# Patient Record
Sex: MALE | Race: WHITE | NOT HISPANIC OR LATINO | Employment: OTHER | URBAN - METROPOLITAN AREA
[De-identification: names, ages, dates, MRNs, and addresses within clinical notes are randomized per-mention and may not be internally consistent; named-entity substitution may affect disease eponyms.]

---

## 2017-01-31 ENCOUNTER — OFFICE VISIT (OUTPATIENT)
Dept: RHEUMATOLOGY | Facility: CLINIC | Age: 67
End: 2017-01-31
Payer: MEDICARE

## 2017-01-31 VITALS
BODY MASS INDEX: 27.25 KG/M2 | WEIGHT: 200.88 LBS | DIASTOLIC BLOOD PRESSURE: 86 MMHG | HEART RATE: 57 BPM | SYSTOLIC BLOOD PRESSURE: 135 MMHG

## 2017-01-31 DIAGNOSIS — M54.2 NECK PAIN, ACUTE: ICD-10-CM

## 2017-01-31 DIAGNOSIS — M19.90 ARTHRITIS PAIN: ICD-10-CM

## 2017-01-31 DIAGNOSIS — M47.15 OSTEOARTHRITIS OF SPINE WITH MYELOPATHY, THORACOLUMBAR REGION: ICD-10-CM

## 2017-01-31 DIAGNOSIS — M06.9 RHEUMATOID ARTHRITIS OF HAND, UNSPECIFIED LATERALITY, UNSPECIFIED RHEUMATOID FACTOR PRESENCE: ICD-10-CM

## 2017-01-31 DIAGNOSIS — K90.9 INTESTINAL MALABSORPTION, UNSPECIFIED TYPE: ICD-10-CM

## 2017-01-31 DIAGNOSIS — E78.9 LIPID DISORDER: ICD-10-CM

## 2017-01-31 DIAGNOSIS — E03.9 HYPOTHYROIDISM, UNSPECIFIED TYPE: Primary | ICD-10-CM

## 2017-01-31 PROCEDURE — 96372 THER/PROPH/DIAG INJ SC/IM: CPT | Mod: PBBFAC,PO

## 2017-01-31 PROCEDURE — 99214 OFFICE O/P EST MOD 30 MIN: CPT | Mod: 25,S$PBB,, | Performed by: INTERNAL MEDICINE

## 2017-01-31 PROCEDURE — 99213 OFFICE O/P EST LOW 20 MIN: CPT | Mod: PBBFAC,PO | Performed by: INTERNAL MEDICINE

## 2017-01-31 PROCEDURE — 99999 PR PBB SHADOW E&M-EST. PATIENT-LVL III: CPT | Mod: PBBFAC,,, | Performed by: INTERNAL MEDICINE

## 2017-01-31 RX ORDER — KETOROLAC TROMETHAMINE 30 MG/ML
60 INJECTION, SOLUTION INTRAMUSCULAR; INTRAVENOUS
Status: COMPLETED | OUTPATIENT
Start: 2017-01-31 | End: 2017-01-31

## 2017-01-31 RX ORDER — CYANOCOBALAMIN 1000 UG/ML
1000 INJECTION, SOLUTION INTRAMUSCULAR; SUBCUTANEOUS ONCE
Status: COMPLETED | OUTPATIENT
Start: 2017-01-31 | End: 2017-01-31

## 2017-01-31 RX ORDER — METHYLPREDNISOLONE ACETATE 80 MG/ML
160 INJECTION, SUSPENSION INTRA-ARTICULAR; INTRALESIONAL; INTRAMUSCULAR; SOFT TISSUE
Status: COMPLETED | OUTPATIENT
Start: 2017-01-31 | End: 2017-01-31

## 2017-01-31 RX ORDER — DICLOFENAC SODIUM 100 MG/1
TABLET, EXTENDED RELEASE ORAL
Qty: 60 TABLET | Refills: 2 | Status: SHIPPED | OUTPATIENT
Start: 2017-01-31 | End: 2017-07-26 | Stop reason: SDUPTHER

## 2017-01-31 RX ORDER — LEVOTHYROXINE SODIUM 25 UG/1
50 TABLET ORAL
Qty: 60 TABLET | Refills: 11 | Status: SHIPPED | OUTPATIENT
Start: 2017-01-31 | End: 2018-02-02 | Stop reason: SDUPTHER

## 2017-01-31 RX ORDER — ESZOPICLONE 1 MG/1
1 TABLET, FILM COATED ORAL NIGHTLY
Qty: 30 TABLET | Refills: 3 | Status: SHIPPED | OUTPATIENT
Start: 2017-01-31 | End: 2017-03-02

## 2017-01-31 RX ADMIN — CYANOCOBALAMIN 1000 MCG: 1000 INJECTION, SOLUTION INTRAMUSCULAR at 05:01

## 2017-01-31 RX ADMIN — METHYLPREDNISOLONE ACETATE 160 MG: 80 INJECTION, SUSPENSION INTRA-ARTICULAR; INTRALESIONAL; INTRAMUSCULAR; SOFT TISSUE at 05:01

## 2017-01-31 RX ADMIN — KETOROLAC TROMETHAMINE 60 MG: 60 INJECTION, SOLUTION INTRAMUSCULAR at 05:01

## 2017-01-31 ASSESSMENT — ROUTINE ASSESSMENT OF PATIENT INDEX DATA (RAPID3)
WHEN YOU AWAKENED IN THE MORNING OVER THE LAST WEEK, PLEASE INDICATE THE AMOUNT OF TIME IT TAKES UNTIL YOU ARE AS LIMBER AS YOU WILL BE FOR THE DAY: ONE HOUR AFTER WAKING
PSYCHOLOGICAL DISTRESS SCORE: 1.1
PATIENT GLOBAL ASSESSMENT SCORE: 4.5
FATIGUE SCORE: 5
AM STIFFNESS SCORE: 1, YES
PAIN SCORE: 6
MDHAQ FUNCTION SCORE: .7
TOTAL RAPID3 SCORE: 4.28

## 2017-01-31 NOTE — MR AVS SNAPSHOT
[facility]  [address] [facility] [address]  [facility] [address]  Phone: [phone]  Fax: [phone]                  Aurelio Perikns   2017 1:30 PM   Office Visit    Description:  Male : 1950   Provider:  Yoav Oliveira MD   Department:  [facility]           Reason for Visit     Follow-up           Diagnoses this Visit        Comments    Hypothyroidism, unspecified type    -  Primary     Rheumatoid arthritis of hand, unspecified laterality, unspecified rheumatoid factor presence         Arthritis pain         Neck pain, acute         Intestinal malabsorption, unspecified type         Osteoarthritis of spine with myelopathy, thoracolumbar region         Lipid disorder                To Do List           Goals (5 Years of Data)     None      Follow-Up and Disposition     Return in about 4 months (around 2017).       These Medications        Disp Refills Start End    diclofenac sodium 100 mg 24 hr tablet 60 tablet 2 2017     TAKE TWO TABLETS BY MOUTH DAILY    Pharmacy: [facility] [id number] [facility] [place] [address] Ph #: [phone]       levothyroxine (SYNTHROID) 25 MCG tablet 60 tablet 11 2017    Take 2 tablets (50 mcg total) by mouth before breakfast. - Oral    Pharmacy: [facility] [address] [facility] [place] [address] Ph #: [phone]         [facility]     [facility] -  Assistance  Registered nurses in the [facility] provide clinical advisement, health education, appointment booking, and other advisory services.  Call for this free service at [phone].             Medications           These medications were administered today        Dose Freq    ketorolac injection 60 mg 60 mg Clinic/HOD 1 time    Sig: Inject 2 mLs (60 mg total) into the muscle one time.    Class: Normal    Route: Intramuscular    methylPREDNISolone acetate injection 160 mg 160 mg Clinic/HOD 1 time     Sig: Inject 2 mLs (160 mg total) into the muscle one time.    Class: Normal    Route: Intramuscular    cyanocobalamin injection 1,000 mcg 1,000 mcg Once    Sig: Inject 1 mL (1,000 mcg total) into the muscle once.    Class: Normal    Route: Intramuscular      CHANGE how you are taking these medications     Start Taking Instead of    levothyroxine (SYNTHROID) 25 MCG tablet levothyroxine (SYNTHROID) 25 MCG tablet    Dosage:  Take 2 tablets (50 mcg total) by mouth before breakfast. Dosage:  Take 1 tablet (25 mcg total) by mouth before breakfast.    Reason for Change:  Reorder            Verify that the below list of medications is an accurate representation of the medications you are currently taking.  If none reported, the list may be blank. If incorrect, please contact your healthcare provider. Carry this list with you in case of emergency.           Current Medications     diclofenac sodium 100 mg 24 hr tablet TAKE TWO TABLETS BY MOUTH DAILY    ENBREL SURECLICK 50 mg/mL (0.98 mL) PnIj INJECT 1 PEN INTO THE SKIN EVERY 7 DAYS    eszopiclone 3 mg Tab TAKE 1 TABLET (3 MG TOTAL) BY MOUTH EVERY EVENING.    levothyroxine (SYNTHROID) 25 MCG tablet Take 2 tablets (50 mcg total) by mouth before breakfast.    syringe, disposable, 1 mL Syrg 1 Syringe by Misc.(Non-Drug; Combo Route) route once a week.    testosterone cypionate (DEPOTESTOTERONE CYPIONATE) 200 mg/mL injection            Clinical Reference Information           Vital Signs - Last Recorded  Most recent update: 1/31/2017  3:55 PM by Ana Luisa Chang LPN    BP Pulse Wt BMI       135/86 (BP Location: Right arm, Patient Position: Sitting, BP Method: Automatic) (!) 57 91.1 kg (200 lb 14.4 oz) 27.25 kg/m2       Blood Pressure          Most Recent Value    BP  135/86      Allergies as of 1/31/2017     Antihistamines - Alkylamine    Benadryl [Diphenhydramine Hcl]    Codeine    Lodine [Etodolac]    Methotrexate Analogues    Morphine      Immunizations Administered on  Date of Encounter - 1/31/2017     None      Orders Placed During Today's Visit      Normal Orders This Visit    C-reactive protein     CBC auto differential     Comprehensive metabolic panel     Sedimentation rate, manual     T3, free     T4, free     TSH     Vitamin D     Future Labs/Procedures Expected by Expires    C-reactive protein  1/31/2017 4/1/2018    CBC auto differential  1/31/2017 4/1/2018    Comprehensive metabolic panel  1/31/2017 4/1/2018    Sedimentation rate, manual  1/31/2017 4/1/2018    T3, free  1/31/2017 4/1/2018    T4, free  1/31/2017 4/1/2018    TSH  1/31/2017 4/1/2018    Vitamin D  1/31/2017 4/1/2018      [facility] Sign-Up     Activating your [facility] account is as easy as 1-2-3!     1) Visit [email or web address], select Sign Up Now, enter this activation code and your date of birth, then select Next.  [email or web address]  Expires: 3/17/2017  5:00 PM      2) Create a username and password to use when you visit [facility] in the future and select a security question in case you lose your password and select Next.    3) Enter your e-mail address and click Sign Up!    Additional Information  If you have questions, please e-mail [email or web address] or call [phone] to talk to our [facility] staff. Remember, [facility] is NOT to be used for urgent needs. For medical emergencies, dial 911.

## 2017-01-31 NOTE — PROGRESS NOTES
Administered 2 cc  Toradol 30mg/cc  to left ventrogluteal. Pt tolerated well. No acute reaction noted to site. Pt instructed on S/S to report. Pt verbalized understanding.     Lot: -[id number]  Exp: 1May2018  Administered 2 cc DepoMedrol 80mg/cc  to right ventrogluteal. Pt tolerated well. No acute reaction noted to site. Pt instructed on S/S to report. Pt verbalized understanding.     Lot: [id number]  Exp: 10/2017  Administered 1 cc Vitamin B12 1000mcg/cc to right ventrogluteal. Pt tolerated well. No acute reaction noted to site. Pt instructed on S/S to report. Pt verbalized understanding.     Lot: -[id number]  Exp: 1may2018

## 2017-01-31 NOTE — PROGRESS NOTES
Subjective:       Patient ID: Aurelio Perkins is a 66 y.o. male.    Chief Complaint: Follow-up    HPI Comments: Follow up: started synthroid help but now not helping , joint pain  Is sig. Patient complains of arthralgias and myalgias for which has been present for a few years. Pain is located in multiple joints, both shoulder(s), both elbow(s), both wrist(s), both MCP(s): 1st, 2nd, 3rd, 4th and 5th, both PIP(s): 1st, 2nd, 3rd, 4th and 5th, both DIP(s): 1st and 2nd, both hip(s), both knee(s) and both MTP(s): 1st, 2nd, 3rd, 4th and 5th, is described as aching, pulsating, shooting and throbbing, and is constant, moderate .  Associated symptoms include: crepitation, decreased range of motion, edema, effusion, tenderness and warmth.                  Past treatments include methotrexate. The treatment provided moderate relief. Compliance with prior treatments has been variable.     Review of Systems   Constitutional: Positive for activity change. Negative for appetite change, chills, diaphoresis and unexpected weight change.   HENT: Negative for congestion, ear pain, facial swelling, mouth sores, nosebleeds, postnasal drip, rhinorrhea, sinus pressure, sneezing, sore throat, tinnitus and voice change.    Eyes: Negative for pain, discharge, redness, itching and visual disturbance.   Respiratory: Negative for apnea, cough, chest tightness, shortness of breath and wheezing.    Cardiovascular: Negative for chest pain, palpitations and leg swelling.   Gastrointestinal: Negative for abdominal pain, constipation, diarrhea, nausea and vomiting.   Endocrine: Negative for cold intolerance, heat intolerance, polydipsia and polyuria.   Genitourinary: Negative for decreased urine volume, difficulty urinating, flank pain, frequency, hematuria and urgency.   Musculoskeletal: Positive for back pain and neck stiffness. Negative for arthralgias, gait problem and neck pain.   Skin: Positive for rash. Negative for pallor and wound.    Allergic/Immunologic: Negative for immunocompromised state.   Neurological: Negative for dizziness, tremors, seizures, syncope, weakness and numbness.   Hematological: Negative for adenopathy. Does not bruise/bleed easily.   Psychiatric/Behavioral: Negative for sleep disturbance and suicidal ideas. The patient is not nervous/anxious.          Objective:     Visit Vitals    /86 (BP Location: Right arm, Patient Position: Sitting, BP Method: Automatic)    Pulse (!) 57    Wt 91.1 kg (200 lb 14.4 oz)    BMI 27.25 kg/m2        Physical Exam   Vitals reviewed.  Constitutional: He is oriented to person, place, and time. He appears distressed.   HENT:   Head: Normocephalic and atraumatic.   Mouth/Throat: Oropharynx is clear and moist.   Eyes: EOM are normal. Pupils are equal, round, and reactive to light.   Neck: Neck supple. No thyromegaly present.   Cardiovascular: Normal rate, regular rhythm and normal heart sounds.  Exam reveals no gallop and no friction rub.    No murmur heard.  Pulmonary/Chest: Breath sounds normal. He has no wheezes. He has no rales. He exhibits no tenderness.   Abdominal: There is no tenderness. There is no rebound and no guarding.       Right Side Rheumatological Exam     Examination finds the 2nd MCP, 3rd MCP, 4th MCP and 5th MCP normal.    The patient is tender to palpation of the 1st PIP, 1st MCP, 2nd PIP, 2nd MCP, 3rd PIP, 3rd MCP, 4th PIP, 4th MCP and 5th PIP    He has swelling of the elbow, wrist, knee, 1st PIP, 1st MCP, 2nd PIP, 2nd MCP, 3rd PIP, 3rd MCP, 4th PIP, 4th MCP, 5th PIP and 5th MCP    The patient has an enlarged wrist, knee, 1st PIP, 2nd PIP, 3rd PIP, 4th PIP and 5th PIP    Shoulder Exam   Tenderness Location: no tenderness    Range of Motion   Active Abduction: abnormal   Adduction: abnormal  Sensation: normal    Knee Exam   Tenderness Location: medial joint line and LCL    Range of Motion   Extension: normal   Flexion: normal   Patellofemoral Crepitus:  positive  Effusion: positive  Sensation: normal    Hip Exam   Tenderness Location: posterior and anterior    Range of Motion   Extension: abnormal   Flexion: abnormal   Sensation: normal    Elbow/Wrist Exam   Tenderness Location: no tenderness    Range of Motion   Elbow   Flexion: normal   Sensation: normal    Muscle Strength (0-5 scale):  Neck Flexion:  3  Neck Extension: 3  Deltoid:  3  Biceps: 3/5   Triceps:  3  Quadriceps:  3   Distal Lower Extremity: 3    Left Side Rheumatological Exam     Examination finds the elbow, wrist, 1st MCP, 2nd MCP, 3rd MCP, 4th MCP and 5th MCP normal.    The patient is tender to palpation of the 1st PIP, 1st MCP, 2nd PIP, 2nd MCP, 3rd PIP, 3rd MCP, 4th PIP, 4th MCP, 5th PIP and 5th MCP.    He has swelling of the wrist, knee, 1st PIP, 1st MCP, 2nd PIP, 2nd MCP, 3rd PIP, 3rd MCP, 4th PIP, 4th MCP, 5th PIP and 5th MCP    The patient has an enlarged knee, 1st PIP, 2nd PIP, 3rd PIP, 4th PIP and 5th PIP.    Shoulder Exam   Tenderness Location: no tenderness    Range of Motion   Active Abduction: abnormal   Extension: abnormal   Sensation: normal    Knee Exam   Tenderness Location: lateral joint line and medial joint line    Range of Motion   Extension: normal   Flexion: normal     Patellofemoral Crepitus: positive  Effusion: positive  Sensation: normal    Hip Exam   Tenderness Location: posterior and anterior    Range of Motion   Extension: abnormal   Flexion: abnormal   Sensation: normal    Elbow/Wrist Exam     Range of Motion   Elbow   Flexion: normal   Sensation: normal    Muscle Strength (0-5 scale):  Neck Flexion:  3  Neck Extension: 3  Deltoid:  3  Biceps: 3/5   Triceps:  3  Quadriceps:  3   Distal Lower Extremity: 3      Back/Neck Exam   General Inspection   Gait: normal       Tenderness Right paramedian tenderness of the Upper C-Spine, Lower C-Spine, Lower L-Spine and SI Joint.Left paramedian tenderness of the Upper C-Spine, Lower L-Spine, Lower C-Spine and SI Joint.    Back Range  of Motion   Extension: abnormal  Flexion: abnormal  Lateral Bend Right: abnormal  Lateral Bend Left: abnormal  Rotation Right: abnormal  Rotation Left: abnormal    Neck Range of Motion   Flexion: Limited  Extension: Limited  Right Lateral Bend: abnormal  Left Lateral Bend: abnormal  Right Rotation: abnormal  Left Rotation: abnormal  Lymphadenopathy:     He has no cervical adenopathy.   Neurological: He is alert and oriented to person, place, and time. He displays weakness. He exhibits normal muscle tone.   Reflex Scores:       Patellar reflexes are 2+ on the right side and 2+ on the left side.  Skin: No rash noted. No erythema. No pallor.     Psychiatric: Mood and affect normal.   Musculoskeletal: He exhibits edema and deformity. He exhibits no tenderness.           Results for orders placed or performed in visit on 05/27/16   CBC auto differential   Result Value Ref Range    WBC 5.78 3.90 - 12.70 K/uL    RBC 4.80 4.60 - 6.20 M/uL    Hemoglobin 13.9 (L) 14.0 - 18.0 g/dL    Hematocrit 42.9 40.0 - 54.0 %    MCV 89 82 - 98 fL    MCH 29.0 27.0 - 31.0 pg    MCHC 32.4 32.0 - 36.0 %    RDW 13.3 11.5 - 14.5 %    Platelets 208 150 - 350 K/uL    MPV 11.5 9.2 - 12.9 fL    Gran # 3.3 1.8 - 7.7 K/uL    Lymph # 1.8 1.0 - 4.8 K/uL    Mono # 0.5 0.3 - 1.0 K/uL    Eos # 0.1 0.0 - 0.5 K/uL    Baso # 0.01 0.00 - 0.20 K/uL    Gran% 58.1 38.0 - 73.0 %    Lymph% 30.6 18.0 - 48.0 %    Mono% 9.2 4.0 - 15.0 %    Eosinophil% 1.9 0.0 - 8.0 %    Basophil% 0.2 0.0 - 1.9 %    Platelet Estimate Appears normal     Poik Slight     Eulogio Cells Occasional     Differential Method Automated    Comprehensive metabolic panel   Result Value Ref Range    Sodium 138 136 - 145 mmol/L    Potassium 4.3 3.5 - 5.1 mmol/L    Chloride 105 95 - 110 mmol/L    CO2 27 23 - 29 mmol/L    Glucose 72 70 - 110 mg/dL    BUN, Bld 14 8 - 23 mg/dL    Creatinine 1.3 0.5 - 1.4 mg/dL    Calcium 9.1 8.7 - 10.5 mg/dL    Total Protein 6.8 6.0 - 8.4 g/dL    Albumin 4.2 3.5 - 5.2 g/dL     Total Bilirubin 1.0 0.1 - 1.0 mg/dL    Alkaline Phosphatase 61 55 - 135 U/L    AST 44 (H) 10 - 40 U/L    ALT 65 (H) 10 - 44 U/L    Anion Gap 6 (L) 8 - 16 mmol/L    eGFR if African American >60.0 >60 mL/min/1.73 m^2    eGFR if non  57.7 (A) >60 mL/min/1.73 m^2   Rheumatoid factor   Result Value Ref Range    Rheumatoid Factor <10.0 0.0 - 15.0 IU/mL   Sedimentation rate, manual   Result Value Ref Range    Sed Rate 2 0 - 10 mm/Hr   TSH   Result Value Ref Range    TSH 2.535 0.400 - 4.000 uIU/mL   T4, free   Result Value Ref Range    Free T4 0.59 (L) 0.71 - 1.51 ng/dL   T3, free   Result Value Ref Range    T3, Free 1.5 (L) 2.3 - 4.2 pg/mL   Hemoglobin A1c   Result Value Ref Range    Hemoglobin A1C 5.5 4.5 - 6.2 %    Estimated Avg Glucose 111 68 - 131 mg/dL       Assessment:       Encounter Diagnoses   Name Primary?    Hypothyroidism, unspecified type Yes    Rheumatoid arthritis of hand, unspecified laterality, unspecified rheumatoid factor presence     Arthritis pain     Neck pain, acute     Intestinal malabsorption, unspecified type      Osteoarthritis of spine with myelopathy, thoracolumbar region     Lipid disorder          Plan:     Hypothyroidism, unspecified type  -     diclofenac sodium 100 mg 24 hr tablet; TAKE TWO TABLETS BY MOUTH DAILY  Dispense: 60 tablet; Refill: 2  -     ketorolac injection 60 mg; Inject 2 mLs (60 mg total) into the muscle one time.  -     methylPREDNISolone acetate injection 160 mg; Inject 2 mLs (160 mg total) into the muscle one time.  -     cyanocobalamin injection 1,000 mcg; Inject 1 mL (1,000 mcg total) into the muscle once.  -     Comprehensive metabolic panel; Future; Expected date: 1/31/17  -     CBC auto differential; Future; Expected date: 1/31/17  -     C-reactive protein; Future; Expected date: 1/31/17  -     Sedimentation rate, manual; Future; Expected date: 1/31/17  -     TSH; Future; Expected date: 1/31/17  -     T4, free; Future; Expected date:  1/31/17  -     T3, free; Future; Expected date: 1/31/17  -     Vitamin D; Future; Expected date: 1/31/17  -     levothyroxine (SYNTHROID) 25 MCG tablet; Take 2 tablets (50 mcg total) by mouth before breakfast.  Dispense: 60 tablet; Refill: 11    Rheumatoid arthritis of hand, unspecified laterality, unspecified rheumatoid factor presence  -     diclofenac sodium 100 mg 24 hr tablet; TAKE TWO TABLETS BY MOUTH DAILY  Dispense: 60 tablet; Refill: 2  -     ketorolac injection 60 mg; Inject 2 mLs (60 mg total) into the muscle one time.  -     methylPREDNISolone acetate injection 160 mg; Inject 2 mLs (160 mg total) into the muscle one time.  -     cyanocobalamin injection 1,000 mcg; Inject 1 mL (1,000 mcg total) into the muscle once.  -     Comprehensive metabolic panel; Future; Expected date: 1/31/17  -     CBC auto differential; Future; Expected date: 1/31/17  -     C-reactive protein; Future; Expected date: 1/31/17  -     Sedimentation rate, manual; Future; Expected date: 1/31/17  -     TSH; Future; Expected date: 1/31/17  -     T4, free; Future; Expected date: 1/31/17  -     T3, free; Future; Expected date: 1/31/17  -     Vitamin D; Future; Expected date: 1/31/17  -     levothyroxine (SYNTHROID) 25 MCG tablet; Take 2 tablets (50 mcg total) by mouth before breakfast.  Dispense: 60 tablet; Refill: 11    Arthritis pain  -     diclofenac sodium 100 mg 24 hr tablet; TAKE TWO TABLETS BY MOUTH DAILY  Dispense: 60 tablet; Refill: 2  -     ketorolac injection 60 mg; Inject 2 mLs (60 mg total) into the muscle one time.  -     methylPREDNISolone acetate injection 160 mg; Inject 2 mLs (160 mg total) into the muscle one time.  -     cyanocobalamin injection 1,000 mcg; Inject 1 mL (1,000 mcg total) into the muscle once.  -     Comprehensive metabolic panel; Future; Expected date: 1/31/17  -     CBC auto differential; Future; Expected date: 1/31/17  -     C-reactive protein; Future; Expected date: 1/31/17  -     Sedimentation rate,  manual; Future; Expected date: 1/31/17  -     TSH; Future; Expected date: 1/31/17  -     T4, free; Future; Expected date: 1/31/17  -     T3, free; Future; Expected date: 1/31/17  -     Vitamin D; Future; Expected date: 1/31/17    Neck pain, acute  -     diclofenac sodium 100 mg 24 hr tablet; TAKE TWO TABLETS BY MOUTH DAILY  Dispense: 60 tablet; Refill: 2  -     ketorolac injection 60 mg; Inject 2 mLs (60 mg total) into the muscle one time.  -     methylPREDNISolone acetate injection 160 mg; Inject 2 mLs (160 mg total) into the muscle one time.  -     cyanocobalamin injection 1,000 mcg; Inject 1 mL (1,000 mcg total) into the muscle once.  -     Comprehensive metabolic panel; Future; Expected date: 1/31/17  -     CBC auto differential; Future; Expected date: 1/31/17  -     C-reactive protein; Future; Expected date: 1/31/17  -     Sedimentation rate, manual; Future; Expected date: 1/31/17  -     TSH; Future; Expected date: 1/31/17  -     T4, free; Future; Expected date: 1/31/17  -     T3, free; Future; Expected date: 1/31/17  -     Vitamin D; Future; Expected date: 1/31/17    Intestinal malabsorption, unspecified type   -     Vitamin D; Future; Expected date: 1/31/17    Osteoarthritis of spine with myelopathy, thoracolumbar region  -     levothyroxine (SYNTHROID) 25 MCG tablet; Take 2 tablets (50 mcg total) by mouth before breakfast.  Dispense: 60 tablet; Refill: 11    Lipid disorder  -     levothyroxine (SYNTHROID) 25 MCG tablet; Take 2 tablets (50 mcg total) by mouth before breakfast.  Dispense: 60 tablet; Refill: 11

## 2017-04-12 ENCOUNTER — CLINICAL SUPPORT (OUTPATIENT)
Dept: RHEUMATOLOGY | Facility: CLINIC | Age: 67
End: 2017-04-12
Payer: MEDICARE

## 2017-04-12 DIAGNOSIS — M25.50 ARTHRALGIA, UNSPECIFIED JOINT: Primary | ICD-10-CM

## 2017-04-12 PROCEDURE — 96372 THER/PROPH/DIAG INJ SC/IM: CPT | Mod: PBBFAC,PO

## 2017-04-12 RX ORDER — CYANOCOBALAMIN 1000 UG/ML
1000 INJECTION, SOLUTION INTRAMUSCULAR; SUBCUTANEOUS
Status: COMPLETED | OUTPATIENT
Start: 2017-04-12 | End: 2017-04-12

## 2017-04-12 RX ORDER — METHYLPREDNISOLONE ACETATE 80 MG/ML
160 INJECTION, SUSPENSION INTRA-ARTICULAR; INTRALESIONAL; INTRAMUSCULAR; SOFT TISSUE
Status: COMPLETED | OUTPATIENT
Start: 2017-04-12 | End: 2017-04-12

## 2017-04-12 RX ADMIN — CYANOCOBALAMIN 1000 MCG: 1000 INJECTION, SOLUTION INTRAMUSCULAR at 03:04

## 2017-04-12 RX ADMIN — METHYLPREDNISOLONE ACETATE 160 MG: 80 INJECTION, SUSPENSION INTRA-ARTICULAR; INTRALESIONAL; INTRAMUSCULAR; SOFT TISSUE at 03:04

## 2017-04-12 NOTE — PROGRESS NOTES
Administered 160mg (2cc) of 80/ml of Depo Medrol and 1cc CYANOCOBALAMIN 1000mcg to left upper outer quadrant of the gluteus jhonny. Patient tolerated well. No acute reaction noted. Instructed patient to report S/S. Patient verbalized understanding.

## 2017-06-26 ENCOUNTER — TELEPHONE (OUTPATIENT)
Dept: RHEUMATOLOGY | Facility: CLINIC | Age: 67
End: 2017-06-26

## 2017-06-26 NOTE — TELEPHONE ENCOUNTER
----- Message from Evelyn Lassiter sent at 6/26/2017  9:50 AM CDT -----  [facility] is calling for lab orders, please call [phone] fax # [phone]

## 2017-06-30 ENCOUNTER — OFFICE VISIT (OUTPATIENT)
Dept: RHEUMATOLOGY | Facility: CLINIC | Age: 67
End: 2017-06-30
Payer: MEDICARE

## 2017-06-30 ENCOUNTER — LAB VISIT (OUTPATIENT)
Dept: LAB | Facility: HOSPITAL | Age: 67
End: 2017-06-30
Attending: INTERNAL MEDICINE
Payer: MEDICARE

## 2017-06-30 VITALS
BODY MASS INDEX: 27.34 KG/M2 | HEART RATE: 59 BPM | HEIGHT: 72 IN | SYSTOLIC BLOOD PRESSURE: 139 MMHG | WEIGHT: 201.88 LBS | DIASTOLIC BLOOD PRESSURE: 83 MMHG

## 2017-06-30 DIAGNOSIS — R94.6 ABNORMAL RESULTS OF THYROID FUNCTION STUDIES: ICD-10-CM

## 2017-06-30 DIAGNOSIS — M06.9 RHEUMATOID ARTHRITIS, INVOLVING UNSPECIFIED SITE, UNSPECIFIED RHEUMATOID FACTOR PRESENCE: Primary | ICD-10-CM

## 2017-06-30 DIAGNOSIS — M06.9 ATROPHIC ARTHRITIS: ICD-10-CM

## 2017-06-30 DIAGNOSIS — R06.83 SNORINGS: ICD-10-CM

## 2017-06-30 DIAGNOSIS — K04.7 ABSCESSED TOOTH: ICD-10-CM

## 2017-06-30 DIAGNOSIS — C61 PROSTATE CANCER: Primary | ICD-10-CM

## 2017-06-30 DIAGNOSIS — K04.7 PERIAPICAL ABSCESS WITHOUT SINUS: ICD-10-CM

## 2017-06-30 DIAGNOSIS — R94.6 NONSPECIFIC ABNORMAL RESULTS OF THYROID FUNCTION STUDY: ICD-10-CM

## 2017-06-30 DIAGNOSIS — M47.15 OSTEOARTHRITIS OF SPINE WITH MYELOPATHY, THORACOLUMBAR REGION: ICD-10-CM

## 2017-06-30 DIAGNOSIS — C61 PROSTATE CA: ICD-10-CM

## 2017-06-30 LAB
ALBUMIN SERPL BCP-MCNC: 3.9 G/DL
ALP SERPL-CCNC: 30 U/L
ALT SERPL W/O P-5'-P-CCNC: 28 U/L
ANION GAP SERPL CALC-SCNC: 6 MMOL/L
AST SERPL-CCNC: 27 U/L
BASOPHILS # BLD AUTO: 0.02 K/UL
BASOPHILS NFR BLD: 0.3 %
BILIRUB SERPL-MCNC: 1.6 MG/DL
BUN SERPL-MCNC: 16 MG/DL
CALCIUM SERPL-MCNC: 9 MG/DL
CHLORIDE SERPL-SCNC: 104 MMOL/L
CO2 SERPL-SCNC: 27 MMOL/L
CREAT SERPL-MCNC: 1.4 MG/DL
CRP SERPL-MCNC: 0.8 MG/L
DIFFERENTIAL METHOD: ABNORMAL
EOSINOPHIL # BLD AUTO: 0.2 K/UL
EOSINOPHIL NFR BLD: 2.8 %
ERYTHROCYTE [DISTWIDTH] IN BLOOD BY AUTOMATED COUNT: 12.6 %
ERYTHROCYTE [SEDIMENTATION RATE] IN BLOOD BY WESTERGREN METHOD: 3 MM/HR
EST. GFR  (AFRICAN AMERICAN): >60 ML/MIN/1.73 M^2
EST. GFR  (NON AFRICAN AMERICAN): 52.4 ML/MIN/1.73 M^2
GLUCOSE SERPL-MCNC: 86 MG/DL
HCT VFR BLD AUTO: 41.2 %
HGB BLD-MCNC: 13.8 G/DL
LYMPHOCYTES # BLD AUTO: 2.2 K/UL
LYMPHOCYTES NFR BLD: 35.9 %
MCH RBC QN AUTO: 28.6 PG
MCHC RBC AUTO-ENTMCNC: 33.5 %
MCV RBC AUTO: 86 FL
MONOCYTES # BLD AUTO: 0.6 K/UL
MONOCYTES NFR BLD: 9.8 %
NEUTROPHILS # BLD AUTO: 3.1 K/UL
NEUTROPHILS NFR BLD: 51 %
PLATELET # BLD AUTO: 155 K/UL
PMV BLD AUTO: 10.4 FL
POTASSIUM SERPL-SCNC: 4.5 MMOL/L
PROSTATE SPECIFIC ANTIGEN, TOTAL: <0.01 NG/ML
PROT SERPL-MCNC: 6.6 G/DL
PSA FREE MFR SERPL: NORMAL %
PSA FREE SERPL-MCNC: <0.01 NG/ML
PTH-INTACT SERPL-MCNC: 72 PG/ML
RBC # BLD AUTO: 4.82 M/UL
SODIUM SERPL-SCNC: 137 MMOL/L
T3FREE SERPL-MCNC: 2 PG/ML
T4 FREE SERPL-MCNC: 0.69 NG/DL
TSH SERPL DL<=0.005 MIU/L-ACNC: 0.8 UIU/ML
WBC # BLD AUTO: 6.01 K/UL

## 2017-06-30 PROCEDURE — 99214 OFFICE O/P EST MOD 30 MIN: CPT | Mod: 25,S$PBB,, | Performed by: INTERNAL MEDICINE

## 2017-06-30 PROCEDURE — 80053 COMPREHEN METABOLIC PANEL: CPT

## 2017-06-30 PROCEDURE — 86140 C-REACTIVE PROTEIN: CPT

## 2017-06-30 PROCEDURE — 84443 ASSAY THYROID STIM HORMONE: CPT

## 2017-06-30 PROCEDURE — 99213 OFFICE O/P EST LOW 20 MIN: CPT | Mod: PBBFAC,PO | Performed by: INTERNAL MEDICINE

## 2017-06-30 PROCEDURE — 84439 ASSAY OF FREE THYROXINE: CPT

## 2017-06-30 PROCEDURE — 96372 THER/PROPH/DIAG INJ SC/IM: CPT | Mod: PBBFAC,PO

## 2017-06-30 PROCEDURE — 99999 PR PBB SHADOW E&M-EST. PATIENT-LVL III: CPT | Mod: PBBFAC,,, | Performed by: INTERNAL MEDICINE

## 2017-06-30 PROCEDURE — 85025 COMPLETE CBC W/AUTO DIFF WBC: CPT

## 2017-06-30 PROCEDURE — 85651 RBC SED RATE NONAUTOMATED: CPT | Mod: PO

## 2017-06-30 PROCEDURE — 36415 COLL VENOUS BLD VENIPUNCTURE: CPT | Mod: PO

## 2017-06-30 PROCEDURE — 84481 FREE ASSAY (FT-3): CPT

## 2017-06-30 PROCEDURE — 83970 ASSAY OF PARATHORMONE: CPT

## 2017-06-30 PROCEDURE — 84153 ASSAY OF PSA TOTAL: CPT

## 2017-06-30 RX ORDER — METHYLPREDNISOLONE ACETATE 80 MG/ML
80 INJECTION, SUSPENSION INTRA-ARTICULAR; INTRALESIONAL; INTRAMUSCULAR; SOFT TISSUE
Status: COMPLETED | OUTPATIENT
Start: 2017-06-30 | End: 2017-06-30

## 2017-06-30 RX ORDER — CEFTRIAXONE 1 G/1
1 INJECTION, POWDER, FOR SOLUTION INTRAMUSCULAR; INTRAVENOUS
Status: COMPLETED | OUTPATIENT
Start: 2017-06-30 | End: 2017-06-30

## 2017-06-30 RX ORDER — CYANOCOBALAMIN 1000 UG/ML
1000 INJECTION, SOLUTION INTRAMUSCULAR; SUBCUTANEOUS
Status: COMPLETED | OUTPATIENT
Start: 2017-06-30 | End: 2017-06-30

## 2017-06-30 RX ORDER — CEPHALEXIN 500 MG/1
1000 CAPSULE ORAL EVERY 12 HOURS
Qty: 40 CAPSULE | Refills: 2 | Status: SHIPPED | OUTPATIENT
Start: 2017-06-30 | End: 2017-07-10

## 2017-06-30 RX ORDER — ESZOPICLONE 2 MG/1
2 TABLET, FILM COATED ORAL NIGHTLY
Qty: 30 TABLET | Refills: 4 | Status: SHIPPED | OUTPATIENT
Start: 2017-06-30 | End: 2018-01-09 | Stop reason: SDUPTHER

## 2017-06-30 RX ORDER — CHLORHEXIDINE GLUCONATE ORAL RINSE 1.2 MG/ML
15 SOLUTION DENTAL 2 TIMES DAILY
Qty: 473 ML | Refills: 6 | Status: SHIPPED | OUTPATIENT
Start: 2017-06-30 | End: 2018-10-10 | Stop reason: SDUPTHER

## 2017-06-30 RX ORDER — ESZOPICLONE 1 MG/1
1 TABLET, FILM COATED ORAL NIGHTLY
Refills: 3 | COMMUNITY
Start: 2017-05-08 | End: 2017-06-30 | Stop reason: SDUPTHER

## 2017-06-30 RX ORDER — KETOROLAC TROMETHAMINE 30 MG/ML
30 INJECTION, SOLUTION INTRAMUSCULAR; INTRAVENOUS
Status: COMPLETED | OUTPATIENT
Start: 2017-06-30 | End: 2017-06-30

## 2017-06-30 RX ADMIN — METHYLPREDNISOLONE ACETATE 80 MG: 80 INJECTION, SUSPENSION INTRA-ARTICULAR; INTRALESIONAL; INTRAMUSCULAR; SOFT TISSUE at 03:06

## 2017-06-30 RX ADMIN — CEFTRIAXONE SODIUM 1 G: 1 INJECTION, POWDER, FOR SOLUTION INTRAMUSCULAR; INTRAVENOUS at 03:06

## 2017-06-30 RX ADMIN — KETOROLAC TROMETHAMINE 30 MG: 60 INJECTION, SOLUTION INTRAMUSCULAR at 03:06

## 2017-06-30 RX ADMIN — CYANOCOBALAMIN 1000 MCG: 1000 INJECTION, SOLUTION INTRAMUSCULAR at 03:06

## 2017-06-30 NOTE — PROGRESS NOTES
Subjective:       Patient ID: Aurelio Perkins is a 66 y.o. male.    Chief Complaint: Rheumatoid Arthritis (4 month follow up)    Follow up: started synthroid, he has had tooth abscess.Patient complains of arthralgias and myalgias for which has been present for a few years. Pain is located in multiple joints, both shoulder(s), both elbow(s), both wrist(s), both MCP(s): 1st, 2nd, 3rd, 4th and 5th, both PIP(s): 1st, 2nd, 3rd, 4th and 5th, both DIP(s): 1st and 2nd, both hip(s), both knee(s) and both MTP(s): 1st, 2nd, 3rd, 4th and 5th, is described as aching, pulsating, shooting and throbbing, and is constant, moderate .  Associated symptoms include: crepitation, decreased range of motion, edema, effusion, tenderness and warmth.                    Past treatments include methotrexate. The treatment provided moderate relief. Compliance with prior treatments has been variable.     Review of Systems   Constitutional: Positive for activity change. Negative for appetite change, chills, diaphoresis and unexpected weight change.   HENT: Negative for congestion, ear pain, facial swelling, mouth sores, nosebleeds, postnasal drip, rhinorrhea, sinus pressure, sneezing, sore throat, tinnitus and voice change.    Eyes: Negative for pain, discharge, redness, itching and visual disturbance.   Respiratory: Negative for apnea, cough, chest tightness, shortness of breath and wheezing.    Cardiovascular: Negative for chest pain, palpitations and leg swelling.   Gastrointestinal: Negative for abdominal pain, constipation, diarrhea, nausea and vomiting.   Endocrine: Negative for cold intolerance, heat intolerance, polydipsia and polyuria.   Genitourinary: Negative for decreased urine volume, difficulty urinating, flank pain, frequency, hematuria and urgency.   Musculoskeletal: Positive for back pain and neck stiffness. Negative for arthralgias, gait problem and neck pain.   Skin: Positive for rash. Negative for pallor and wound.    Allergic/Immunologic: Negative for immunocompromised state.   Neurological: Negative for dizziness, tremors, seizures, syncope, weakness and numbness.   Hematological: Negative for adenopathy. Does not bruise/bleed easily.   Psychiatric/Behavioral: Negative for sleep disturbance and suicidal ideas. The patient is not nervous/anxious.          Objective:     /83   Pulse (!) 59   Ht 6' (1.829 m)   Wt 91.6 kg (201 lb 14.4 oz)   BMI 27.38 kg/m²      Physical Exam   Vitals reviewed.  Constitutional: He is oriented to person, place, and time. He appears distressed.   HENT:   Head: Normocephalic and atraumatic.   Mouth/Throat: Oropharynx is clear and moist.   Eyes: EOM are normal. Pupils are equal, round, and reactive to light.   Neck: Neck supple. No thyromegaly present.   Cardiovascular: Normal rate, regular rhythm and normal heart sounds.  Exam reveals no gallop and no friction rub.    No murmur heard.  Pulmonary/Chest: Breath sounds normal. He has no wheezes. He has no rales. He exhibits no tenderness.   Abdominal: There is no tenderness. There is no rebound and no guarding.       Right Side Rheumatological Exam     Examination finds the 2nd MCP, 3rd MCP, 4th MCP and 5th MCP normal.    The patient is tender to palpation of the 1st PIP, 1st MCP, 2nd PIP, 2nd MCP, 3rd PIP, 3rd MCP, 4th PIP, 4th MCP and 5th PIP    He has swelling of the elbow, wrist, knee, 1st PIP, 1st MCP, 2nd PIP, 2nd MCP, 3rd PIP, 3rd MCP, 4th PIP, 4th MCP, 5th PIP and 5th MCP    The patient has an enlarged wrist, knee, 1st PIP, 2nd PIP, 3rd PIP, 4th PIP and 5th PIP    Shoulder Exam   Tenderness Location: no tenderness    Range of Motion   Active Abduction: abnormal   Adduction: abnormal  Sensation: normal    Knee Exam   Tenderness Location: medial joint line and LCL    Range of Motion   Extension: normal   Flexion: normal   Patellofemoral Crepitus: positive  Effusion: positive  Sensation: normal    Hip Exam   Tenderness Location: posterior  and anterior    Range of Motion   Extension: abnormal   Flexion: abnormal   Sensation: normal    Elbow/Wrist Exam   Tenderness Location: no tenderness    Range of Motion   Elbow   Flexion: normal   Sensation: normal    Muscle Strength (0-5 scale):  Neck Flexion:  3  Neck Extension: 3  Deltoid:  3  Biceps: 3/5   Triceps:  3  Quadriceps:  3   Distal Lower Extremity: 3    Left Side Rheumatological Exam     Examination finds the elbow, wrist, 1st MCP, 2nd MCP, 3rd MCP, 4th MCP and 5th MCP normal.    The patient is tender to palpation of the 1st PIP, 1st MCP, 2nd PIP, 2nd MCP, 3rd PIP, 3rd MCP, 4th PIP, 4th MCP, 5th PIP and 5th MCP.    He has swelling of the wrist, knee, 1st PIP, 1st MCP, 2nd PIP, 2nd MCP, 3rd PIP, 3rd MCP, 4th PIP, 4th MCP, 5th PIP and 5th MCP    The patient has an enlarged knee, 1st PIP, 2nd PIP, 3rd PIP, 4th PIP and 5th PIP.    Shoulder Exam   Tenderness Location: no tenderness    Range of Motion   Active Abduction: abnormal   Extension: abnormal   Sensation: normal    Knee Exam   Tenderness Location: lateral joint line and medial joint line    Range of Motion   Extension: normal   Flexion: normal     Patellofemoral Crepitus: positive  Effusion: positive  Sensation: normal    Hip Exam   Tenderness Location: posterior and anterior    Range of Motion   Extension: abnormal   Flexion: abnormal   Sensation: normal    Elbow/Wrist Exam     Range of Motion   Elbow   Flexion: normal   Sensation: normal    Muscle Strength (0-5 scale):  Neck Flexion:  3  Neck Extension: 3  Deltoid:  3  Biceps: 3/5   Triceps:  3  Quadriceps:  3   Distal Lower Extremity: 3      Back/Neck Exam   General Inspection   Gait: normal       Tenderness Right paramedian tenderness of the Upper C-Spine, Lower C-Spine, Lower L-Spine and SI Joint.Left paramedian tenderness of the Upper C-Spine, Lower L-Spine, Lower C-Spine and SI Joint.    Back Range of Motion   Extension: abnormal  Flexion: abnormal  Lateral Bend Right: abnormal  Lateral  Bend Left: abnormal  Rotation Right: abnormal  Rotation Left: abnormal    Neck Range of Motion   Flexion: Limited  Extension: Limited  Right Lateral Bend: abnormal  Left Lateral Bend: abnormal  Right Rotation: abnormal  Left Rotation: abnormal  Lymphadenopathy:     He has no cervical adenopathy.   Neurological: He is alert and oriented to person, place, and time. He displays weakness. He exhibits normal muscle tone.   Reflex Scores:       Patellar reflexes are 2+ on the right side and 2+ on the left side.  Skin: No rash noted. No erythema. No pallor.     Psychiatric: Mood and affect normal.   Musculoskeletal: He exhibits edema and deformity. He exhibits no tenderness.           Results for orders placed or performed in visit on 05/27/16   CBC auto differential   Result Value Ref Range    WBC 5.78 3.90 - 12.70 K/uL    RBC 4.80 4.60 - 6.20 M/uL    Hemoglobin 13.9 (L) 14.0 - 18.0 g/dL    Hematocrit 42.9 40.0 - 54.0 %    MCV 89 82 - 98 fL    MCH 29.0 27.0 - 31.0 pg    MCHC 32.4 32.0 - 36.0 %    RDW 13.3 11.5 - 14.5 %    Platelets 208 150 - 350 K/uL    MPV 11.5 9.2 - 12.9 fL    Gran # 3.3 1.8 - 7.7 K/uL    Lymph # 1.8 1.0 - 4.8 K/uL    Mono # 0.5 0.3 - 1.0 K/uL    Eos # 0.1 0.0 - 0.5 K/uL    Baso # 0.01 0.00 - 0.20 K/uL    Gran% 58.1 38.0 - 73.0 %    Lymph% 30.6 18.0 - 48.0 %    Mono% 9.2 4.0 - 15.0 %    Eosinophil% 1.9 0.0 - 8.0 %    Basophil% 0.2 0.0 - 1.9 %    Platelet Estimate Appears normal     Poik Slight     Orient Cells Occasional     Differential Method Automated    Comprehensive metabolic panel   Result Value Ref Range    Sodium 138 136 - 145 mmol/L    Potassium 4.3 3.5 - 5.1 mmol/L    Chloride 105 95 - 110 mmol/L    CO2 27 23 - 29 mmol/L    Glucose 72 70 - 110 mg/dL    BUN, Bld 14 8 - 23 mg/dL    Creatinine 1.3 0.5 - 1.4 mg/dL    Calcium 9.1 8.7 - 10.5 mg/dL    Total Protein 6.8 6.0 - 8.4 g/dL    Albumin 4.2 3.5 - 5.2 g/dL    Total Bilirubin 1.0 0.1 - 1.0 mg/dL    Alkaline Phosphatase 61 55 - 135 U/L    AST 44  (H) 10 - 40 U/L    ALT 65 (H) 10 - 44 U/L    Anion Gap 6 (L) 8 - 16 mmol/L    eGFR if African American >60.0 >60 mL/min/1.73 m^2    eGFR if non  57.7 (A) >60 mL/min/1.73 m^2   Rheumatoid factor   Result Value Ref Range    Rheumatoid Factor <10.0 0.0 - 15.0 IU/mL   Sedimentation rate, manual   Result Value Ref Range    Sed Rate 2 0 - 10 mm/Hr   TSH   Result Value Ref Range    TSH 2.535 0.400 - 4.000 uIU/mL   T4, free   Result Value Ref Range    Free T4 0.59 (L) 0.71 - 1.51 ng/dL   T3, free   Result Value Ref Range    T3, Free 1.5 (L) 2.3 - 4.2 pg/mL   Hemoglobin A1c   Result Value Ref Range    Hemoglobin A1C 5.5 4.5 - 6.2 %    Estimated Avg Glucose 111 68 - 131 mg/dL       Assessment:       Encounter Diagnoses   Name Primary?    Rheumatoid arthritis, involving unspecified site, unspecified rheumatoid factor presence Yes    Abscessed tooth     Abnormal results of thyroid function studies      Osteoarthritis of spine with myelopathy, thoracolumbar region          Plan:     Rheumatoid arthritis, involving unspecified site, unspecified rheumatoid factor presence  -     methylPREDNISolone acetate injection 80 mg; Inject 1 mL (80 mg total) into the muscle one time.  -     ketorolac injection 30 mg; Inject 1 mL (30 mg total) into the muscle one time.  -     cyanocobalamin injection 1,000 mcg; Inject 1 mL (1,000 mcg total) into the muscle one time.  -     CBC auto differential; Future; Expected date: 06/30/2017  -     Comprehensive metabolic panel; Future; Expected date: 06/30/2017  -     C-reactive protein; Future; Expected date: 06/30/2017  -     Sedimentation rate, manual; Future; Expected date: 06/30/2017  -     TSH; Future; Expected date: 06/30/2017  -     T4, free; Future; Expected date: 06/30/2017  -     T3, free; Future; Expected date: 06/30/2017  -     PTH, intact; Future; Expected date: 06/30/2017  -     TESTOSTERONE; Future; Expected date: 06/30/2017    Abscessed tooth  -     cefTRIAXone  injection 1 g; Inject 1 g into the muscle one time.  -     methylPREDNISolone acetate injection 80 mg; Inject 1 mL (80 mg total) into the muscle one time.  -     ketorolac injection 30 mg; Inject 1 mL (30 mg total) into the muscle one time.  -     cyanocobalamin injection 1,000 mcg; Inject 1 mL (1,000 mcg total) into the muscle one time.  -     CBC auto differential; Future; Expected date: 06/30/2017  -     Comprehensive metabolic panel; Future; Expected date: 06/30/2017  -     C-reactive protein; Future; Expected date: 06/30/2017  -     Sedimentation rate, manual; Future; Expected date: 06/30/2017  -     TSH; Future; Expected date: 06/30/2017  -     T4, free; Future; Expected date: 06/30/2017  -     T3, free; Future; Expected date: 06/30/2017  -     PTH, intact; Future; Expected date: 06/30/2017  -     TESTOSTERONE; Future; Expected date: 06/30/2017    Abnormal results of thyroid function studies   -     CBC auto differential; Future; Expected date: 06/30/2017  -     Comprehensive metabolic panel; Future; Expected date: 06/30/2017  -     C-reactive protein; Future; Expected date: 06/30/2017  -     Sedimentation rate, manual; Future; Expected date: 06/30/2017  -     TSH; Future; Expected date: 06/30/2017  -     T4, free; Future; Expected date: 06/30/2017  -     T3, free; Future; Expected date: 06/30/2017  -     PTH, intact; Future; Expected date: 06/30/2017  -     TESTOSTERONE; Future; Expected date: 06/30/2017    Osteoarthritis of spine with myelopathy, thoracolumbar region    Other orders  -     cephALEXin (KEFLEX) 500 MG capsule; Take 2 capsules (1,000 mg total) by mouth every 12 (twelve) hours.  Dispense: 40 capsule; Refill: 2    held enbrel until teeth are treated

## 2017-06-30 NOTE — PROGRESS NOTES
Administered 1 gram of rocephin mixed with 2.1 ml of lidocaine. Given in the left upper outer gluteal. Informed of s/s to report verbalized understanding. No adverse reactions noted.    Lot # [id number]  Expiration 1 nov 2019    Administered 1 cc ( 1000 mcg/ml ) of b12 to the right upper outer gluteal. Informed of s/s to report verbalized understanding. No adverse reactions noted.    Lot # [id number]  Expiration aug 18    Administered 1 cc ( 30 mg/ml ) of toradol to the right upper outer gluteal. Informed of s/s to report verbalized understanding. No adverse reactions noted.    Lot # [id number]  Expiration 1 mar 2018      Administered 1 cc ( 80 mg/ml ) of depomedrol to the right upper outer gluteal. Informed of s/s to report verbalized understanding. No adverse reactions noted.    Lot # [id number]  Expiration 06/2018

## 2017-07-03 ENCOUNTER — TELEPHONE (OUTPATIENT)
Dept: RHEUMATOLOGY | Facility: CLINIC | Age: 67
End: 2017-07-03

## 2017-07-03 DIAGNOSIS — R06.83 SNORING: Primary | ICD-10-CM

## 2017-07-03 NOTE — TELEPHONE ENCOUNTER
----- Message from Batool Hammer sent at 7/3/2017  8:44 AM CDT -----  Contact: Cayla with Dr Carol Nicole at [phone]  Cayla with Dr Carol Nicole at [phone], Calling about the referral for sleep disorders. Dr Nicole does not handle sleep disorders. Thanks.

## 2017-07-05 ENCOUNTER — TELEPHONE (OUTPATIENT)
Dept: PHARMACY | Facility: CLINIC | Age: 67
End: 2017-07-05

## 2017-07-26 DIAGNOSIS — M54.2 NECK PAIN, ACUTE: ICD-10-CM

## 2017-07-26 DIAGNOSIS — M19.90 ARTHRITIS PAIN: ICD-10-CM

## 2017-07-26 DIAGNOSIS — E03.9 HYPOTHYROIDISM, UNSPECIFIED TYPE: ICD-10-CM

## 2017-07-26 DIAGNOSIS — M06.9 RHEUMATOID ARTHRITIS OF HAND, UNSPECIFIED LATERALITY, UNSPECIFIED RHEUMATOID FACTOR PRESENCE: ICD-10-CM

## 2017-07-26 RX ORDER — DICLOFENAC SODIUM 100 MG/1
TABLET, EXTENDED RELEASE ORAL
Qty: 60 TABLET | Refills: 2 | Status: SHIPPED | OUTPATIENT
Start: 2017-07-26 | End: 2017-10-28 | Stop reason: SDUPTHER

## 2017-08-02 ENCOUNTER — TELEPHONE (OUTPATIENT)
Dept: PHARMACY | Facility: CLINIC | Age: 67
End: 2017-08-02

## 2017-08-07 DIAGNOSIS — E78.9 LIPID DISORDER: ICD-10-CM

## 2017-08-07 DIAGNOSIS — M47.15 OSTEOARTHRITIS OF SPINE WITH MYELOPATHY, THORACOLUMBAR REGION: ICD-10-CM

## 2017-08-07 DIAGNOSIS — E03.9 HYPOTHYROIDISM, UNSPECIFIED TYPE: ICD-10-CM

## 2017-08-07 DIAGNOSIS — M06.9 RHEUMATOID ARTHRITIS OF HAND, UNSPECIFIED LATERALITY, UNSPECIFIED RHEUMATOID FACTOR PRESENCE: ICD-10-CM

## 2017-08-07 RX ORDER — LEVOTHYROXINE SODIUM 25 UG/1
TABLET ORAL
Qty: 30 TABLET | Refills: 11 | Status: SHIPPED | OUTPATIENT
Start: 2017-08-07 | End: 2018-07-05

## 2017-08-17 ENCOUNTER — TELEPHONE (OUTPATIENT)
Dept: PHARMACY | Facility: CLINIC | Age: 67
End: 2017-08-17

## 2017-08-22 ENCOUNTER — TELEPHONE (OUTPATIENT)
Dept: PHARMACY | Facility: CLINIC | Age: 67
End: 2017-08-22

## 2017-09-11 ENCOUNTER — TELEPHONE (OUTPATIENT)
Dept: PHARMACY | Facility: CLINIC | Age: 67
End: 2017-09-11

## 2017-09-12 ENCOUNTER — TELEPHONE (OUTPATIENT)
Dept: RHEUMATOLOGY | Facility: CLINIC | Age: 67
End: 2017-09-12

## 2017-09-12 NOTE — TELEPHONE ENCOUNTER
Spoke with pt regarding lab results. Informed that Dr Oliveira has reviewed and advised that tsh is normal but active and total thyroid low. Pt requested increase in thyroid medication. Discussed with Dr Oliveira. Advised to increase to 75 mcg daily. Pt verbalized understanding.

## 2017-09-12 NOTE — TELEPHONE ENCOUNTER
----- Message from Yoav Oliveira MD sent at 9/5/2017  5:22 PM CDT -----  tsh is normal but active and total thyroid  Are low we will recheck at next visit if still oddly low we will refer to endocrine

## 2017-09-15 ENCOUNTER — CLINICAL SUPPORT (OUTPATIENT)
Dept: RHEUMATOLOGY | Facility: CLINIC | Age: 67
End: 2017-09-15
Payer: MEDICARE

## 2017-09-15 DIAGNOSIS — M06.9 RHEUMATOID ARTHRITIS FLARE: Primary | ICD-10-CM

## 2017-09-15 PROCEDURE — 99211 OFF/OP EST MAY X REQ PHY/QHP: CPT | Mod: S$PBB,,, | Performed by: INTERNAL MEDICINE

## 2017-09-15 PROCEDURE — 96372 THER/PROPH/DIAG INJ SC/IM: CPT | Mod: PBBFAC,PO

## 2017-09-15 RX ORDER — CYANOCOBALAMIN 1000 UG/ML
1000 INJECTION, SOLUTION INTRAMUSCULAR; SUBCUTANEOUS
Status: COMPLETED | OUTPATIENT
Start: 2017-09-15 | End: 2017-09-15

## 2017-09-15 RX ORDER — METHYLPREDNISOLONE ACETATE 80 MG/ML
160 INJECTION, SUSPENSION INTRA-ARTICULAR; INTRALESIONAL; INTRAMUSCULAR; SOFT TISSUE
Status: COMPLETED | OUTPATIENT
Start: 2017-09-15 | End: 2017-09-15

## 2017-09-15 RX ADMIN — METHYLPREDNISOLONE ACETATE 160 MG: 80 INJECTION, SUSPENSION INTRA-ARTICULAR; INTRALESIONAL; INTRAMUSCULAR; SOFT TISSUE at 10:09

## 2017-09-15 RX ADMIN — CYANOCOBALAMIN 1000 MCG: 1000 INJECTION INTRAMUSCULAR; SUBCUTANEOUS at 10:09

## 2017-09-15 NOTE — PROGRESS NOTES
Patient arrives for nurse injections for RA flare. Patient receives B 12 1000 mcg and Depo-medrol 160 mg IM. See MAR

## 2017-09-25 ENCOUNTER — TELEPHONE (OUTPATIENT)
Dept: PHARMACY | Facility: CLINIC | Age: 67
End: 2017-09-25

## 2017-09-27 ENCOUNTER — TELEPHONE (OUTPATIENT)
Dept: PHARMACY | Facility: CLINIC | Age: 67
End: 2017-09-27

## 2017-09-27 NOTE — TELEPHONE ENCOUNTER
Good afternoon,    When Mr. Perkins was just reached regarding his Enbrel refill he stated that he does not believe this medication is to be continued and there was going to be a change in treatment. Just wanted to confirm this with your office, so we will close him out on our end appropriately and not cause any confusion.     Thanks,  Amaris Villafana, PharmD  [facility]

## 2017-10-04 ENCOUNTER — PATIENT MESSAGE (OUTPATIENT)
Dept: RHEUMATOLOGY | Facility: CLINIC | Age: 67
End: 2017-10-04

## 2017-10-23 ENCOUNTER — TELEPHONE (OUTPATIENT)
Dept: RHEUMATOLOGY | Facility: CLINIC | Age: 67
End: 2017-10-23

## 2017-10-23 DIAGNOSIS — E03.9 HYPOTHYROIDISM, UNSPECIFIED TYPE: Primary | ICD-10-CM

## 2017-10-23 NOTE — TELEPHONE ENCOUNTER
----- Message from Noemí Giles sent at 10/23/2017  1:42 PM CDT -----  Contact: self  Patient called asking for advice about problems he is having with is thyroid,lab work and other things.  Please call patient at [phone]. Thanks!

## 2017-10-27 ENCOUNTER — TELEPHONE (OUTPATIENT)
Dept: RHEUMATOLOGY | Facility: CLINIC | Age: 67
End: 2017-10-27

## 2017-10-28 DIAGNOSIS — E03.9 HYPOTHYROIDISM, UNSPECIFIED TYPE: ICD-10-CM

## 2017-10-28 DIAGNOSIS — M06.9 RHEUMATOID ARTHRITIS OF HAND, UNSPECIFIED LATERALITY, UNSPECIFIED RHEUMATOID FACTOR PRESENCE: ICD-10-CM

## 2017-10-28 DIAGNOSIS — M19.90 ARTHRITIS PAIN: ICD-10-CM

## 2017-10-28 DIAGNOSIS — M54.2 NECK PAIN, ACUTE: ICD-10-CM

## 2017-10-29 RX ORDER — DICLOFENAC SODIUM 100 MG/1
TABLET, EXTENDED RELEASE ORAL
Qty: 60 TABLET | Refills: 2 | Status: SHIPPED | OUTPATIENT
Start: 2017-10-29 | End: 2017-12-04 | Stop reason: SDUPTHER

## 2017-11-02 ENCOUNTER — TELEPHONE (OUTPATIENT)
Dept: RHEUMATOLOGY | Facility: CLINIC | Age: 67
End: 2017-11-02

## 2017-11-02 DIAGNOSIS — R89.9 ABNORMAL LABORATORY TEST: Primary | ICD-10-CM

## 2017-12-04 DIAGNOSIS — M54.2 NECK PAIN, ACUTE: ICD-10-CM

## 2017-12-04 DIAGNOSIS — M06.9 RHEUMATOID ARTHRITIS OF HAND, UNSPECIFIED LATERALITY, UNSPECIFIED RHEUMATOID FACTOR PRESENCE: ICD-10-CM

## 2017-12-04 DIAGNOSIS — E03.9 HYPOTHYROIDISM, UNSPECIFIED TYPE: ICD-10-CM

## 2017-12-04 DIAGNOSIS — M19.90 ARTHRITIS PAIN: ICD-10-CM

## 2017-12-05 RX ORDER — DICLOFENAC SODIUM 100 MG/1
100 TABLET, EXTENDED RELEASE ORAL DAILY
Qty: 90 TABLET | Refills: 3 | Status: SHIPPED | OUTPATIENT
Start: 2017-12-05 | End: 2018-03-05

## 2018-01-08 ENCOUNTER — OFFICE VISIT (OUTPATIENT)
Dept: RHEUMATOLOGY | Facility: CLINIC | Age: 68
End: 2018-01-08
Payer: MEDICARE

## 2018-01-08 VITALS
BODY MASS INDEX: 26.68 KG/M2 | DIASTOLIC BLOOD PRESSURE: 75 MMHG | HEIGHT: 72 IN | WEIGHT: 197 LBS | HEART RATE: 59 BPM | SYSTOLIC BLOOD PRESSURE: 117 MMHG

## 2018-01-08 DIAGNOSIS — M06.9 RHEUMATOID ARTHRITIS, INVOLVING UNSPECIFIED SITE, UNSPECIFIED RHEUMATOID FACTOR PRESENCE: Primary | ICD-10-CM

## 2018-01-08 DIAGNOSIS — M47.15 OSTEOARTHRITIS OF SPINE WITH MYELOPATHY, THORACOLUMBAR REGION: ICD-10-CM

## 2018-01-08 PROCEDURE — 96372 THER/PROPH/DIAG INJ SC/IM: CPT | Mod: PBBFAC,PO

## 2018-01-08 PROCEDURE — 99214 OFFICE O/P EST MOD 30 MIN: CPT | Mod: 25,S$PBB,, | Performed by: INTERNAL MEDICINE

## 2018-01-08 PROCEDURE — 99999 PR PBB SHADOW E&M-EST. PATIENT-LVL III: CPT | Mod: PBBFAC,,, | Performed by: INTERNAL MEDICINE

## 2018-01-08 PROCEDURE — 99213 OFFICE O/P EST LOW 20 MIN: CPT | Mod: PBBFAC,PO | Performed by: INTERNAL MEDICINE

## 2018-01-08 RX ORDER — METHYLPREDNISOLONE ACETATE 80 MG/ML
160 INJECTION, SUSPENSION INTRA-ARTICULAR; INTRALESIONAL; INTRAMUSCULAR; SOFT TISSUE
Status: COMPLETED | OUTPATIENT
Start: 2018-01-08 | End: 2018-01-08

## 2018-01-08 RX ORDER — CEPHALEXIN 500 MG/1
CAPSULE ORAL
COMMUNITY
Start: 2017-12-31 | End: 2018-11-13 | Stop reason: ALTCHOICE

## 2018-01-08 RX ORDER — CYANOCOBALAMIN 1000 UG/ML
1000 INJECTION, SOLUTION INTRAMUSCULAR; SUBCUTANEOUS
Status: COMPLETED | OUTPATIENT
Start: 2018-01-08 | End: 2018-01-08

## 2018-01-08 RX ADMIN — METHYLPREDNISOLONE ACETATE 160 MG: 80 INJECTION, SUSPENSION INTRA-ARTICULAR; INTRALESIONAL; INTRAMUSCULAR; SOFT TISSUE at 05:01

## 2018-01-08 RX ADMIN — CYANOCOBALAMIN 1000 MCG: 1000 INJECTION, SOLUTION INTRAMUSCULAR at 05:01

## 2018-01-08 NOTE — PROGRESS NOTES
Subjective:       Patient ID: Aurelio Perkins is a 67 y.o. male.    Chief Complaint: Follow-up    Follow up: RA was on enbrel and it failed.Patient complains of arthralgias and myalgias for which has been present for a few years. Pain is located in multiple joints, both shoulder(s), both elbow(s), both wrist(s), both MCP(s): 1st, 2nd, 3rd, 4th and 5th, both PIP(s): 1st, 2nd, 3rd, 4th and 5th, both DIP(s): 1st and 2nd, both hip(s), both knee(s) and both MTP(s): 1st, 2nd, 3rd, 4th and 5th, is described as aching, pulsating, shooting and throbbing, and is constant, moderate .  Associated symptoms include: crepitation, decreased range of motion, edema, effusion, tenderness and warmth.        Review of Systems   Constitutional: Positive for activity change. Negative for appetite change, chills, diaphoresis and unexpected weight change.   HENT: Negative for congestion, ear pain, facial swelling, mouth sores, nosebleeds, postnasal drip, rhinorrhea, sinus pressure, sneezing, sore throat, tinnitus and voice change.    Eyes: Negative for pain, discharge, redness, itching and visual disturbance.   Respiratory: Negative for apnea, cough, chest tightness, shortness of breath and wheezing.    Cardiovascular: Negative for chest pain, palpitations and leg swelling.   Gastrointestinal: Negative for abdominal pain, constipation, diarrhea, nausea and vomiting.   Endocrine: Negative for cold intolerance, heat intolerance, polydipsia and polyuria.   Genitourinary: Negative for decreased urine volume, difficulty urinating, flank pain, frequency, hematuria and urgency.   Musculoskeletal: Positive for back pain and neck stiffness. Negative for arthralgias, gait problem and neck pain.   Skin: Positive for rash. Negative for pallor and wound.   Allergic/Immunologic: Negative for immunocompromised state.   Neurological: Negative for dizziness, tremors, seizures, syncope, weakness and numbness.   Hematological: Negative for adenopathy.  Does not bruise/bleed easily.   Psychiatric/Behavioral: Negative for sleep disturbance and suicidal ideas. The patient is not nervous/anxious.          Objective:     /75   Pulse (!) 59   Ht 6' (1.829 m)   Wt 89.4 kg (196 lb 15.7 oz)   BMI 26.72 kg/m²      Physical Exam   Vitals reviewed.  Constitutional: He is oriented to person, place, and time. He appears distressed.   HENT:   Head: Normocephalic and atraumatic.   Mouth/Throat: Oropharynx is clear and moist.   Eyes: EOM are normal. Pupils are equal, round, and reactive to light.   Neck: Neck supple. No thyromegaly present.   Cardiovascular: Normal rate, regular rhythm and normal heart sounds.  Exam reveals no gallop and no friction rub.    No murmur heard.  Pulmonary/Chest: Breath sounds normal. He has no wheezes. He has no rales. He exhibits no tenderness.   Abdominal: There is no tenderness. There is no rebound and no guarding.       Right Side Rheumatological Exam     Examination finds the 2nd MCP, 3rd MCP, 4th MCP and 5th MCP normal.    The patient is tender to palpation of the 1st PIP, 1st MCP, 2nd PIP, 2nd MCP, 3rd PIP, 3rd MCP, 4th PIP, 4th MCP and 5th PIP    He has swelling of the elbow, wrist, knee, 1st PIP, 1st MCP, 2nd PIP, 2nd MCP, 3rd PIP, 3rd MCP, 4th PIP, 4th MCP, 5th PIP and 5th MCP    The patient has an enlarged wrist, knee, 1st PIP, 2nd PIP, 3rd PIP, 4th PIP and 5th PIP    Shoulder Exam   Tenderness Location: no tenderness    Range of Motion   Active Abduction: abnormal   Adduction: abnormal  Sensation: normal    Knee Exam   Tenderness Location: medial joint line and LCL    Range of Motion   Extension: normal   Flexion: normal   Patellofemoral Crepitus: positive  Effusion: positive  Sensation: normal    Hip Exam   Tenderness Location: posterior and anterior    Range of Motion   Extension: abnormal   Flexion: abnormal   Sensation: normal    Elbow/Wrist Exam   Tenderness Location: no tenderness    Range of Motion   Elbow   Flexion:  normal   Sensation: normal    Muscle Strength (0-5 scale):  Neck Flexion:  3  Neck Extension: 3  Deltoid:  3  Biceps: 3/5   Triceps:  3  Quadriceps:  3   Distal Lower Extremity: 3    Left Side Rheumatological Exam     Examination finds the elbow, wrist, 1st MCP, 2nd MCP, 3rd MCP, 4th MCP and 5th MCP normal.    The patient is tender to palpation of the 1st PIP, 1st MCP, 2nd PIP, 2nd MCP, 3rd PIP, 3rd MCP, 4th PIP, 4th MCP, 5th PIP and 5th MCP.    He has swelling of the wrist, knee, 1st PIP, 1st MCP, 2nd PIP, 2nd MCP, 3rd PIP, 3rd MCP, 4th PIP, 4th MCP, 5th PIP and 5th MCP    The patient has an enlarged knee, 1st PIP, 2nd PIP, 3rd PIP, 4th PIP and 5th PIP.    Shoulder Exam   Tenderness Location: no tenderness    Range of Motion   Active Abduction: abnormal   Extension: abnormal   Sensation: normal    Knee Exam   Tenderness Location: lateral joint line and medial joint line    Range of Motion   Extension: normal   Flexion: normal     Patellofemoral Crepitus: positive  Effusion: positive  Sensation: normal    Hip Exam   Tenderness Location: posterior and anterior    Range of Motion   Extension: abnormal   Flexion: abnormal   Sensation: normal    Elbow/Wrist Exam     Range of Motion   Elbow   Flexion: normal   Sensation: normal    Muscle Strength (0-5 scale):  Neck Flexion:  3  Neck Extension: 3  Deltoid:  3  Biceps: 3/5   Triceps:  3  Quadriceps:  3   Distal Lower Extremity: 3      Back/Neck Exam   General Inspection   Gait: normal       Tenderness Right paramedian tenderness of the Upper C-Spine, Lower C-Spine, Lower L-Spine and SI Joint.Left paramedian tenderness of the Upper C-Spine, Lower L-Spine, Lower C-Spine and SI Joint.    Back Range of Motion   Extension: abnormal  Flexion: abnormal  Lateral Bend Right: abnormal  Lateral Bend Left: abnormal  Rotation Right: abnormal  Rotation Left: abnormal    Neck Range of Motion   Flexion: Limited  Extension: Limited  Right Lateral Bend: abnormal  Left Lateral Bend:  abnormal  Right Rotation: abnormal  Left Rotation: abnormal  Lymphadenopathy:     He has no cervical adenopathy.   Neurological: He is alert and oriented to person, place, and time. He displays weakness. He exhibits normal muscle tone.   Reflex Scores:       Patellar reflexes are 2+ on the right side and 2+ on the left side.  Skin: No rash noted. No erythema. No pallor.     Psychiatric: Mood and affect normal.   Musculoskeletal: He exhibits edema and deformity. He exhibits no tenderness.           Results for orders placed or performed in visit on 06/30/17   CBC W/ AUTO DIFFERENTIAL   Result Value Ref Range    WBC 6.01 3.90 - 12.70 K/uL    RBC 4.82 4.60 - 6.20 M/uL    Hemoglobin 13.8 (L) 14.0 - 18.0 g/dL    Hematocrit 41.2 40.0 - 54.0 %    MCV 86 82 - 98 fL    MCH 28.6 27.0 - 31.0 pg    MCHC 33.5 32.0 - 36.0 %    RDW 12.6 11.5 - 14.5 %    Platelets 155 150 - 350 K/uL    MPV 10.4 9.2 - 12.9 fL    Gran # 3.1 1.8 - 7.7 K/uL    Lymph # 2.2 1.0 - 4.8 K/uL    Mono # 0.6 0.3 - 1.0 K/uL    Eos # 0.2 0.0 - 0.5 K/uL    Baso # 0.02 0.00 - 0.20 K/uL    Gran% 51.0 38.0 - 73.0 %    Lymph% 35.9 18.0 - 48.0 %    Mono% 9.8 4.0 - 15.0 %    Eosinophil% 2.8 0.0 - 8.0 %    Basophil% 0.3 0.0 - 1.9 %    Differential Method Automated    COMPREHENSIVE METABOLIC PANEL   Result Value Ref Range    Sodium 137 136 - 145 mmol/L    Potassium 4.5 3.5 - 5.1 mmol/L    Chloride 104 95 - 110 mmol/L    CO2 27 23 - 29 mmol/L    Glucose 86 70 - 110 mg/dL    BUN, Bld 16 8 - 23 mg/dL    Creatinine 1.4 0.5 - 1.4 mg/dL    Calcium 9.0 8.7 - 10.5 mg/dL    Total Protein 6.6 6.0 - 8.4 g/dL    Albumin 3.9 3.5 - 5.2 g/dL    Total Bilirubin 1.6 (H) 0.1 - 1.0 mg/dL    Alkaline Phosphatase 30 (L) 55 - 135 U/L    AST 27 10 - 40 U/L    ALT 28 10 - 44 U/L    Anion Gap 6 (L) 8 - 16 mmol/L    eGFR if African American >60.0 >60 mL/min/1.73 m^2    eGFR if non African American 52.4 (A) >60 mL/min/1.73 m^2   C-REACTIVE PROTEIN   Result Value Ref Range    CRP 0.8 0.0 - 8.2 mg/L    SEDIMENTATION RATE, MANUAL   Result Value Ref Range    Sed Rate 3 0 - 10 mm/Hr   TSH   Result Value Ref Range    TSH 0.797 0.400 - 4.000 uIU/mL   T4, FREE   Result Value Ref Range    Free T4 0.69 (L) 0.71 - 1.51 ng/dL   T3, FREE   Result Value Ref Range    T3, Free 2.0 (L) 2.3 - 4.2 pg/mL   PTH, intact   Result Value Ref Range    PTH, Intact 72.0 9.0 - 77.0 pg/mL   PSA, TOTAL AND FREE   Result Value Ref Range    PSA Total <0.01 0.00 - 4.00 ng/mL    PSA, Free <0.01 0.01 - 1.50 ng/mL    PSA, Free Pct Unable to calculate Not established %       Assessment:       Encounter Diagnoses   Name Primary?    Rheumatoid arthritis, involving unspecified site, unspecified rheumatoid factor presence Yes    Osteoarthritis of spine with myelopathy, thoracolumbar region          Plan:     Rheumatoid arthritis, involving unspecified site, unspecified rheumatoid factor presence  -     methylPREDNISolone acetate injection 160 mg; Inject 2 mLs (160 mg total) into the muscle one time.  -     cyanocobalamin injection 1,000 mcg; Inject 1 mL (1,000 mcg total) into the muscle one time.  -     Comprehensive metabolic panel; Future; Expected date: 01/08/2018  -     CBC auto differential; Future; Expected date: 01/08/2018  -     C-reactive protein; Future; Expected date: 01/08/2018  -     Sedimentation rate, manual; Future; Expected date: 01/08/2018  -     Rheumatoid factor; Future; Expected date: 01/08/2018  -     Cyclic citrul peptide antibody, IgG; Future; Expected date: 01/08/2018    Osteoarthritis of spine with myelopathy, thoracolumbar region  -     methylPREDNISolone acetate injection 160 mg; Inject 2 mLs (160 mg total) into the muscle one time.  -     cyanocobalamin injection 1,000 mcg; Inject 1 mL (1,000 mcg total) into the muscle one time.  -     Comprehensive metabolic panel; Future; Expected date: 01/08/2018  -     CBC auto differential; Future; Expected date: 01/08/2018  -     C-reactive protein; Future; Expected date:  01/08/2018  -     Sedimentation rate, manual; Future; Expected date: 01/08/2018  -     Rheumatoid factor; Future; Expected date: 01/08/2018  -     Cyclic citrul peptide antibody, IgG; Future; Expected date: 01/08/2018    Other orders  -     tofacitinib (XELJANZ XR) 11 mg Tb24; Take 1 tablet by mouth once daily.  Dispense: 30 tablet; Refill: 11       DC enbrel and start xeljanz

## 2018-01-09 ENCOUNTER — TELEPHONE (OUTPATIENT)
Dept: PHARMACY | Facility: HOSPITAL | Age: 68
End: 2018-01-09

## 2018-01-09 RX ORDER — ESZOPICLONE 2 MG/1
2 TABLET, FILM COATED ORAL NIGHTLY
Qty: 30 TABLET | Refills: 4 | Status: SHIPPED | OUTPATIENT
Start: 2018-01-09 | End: 2018-06-18 | Stop reason: SDUPTHER

## 2018-01-09 NOTE — PROGRESS NOTES
Administered 1 cc Vitamin B12 1000mcg/cc to left ventrogluteal. Pt tolerated well. No acute reaction noted to site. Pt instructed on S/S to report. Advised patient to wait in lobby 15 minutes after receiving injection to monitor for any reactions. Pt verbalized understanding.     Lot: [id number]  Exp: Apr19  Administered 2 cc DepoMedrol 80mg/cc  to left ventrogluteal. Pt tolerated well. No acute reaction noted to site. Pt instructed on S/S to report. Advised patient to wait in lobby 15 minutes after receiving injection to monitor for any reactions..  Pt verbalized understanding.     Lot: [id number]  Exp: 11/2018

## 2018-01-09 NOTE — TELEPHONE ENCOUNTER
Informed patient [facility] received a prescription for Xeljanz and it will require a prior authorization with their insurance company. We will update patient of status as more information is received.

## 2018-01-10 ENCOUNTER — TELEPHONE (OUTPATIENT)
Dept: RHEUMATOLOGY | Facility: CLINIC | Age: 68
End: 2018-01-10

## 2018-01-10 DIAGNOSIS — M06.9 RHEUMATOID ARTHRITIS, INVOLVING UNSPECIFIED SITE, UNSPECIFIED RHEUMATOID FACTOR PRESENCE: Primary | ICD-10-CM

## 2018-01-10 NOTE — TELEPHONE ENCOUNTER
----- Message from Amaris Villafana, PharmD sent at 1/10/2018  9:11 AM CST -----  Good morning,    We are working on Mr. Perkins's Xeljanz Rx, but there are no updated labs on file. Is there anyway he can be scheduled for TB and hep in the near future?    Thanks,  Amaris Villafana, PharmD  [facility]- Clinical Pharmacist  [phone]

## 2018-01-10 NOTE — TELEPHONE ENCOUNTER
Spoke to pt's wife, advised pt will need a TB gold test prior to starting Xeljanz. Faxed orders to [facility]. No further questions.

## 2018-01-12 ENCOUNTER — TELEPHONE (OUTPATIENT)
Dept: RHEUMATOLOGY | Facility: CLINIC | Age: 68
End: 2018-01-12

## 2018-01-13 NOTE — TELEPHONE ENCOUNTER
----- Message from Amaris Villafana, PharmD sent at 1/10/2018  9:11 AM CST -----  Good morning,    We are working on Mr. Perkins's Xeljanz Rx, but there are no updated labs on file. Is there anyway he can be scheduled for TB and hep in the near future?    Thanks,  Amaris Villafana, PharmD  [facility]- Clinical Pharmacist  [phone]

## 2018-01-22 NOTE — TELEPHONE ENCOUNTER
DOCUMENTATION ONLY:  Prior authorization for Xeljanz XR 11mg approved from 01/22/2018 to 01/22/2019.  Case ID# [id number]    Co-pay: $0    Patient Assistance IS NOT required. Forward to clinical pharmacist for consult & shipment.

## 2018-02-02 DIAGNOSIS — M06.9 RHEUMATOID ARTHRITIS OF HAND, UNSPECIFIED LATERALITY, UNSPECIFIED RHEUMATOID FACTOR PRESENCE: ICD-10-CM

## 2018-02-02 DIAGNOSIS — E03.9 HYPOTHYROIDISM, UNSPECIFIED TYPE: ICD-10-CM

## 2018-02-02 DIAGNOSIS — E78.9 LIPID DISORDER: ICD-10-CM

## 2018-02-02 DIAGNOSIS — M47.15 OSTEOARTHRITIS OF SPINE WITH MYELOPATHY, THORACOLUMBAR REGION: ICD-10-CM

## 2018-02-02 RX ORDER — LEVOTHYROXINE SODIUM 25 UG/1
TABLET ORAL
Qty: 60 TABLET | Refills: 4 | Status: SHIPPED | OUTPATIENT
Start: 2018-02-02 | End: 2018-02-12 | Stop reason: SDUPTHER

## 2018-02-12 DIAGNOSIS — E03.9 HYPOTHYROIDISM, UNSPECIFIED TYPE: ICD-10-CM

## 2018-02-12 DIAGNOSIS — E78.9 LIPID DISORDER: ICD-10-CM

## 2018-02-12 DIAGNOSIS — M47.15 OSTEOARTHRITIS OF SPINE WITH MYELOPATHY, THORACOLUMBAR REGION: ICD-10-CM

## 2018-02-12 DIAGNOSIS — M06.9 RHEUMATOID ARTHRITIS OF HAND, UNSPECIFIED LATERALITY, UNSPECIFIED RHEUMATOID FACTOR PRESENCE: ICD-10-CM

## 2018-02-15 DIAGNOSIS — M06.9 RHEUMATOID ARTHRITIS OF HAND, UNSPECIFIED LATERALITY, UNSPECIFIED RHEUMATOID FACTOR PRESENCE: ICD-10-CM

## 2018-02-15 DIAGNOSIS — M19.90 ARTHRITIS PAIN: ICD-10-CM

## 2018-02-15 DIAGNOSIS — M54.2 NECK PAIN, ACUTE: ICD-10-CM

## 2018-02-15 DIAGNOSIS — E03.9 HYPOTHYROIDISM, UNSPECIFIED TYPE: ICD-10-CM

## 2018-02-15 RX ORDER — DICLOFENAC SODIUM 100 MG/1
TABLET, EXTENDED RELEASE ORAL
Qty: 60 TABLET | Refills: 2 | Status: SHIPPED | OUTPATIENT
Start: 2018-02-15 | End: 2018-06-18 | Stop reason: SDUPTHER

## 2018-02-15 RX ORDER — LEVOTHYROXINE SODIUM 25 UG/1
TABLET ORAL
Qty: 60 TABLET | Refills: 4 | Status: ON HOLD | OUTPATIENT
Start: 2018-02-15 | End: 2019-03-04 | Stop reason: HOSPADM

## 2018-03-06 NOTE — TELEPHONE ENCOUNTER
Pt would like a call back in 1 month for Xeljanz- he is not ready to start and he has not completed tb test. [facility] will follow up 4/5/18

## 2018-04-10 ENCOUNTER — PATIENT MESSAGE (OUTPATIENT)
Dept: RHEUMATOLOGY | Facility: CLINIC | Age: 68
End: 2018-04-10

## 2018-04-19 ENCOUNTER — CLINICAL SUPPORT (OUTPATIENT)
Dept: RHEUMATOLOGY | Facility: CLINIC | Age: 68
End: 2018-04-19
Payer: MEDICARE

## 2018-04-19 DIAGNOSIS — M06.9 RHEUMATOID ARTHRITIS FLARE: Primary | ICD-10-CM

## 2018-04-19 PROCEDURE — 99211 OFF/OP EST MAY X REQ PHY/QHP: CPT | Mod: PBBFAC,PO

## 2018-04-19 PROCEDURE — 99999 PR PBB SHADOW E&M-EST. PATIENT-LVL I: CPT | Mod: PBBFAC,,,

## 2018-04-19 PROCEDURE — 99211 OFF/OP EST MAY X REQ PHY/QHP: CPT | Mod: S$PBB,,, | Performed by: INTERNAL MEDICINE

## 2018-04-19 PROCEDURE — 96372 THER/PROPH/DIAG INJ SC/IM: CPT | Mod: PBBFAC,PO

## 2018-04-19 RX ORDER — CYANOCOBALAMIN 1000 UG/ML
1000 INJECTION, SOLUTION INTRAMUSCULAR; SUBCUTANEOUS
Status: COMPLETED | OUTPATIENT
Start: 2018-04-19 | End: 2018-04-19

## 2018-04-19 RX ORDER — METHYLPREDNISOLONE ACETATE 80 MG/ML
160 INJECTION, SUSPENSION INTRA-ARTICULAR; INTRALESIONAL; INTRAMUSCULAR; SOFT TISSUE
Status: COMPLETED | OUTPATIENT
Start: 2018-04-19 | End: 2018-04-19

## 2018-04-19 RX ADMIN — METHYLPREDNISOLONE ACETATE 160 MG: 80 INJECTION, SUSPENSION INTRA-ARTICULAR; INTRALESIONAL; INTRAMUSCULAR; SOFT TISSUE at 11:04

## 2018-04-19 RX ADMIN — CYANOCOBALAMIN 1000 MCG: 1000 INJECTION, SOLUTION INTRAMUSCULAR at 11:04

## 2018-04-19 NOTE — PROGRESS NOTES
Patient received 160 mg Depomedrol IM and 1000 mcg  B 12 IM per verbal order for RA flare. See MAR. CG

## 2018-05-09 ENCOUNTER — PATIENT MESSAGE (OUTPATIENT)
Dept: RHEUMATOLOGY | Facility: CLINIC | Age: 68
End: 2018-05-09

## 2018-05-09 NOTE — TELEPHONE ENCOUNTER
Initial Xejanz consult completed on 18. Xeljanz will be shipped on 18 to arrive at patient's home on 5/10/18 via [employer]. $ 0 copay.  Address confirmed,  Confirmed 2 patient identifiers - name and . Therapy Appropriate.     Counseled on the administration directions for Xeljanz:  -Take 1 tablet (11mg) by mouth once daily, with or without food  -If dose is missed, skip the missed dose and go back to your normal time.  Do not take 2 doses at the same time or extra doses    Patient was counseled on the following possible side effects, which include, but are not limited to:   -diarrhea, headache, cold like symptoms - runny nose, stuffy nose, sore throat.  Contact provider if allergic reaction, changes in heartbeat, muscle pain or weakness, signs of infection, liver problems - dark urine, fatigue, light-colored stools, yellow skin or eyes.       Patient's wife confirmed understanding and did not have additional questions.   Consultation included: administration; storage and handling; side effects; how to handle side effects; the importance of compliance; how to handle missed doses; the importance of laboratory monitoring; the importance of keeping all follow up appointments.  Patient understands to report any medication changes to [facility] and provider. All questions answered and addressed to patients satisfaction.[facility] to contact patient in 3 weeks for refills.     Viki Carvajal, PharmD  [facility]  [phone]

## 2018-06-01 ENCOUNTER — PATIENT MESSAGE (OUTPATIENT)
Dept: PHARMACY | Facility: CLINIC | Age: 68
End: 2018-06-01

## 2018-06-11 ENCOUNTER — PATIENT MESSAGE (OUTPATIENT)
Dept: PHARMACY | Facility: CLINIC | Age: 68
End: 2018-06-11

## 2018-06-18 ENCOUNTER — PATIENT MESSAGE (OUTPATIENT)
Dept: RHEUMATOLOGY | Facility: CLINIC | Age: 68
End: 2018-06-18

## 2018-06-18 DIAGNOSIS — M54.2 NECK PAIN, ACUTE: ICD-10-CM

## 2018-06-18 DIAGNOSIS — M19.90 ARTHRITIS PAIN: ICD-10-CM

## 2018-06-18 DIAGNOSIS — E03.9 HYPOTHYROIDISM, UNSPECIFIED TYPE: ICD-10-CM

## 2018-06-18 DIAGNOSIS — M06.9 RHEUMATOID ARTHRITIS OF HAND, UNSPECIFIED LATERALITY, UNSPECIFIED RHEUMATOID FACTOR PRESENCE: ICD-10-CM

## 2018-06-19 RX ORDER — ESZOPICLONE 2 MG/1
2 TABLET, FILM COATED ORAL NIGHTLY
Qty: 30 TABLET | Refills: 4 | Status: SHIPPED | OUTPATIENT
Start: 2018-06-19 | End: 2018-06-26 | Stop reason: SDUPTHER

## 2018-06-19 RX ORDER — DICLOFENAC SODIUM 100 MG/1
TABLET, EXTENDED RELEASE ORAL
Qty: 60 TABLET | Refills: 2 | Status: SHIPPED | OUTPATIENT
Start: 2018-06-19 | End: 2018-09-14 | Stop reason: SDUPTHER

## 2018-06-20 ENCOUNTER — TELEPHONE (OUTPATIENT)
Dept: RHEUMATOLOGY | Facility: CLINIC | Age: 68
End: 2018-06-20

## 2018-06-20 NOTE — TELEPHONE ENCOUNTER
----- Message from Veraoj Wall sent at 6/20/2018  9:38 AM CDT -----  Please fax orders and ICD 9 code to [phone].  Any questions call Odalys/Yuok at [phone].

## 2018-06-26 ENCOUNTER — TELEPHONE (OUTPATIENT)
Dept: RHEUMATOLOGY | Facility: CLINIC | Age: 68
End: 2018-06-26

## 2018-06-26 RX ORDER — ESZOPICLONE 2 MG/1
2 TABLET, FILM COATED ORAL NIGHTLY
Qty: 30 TABLET | Refills: 4 | Status: CANCELLED | OUTPATIENT
Start: 2018-06-26

## 2018-06-26 RX ORDER — ESZOPICLONE 2 MG/1
2 TABLET, FILM COATED ORAL NIGHTLY
Qty: 30 TABLET | Refills: 4 | Status: SHIPPED | OUTPATIENT
Start: 2018-06-26 | End: 2018-12-26 | Stop reason: SDUPTHER

## 2018-06-28 ENCOUNTER — PATIENT MESSAGE (OUTPATIENT)
Dept: RHEUMATOLOGY | Facility: CLINIC | Age: 68
End: 2018-06-28

## 2018-07-02 ENCOUNTER — TELEPHONE (OUTPATIENT)
Dept: PHARMACY | Facility: CLINIC | Age: 68
End: 2018-07-02

## 2018-07-05 ENCOUNTER — OFFICE VISIT (OUTPATIENT)
Dept: RHEUMATOLOGY | Facility: CLINIC | Age: 68
End: 2018-07-05
Payer: MEDICARE

## 2018-07-05 VITALS
HEART RATE: 52 BPM | HEIGHT: 71 IN | WEIGHT: 201.25 LBS | DIASTOLIC BLOOD PRESSURE: 75 MMHG | BODY MASS INDEX: 28.18 KG/M2 | SYSTOLIC BLOOD PRESSURE: 103 MMHG

## 2018-07-05 DIAGNOSIS — M47.15 OSTEOARTHRITIS OF SPINE WITH MYELOPATHY, THORACOLUMBAR REGION: ICD-10-CM

## 2018-07-05 DIAGNOSIS — R89.9 ABNORMAL LABORATORY TEST: ICD-10-CM

## 2018-07-05 DIAGNOSIS — M06.9 RHEUMATOID ARTHRITIS FLARE: Primary | ICD-10-CM

## 2018-07-05 DIAGNOSIS — R94.6 ABNORMAL RESULTS OF THYROID FUNCTION STUDIES: ICD-10-CM

## 2018-07-05 PROCEDURE — 99999 PR PBB SHADOW E&M-EST. PATIENT-LVL III: CPT | Mod: PBBFAC,,, | Performed by: INTERNAL MEDICINE

## 2018-07-05 PROCEDURE — 99214 OFFICE O/P EST MOD 30 MIN: CPT | Mod: 25,S$PBB,, | Performed by: INTERNAL MEDICINE

## 2018-07-05 PROCEDURE — 99213 OFFICE O/P EST LOW 20 MIN: CPT | Mod: PBBFAC,PO,25 | Performed by: INTERNAL MEDICINE

## 2018-07-05 PROCEDURE — 96372 THER/PROPH/DIAG INJ SC/IM: CPT | Mod: PBBFAC,PO

## 2018-07-05 RX ORDER — METHYLPREDNISOLONE ACETATE 80 MG/ML
160 INJECTION, SUSPENSION INTRA-ARTICULAR; INTRALESIONAL; INTRAMUSCULAR; SOFT TISSUE
Status: COMPLETED | OUTPATIENT
Start: 2018-07-05 | End: 2018-07-05

## 2018-07-05 RX ORDER — CYANOCOBALAMIN 1000 UG/ML
1000 INJECTION, SOLUTION INTRAMUSCULAR; SUBCUTANEOUS
Status: COMPLETED | OUTPATIENT
Start: 2018-07-05 | End: 2018-07-05

## 2018-07-05 RX ORDER — ESZOPICLONE 3 MG/1
3 TABLET, FILM COATED ORAL NIGHTLY
Qty: 30 TABLET | Refills: 4 | Status: SHIPPED | OUTPATIENT
Start: 2018-07-05 | End: 2019-03-27 | Stop reason: SDUPTHER

## 2018-07-05 RX ADMIN — METHYLPREDNISOLONE ACETATE 160 MG: 80 INJECTION, SUSPENSION INTRA-ARTICULAR; INTRALESIONAL; INTRAMUSCULAR; SOFT TISSUE at 07:07

## 2018-07-05 RX ADMIN — CYANOCOBALAMIN 1000 MCG: 1000 INJECTION, SOLUTION INTRAMUSCULAR at 07:07

## 2018-07-05 NOTE — PROGRESS NOTES
Subjective:       Patient ID: Aurelio Perkins is a 67 y.o. male.    Chief Complaint: Follow-up    Follow up: RA was on xeljanz, he has had fatigue , lethargy, Patient complains of arthralgias and myalgias for which has been present for a few years. Pain is located in multiple joints, both shoulder(s), both elbow(s), both wrist(s), both MCP(s): 1st, 2nd, 3rd, 4th and 5th, both PIP(s): 1st, 2nd, 3rd, 4th and 5th, both DIP(s): 1st and 2nd, both hip(s), both knee(s) and both MTP(s): 1st, 2nd, 3rd, 4th and 5th, is described as aching, pulsating, shooting and throbbing, and is constant, moderate .  Associated symptoms include: crepitation, decreased range of motion, edema, effusion, tenderness and warmth.  Overall fatigued      Review of Systems   Constitutional: Positive for activity change. Negative for appetite change, chills, diaphoresis and unexpected weight change.   HENT: Negative for congestion, ear pain, facial swelling, mouth sores, nosebleeds, postnasal drip, rhinorrhea, sinus pressure, sneezing, sore throat, tinnitus and voice change.    Eyes: Negative for pain, discharge, redness, itching and visual disturbance.   Respiratory: Negative for apnea, cough, chest tightness, shortness of breath and wheezing.    Cardiovascular: Negative for chest pain, palpitations and leg swelling.   Gastrointestinal: Negative for abdominal pain, constipation, diarrhea, nausea and vomiting.   Endocrine: Negative for cold intolerance, heat intolerance, polydipsia and polyuria.   Genitourinary: Negative for decreased urine volume, difficulty urinating, flank pain, frequency, hematuria and urgency.   Musculoskeletal: Positive for back pain and neck stiffness. Negative for arthralgias, gait problem and neck pain.   Skin: Positive for rash. Negative for pallor and wound.   Allergic/Immunologic: Negative for immunocompromised state.   Neurological: Negative for dizziness, tremors, seizures, syncope, weakness and numbness.  "  Hematological: Negative for adenopathy. Does not bruise/bleed easily.   Psychiatric/Behavioral: Negative for sleep disturbance and suicidal ideas. The patient is not nervous/anxious.          Objective:     /75 (BP Location: Left arm, Patient Position: Sitting, BP Method: Large (Automatic))   Pulse (!) 52   Ht 5' 11" (1.803 m)   Wt 91.3 kg (201 lb 4.5 oz)   BMI 28.07 kg/m²      Physical Exam   Vitals reviewed.  Constitutional: He is oriented to person, place, and time. No distress.   HENT:   Head: Normocephalic and atraumatic.   Mouth/Throat: Oropharynx is clear and moist.   Eyes: EOM are normal. Pupils are equal, round, and reactive to light.   Neck: Neck supple. No thyromegaly present.   Cardiovascular: Normal rate, regular rhythm and normal heart sounds.  Exam reveals no gallop and no friction rub.    No murmur heard.  Pulmonary/Chest: Breath sounds normal. He has no wheezes. He has no rales. He exhibits no tenderness.   Abdominal: There is no tenderness. There is no rebound and no guarding.       Right Side Rheumatological Exam     Examination finds the 2nd MCP, 3rd MCP, 4th MCP and 5th MCP normal.    The patient is tender to palpation of the 1st PIP, 1st MCP, 2nd PIP, 2nd MCP, 3rd PIP, 3rd MCP, 4th PIP, 4th MCP and 5th PIP    He has swelling of the elbow, wrist, knee, 1st PIP, 1st MCP, 2nd PIP, 2nd MCP, 3rd PIP, 3rd MCP, 4th PIP, 4th MCP, 5th PIP and 5th MCP    The patient has an enlarged wrist, knee, 1st PIP, 2nd PIP, 3rd PIP, 4th PIP and 5th PIP    Shoulder Exam   Tenderness Location: no tenderness    Range of Motion   Active Abduction: abnormal   Adduction: abnormal  Sensation: normal    Knee Exam   Tenderness Location: medial joint line and LCL    Range of Motion   Extension: normal   Flexion: normal   Patellofemoral Crepitus: positive  Effusion: positive  Sensation: normal    Hip Exam   Tenderness Location: posterior and anterior    Range of Motion   Extension: abnormal   Flexion: abnormal "   Sensation: normal    Elbow/Wrist Exam   Tenderness Location: no tenderness    Range of Motion   Elbow   Flexion: normal   Sensation: normal    Muscle Strength (0-5 scale):  Neck Flexion:  3  Neck Extension: 3  Deltoid:  3  Biceps: 3/5   Triceps:  3  Quadriceps:  3   Distal Lower Extremity: 3    Left Side Rheumatological Exam     Examination finds the elbow, wrist, 1st MCP, 2nd MCP, 3rd MCP, 4th MCP and 5th MCP normal.    The patient is tender to palpation of the 1st PIP, 1st MCP, 2nd PIP, 2nd MCP, 3rd PIP, 3rd MCP, 4th PIP, 4th MCP, 5th PIP and 5th MCP.    He has swelling of the wrist, knee, 1st PIP, 1st MCP, 2nd PIP, 2nd MCP, 3rd PIP, 3rd MCP, 4th PIP, 4th MCP, 5th PIP and 5th MCP    The patient has an enlarged knee, 1st PIP, 2nd PIP, 3rd PIP, 4th PIP and 5th PIP.    Shoulder Exam   Tenderness Location: no tenderness    Range of Motion   Active Abduction: abnormal   Extension: abnormal   Sensation: normal    Knee Exam   Tenderness Location: lateral joint line and medial joint line    Range of Motion   Extension: normal   Flexion: normal     Patellofemoral Crepitus: positive  Effusion: positive  Sensation: normal    Hip Exam   Tenderness Location: posterior and anterior    Range of Motion   Extension: abnormal   Flexion: abnormal   Sensation: normal    Elbow/Wrist Exam     Range of Motion   Elbow   Flexion: normal   Sensation: normal    Muscle Strength (0-5 scale):  Neck Flexion:  3  Neck Extension: 3  Deltoid:  3  Biceps: 3/5   Triceps:  3  Quadriceps:  3   Distal Lower Extremity: 3      Back/Neck Exam   General Inspection   Gait: normal       Tenderness Right paramedian tenderness of the Upper C-Spine, Lower C-Spine, Lower L-Spine and SI Joint.Left paramedian tenderness of the Upper C-Spine, Lower L-Spine, Lower C-Spine and SI Joint.    Back Range of Motion   Extension: abnormal  Flexion: abnormal  Lateral Bend Right: abnormal  Lateral Bend Left: abnormal  Rotation Right: abnormal  Rotation Left:  abnormal    Neck Range of Motion   Flexion: Limited  Extension: Limited  Right Lateral Bend: abnormal  Left Lateral Bend: abnormal  Right Rotation: abnormal  Left Rotation: abnormal  Lymphadenopathy:     He has no cervical adenopathy.   Neurological: He is alert and oriented to person, place, and time. He displays weakness. He exhibits normal muscle tone.   Reflex Scores:       Patellar reflexes are 2+ on the right side and 2+ on the left side.  Skin: No rash noted. No erythema. No pallor.     Psychiatric: Mood and affect normal.   Musculoskeletal: He exhibits edema and deformity. He exhibits no tenderness.           pt had external labs done, we reviewed  the results at this visit and the lab results will be scanned into the  media    Assessment:       Encounter Diagnoses   Name Primary?    Rheumatoid arthritis flare Yes    Osteoarthritis of spine with myelopathy, thoracolumbar region     Abnormal laboratory test     Abnormal results of thyroid function studies           Plan:     Rheumatoid arthritis flare  -     Comprehensive metabolic panel; Future; Expected date: 07/05/2018  -     Cytomegalovirus (Cmv) Ab, Igm; Future; Expected date: 07/05/2018  -     Cytomegalovirus antibody, IgG; Future; Expected date: 07/05/2018  -     David-Barr virus early antigen antibody, IgG; Future; Expected date: 07/05/2018  -     Parvovirus B19 antibody, IgG and IgM; Future; Expected date: 07/05/2018  -     methylPREDNISolone acetate injection 160 mg; Inject 2 mLs (160 mg total) into the muscle one time.  -     cyanocobalamin injection 1,000 mcg; Inject 1 mL (1,000 mcg total) into the muscle one time.    Osteoarthritis of spine with myelopathy, thoracolumbar region  -     Comprehensive metabolic panel; Future; Expected date: 07/05/2018  -     Cytomegalovirus (Cmv) Ab, Igm; Future; Expected date: 07/05/2018  -     Cytomegalovirus antibody, IgG; Future; Expected date: 07/05/2018  -     David-Barr virus early antigen antibody,  IgG; Future; Expected date: 07/05/2018  -     Parvovirus B19 antibody, IgG and IgM; Future; Expected date: 07/05/2018  -     methylPREDNISolone acetate injection 160 mg; Inject 2 mLs (160 mg total) into the muscle one time.  -     cyanocobalamin injection 1,000 mcg; Inject 1 mL (1,000 mcg total) into the muscle one time.    Abnormal laboratory test  -     Comprehensive metabolic panel; Future; Expected date: 07/05/2018  -     Cytomegalovirus (Cmv) Ab, Igm; Future; Expected date: 07/05/2018  -     Cytomegalovirus antibody, IgG; Future; Expected date: 07/05/2018  -     David-Barr virus early antigen antibody, IgG; Future; Expected date: 07/05/2018  -     Parvovirus B19 antibody, IgG and IgM; Future; Expected date: 07/05/2018  -     methylPREDNISolone acetate injection 160 mg; Inject 2 mLs (160 mg total) into the muscle one time.  -     cyanocobalamin injection 1,000 mcg; Inject 1 mL (1,000 mcg total) into the muscle one time.    Abnormal results of thyroid function studies   -     Comprehensive metabolic panel; Future; Expected date: 07/05/2018  -     Cytomegalovirus (Cmv) Ab, Igm; Future; Expected date: 07/05/2018  -     Cytomegalovirus antibody, IgG; Future; Expected date: 07/05/2018  -     David-Barr virus early antigen antibody, IgG; Future; Expected date: 07/05/2018  -     Parvovirus B19 antibody, IgG and IgM; Future; Expected date: 07/05/2018  -     methylPREDNISolone acetate injection 160 mg; Inject 2 mLs (160 mg total) into the muscle one time.  -     cyanocobalamin injection 1,000 mcg; Inject 1 mL (1,000 mcg total) into the muscle one time.    Other orders  -     eszopiclone 3 mg Tab; Take 1 tablet (3 mg total) by mouth every evening.  Dispense: 30 tablet; Refill: 4       Continue xeljanz, we will re-evaluate his progress at his next visit also will address his significant insomnia which may be contributing and will evaluate for a viral causes of his fatigue

## 2018-07-09 ENCOUNTER — PATIENT MESSAGE (OUTPATIENT)
Dept: RHEUMATOLOGY | Facility: CLINIC | Age: 68
End: 2018-07-09

## 2018-07-09 DIAGNOSIS — R53.83 FATIGUE, UNSPECIFIED TYPE: Primary | ICD-10-CM

## 2018-07-10 ENCOUNTER — PATIENT MESSAGE (OUTPATIENT)
Dept: RHEUMATOLOGY | Facility: CLINIC | Age: 68
End: 2018-07-10

## 2018-07-12 ENCOUNTER — PATIENT MESSAGE (OUTPATIENT)
Dept: RHEUMATOLOGY | Facility: CLINIC | Age: 68
End: 2018-07-12

## 2018-07-12 NOTE — TELEPHONE ENCOUNTER
Spoke to pt advised lab orders will be ready at 2nd floor registration desk per Kaci. Pt verbalizes understanding and will send Gabby for

## 2018-07-26 ENCOUNTER — TELEPHONE (OUTPATIENT)
Dept: PHARMACY | Facility: CLINIC | Age: 68
End: 2018-07-26

## 2018-07-31 ENCOUNTER — PATIENT MESSAGE (OUTPATIENT)
Dept: RHEUMATOLOGY | Facility: CLINIC | Age: 68
End: 2018-07-31

## 2018-07-31 ENCOUNTER — TELEPHONE (OUTPATIENT)
Dept: RHEUMATOLOGY | Facility: CLINIC | Age: 68
End: 2018-07-31

## 2018-07-31 DIAGNOSIS — R89.9 ABNORMAL LABORATORY TEST: Primary | ICD-10-CM

## 2018-08-01 ENCOUNTER — PATIENT MESSAGE (OUTPATIENT)
Dept: RHEUMATOLOGY | Facility: CLINIC | Age: 68
End: 2018-08-01

## 2018-08-16 ENCOUNTER — DOCUMENTATION ONLY (OUTPATIENT)
Dept: RHEUMATOLOGY | Facility: CLINIC | Age: 68
End: 2018-08-16

## 2018-08-16 ENCOUNTER — PATIENT MESSAGE (OUTPATIENT)
Dept: RHEUMATOLOGY | Facility: CLINIC | Age: 68
End: 2018-08-16

## 2018-08-16 NOTE — PROGRESS NOTES
Received progress notes from Dr Workman patient seen 8-14-18 no change in medication regimen at this time.

## 2018-08-17 ENCOUNTER — PATIENT MESSAGE (OUTPATIENT)
Dept: RHEUMATOLOGY | Facility: CLINIC | Age: 68
End: 2018-08-17

## 2018-08-21 ENCOUNTER — TELEPHONE (OUTPATIENT)
Dept: PHARMACY | Facility: CLINIC | Age: 68
End: 2018-08-21

## 2018-08-30 ENCOUNTER — CLINICAL SUPPORT (OUTPATIENT)
Dept: RHEUMATOLOGY | Facility: CLINIC | Age: 68
End: 2018-08-30
Payer: MEDICARE

## 2018-08-30 DIAGNOSIS — M25.50 ARTHRALGIA, UNSPECIFIED JOINT: Primary | ICD-10-CM

## 2018-08-30 PROCEDURE — 96372 THER/PROPH/DIAG INJ SC/IM: CPT | Mod: PBBFAC,PO

## 2018-08-30 RX ORDER — CYANOCOBALAMIN 1000 UG/ML
1000 INJECTION, SOLUTION INTRAMUSCULAR; SUBCUTANEOUS
Status: COMPLETED | OUTPATIENT
Start: 2018-08-30 | End: 2018-08-30

## 2018-08-30 RX ORDER — METHYLPREDNISOLONE ACETATE 80 MG/ML
160 INJECTION, SUSPENSION INTRA-ARTICULAR; INTRALESIONAL; INTRAMUSCULAR; SOFT TISSUE
Status: COMPLETED | OUTPATIENT
Start: 2018-08-30 | End: 2018-08-30

## 2018-08-30 RX ADMIN — METHYLPREDNISOLONE ACETATE 160 MG: 80 INJECTION, SUSPENSION INTRA-ARTICULAR; INTRALESIONAL; INTRAMUSCULAR; SOFT TISSUE at 04:08

## 2018-08-30 RX ADMIN — CYANOCOBALAMIN 1000 MCG: 1000 INJECTION, SOLUTION INTRAMUSCULAR at 04:08

## 2018-08-31 ENCOUNTER — PATIENT MESSAGE (OUTPATIENT)
Dept: RHEUMATOLOGY | Facility: CLINIC | Age: 68
End: 2018-08-31

## 2018-09-12 ENCOUNTER — TELEPHONE (OUTPATIENT)
Dept: PHARMACY | Facility: CLINIC | Age: 68
End: 2018-09-12

## 2018-09-12 NOTE — TELEPHONE ENCOUNTER
Patient returned phone call back regard his specialty medication for Xeljanz 5mg refill- Patient scheduled to have medication ship out on Thurs 9/13 to arrive on Fri 9/14 address confirmed. Patient informed no new medications, conditions or allergies since talked to [facility]. Patient have about 3 days of medication remaining & no missed dose. Patient voiced understanding.

## 2018-09-14 DIAGNOSIS — E03.9 HYPOTHYROIDISM, UNSPECIFIED TYPE: ICD-10-CM

## 2018-09-14 DIAGNOSIS — M06.9 RHEUMATOID ARTHRITIS OF HAND, UNSPECIFIED LATERALITY, UNSPECIFIED RHEUMATOID FACTOR PRESENCE: ICD-10-CM

## 2018-09-14 DIAGNOSIS — M54.2 NECK PAIN, ACUTE: ICD-10-CM

## 2018-09-14 DIAGNOSIS — M19.90 ARTHRITIS PAIN: ICD-10-CM

## 2018-09-14 RX ORDER — DICLOFENAC SODIUM 100 MG/1
TABLET, EXTENDED RELEASE ORAL
Qty: 60 TABLET | Refills: 2 | Status: SHIPPED | OUTPATIENT
Start: 2018-09-14 | End: 2018-11-13

## 2018-10-04 ENCOUNTER — TELEPHONE (OUTPATIENT)
Dept: PHARMACY | Facility: CLINIC | Age: 68
End: 2018-10-04

## 2018-10-10 ENCOUNTER — PATIENT MESSAGE (OUTPATIENT)
Dept: RHEUMATOLOGY | Facility: CLINIC | Age: 68
End: 2018-10-10

## 2018-10-10 RX ORDER — CHLORHEXIDINE GLUCONATE ORAL RINSE 1.2 MG/ML
SOLUTION DENTAL
Qty: 473 ML | Refills: 0 | Status: CANCELLED | OUTPATIENT
Start: 2018-10-10

## 2018-10-10 RX ORDER — CHLORHEXIDINE GLUCONATE ORAL RINSE 1.2 MG/ML
15 SOLUTION DENTAL 2 TIMES DAILY
Qty: 473 ML | Refills: 6 | Status: SHIPPED | OUTPATIENT
Start: 2018-10-10 | End: 2020-01-20

## 2018-10-22 ENCOUNTER — DOCUMENTATION ONLY (OUTPATIENT)
Dept: RHEUMATOLOGY | Facility: CLINIC | Age: 68
End: 2018-10-22

## 2018-10-31 ENCOUNTER — TELEPHONE (OUTPATIENT)
Dept: PHARMACY | Facility: CLINIC | Age: 68
End: 2018-10-31

## 2018-11-13 ENCOUNTER — OFFICE VISIT (OUTPATIENT)
Dept: RHEUMATOLOGY | Facility: CLINIC | Age: 68
End: 2018-11-13
Payer: MEDICARE

## 2018-11-13 VITALS
DIASTOLIC BLOOD PRESSURE: 85 MMHG | HEART RATE: 52 BPM | WEIGHT: 201.5 LBS | HEIGHT: 71 IN | SYSTOLIC BLOOD PRESSURE: 150 MMHG | BODY MASS INDEX: 28.21 KG/M2

## 2018-11-13 DIAGNOSIS — M06.9 RHEUMATOID ARTHRITIS FLARE: Primary | ICD-10-CM

## 2018-11-13 DIAGNOSIS — R53.83 FATIGUE, UNSPECIFIED TYPE: ICD-10-CM

## 2018-11-13 DIAGNOSIS — M25.50 ARTHRALGIA, UNSPECIFIED JOINT: ICD-10-CM

## 2018-11-13 DIAGNOSIS — E03.9 HYPOTHYROIDISM, UNSPECIFIED TYPE: ICD-10-CM

## 2018-11-13 PROCEDURE — 99213 OFFICE O/P EST LOW 20 MIN: CPT | Mod: PBBFAC,PO | Performed by: INTERNAL MEDICINE

## 2018-11-13 PROCEDURE — 99999 PR PBB SHADOW E&M-EST. PATIENT-LVL III: CPT | Mod: PBBFAC,,, | Performed by: INTERNAL MEDICINE

## 2018-11-13 PROCEDURE — 99214 OFFICE O/P EST MOD 30 MIN: CPT | Mod: S$PBB,,, | Performed by: INTERNAL MEDICINE

## 2018-11-13 PROCEDURE — 96372 THER/PROPH/DIAG INJ SC/IM: CPT | Mod: PBBFAC,PO

## 2018-11-13 RX ORDER — SULFAMETHOXAZOLE AND TRIMETHOPRIM 800; 160 MG/1; MG/1
1 TABLET ORAL 2 TIMES DAILY
Qty: 20 TABLET | Refills: 3 | Status: SHIPPED | OUTPATIENT
Start: 2018-11-13 | End: 2018-11-23

## 2018-11-13 RX ORDER — DICLOFENAC SODIUM 75 MG/1
75 TABLET, DELAYED RELEASE ORAL 2 TIMES DAILY
Qty: 60 TABLET | Refills: 8 | Status: SHIPPED | OUTPATIENT
Start: 2018-11-13 | End: 2018-12-13

## 2018-11-13 RX ORDER — CYANOCOBALAMIN 1000 UG/ML
1000 INJECTION, SOLUTION INTRAMUSCULAR; SUBCUTANEOUS
Status: COMPLETED | OUTPATIENT
Start: 2018-11-13 | End: 2018-11-13

## 2018-11-13 RX ADMIN — CYANOCOBALAMIN 1000 MCG: 1000 INJECTION, SOLUTION INTRAMUSCULAR at 03:11

## 2018-11-13 NOTE — PROGRESS NOTES
Administered 1 cc ( 1000 mcg/ml ) of b12 to the right upper outer arm. Informed of s/s to report verbalized understanding. No adverse reactions noted.    Lot # [id number]  Expiration nov 19

## 2018-11-13 NOTE — PROGRESS NOTES
Subjective:       Patient ID: Aurelio Perkins is a 67 y.o. male.    Chief Complaint: Follow-up    Follow up: RA was on xeljanz,on diclofenac 100 mg bid but has 1 kidney and would like  To decrease the dose.Patient complains of arthralgias and myalgias for which has been present for a few years. Pain is located in multiple joints, both shoulder(s), both elbow(s), both wrist(s), both MCP(s): 1st, 2nd, 3rd, 4th and 5th, both PIP(s): 1st, 2nd, 3rd, 4th and 5th, both DIP(s): 1st and 2nd, both hip(s), both knee(s) and both MTP(s): 1st, 2nd, 3rd, 4th and 5th, is described as aching, pulsating, shooting and throbbing, and is constant, moderate .  Associated symptoms include: crepitation, decreased range of motion, edema, effusion, tenderness and warmth.       Review of Systems   Constitutional: Positive for activity change. Negative for appetite change, chills, diaphoresis and unexpected weight change.   HENT: Negative for congestion, ear pain, facial swelling, mouth sores, nosebleeds, postnasal drip, rhinorrhea, sinus pressure, sneezing, sore throat, tinnitus and voice change.    Eyes: Negative for pain, discharge, redness, itching and visual disturbance.   Respiratory: Negative for apnea, cough, chest tightness, shortness of breath and wheezing.    Cardiovascular: Negative for chest pain, palpitations and leg swelling.   Gastrointestinal: Negative for abdominal pain, constipation, diarrhea, nausea and vomiting.   Endocrine: Negative for cold intolerance, heat intolerance, polydipsia and polyuria.   Genitourinary: Negative for decreased urine volume, difficulty urinating, flank pain, frequency, hematuria and urgency.   Musculoskeletal: Positive for back pain and neck stiffness. Negative for arthralgias, gait problem and neck pain.   Skin: Positive for rash. Negative for pallor and wound.   Allergic/Immunologic: Negative for immunocompromised state.   Neurological: Negative for dizziness, tremors, seizures, syncope,  "weakness and numbness.   Hematological: Negative for adenopathy. Does not bruise/bleed easily.   Psychiatric/Behavioral: Negative for sleep disturbance and suicidal ideas. The patient is not nervous/anxious.          Objective:     BP (!) 150/85   Pulse (!) 52   Ht 5' 11" (1.803 m)   Wt 91.4 kg (201 lb 8 oz)   BMI 28.10 kg/m²      Physical Exam   Vitals reviewed.  Constitutional: He is oriented to person, place, and time. No distress.   HENT:   Head: Normocephalic and atraumatic.   Mouth/Throat: Oropharynx is clear and moist.   Eyes: EOM are normal. Pupils are equal, round, and reactive to light.   Neck: Neck supple. No thyromegaly present.   Cardiovascular: Normal rate, regular rhythm and normal heart sounds.  Exam reveals no gallop and no friction rub.    No murmur heard.  Pulmonary/Chest: Breath sounds normal. He has no wheezes. He has no rales. He exhibits no tenderness.   Abdominal: There is no tenderness. There is no rebound and no guarding.       Right Side Rheumatological Exam     Examination finds the 2nd MCP, 3rd MCP, 4th MCP and 5th MCP normal.    The patient is tender to palpation of the 1st PIP, 1st MCP, 2nd PIP, 2nd MCP, 3rd PIP, 3rd MCP, 4th PIP, 4th MCP and 5th PIP    He has swelling of the elbow, wrist, knee, 1st PIP, 1st MCP, 2nd PIP, 2nd MCP, 3rd PIP, 3rd MCP, 4th PIP, 4th MCP, 5th PIP and 5th MCP    The patient has an enlarged wrist, knee, 1st PIP, 2nd PIP, 3rd PIP, 4th PIP and 5th PIP    Shoulder Exam   Tenderness Location: no tenderness    Range of Motion   Active abduction: abnormal   Adduction: abnormal  Sensation: normal    Knee Exam   Tenderness Location: medial joint line and LCL    Range of Motion   Extension: normal   Flexion: normal   Patellofemoral Crepitus: positive  Effusion: positive  Sensation: normal    Hip Exam   Tenderness Location: posterior and anterior    Range of Motion   Extension: abnormal   Flexion: abnormal   Sensation: normal    Elbow/Wrist Exam   Tenderness " Location: no tenderness    Range of Motion   Elbow   Flexion: normal   Sensation: normal    Muscle Strength (0-5 scale):  Neck Flexion:  3  Neck Extension: 3  Deltoid:  3  Biceps: 3/5   Triceps:  3  Quadriceps:  3   Distal Lower Extremity: 3    Left Side Rheumatological Exam     Examination finds the elbow, wrist, 1st MCP, 2nd MCP, 3rd MCP, 4th MCP and 5th MCP normal.    The patient is tender to palpation of the 1st PIP, 1st MCP, 2nd PIP, 2nd MCP, 3rd PIP, 3rd MCP, 4th PIP, 4th MCP, 5th PIP and 5th MCP.    He has swelling of the wrist, knee, 1st PIP, 1st MCP, 2nd PIP, 2nd MCP, 3rd PIP, 3rd MCP, 4th PIP, 4th MCP, 5th PIP and 5th MCP    The patient has an enlarged knee, 1st PIP, 2nd PIP, 3rd PIP, 4th PIP and 5th PIP.    Shoulder Exam   Tenderness Location: no tenderness    Range of Motion   Active abduction: abnormal   Extension: abnormal   Sensation: normal    Knee Exam   Tenderness Location: lateral joint line and medial joint line    Range of Motion   Extension: normal   Flexion: normal     Patellofemoral Crepitus: positive  Effusion: positive  Sensation: normal    Hip Exam   Tenderness Location: posterior and anterior    Range of Motion   Extension: abnormal   Flexion: abnormal   Sensation: normal    Elbow/Wrist Exam     Range of Motion   Elbow   Flexion: normal   Sensation: normal    Muscle Strength (0-5 scale):  Neck Flexion:  3  Neck Extension: 3  Deltoid:  3  Biceps: 3/5   Triceps:  3  Quadriceps:  3   Distal Lower Extremity: 3      Back/Neck Exam   General Inspection   Gait: normal       Tenderness Right paramedian tenderness of the Upper C-Spine, Lower C-Spine, Lower L-Spine and SI Joint.Left paramedian tenderness of the Upper C-Spine, Lower L-Spine, Lower C-Spine and SI Joint.    Back Range of Motion   Extension: abnormal  Flexion: abnormal  Lateral Bend Right: abnormal  Lateral Bend Left: abnormal  Rotation Right: abnormal  Rotation Left: abnormal    Neck Range of Motion   Flexion: Limited  Extension:  Limited  Right Lateral Bend: abnormal  Left Lateral Bend: abnormal  Right Rotation: abnormal  Left Rotation: abnormal  Lymphadenopathy:     He has no cervical adenopathy.   Neurological: He is alert and oriented to person, place, and time. He displays weakness. He exhibits normal muscle tone.   Reflex Scores:       Patellar reflexes are 2+ on the right side and 2+ on the left side.  Skin: No rash noted. No erythema. No pallor.     Psychiatric: Mood and affect normal.   Musculoskeletal: He exhibits edema and deformity. He exhibits no tenderness.           pt had external labs done, we reviewed  the results at this visit and the lab results will be scanned into the  media    Assessment:       Encounter Diagnoses   Name Primary?    Rheumatoid arthritis flare Yes    Fatigue, unspecified type     Arthralgia, unspecified joint     Hypothyroidism, unspecified type          Plan:     Rheumatoid arthritis flare  -     diclofenac (VOLTAREN) 75 MG EC tablet; Take 1 tablet (75 mg total) by mouth 2 (two) times daily.  Dispense: 60 tablet; Refill: 8  -     CBC auto differential; Future; Expected date: 11/13/2018  -     Comprehensive metabolic panel; Future; Expected date: 11/13/2018  -     C-reactive protein; Future; Expected date: 11/13/2018  -     Sedimentation rate, automated; Future; Expected date: 11/13/2018  -     Rheumatoid factor; Future; Expected date: 11/13/2018  -     Cyclic citrul peptide antibody, IgG; Future; Expected date: 11/13/2018  -     TSH; Future; Expected date: 11/13/2018  -     cyanocobalamin injection 1,000 mcg    Fatigue, unspecified type  -     diclofenac (VOLTAREN) 75 MG EC tablet; Take 1 tablet (75 mg total) by mouth 2 (two) times daily.  Dispense: 60 tablet; Refill: 8  -     CBC auto differential; Future; Expected date: 11/13/2018  -     Comprehensive metabolic panel; Future; Expected date: 11/13/2018  -     C-reactive protein; Future; Expected date: 11/13/2018  -     Sedimentation rate,  automated; Future; Expected date: 11/13/2018  -     Rheumatoid factor; Future; Expected date: 11/13/2018  -     Cyclic citrul peptide antibody, IgG; Future; Expected date: 11/13/2018  -     TSH; Future; Expected date: 11/13/2018  -     cyanocobalamin injection 1,000 mcg    Arthralgia, unspecified joint  -     diclofenac (VOLTAREN) 75 MG EC tablet; Take 1 tablet (75 mg total) by mouth 2 (two) times daily.  Dispense: 60 tablet; Refill: 8  -     CBC auto differential; Future; Expected date: 11/13/2018  -     Comprehensive metabolic panel; Future; Expected date: 11/13/2018  -     C-reactive protein; Future; Expected date: 11/13/2018  -     Sedimentation rate, automated; Future; Expected date: 11/13/2018  -     Rheumatoid factor; Future; Expected date: 11/13/2018  -     Cyclic citrul peptide antibody, IgG; Future; Expected date: 11/13/2018  -     TSH; Future; Expected date: 11/13/2018  -     cyanocobalamin injection 1,000 mcg    Hypothyroidism, unspecified type  -     diclofenac (VOLTAREN) 75 MG EC tablet; Take 1 tablet (75 mg total) by mouth 2 (two) times daily.  Dispense: 60 tablet; Refill: 8  -     CBC auto differential; Future; Expected date: 11/13/2018  -     Comprehensive metabolic panel; Future; Expected date: 11/13/2018  -     C-reactive protein; Future; Expected date: 11/13/2018  -     Sedimentation rate, automated; Future; Expected date: 11/13/2018  -     Rheumatoid factor; Future; Expected date: 11/13/2018  -     Cyclic citrul peptide antibody, IgG; Future; Expected date: 11/13/2018  -     TSH; Future; Expected date: 11/13/2018  -     cyanocobalamin injection 1,000 mcg    Other orders  -     sulfamethoxazole-trimethoprim 800-160mg (BACTRIM DS) 800-160 mg Tab; Take 1 tablet by mouth 2 (two) times daily. for 10 days  Dispense: 20 tablet; Refill: 3       Continue xeljanz, we will decrease diclofenac 75 mg bid

## 2018-11-21 ENCOUNTER — TELEPHONE (OUTPATIENT)
Dept: PHARMACY | Facility: CLINIC | Age: 68
End: 2018-11-21

## 2018-11-29 ENCOUNTER — TELEPHONE (OUTPATIENT)
Dept: PHARMACY | Facility: CLINIC | Age: 68
End: 2018-11-29

## 2018-12-26 ENCOUNTER — TELEPHONE (OUTPATIENT)
Dept: PHARMACY | Facility: CLINIC | Age: 68
End: 2018-12-26

## 2018-12-27 RX ORDER — ESZOPICLONE 2 MG/1
TABLET, FILM COATED ORAL
Qty: 30 TABLET | Refills: 3 | Status: SHIPPED | OUTPATIENT
Start: 2018-12-27 | End: 2019-04-02

## 2019-01-17 ENCOUNTER — TELEPHONE (OUTPATIENT)
Dept: PHARMACY | Facility: CLINIC | Age: 69
End: 2019-01-17

## 2019-01-17 NOTE — TELEPHONE ENCOUNTER
Documentation Only:    Re-authorization for Xeljanz has been approved for 1 year    Approval dates: 1.17.2019 until 1.17.2020    Case ID#: [id number]    Patient co-pay: $50    Patient Assistance IS required.    Patient is automatically enrolled in Xeljanz evoucher program which will reduce patient copay to $0.    Forwarding to clinical pharmacist for consult and shipment.    BLU 9:28am

## 2019-01-23 ENCOUNTER — TELEPHONE (OUTPATIENT)
Dept: PHARMACY | Facility: CLINIC | Age: 69
End: 2019-01-23

## 2019-02-14 LAB
ALBUMIN SERPL-MCNC: 4.1 G/DL (ref 3.6–5.1)
ALBUMIN/GLOB SERPL: 2 (CALC) (ref 1–2.5)
ALP SERPL-CCNC: 34 U/L (ref 40–115)
ALT SERPL-CCNC: 41 U/L (ref 9–46)
AST SERPL-CCNC: 29 U/L (ref 10–35)
BASOPHILS # BLD AUTO: 18 CELLS/UL (ref 0–200)
BASOPHILS NFR BLD AUTO: 0.4 %
BILIRUB SERPL-MCNC: 0.7 MG/DL (ref 0.2–1.2)
BUN SERPL-MCNC: 18 MG/DL (ref 7–25)
BUN/CREAT SERPL: 11 (CALC) (ref 6–22)
CALCIUM SERPL-MCNC: 8.8 MG/DL (ref 8.6–10.3)
CCP IGG SERPL-ACNC: 30 UNITS
CHLORIDE SERPL-SCNC: 105 MMOL/L (ref 98–110)
CO2 SERPL-SCNC: 28 MMOL/L (ref 20–32)
CREAT SERPL-MCNC: 1.6 MG/DL (ref 0.7–1.25)
CRP SERPL-MCNC: 1 MG/L
EOSINOPHIL # BLD AUTO: 161 CELLS/UL (ref 15–500)
EOSINOPHIL NFR BLD AUTO: 3.5 %
ERYTHROCYTE [DISTWIDTH] IN BLOOD BY AUTOMATED COUNT: 13.1 % (ref 11–15)
ERYTHROCYTE [SEDIMENTATION RATE] IN BLOOD BY WESTERGREN METHOD: 6 MM/H
GFRSERPLBLD MDRD-ARVRAT: 44 ML/MIN/1.73M2
GLOBULIN SER CALC-MCNC: 2.1 G/DL (CALC) (ref 1.9–3.7)
GLUCOSE SERPL-MCNC: 85 MG/DL (ref 65–99)
HCT VFR BLD AUTO: 38 % (ref 38.5–50)
HGB BLD-MCNC: 12.6 G/DL (ref 13.2–17.1)
LYMPHOCYTES # BLD AUTO: 1858 CELLS/UL (ref 850–3900)
LYMPHOCYTES NFR BLD AUTO: 40.4 %
MCH RBC QN AUTO: 29 PG (ref 27–33)
MCHC RBC AUTO-ENTMCNC: 33.2 G/DL (ref 32–36)
MCV RBC AUTO: 87.4 FL (ref 80–100)
MONOCYTES # BLD AUTO: 322 CELLS/UL (ref 200–950)
MONOCYTES NFR BLD AUTO: 7 %
NEUTROPHILS # BLD AUTO: 2240 CELLS/UL (ref 1500–7800)
NEUTROPHILS NFR BLD AUTO: 48.7 %
PLATELET # BLD AUTO: 224 THOUSAND/UL (ref 140–400)
PMV BLD REES-ECKER: 10.6 FL (ref 7.5–12.5)
POTASSIUM SERPL-SCNC: 4.3 MMOL/L (ref 3.5–5.3)
PROT SERPL-MCNC: 6.2 G/DL (ref 6.1–8.1)
RBC # BLD AUTO: 4.35 MILLION/UL (ref 4.2–5.8)
RHEUMATOID FACT SERPL-ACNC: <14 IU/ML
SODIUM SERPL-SCNC: 140 MMOL/L (ref 135–146)
TSH SERPL-ACNC: 1.37 MIU/L (ref 0.4–4.5)
WBC # BLD AUTO: 4.6 THOUSAND/UL (ref 3.8–10.8)

## 2019-02-18 ENCOUNTER — TELEPHONE (OUTPATIENT)
Dept: PHARMACY | Facility: CLINIC | Age: 69
End: 2019-02-18

## 2019-03-02 ENCOUNTER — HOSPITAL ENCOUNTER (INPATIENT)
Facility: HOSPITAL | Age: 69
LOS: 2 days | Discharge: HOME | DRG: 054 | End: 2019-03-04
Attending: EMERGENCY MEDICINE | Admitting: NEUROLOGICAL SURGERY
Payer: MEDICARE

## 2019-03-02 DIAGNOSIS — R73.9 HYPERGLYCEMIA: ICD-10-CM

## 2019-03-02 DIAGNOSIS — D32.9 MENINGIOMA: ICD-10-CM

## 2019-03-02 DIAGNOSIS — M06.9 RHEUMATOID ARTHRITIS, INVOLVING UNSPECIFIED SITE, UNSPECIFIED RHEUMATOID FACTOR PRESENCE: ICD-10-CM

## 2019-03-02 DIAGNOSIS — Z01.810 PREOP CARDIOVASCULAR EXAM: ICD-10-CM

## 2019-03-02 DIAGNOSIS — R00.2 PALPITATIONS: ICD-10-CM

## 2019-03-02 DIAGNOSIS — E03.9 HYPOTHYROIDISM, UNSPECIFIED TYPE: ICD-10-CM

## 2019-03-02 DIAGNOSIS — G93.89 BRAIN MASS: ICD-10-CM

## 2019-03-02 PROCEDURE — 99285 PR EMERGENCY DEPT VISIT,LEVEL V: ICD-10-PCS | Mod: ,,, | Performed by: NURSE PRACTITIONER

## 2019-03-02 PROCEDURE — 96375 TX/PRO/DX INJ NEW DRUG ADDON: CPT

## 2019-03-02 PROCEDURE — 25500020 PHARM REV CODE 255: Performed by: EMERGENCY MEDICINE

## 2019-03-02 PROCEDURE — 99285 EMERGENCY DEPT VISIT HI MDM: CPT | Mod: ,,, | Performed by: NURSE PRACTITIONER

## 2019-03-02 PROCEDURE — 99223 1ST HOSP IP/OBS HIGH 75: CPT | Mod: AI,GC,, | Performed by: HOSPITALIST

## 2019-03-02 PROCEDURE — 12000002 HC ACUTE/MED SURGE SEMI-PRIVATE ROOM

## 2019-03-02 PROCEDURE — A9585 GADOBUTROL INJECTION: HCPCS | Performed by: EMERGENCY MEDICINE

## 2019-03-02 PROCEDURE — 99285 EMERGENCY DEPT VISIT HI MDM: CPT | Mod: 25

## 2019-03-02 PROCEDURE — 82962 GLUCOSE BLOOD TEST: CPT

## 2019-03-02 PROCEDURE — 99223 PR INITIAL HOSPITAL CARE,LEVL III: ICD-10-PCS | Mod: AI,GC,, | Performed by: HOSPITALIST

## 2019-03-02 PROCEDURE — 63600175 PHARM REV CODE 636 W HCPCS: Performed by: NURSE PRACTITIONER

## 2019-03-02 PROCEDURE — 96365 THER/PROPH/DIAG IV INF INIT: CPT

## 2019-03-02 RX ORDER — GADOBUTROL 604.72 MG/ML
10 INJECTION INTRAVENOUS
Status: COMPLETED | OUTPATIENT
Start: 2019-03-02 | End: 2019-03-02

## 2019-03-02 RX ORDER — LEVOTHYROXINE SODIUM 50 UG/1
50 TABLET ORAL
Status: DISCONTINUED | OUTPATIENT
Start: 2019-03-03 | End: 2019-03-04 | Stop reason: HOSPADM

## 2019-03-02 RX ORDER — ONDANSETRON 4 MG/1
4 TABLET, ORALLY DISINTEGRATING ORAL ONCE
Status: DISCONTINUED | OUTPATIENT
Start: 2019-03-03 | End: 2019-03-04

## 2019-03-02 RX ORDER — IBUPROFEN 200 MG
16 TABLET ORAL
Status: DISCONTINUED | OUTPATIENT
Start: 2019-03-03 | End: 2019-03-04 | Stop reason: HOSPADM

## 2019-03-02 RX ORDER — MUPIROCIN 20 MG/G
OINTMENT TOPICAL
Status: DISCONTINUED | OUTPATIENT
Start: 2019-03-02 | End: 2019-03-02

## 2019-03-02 RX ORDER — INSULIN ASPART 100 [IU]/ML
0-5 INJECTION, SOLUTION INTRAVENOUS; SUBCUTANEOUS
Status: DISCONTINUED | OUTPATIENT
Start: 2019-03-03 | End: 2019-03-02

## 2019-03-02 RX ORDER — PANTOPRAZOLE SODIUM 40 MG/1
40 TABLET, DELAYED RELEASE ORAL DAILY
Status: DISCONTINUED | OUTPATIENT
Start: 2019-03-03 | End: 2019-03-04 | Stop reason: HOSPADM

## 2019-03-02 RX ORDER — DEXAMETHASONE SODIUM PHOSPHATE 4 MG/ML
10 INJECTION, SOLUTION INTRA-ARTICULAR; INTRALESIONAL; INTRAMUSCULAR; INTRAVENOUS; SOFT TISSUE EVERY 6 HOURS
Status: DISCONTINUED | OUTPATIENT
Start: 2019-03-02 | End: 2019-03-02

## 2019-03-02 RX ORDER — SODIUM CHLORIDE 9 MG/ML
INJECTION, SOLUTION INTRAVENOUS CONTINUOUS
Status: DISCONTINUED | OUTPATIENT
Start: 2019-03-03 | End: 2019-03-04

## 2019-03-02 RX ORDER — ACETAMINOPHEN 325 MG/1
650 TABLET ORAL EVERY 4 HOURS PRN
Status: DISCONTINUED | OUTPATIENT
Start: 2019-03-03 | End: 2019-03-04 | Stop reason: HOSPADM

## 2019-03-02 RX ORDER — LEVETIRACETAM 5 MG/ML
500 INJECTION INTRAVASCULAR 2 TIMES DAILY
Status: DISCONTINUED | OUTPATIENT
Start: 2019-03-02 | End: 2019-03-03

## 2019-03-02 RX ORDER — SODIUM CHLORIDE 0.9 % (FLUSH) 0.9 %
5 SYRINGE (ML) INJECTION
Status: DISCONTINUED | OUTPATIENT
Start: 2019-03-02 | End: 2019-03-04 | Stop reason: HOSPADM

## 2019-03-02 RX ORDER — IBUPROFEN 200 MG
24 TABLET ORAL
Status: DISCONTINUED | OUTPATIENT
Start: 2019-03-03 | End: 2019-03-04 | Stop reason: HOSPADM

## 2019-03-02 RX ORDER — GLUCAGON 1 MG
1 KIT INJECTION
Status: DISCONTINUED | OUTPATIENT
Start: 2019-03-03 | End: 2019-03-02

## 2019-03-02 RX ORDER — INSULIN ASPART 100 [IU]/ML
0-5 INJECTION, SOLUTION INTRAVENOUS; SUBCUTANEOUS
Status: DISCONTINUED | OUTPATIENT
Start: 2019-03-03 | End: 2019-03-04 | Stop reason: HOSPADM

## 2019-03-02 RX ORDER — IBUPROFEN 200 MG
24 TABLET ORAL
Status: DISCONTINUED | OUTPATIENT
Start: 2019-03-03 | End: 2019-03-02

## 2019-03-02 RX ORDER — DEXAMETHASONE SODIUM PHOSPHATE 4 MG/ML
4 INJECTION, SOLUTION INTRA-ARTICULAR; INTRALESIONAL; INTRAMUSCULAR; INTRAVENOUS; SOFT TISSUE EVERY 6 HOURS
Status: DISCONTINUED | OUTPATIENT
Start: 2019-03-03 | End: 2019-03-03

## 2019-03-02 RX ORDER — FENTANYL CITRATE 50 UG/ML
25 INJECTION, SOLUTION INTRAMUSCULAR; INTRAVENOUS
Status: COMPLETED | OUTPATIENT
Start: 2019-03-02 | End: 2019-03-02

## 2019-03-02 RX ORDER — GLUCAGON 1 MG
1 KIT INJECTION
Status: DISCONTINUED | OUTPATIENT
Start: 2019-03-03 | End: 2019-03-04 | Stop reason: HOSPADM

## 2019-03-02 RX ORDER — IBUPROFEN 200 MG
16 TABLET ORAL
Status: DISCONTINUED | OUTPATIENT
Start: 2019-03-03 | End: 2019-03-02

## 2019-03-02 RX ORDER — MUPIROCIN 20 MG/G
1 OINTMENT TOPICAL
Status: DISCONTINUED | OUTPATIENT
Start: 2019-03-02 | End: 2019-03-04

## 2019-03-02 RX ADMIN — LEVETIRACETAM 500 MG: 5 INJECTION INTRAVENOUS at 08:03

## 2019-03-02 RX ADMIN — DEXAMETHASONE SODIUM PHOSPHATE 10 MG: 4 INJECTION, SOLUTION INTRAMUSCULAR; INTRAVENOUS at 08:03

## 2019-03-02 RX ADMIN — FENTANYL CITRATE 25 MCG: 50 INJECTION INTRAMUSCULAR; INTRAVENOUS at 08:03

## 2019-03-02 RX ADMIN — GADOBUTROL 10 ML: 604.72 INJECTION INTRAVENOUS at 09:03

## 2019-03-03 LAB
ABO + RH BLD: NORMAL
ANION GAP SERPL CALC-SCNC: 8 MMOL/L
ANION GAP SERPL CALC-SCNC: 9 MMOL/L
APTT BLDCRRT: 25.6 SEC
BILIRUB UR QL STRIP: NEGATIVE
BLD GP AB SCN CELLS X3 SERPL QL: NORMAL
BUN SERPL-MCNC: 20 MG/DL
BUN SERPL-MCNC: 20 MG/DL
CALCIUM SERPL-MCNC: 9.2 MG/DL
CALCIUM SERPL-MCNC: 9.3 MG/DL
CHLORIDE SERPL-SCNC: 104 MMOL/L
CHLORIDE SERPL-SCNC: 106 MMOL/L
CLARITY UR REFRACT.AUTO: CLEAR
CO2 SERPL-SCNC: 23 MMOL/L
CO2 SERPL-SCNC: 24 MMOL/L
COLOR UR AUTO: YELLOW
CORTIS SERPL-MCNC: <1 UG/DL
CREAT SERPL-MCNC: 1.4 MG/DL
CREAT SERPL-MCNC: 1.5 MG/DL
EST. GFR  (AFRICAN AMERICAN): 54.9 ML/MIN/1.73 M^2
EST. GFR  (AFRICAN AMERICAN): 59.7 ML/MIN/1.73 M^2
EST. GFR  (NON AFRICAN AMERICAN): 47.5 ML/MIN/1.73 M^2
EST. GFR  (NON AFRICAN AMERICAN): 51.6 ML/MIN/1.73 M^2
FSH SERPL-ACNC: 0.5 MIU/ML
GLUCOSE SERPL-MCNC: 149 MG/DL
GLUCOSE SERPL-MCNC: 162 MG/DL
GLUCOSE UR QL STRIP: ABNORMAL
HGB UR QL STRIP: NEGATIVE
INR PPP: 1
KETONES UR QL STRIP: NEGATIVE
LEUKOCYTE ESTERASE UR QL STRIP: NEGATIVE
LH SERPL-ACNC: <0.1 MIU/ML
NITRITE UR QL STRIP: NEGATIVE
PH UR STRIP: 6 [PH] (ref 5–8)
POCT GLUCOSE: 129 MG/DL (ref 70–110)
POCT GLUCOSE: 173 MG/DL (ref 70–110)
POCT GLUCOSE: 177 MG/DL (ref 70–110)
POCT GLUCOSE: 238 MG/DL (ref 70–110)
POTASSIUM SERPL-SCNC: 4.2 MMOL/L
POTASSIUM SERPL-SCNC: 4.5 MMOL/L
PROLACTIN SERPL IA-MCNC: 39 NG/ML
PROT UR QL STRIP: NEGATIVE
PROTHROMBIN TIME: 10.3 SEC
SODIUM SERPL-SCNC: 137 MMOL/L
SODIUM SERPL-SCNC: 137 MMOL/L
SP GR UR STRIP: 1.01 (ref 1–1.03)
T3 SERPL-MCNC: 52 NG/DL
T4 FREE SERPL-MCNC: 0.66 NG/DL
T4 SERPL-MCNC: 3.9 UG/DL
TROPONIN I SERPL DL<=0.01 NG/ML-MCNC: 0.01 NG/ML
TSH SERPL DL<=0.005 MIU/L-ACNC: 0.27 UIU/ML
URN SPEC COLLECT METH UR: ABNORMAL

## 2019-03-03 PROCEDURE — 93005 ELECTROCARDIOGRAM TRACING: CPT

## 2019-03-03 PROCEDURE — 63600175 PHARM REV CODE 636 W HCPCS: Performed by: STUDENT IN AN ORGANIZED HEALTH CARE EDUCATION/TRAINING PROGRAM

## 2019-03-03 PROCEDURE — 93010 EKG 12-LEAD: ICD-10-PCS | Mod: ,,, | Performed by: INTERNAL MEDICINE

## 2019-03-03 PROCEDURE — 83003 ASSAY GROWTH HORMONE (HGH): CPT

## 2019-03-03 PROCEDURE — 20600001 HC STEP DOWN PRIVATE ROOM

## 2019-03-03 PROCEDURE — 85730 THROMBOPLASTIN TIME PARTIAL: CPT

## 2019-03-03 PROCEDURE — 93010 ELECTROCARDIOGRAM REPORT: CPT | Mod: ,,, | Performed by: INTERNAL MEDICINE

## 2019-03-03 PROCEDURE — 80048 BASIC METABOLIC PNL TOTAL CA: CPT | Mod: 91

## 2019-03-03 PROCEDURE — 84480 ASSAY TRIIODOTHYRONINE (T3): CPT

## 2019-03-03 PROCEDURE — 80048 BASIC METABOLIC PNL TOTAL CA: CPT

## 2019-03-03 PROCEDURE — 83002 ASSAY OF GONADOTROPIN (LH): CPT

## 2019-03-03 PROCEDURE — 84305 ASSAY OF SOMATOMEDIN: CPT

## 2019-03-03 PROCEDURE — 85610 PROTHROMBIN TIME: CPT

## 2019-03-03 PROCEDURE — 96361 HYDRATE IV INFUSION ADD-ON: CPT

## 2019-03-03 PROCEDURE — 84146 ASSAY OF PROLACTIN: CPT

## 2019-03-03 PROCEDURE — 82533 TOTAL CORTISOL: CPT

## 2019-03-03 PROCEDURE — 99223 PR INITIAL HOSPITAL CARE,LEVL III: ICD-10-PCS | Mod: AI,GC,, | Performed by: NEUROLOGICAL SURGERY

## 2019-03-03 PROCEDURE — 36415 COLL VENOUS BLD VENIPUNCTURE: CPT

## 2019-03-03 PROCEDURE — 25000003 PHARM REV CODE 250: Performed by: STUDENT IN AN ORGANIZED HEALTH CARE EDUCATION/TRAINING PROGRAM

## 2019-03-03 PROCEDURE — 96376 TX/PRO/DX INJ SAME DRUG ADON: CPT

## 2019-03-03 PROCEDURE — 99223 1ST HOSP IP/OBS HIGH 75: CPT | Mod: AI,GC,, | Performed by: NEUROLOGICAL SURGERY

## 2019-03-03 PROCEDURE — 84436 ASSAY OF TOTAL THYROXINE: CPT

## 2019-03-03 PROCEDURE — 84443 ASSAY THYROID STIM HORMONE: CPT

## 2019-03-03 PROCEDURE — 86850 RBC ANTIBODY SCREEN: CPT

## 2019-03-03 PROCEDURE — 83001 ASSAY OF GONADOTROPIN (FSH): CPT

## 2019-03-03 PROCEDURE — 81003 URINALYSIS AUTO W/O SCOPE: CPT

## 2019-03-03 PROCEDURE — 84484 ASSAY OF TROPONIN QUANT: CPT

## 2019-03-03 PROCEDURE — 63600175 PHARM REV CODE 636 W HCPCS: Performed by: NURSE PRACTITIONER

## 2019-03-03 PROCEDURE — 84439 ASSAY OF FREE THYROXINE: CPT

## 2019-03-03 RX ORDER — DICLOFENAC SODIUM 75 MG/1
75 TABLET, DELAYED RELEASE ORAL 2 TIMES DAILY
Status: DISCONTINUED | OUTPATIENT
Start: 2019-03-03 | End: 2019-03-04

## 2019-03-03 RX ORDER — DICLOFENAC SODIUM 75 MG/1
75 TABLET, DELAYED RELEASE ORAL 2 TIMES DAILY
Status: ON HOLD | COMMUNITY
End: 2019-05-31 | Stop reason: HOSPADM

## 2019-03-03 RX ADMIN — SODIUM CHLORIDE: 0.9 INJECTION, SOLUTION INTRAVENOUS at 10:03

## 2019-03-03 RX ADMIN — DICLOFENAC SODIUM 75 MG: 75 TABLET, DELAYED RELEASE ORAL at 09:03

## 2019-03-03 RX ADMIN — SODIUM CHLORIDE: 0.9 INJECTION, SOLUTION INTRAVENOUS at 01:03

## 2019-03-03 RX ADMIN — INSULIN ASPART 2 UNITS: 100 INJECTION, SOLUTION INTRAVENOUS; SUBCUTANEOUS at 06:03

## 2019-03-03 RX ADMIN — LEVETIRACETAM 500 MG: 5 INJECTION INTRAVENOUS at 09:03

## 2019-03-03 RX ADMIN — DICLOFENAC SODIUM 75 MG: 75 TABLET, DELAYED RELEASE ORAL at 02:03

## 2019-03-03 RX ADMIN — LEVOTHYROXINE SODIUM 50 MCG: 50 TABLET ORAL at 05:03

## 2019-03-03 RX ADMIN — DEXAMETHASONE SODIUM PHOSPHATE 4 MG: 4 INJECTION, SOLUTION INTRA-ARTICULAR; INTRALESIONAL; INTRAMUSCULAR; INTRAVENOUS; SOFT TISSUE at 01:03

## 2019-03-03 RX ADMIN — PANTOPRAZOLE SODIUM 40 MG: 40 TABLET, DELAYED RELEASE ORAL at 09:03

## 2019-03-03 NOTE — PROGRESS NOTES
Patient transferred from stretcher to MRI bed,, Tele and O2 sensor applied,, all vitals WNL,, placed in MRI,, tolerating well, WCTM
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
## 2019-03-03 NOTE — ED NOTES
Spoke with pharmacy to get home medication of voltaren ordered for pt per verbal order from KIMMIE Davison neurosurgery.
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
## 2019-03-03 NOTE — ASSESSMENT & PLAN NOTE
Patient is 69 yo M with RA, Hshimoto thyroiditis, SYMONE, prostate ca (s/p prostatectomy 2004) transferred from [facility] for abnormal CT.     CT impression:   1.  Masses with adjacent edema are noted in the deep white matter of the right frontal lobe and along the right side of the cavernous sinus adjacent to the medial aspect of the right temporal lobe and caudal aspect of the right hippocampus. The findings suggest metastatic or primary neoplasm and are unrelated to recent trauma.   2.  There is no evidence of acute intracranial hemorrhage or other acute traumatic injury appreciated.    - Assessed by Neurosurgery, will be admitted to surgical service for further management

## 2019-03-03 NOTE — PLAN OF CARE
Problem: Adult Inpatient Plan of Care  Goal: Plan of Care Review  Outcome: Ongoing (interventions implemented as appropriate)  POC reviewed with pt at 1700. Pt verbalized understanding. Questions and concerns addressed. No acute events today. Pt progressing toward goals. Will continue to monitor. See flowsheets for full assessment and VS info.     Goal: Patient-Specific Goal (Individualization)  Outcome: Ongoing (interventions implemented as appropriate)   03/03/19 1752   OTHER   Anxieties, Fears or Concerns Addressed   Individualized Care Needs None   Patient-Specific Goal (Individualization)   Patient-Specific Goals (Include Timeframe) NA      Goal: Absence of Hospital-Acquired Illness or Injury  Outcome: Ongoing (interventions implemented as appropriate)  Intervention: Identify and Manage Fall Risk   03/03/19 1752   Optimize Balance and Safe Activity   Safety Promotion/Fall Prevention assistive device/personal item within reach;bed alarm set;bed alarm refused;family to remain at bedside     Intervention: Prevent VTE (venous thromboembolism)   03/03/19 1752   Prevent or Manage Embolism   VTE Prevention/Management ambulation promoted;intravenous hydration;ROM (active) performed       Goal: Optimal Comfort and Wellbeing  Outcome: Ongoing (interventions implemented as appropriate)  Intervention: Provide Person-Centered Care   03/03/19 1752   Support Dyspnea Relief   Trust Relationship/Rapport care explained;emotional support provided;choices provided;questions answered;questions encouraged;reassurance provided;thoughts/feelings acknowledged

## 2019-03-03 NOTE — SUBJECTIVE & OBJECTIVE
Past Medical History:   Diagnosis Date    Arthritis     Cancer     prostate cancer-Removed Prostate    Chronic kidney disease     one kidney    Mitral valve prolapse        Past Surgical History:   Procedure Laterality Date    HEMORRHOID SURGERY      NEPHRECTOMY      PROSTATECTOMY      TONSILLECTOMY         Review of patient's allergies indicates:   Allergen Reactions    Antihistamines - alkylamine Other (See Comments)     hallucinations    Benadryl [diphenhydramine hcl] Other (See Comments)     hallucinations    Codeine Itching     Turns red    Lodine [etodolac] Other (See Comments)     fatigue    Methotrexate analogues Other (See Comments)     Sunlight sensitivity    Morphine Itching     Turns red       No current facility-administered medications on file prior to encounter.      Current Outpatient Medications on File Prior to Encounter   Medication Sig    eszopiclone (LUNESTA) 2 MG Tab TAKE 1 TABLET BY MOUTH NIGHTLY.    levothyroxine (SYNTHROID) 25 MCG tablet TAKE 2 TABLETS BY MOUTH BEFORE BREAKFAST    syringe, disposable, 1 mL Syrg 1 Syringe by Misc.(Non-Drug; Combo Route) route once a week.    testosterone cypionate (DEPOTESTOTERONE CYPIONATE) 200 mg/mL injection     tofacitinib (XELJANZ) 5 mg Tab Take one tablet (5mg) by mouth 2 (two) times daily.     Family History     None        Tobacco Use    Smoking status: Never Smoker    Smokeless tobacco: Never Used   Substance and Sexual Activity    Alcohol use: No    Drug use: No    Sexual activity: Not on file     Review of Systems   Constitutional: Negative for diaphoresis and fever.   HENT: Negative for trouble swallowing and voice change.    Eyes: Negative for photophobia, pain and visual disturbance.   Respiratory: Negative for cough and shortness of breath.    Cardiovascular: Negative for chest pain, palpitations and leg swelling.   Gastrointestinal: Positive for constipation. Negative for abdominal pain, blood in stool, diarrhea, nausea  and vomiting.   Genitourinary: Negative for difficulty urinating and dysuria.        Uses external urinary catheter chronically   Musculoskeletal: Positive for neck pain. Negative for back pain.   Skin: Negative for color change and pallor.   Neurological: Negative for dizziness, speech difficulty, weakness, light-headedness and headaches.     Objective:     Vital Signs (Most Recent):  Temp: 98 °F (36.7 °C) (03/02/19 2314)  Pulse: 66 (03/02/19 2230)  Resp: 18 (03/02/19 2230)  BP: (!) 148/83 (03/02/19 2230)  SpO2: 98 % (03/02/19 2230) Vital Signs (24h Range):  Temp:  [98 °F (36.7 °C)-98.4 °F (36.9 °C)] 98 °F (36.7 °C)  Pulse:  [54-84] 66  Resp:  [16-20] 18  SpO2:  [96 %-100 %] 98 %  BP: (132-191)/(72-84) 148/83     Weight: 92.5 kg (204 lb)  Body mass index is 28.45 kg/m².    Physical Exam   Constitutional: He is oriented to person, place, and time. No distress.   HENT:   Head: Normocephalic and atraumatic.   Mouth/Throat: Oropharynx is clear and moist.   Eyes: EOM are normal. Pupils are equal, round, and reactive to light. No scleral icterus.   R droopy eyelid, chronic finding according to wife   Neck: Neck supple. No JVD present. No thyromegaly present.   Cardiovascular: Normal rate and regular rhythm.   Murmur (systolic, with radiation to both carotids) heard.  Pulmonary/Chest: Effort normal and breath sounds normal. No respiratory distress.   Abdominal: Bowel sounds are normal. He exhibits no distension. There is no tenderness.   Musculoskeletal: He exhibits no edema or tenderness.   Neurological: He is alert and oriented to person, place, and time. He displays normal reflexes. No cranial nerve deficit or sensory deficit.   Skin: Skin is warm and dry. He is not diaphoretic.   Psychiatric: He has a normal mood and affect. His behavior is normal.   Vitals reviewed.      Significant Labs: None    Significant Imaging: I have reviewed all pertinent imaging results/findings within the past 24 hours.     CT Head WO  contrast:  IMPRESSION:    1.  Masses with adjacent edema are noted in the deep white matter of the right frontal lobe and along the right side of the cavernous sinus adjacent to the medial aspect of the right temporal lobe and caudal aspect of the right hippocampus. The findings suggest metastatic or primary neoplasm and are unrelated to recent trauma.   2.  There is no evidence of acute intracranial hemorrhage or other acute traumatic injury appreciated.        Electronically signed by Chucho Jara MD on 3/2/2019 4:05 PM
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
## 2019-03-03 NOTE — ED NOTES
Bed: 27  Expected date:   Expected time:   Means of arrival:   Comments:  Neuro Sx Transfer wn rdy

## 2019-03-03 NOTE — ED NOTES
LOC: Pt is awake, alert, oriented x4. Affect is appropriate. Speech clear and appropriate.      Appearance: Pt resting comfortably in no acute distress. Pt clean and well groomed.     Skin: Skin warm and dry. Color consistent with ethnicity. Skin turgor normal. Mucus membranes moist. Skin intact. No breakdown or bruising noted.     Musculoskeletal: Pt moving upper and lower extremities without difficulty. Denies weakness.     Respiratory: Airway open and patent. Respirations spontaneous, even, easy, and unlabored. Pt has normal effort and rate. No accessory muscle use noted. Denies cough. Denies shortness of breath.     Cardiovascular: No peripheral edema noted. No complaints of chest pain. S1S2 present. Rate regular. Radial pulses 2+.     Abdominal: Abdomen soft and nontender. No distention noted. Denies n/v. Bowels sounds active x 4 quadrants.     Neurological: Eyes open spontaneously. Behavior appropriate to situation. Follows commands appropriately. Facial expression symmetrical. Purposeful motor response. Sensation intact.

## 2019-03-03 NOTE — PLAN OF CARE
Problem: Adult Inpatient Plan of Care  Goal: Plan of Care Review  Outcome: Ongoing (interventions implemented as appropriate)  Plan of care reviewed with pt. and all concerns addressed. NAD noted at this time. No events overnight. Denies any cp or sob. Resp even and unlabored. VSS, afebrile. AAO x 4. Remains free from injury. Please see flow sheet for VS and assessment info. Safety precautions remain in place. Call light in reach. Will continue to monitor.
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
## 2019-03-04 ENCOUNTER — TELEPHONE (OUTPATIENT)
Dept: ENDOCRINOLOGY | Facility: HOSPITAL | Age: 69
End: 2019-03-04

## 2019-03-04 VITALS
WEIGHT: 193.81 LBS | OXYGEN SATURATION: 96 % | DIASTOLIC BLOOD PRESSURE: 60 MMHG | SYSTOLIC BLOOD PRESSURE: 112 MMHG | HEART RATE: 84 BPM | BODY MASS INDEX: 27.13 KG/M2 | RESPIRATION RATE: 18 BRPM | TEMPERATURE: 98 F | HEIGHT: 71 IN

## 2019-03-04 DIAGNOSIS — E23.7 PITUITARY ABNORMALITY: Primary | ICD-10-CM

## 2019-03-04 LAB
ANION GAP SERPL CALC-SCNC: 10 MMOL/L
BUN SERPL-MCNC: 22 MG/DL
CALCIUM SERPL-MCNC: 8.9 MG/DL
CHLORIDE SERPL-SCNC: 110 MMOL/L
CO2 SERPL-SCNC: 21 MMOL/L
CREAT SERPL-MCNC: 1.3 MG/DL
EST. GFR  (AFRICAN AMERICAN): >60 ML/MIN/1.73 M^2
EST. GFR  (NON AFRICAN AMERICAN): 56.5 ML/MIN/1.73 M^2
GLUCOSE SERPL-MCNC: 121 MG/DL
POTASSIUM SERPL-SCNC: 4 MMOL/L
SODIUM SERPL-SCNC: 141 MMOL/L

## 2019-03-04 PROCEDURE — 99222 PR INITIAL HOSPITAL CARE,LEVL II: ICD-10-PCS | Mod: GC,,, | Performed by: INTERNAL MEDICINE

## 2019-03-04 PROCEDURE — 99222 1ST HOSP IP/OBS MODERATE 55: CPT | Mod: GC,,, | Performed by: INTERNAL MEDICINE

## 2019-03-04 PROCEDURE — 36415 COLL VENOUS BLD VENIPUNCTURE: CPT

## 2019-03-04 PROCEDURE — 99238 HOSP IP/OBS DSCHRG MGMT 30/<: CPT | Mod: ,,, | Performed by: PHYSICIAN ASSISTANT

## 2019-03-04 PROCEDURE — 25000003 PHARM REV CODE 250: Performed by: PHYSICIAN ASSISTANT

## 2019-03-04 PROCEDURE — 80048 BASIC METABOLIC PNL TOTAL CA: CPT

## 2019-03-04 PROCEDURE — 25000003 PHARM REV CODE 250: Performed by: STUDENT IN AN ORGANIZED HEALTH CARE EDUCATION/TRAINING PROGRAM

## 2019-03-04 PROCEDURE — 99238 PR HOSPITAL DISCHARGE DAY,<30 MIN: ICD-10-PCS | Mod: ,,, | Performed by: PHYSICIAN ASSISTANT

## 2019-03-04 RX ORDER — POLYETHYLENE GLYCOL 3350 17 G/17G
17 POWDER, FOR SOLUTION ORAL DAILY
Status: DISCONTINUED | OUTPATIENT
Start: 2019-03-04 | End: 2019-03-04 | Stop reason: HOSPADM

## 2019-03-04 RX ORDER — HYDROCODONE BITARTRATE AND ACETAMINOPHEN 7.5; 325 MG/1; MG/1
1 TABLET ORAL EVERY 6 HOURS PRN
Status: DISCONTINUED | OUTPATIENT
Start: 2019-03-04 | End: 2019-03-04 | Stop reason: HOSPADM

## 2019-03-04 RX ORDER — LEVOTHYROXINE SODIUM 50 UG/1
100 TABLET ORAL
Qty: 30 TABLET | Refills: 2 | Status: SHIPPED | OUTPATIENT
Start: 2019-03-05 | End: 2019-05-15 | Stop reason: SDUPTHER

## 2019-03-04 RX ORDER — AMOXICILLIN 250 MG
1 CAPSULE ORAL 2 TIMES DAILY
Status: DISCONTINUED | OUTPATIENT
Start: 2019-03-04 | End: 2019-03-04 | Stop reason: HOSPADM

## 2019-03-04 RX ORDER — DEXAMETHASONE 2 MG/1
2 TABLET ORAL 2 TIMES DAILY WITH MEALS
Qty: 42 TABLET | Refills: 0 | Status: SHIPPED | OUTPATIENT
Start: 2019-03-04 | End: 2019-03-22

## 2019-03-04 RX ORDER — LORAZEPAM 0.5 MG/1
0.5 TABLET ORAL EVERY 8 HOURS PRN
Status: DISCONTINUED | OUTPATIENT
Start: 2019-03-04 | End: 2019-03-04 | Stop reason: HOSPADM

## 2019-03-04 RX ORDER — FAMOTIDINE 40 MG/1
40 TABLET, FILM COATED ORAL DAILY
Qty: 21 TABLET | Refills: 0 | Status: SHIPPED | OUTPATIENT
Start: 2019-03-04 | End: 2019-05-22 | Stop reason: CLARIF

## 2019-03-04 RX ORDER — ONDANSETRON 2 MG/ML
4 INJECTION INTRAMUSCULAR; INTRAVENOUS EVERY 6 HOURS PRN
Status: DISCONTINUED | OUTPATIENT
Start: 2019-03-04 | End: 2019-03-04 | Stop reason: HOSPADM

## 2019-03-04 RX ORDER — LEVOTHYROXINE SODIUM 50 UG/1
50 TABLET ORAL
Qty: 30 TABLET | Refills: 2 | Status: SHIPPED | OUTPATIENT
Start: 2019-03-05 | End: 2019-03-04

## 2019-03-04 RX ADMIN — POLYETHYLENE GLYCOL 3350 17 G: 17 POWDER, FOR SOLUTION ORAL at 09:03

## 2019-03-04 RX ADMIN — LORAZEPAM 0.5 MG: 0.5 TABLET ORAL at 01:03

## 2019-03-04 RX ADMIN — LEVOTHYROXINE SODIUM 50 MCG: 50 TABLET ORAL at 05:03

## 2019-03-04 RX ADMIN — PANTOPRAZOLE SODIUM 40 MG: 40 TABLET, DELAYED RELEASE ORAL at 09:03

## 2019-03-04 RX ADMIN — STANDARDIZED SENNA CONCENTRATE AND DOCUSATE SODIUM 1 TABLET: 8.6; 5 TABLET, FILM COATED ORAL at 09:03
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
## 2019-03-04 NOTE — ASSESSMENT & PLAN NOTE
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 Recommended to take it on an empty stomach, and wait at least 30 min prior to eating.   -Repeat TFTs in 4-6 weeks as outpatient.

## 2019-03-04 NOTE — PROGRESS NOTES
Patient seen by Ophthalmology. No evidence of papilledema on exam.     Patient seen by Endocrinology. Plan to increase Synthroid and repeat pituitary labs as an outpatient.     Will discharge patient home on Decadron 2 mg BID and schedule outpatient follow up with Dr. Segura to discuss surgical resection. Patient has not had any seizure like activity. No indication to begin Keppra at this time. Patient in agreement with plan. DC home with family support.         Discussed with Dr. Segura  Please call with any questions      Viki Johnson PA-C   Neurosurgery   Pager: [phone]

## 2019-03-04 NOTE — SUBJECTIVE & OBJECTIVE
Interval HPI:   Overnight events:  VS stable    Eatin%  Nausea: No  Hypoglycemia and intervention: No  Fever: No  TPN and/or TF: No    PMH, PSH, FH, SH updated and reviewed     ROS:    Review of Systems   Constitutional: Negative for unexpected weight change.   Eyes: Negative for visual disturbance.   Respiratory: Negative for shortness of breath.    Cardiovascular: Negative for chest pain.   Gastrointestinal: Negative for abdominal pain.   Genitourinary: Negative for urgency.   Musculoskeletal: Negative for arthralgias.   Skin: Negative for wound.   Neurological: Negative for headaches.   Hematological: Does not bruise/bleed easily.   Psychiatric/Behavioral: Negative for sleep disturbance.         PHYSICAL EXAMINATION:  Vitals:    19 1058   BP: 112/60   Pulse: 84   Resp: 18   Temp: 98 °F (36.7 °C)     Body mass index is 27.03 kg/m².    Physical Exam   Constitutional: He is oriented to person, place, and time. He appears well-developed and well-nourished.   HENT:   Head: Normocephalic and atraumatic.   Neck: Neck supple. No thyromegaly present.   Cardiovascular: Normal rate, regular rhythm and normal heart sounds.   Pulmonary/Chest: Effort normal. No respiratory distress.   Abdominal: Soft. There is no tenderness.   Neurological: He is alert and oriented to person, place, and time.   Skin: Skin is warm. No rash noted.   Psychiatric: He has a normal mood and affect. His behavior is normal.

## 2019-03-04 NOTE — PLAN OF CARE
CM met with patient and spouse this am to obtain discharge planning assessment.  Patient is admitted with a brain mass.  Plan of care to be determined.  Planned discharge is home with family - Plan (A) or home with family and home health - Plan (B).    PCP:  Seymour Avendano MD     Payor:  Payor: MEDICARE / Plan: MEDICARE PART A & B / Product Type: [facility] /      Pharmacy:    [facility] [id number] [facility] [employer] - [place] - [address] [address]  [address] [address]  [place] [address]  Phone: [phone] Fax: [phone]    [facility]  [address] [facility] [address] [place] [address]  Phone: [phone] Fax: [phone]    [facility]  [address]  [facility] [address]  Phone: [phone] Fax: [phone]
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
## 2019-03-04 NOTE — DISCHARGE INSTRUCTIONS
Patient Information  -Hold all arthritis medications 3 days before your appointment with Dr. Jimmy Segura  -Hold any Aspirin or Aspirin containing products 3 days before your appointment with Dr. Jimmy Segura  -Do not consume any alcoholic beverages 3 days before your appointment with Dr. Jimmy Segura    Contact the Neurosurgery Office immediately if:  -If you begin to notice any neurologic changes such as:           -Sudden onset of lethargy or sleepiness           -Sudden confusion, trouble speaking, or understanding            -Sudden trouble seeing in one or both eyes            -Sudden trouble walking, dizziness, loss of coordination            -Sudden severe headache with no known cause            -Sudden onset of numbness or weakness     Miscellaneous:  -You were started on a steroid (Decadron) while in the hospital. Please continue to take this medication as instructed.  -Please continue to take Famotidine to protect your stomach while on a steroid.   -Follow up with Dr. Segura in 2 weeks to discuss surgical intervention.       Neurosurgery Office: [phone]
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
## 2019-03-04 NOTE — ASSESSMENT & PLAN NOTE
-Pt has brain mass affecting pituitary function.  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   -For hypogonadism, pt is currently receiving testosterone replacement therapy as outpatient.   -serum cortisol is appropriately suppressed after receiving dexamethasone, thus r/o Cushing disease.   -Re-check AM cortisol in 2 days along with ACTH to check for adrenal insufficiency.   -IGF-1 is in process.   -Agree with visual field testing.   -Will schedule outpatient follow up appointment in pituitary clinic in about 4 weeks.
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
## 2019-03-04 NOTE — SUBJECTIVE & OBJECTIVE
Interval History:   NAEON. Patient resting comfortably in bed. Wife at bedside. Patient denies any headaches, dizziness, N/V, or vision loss. He is requesting to be given his testosterone replacement since he is 3 days behind. Denies any new symptoms. Tolerating diet. Voiding appropriately.     Medications:  Continuous Infusions:  Scheduled Meds:   levothyroxine  50 mcg Oral Before breakfast    pantoprazole  40 mg Oral Daily    polyethylene glycol  17 g Oral Daily    senna-docusate 8.6-50 mg  1 tablet Oral BID     PRN Meds:acetaminophen, dextrose 50%, dextrose 50%, glucagon (human recombinant), glucose, glucose, glucose, HYDROcodone-acetaminophen, insulin aspart U-100, LORazepam, ondansetron, sodium chloride 0.9%     Review of Systems  Objective:     Weight: 87.9 kg (193 lb 12.8 oz)  Body mass index is 27.03 kg/m².  Vital Signs (Most Recent):  Temp: 98 °F (36.7 °C) (03/04/19 1058)  Pulse: 84 (03/04/19 1058)  Resp: 18 (03/04/19 1058)  BP: 112/60 (03/04/19 1058)  SpO2: 96 % (03/04/19 1058) Vital Signs (24h Range):  Temp:  [96.7 °F (35.9 °C)-98.9 °F (37.2 °C)] 98 °F (36.7 °C)  Pulse:  [66-88] 84  Resp:  [17-22] 18  SpO2:  [95 %-99 %] 96 %  BP: (112-137)/(60-70) 112/60     Date 03/04/19 0700 - 03/05/19 0659   Shift 2484-5592 4826-2155 7805-5495 24 Hour Total   INTAKE   Shift Total(mL/kg)       OUTPUT   Urine(mL/kg/hr) 525   525   Shift Total(mL/kg) 525(6)   525(6)   Weight (kg) 87.9 87.9 87.9 87.9       Neurosurgery Physical Exam  General: well developed, well nourished, no distress.   Head: normocephalic, atraumatic  Neurologic: Alert and oriented. Thought content appropriate.  GCS: Motor: 6/Verbal: 5/Eyes: 4 GCS Total: 15  Mental Status: Awake, Alert, Oriented x 4  Language: No aphasia  Speech: No dysarthria  Cranial nerves: face symmetric, tongue midline, CN II-XII grossly intact.   Eyes: pupils equal, round, reactive to light with accomodation, EOMI. Mild right eye ptosis.   Pulmonary: normal respirations, no  signs of respiratory distress  Abdomen: soft, non-distended, not tender to palpation  Skin: Skin is warm, dry and intact.  Sensory: intact to light touch throughout  Motor Strength:Moves all extremities spontaneously with good tone.  Full strength upper and lower extremities. No abnormal movements seen.   Babinski's: Negative.  Clonus: Negative.  Finger-to-nose normal.   Pronator drift: intact bilaterally        Significant Labs:  Recent Labs   Lab 03/03/19  0038 03/03/19  0407 03/04/19  0404   * 149* 121*    137 141   K 4.2 4.5 4.0    106 110   CO2 24 23 21*   BUN 20 20 22   CREATININE 1.5* 1.4 1.3   CALCIUM 9.3 9.2 8.9     No results for input(s): WBC, HGB, HCT, PLT in the last 48 hours.  Recent Labs   Lab 03/03/19 0038   INR 1.0   APTT 25.6

## 2019-03-04 NOTE — PLAN OF CARE
SW following for DC needs. SW in communication with CM.    Needs to be determined.     Keily Bro, MAYUR  [facility]  [id number]

## 2019-03-04 NOTE — SUBJECTIVE & OBJECTIVE
Past Medical History:   Diagnosis Date    Arthritis     Cancer     prostate cancer-Removed Prostate    Chronic kidney disease     one kidney    Mitral valve prolapse        Past Surgical History:   Procedure Laterality Date    HEMORRHOID SURGERY      NEPHRECTOMY      PROSTATECTOMY      TONSILLECTOMY         Review of patient's allergies indicates:   Allergen Reactions    Antihistamines - alkylamine Other (See Comments)     hallucinations    Benadryl [diphenhydramine hcl] Other (See Comments)     hallucinations    Codeine Itching     Turns red    Lodine [etodolac] Other (See Comments)     fatigue    Methotrexate analogues Other (See Comments)     Sunlight sensitivity    Morphine Itching     Turns red     Current Facility-Administered Medications   Medication Frequency    acetaminophen tablet 650 mg Q4H PRN    dextrose 50% injection 12.5 g PRN    dextrose 50% injection 25 g PRN    glucagon (human recombinant) injection 1 mg PRN    glucose chewable tablet 16 g PRN    glucose chewable tablet 16 g PRN    glucose chewable tablet 24 g PRN    HYDROcodone-acetaminophen 7.5-325 mg per tablet 1 tablet Q6H PRN    insulin aspart U-100 pen 0-5 Units QID (AC + HS) PRN    levothyroxine tablet 50 mcg Before breakfast    LORazepam tablet 0.5 mg Q8H PRN    ondansetron injection 4 mg Q6H PRN    pantoprazole EC tablet 40 mg Daily    polyethylene glycol packet 17 g Daily    senna-docusate 8.6-50 mg per tablet 1 tablet BID    sodium chloride 0.9% flush 5 mL PRN     Family History     None        Tobacco Use    Smoking status: Never Smoker    Smokeless tobacco: Never Used   Substance and Sexual Activity    Alcohol use: No    Drug use: No    Sexual activity: Not on file     Review of Systems  Objective:     Vital Signs (Most Recent):  Temp: 98 °F (36.7 °C) (03/04/19 1058)  Pulse: 84 (03/04/19 1058)  Resp: 18 (03/04/19 1058)  BP: 112/60 (03/04/19 1058)  SpO2: 96 % (03/04/19 1058)  O2 Device (Oxygen  "Therapy): room air (03/04/19 0803) Vital Signs (24h Range):  Temp:  [96.7 °F (35.9 °C)-98.9 °F (37.2 °C)] 98 °F (36.7 °C)  Pulse:  [66-88] 84  Resp:  [17-22] 18  SpO2:  [95 %-99 %] 96 %  BP: (112-137)/(60-70) 112/60     Weight: 87.9 kg (193 lb 12.8 oz) (03/03/19 0355)  Body mass index is 27.03 kg/m².  Body surface area is 2.1 meters squared.    I/O last 3 completed shifts:  In: 3243.3 [P.O.:300; I.V.:2843.3; IV Piggyback:100]  Out: 0     Physical Exam    Significant Labs:  {Select Labs:14508::"All labs within the past 24 hours have been reviewed."}    Significant Imaging:  {Results; Diagnostics:29930490::"***"}  "

## 2019-03-04 NOTE — NURSING
Pt c/o hot flashes and heart palpitations. Notified Dr. Bell and orders received for 12 lead EKG and a set of troponins. Pts VSS. Will continue to monitor.
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
## 2019-03-05 ENCOUNTER — NURSE TRIAGE (OUTPATIENT)
Dept: ADMINISTRATIVE | Facility: CLINIC | Age: 69
End: 2019-03-05

## 2019-03-05 DIAGNOSIS — R73.9 STEROID-INDUCED HYPERGLYCEMIA: Primary | ICD-10-CM

## 2019-03-05 DIAGNOSIS — T38.0X5A STEROID-INDUCED HYPERGLYCEMIA: Primary | ICD-10-CM

## 2019-03-05 RX ORDER — PEN NEEDLE, DIABETIC 30 GX3/16"
NEEDLE, DISPOSABLE MISCELLANEOUS
Qty: 200 EACH | Refills: 0 | Status: SHIPPED | OUTPATIENT
Start: 2019-03-05 | End: 2019-04-29

## 2019-03-05 RX ORDER — INSULIN PUMP SYRINGE, 3 ML
EACH MISCELLANEOUS
Qty: 1 EACH | Refills: 0 | Status: SHIPPED | OUTPATIENT
Start: 2019-03-05 | End: 2019-07-29

## 2019-03-05 RX ORDER — INSULIN ASPART 100 [IU]/ML
INJECTION, SOLUTION INTRAVENOUS; SUBCUTANEOUS
Qty: 3 ML | Refills: 0 | Status: SHIPPED | OUTPATIENT
Start: 2019-03-05 | End: 2019-04-29

## 2019-03-05 RX ORDER — LANCETS
EACH MISCELLANEOUS
Qty: 200 EACH | Refills: 0 | Status: SHIPPED | OUTPATIENT
Start: 2019-03-05 | End: 2019-04-29

## 2019-03-05 NOTE — TELEPHONE ENCOUNTER
Reason for Disposition   Caller has URGENT medication question about med that PCP prescribed and triager unable to answer question    Protocols used: ST MEDICATION QUESTION CALL-A-AH    Patient wanted to know if he should take his testosterone shot and dexamethasone since he is having the blood test ACTH and cortisol tomorrow. Called Dr. Wood who states they can wait until after he finishes the dexamethasone for these tests. Also, blood sugars are elevated. When he was in the hospital, last reading they recall it was 232. Called Dr. Wood and she gave verbal order to test BS ac and hs. Low dose sliding scale as follows for aspart insulin SQ injections: -200 adm 1 unit SQ, -250 adm 2 unit SQ,. -300 adm 3 units SQ, -350 adm 4 units, BS over 350 adm 5 units SQ. Patient needs to f/u with pcp for regular regimen. Patient verbalized understanding.     Gave verbal order to [facility] pharmacist Artemio for novolog flex pen with corrective dose insulin, glucometer, lancets, and test strips. Patient is aware.

## 2019-03-05 NOTE — PLAN OF CARE
Patient discharged home.  The patient does not have any home needs.  Family provided transportation home.  Neurosurgery clinic to schedule follow up appointment.    Future Appointments   Date Time Provider Department [place]   3/6/2019  8:45 AM [facility]   4/29/2019  3:30 PM Yoav Oliveira MD [facility]        03/05/19 0855   Final Note   Assessment Type Final Discharge Note   Anticipated Discharge Disposition Home   Hospital Follow Up  Appt(s) scheduled? (Neurosurgery clinic to schedule follow up appointment.)   Discharge plans and expectations educations in teach back method with documentation complete? Yes

## 2019-03-06 LAB — GH SERPL-MCNC: <0.1 NG/ML

## 2019-03-07 LAB
IGF-I SERPL-MCNC: 58 NG/ML (ref 32–209)
IGF-I Z-SCORE SERPL: -1.28 SD

## 2019-03-08 ENCOUNTER — PATIENT MESSAGE (OUTPATIENT)
Dept: RHEUMATOLOGY | Facility: CLINIC | Age: 69
End: 2019-03-08

## 2019-03-08 NOTE — PHYSICIAN QUERY
PT Name: Aurelio Perkins  MR #: [id number]     Physician Query Form - Documentation Clarification      CDS: Merlyn Moser RN, CCDS       Contact information: [email or web address]    This form is a permanent document in the medical record.     Query Date: March 8, 2019    By submitting this query, we are merely seeking further clarification of documentation. Please utilize your independent clinical judgment when addressing the question(s) below.    The Medical record reflects the following:    Supporting Clinical Findings Location in Medical Record   Meningioma   Brain mass  Pituitary abnormality     He presented to OSH where CT head obtained and reportedly shows R inferior frontal mass about cm with perilesional edema   3/4-D/C Summary        3/4-Neurosurgery PN     Mass effect on the adjacent brain parenchyma with heterogeneous edema most pronounced in the right frontal lobe.     Mass effect on the right cerebral hemisphere with approximately 3 mm of leftward midline shift.      3/2-MRI Brain                                                                            Doctor, Please specify diagnosis or diagnoses associated with above clinical findings.    Scout all that apply.    Provider Use Only     [  x ] Brain Compression     [ x  ] Cerebral Edema     [   ] Other diagnosis (please specify)_________________________                                                                                                           [  ] Clinically Undetermined

## 2019-03-10 ENCOUNTER — PATIENT MESSAGE (OUTPATIENT)
Dept: ENDOCRINOLOGY | Facility: CLINIC | Age: 69
End: 2019-03-10

## 2019-03-13 ENCOUNTER — TELEPHONE (OUTPATIENT)
Dept: NEUROSURGERY | Facility: CLINIC | Age: 69
End: 2019-03-13

## 2019-03-13 NOTE — TELEPHONE ENCOUNTER
----- Message from Jimmy Segura DO sent at 3/13/2019  3:18 PM CDT -----  That's fine. Sleeping issue is probably because of steroids he should be tapering off of them.      ----- Message -----  From: Lacey Bailey LPN  Sent: 3/13/2019   1:58 PM  To: Jimmy Segura DO    Patient did not wish to be seen tomorrow, scheduled 04/04, he states he is not sleeping even after taking lumnesta, wants to know what else he can do.    Lacey

## 2019-03-13 NOTE — TELEPHONE ENCOUNTER
Spoke to patient hospital f/u scheduled per patient preference notification mailed, provider messaged re: insomnia will call patient back with response.

## 2019-03-13 NOTE — TELEPHONE ENCOUNTER
----- Message from Kayla Patterson sent at 3/13/2019  9:31 AM CDT -----  Contact: Aurelio [phone]  Type: Patient Call Back    Who called:Aurelio    What is the request in detail: The patient would like to set up an appointment to have a procedure done. The patient also reports that he needs help with sleeping as well.    Best call back number:[phone]

## 2019-03-13 NOTE — TELEPHONE ENCOUNTER
Called patient informed he could taper off steroids, begin 1 PO QD for the next four days then d/c. Patient verbalized understanding.

## 2019-03-14 ENCOUNTER — TELEPHONE (OUTPATIENT)
Dept: PHARMACY | Facility: CLINIC | Age: 69
End: 2019-03-14

## 2019-03-22 ENCOUNTER — PATIENT MESSAGE (OUTPATIENT)
Dept: ENDOCRINOLOGY | Facility: CLINIC | Age: 69
End: 2019-03-22

## 2019-03-22 ENCOUNTER — TELEPHONE (OUTPATIENT)
Dept: ENDOCRINOLOGY | Facility: CLINIC | Age: 69
End: 2019-03-22

## 2019-03-22 ENCOUNTER — PATIENT MESSAGE (OUTPATIENT)
Dept: NEUROSURGERY | Facility: CLINIC | Age: 69
End: 2019-03-22

## 2019-03-22 RX ORDER — HYDROCORTISONE 10 MG/1
TABLET ORAL
Qty: 90 TABLET | Refills: 2 | Status: SHIPPED | OUTPATIENT
Start: 2019-03-22 | End: 2019-06-27 | Stop reason: SDUPTHER

## 2019-03-22 NOTE — TELEPHONE ENCOUNTER
Pt reports completing Dexamethasone on 3/19. He is currently not taking any steroids.   Will start Hydrocortisone 20 mg in the morning and 10 mg in the evening (4PM).     Pt has an appointment in pituitary clinic on 4/2.

## 2019-03-26 ENCOUNTER — PATIENT MESSAGE (OUTPATIENT)
Dept: RHEUMATOLOGY | Facility: CLINIC | Age: 69
End: 2019-03-26

## 2019-03-27 ENCOUNTER — PATIENT MESSAGE (OUTPATIENT)
Dept: RHEUMATOLOGY | Facility: CLINIC | Age: 69
End: 2019-03-27

## 2019-03-28 RX ORDER — ESZOPICLONE 3 MG/1
3 TABLET, FILM COATED ORAL NIGHTLY
Qty: 30 TABLET | Refills: 4 | Status: SHIPPED | OUTPATIENT
Start: 2019-03-28 | End: 2019-04-27

## 2019-04-02 ENCOUNTER — OFFICE VISIT (OUTPATIENT)
Dept: ENDOCRINOLOGY | Facility: CLINIC | Age: 69
End: 2019-04-02
Payer: MEDICARE

## 2019-04-02 VITALS
DIASTOLIC BLOOD PRESSURE: 93 MMHG | SYSTOLIC BLOOD PRESSURE: 174 MMHG | BODY MASS INDEX: 28.26 KG/M2 | TEMPERATURE: 99 F | WEIGHT: 202.63 LBS | HEART RATE: 62 BPM

## 2019-04-02 DIAGNOSIS — E23.7 PITUITARY ABNORMALITY: Primary | ICD-10-CM

## 2019-04-02 DIAGNOSIS — D32.9 MENINGIOMA: ICD-10-CM

## 2019-04-02 DIAGNOSIS — E27.49 SECONDARY ADRENAL INSUFFICIENCY: ICD-10-CM

## 2019-04-02 DIAGNOSIS — E03.9 PRIMARY HYPOTHYROIDISM: ICD-10-CM

## 2019-04-02 DIAGNOSIS — F41.9 ANXIETY: ICD-10-CM

## 2019-04-02 PROCEDURE — 99214 PR OFFICE/OUTPT VISIT, EST, LEVL IV, 30-39 MIN: ICD-10-PCS | Mod: S$PBB,,, | Performed by: INTERNAL MEDICINE

## 2019-04-02 PROCEDURE — 99213 OFFICE O/P EST LOW 20 MIN: CPT | Mod: PBBFAC | Performed by: INTERNAL MEDICINE

## 2019-04-02 PROCEDURE — 99999 PR PBB SHADOW E&M-EST. PATIENT-LVL III: CPT | Mod: PBBFAC,,, | Performed by: INTERNAL MEDICINE

## 2019-04-02 PROCEDURE — 99214 OFFICE O/P EST MOD 30 MIN: CPT | Mod: S$PBB,,, | Performed by: INTERNAL MEDICINE

## 2019-04-02 PROCEDURE — 99999 PR PBB SHADOW E&M-EST. PATIENT-LVL III: ICD-10-PCS | Mod: PBBFAC,,, | Performed by: INTERNAL MEDICINE

## 2019-04-02 NOTE — LETTER
April 3, 2019      [facility]  [address]  [facility] [address]           [facility]  [address]  [facility] [address]  Phone: [phone]  Fax: [phone]          Patient: Aurelio Perkins   MR Number: [id number]   YOB: 1950   Date of Visit: 4/2/2019       Dear Viki Johnson:    Thank you for referring Aurelio Perkins to me for evaluation. Attached you will find relevant portions of my assessment and plan of care.    If you have questions, please do not hesitate to call me. I look forward to following Aurelio Perkins along with you.    Sincerely,    Maria Esther Hammonds MD    Enclosure  CC:  No Recipients    If you would like to receive this communication electronically, please contact [email or web address] or [phone] to request more information on [employer] access.    For providers and/or their staff who would like to refer a patient to [facility], please contact us through our one-stop-shop provider referral line, [facility] , at [phone].    If you feel you have received this communication in error or would no longer like to receive these types of communications, please e-mail [email or web address]
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
## 2019-04-02 NOTE — PATIENT INSTRUCTIONS
Decrease hydrocortisone to 15 mg first thing in the morning, 5 mg in the afternoon (2-3 PM)    Sunday afternoon don't take the 5 mg hydrocortisone and get labs done Monday morning before taking hydrocortisone  Resume usual dose after bloodwork done

## 2019-04-04 ENCOUNTER — OFFICE VISIT (OUTPATIENT)
Dept: NEUROSURGERY | Facility: CLINIC | Age: 69
End: 2019-04-04
Payer: MEDICARE

## 2019-04-04 VITALS
HEART RATE: 61 BPM | DIASTOLIC BLOOD PRESSURE: 84 MMHG | RESPIRATION RATE: 18 BRPM | BODY MASS INDEX: 28.27 KG/M2 | HEIGHT: 71 IN | SYSTOLIC BLOOD PRESSURE: 184 MMHG | WEIGHT: 201.94 LBS

## 2019-04-04 DIAGNOSIS — D49.6 CAVERNOUS SINUS TUMOR: Primary | ICD-10-CM

## 2019-04-04 PROCEDURE — 99215 OFFICE O/P EST HI 40 MIN: CPT | Mod: S$PBB,,, | Performed by: NEUROLOGICAL SURGERY

## 2019-04-04 PROCEDURE — 99999 PR PBB SHADOW E&M-EST. PATIENT-LVL IV: CPT | Mod: PBBFAC,,, | Performed by: NEUROLOGICAL SURGERY

## 2019-04-04 PROCEDURE — 99999 PR PBB SHADOW E&M-EST. PATIENT-LVL IV: ICD-10-PCS | Mod: PBBFAC,,, | Performed by: NEUROLOGICAL SURGERY

## 2019-04-04 PROCEDURE — 99215 PR OFFICE/OUTPT VISIT, EST, LEVL V, 40-54 MIN: ICD-10-PCS | Mod: S$PBB,,, | Performed by: NEUROLOGICAL SURGERY

## 2019-04-04 PROCEDURE — 99214 OFFICE O/P EST MOD 30 MIN: CPT | Mod: PBBFAC | Performed by: NEUROLOGICAL SURGERY

## 2019-04-07 ENCOUNTER — PATIENT MESSAGE (OUTPATIENT)
Dept: ENDOCRINOLOGY | Facility: CLINIC | Age: 69
End: 2019-04-07

## 2019-04-08 ENCOUNTER — PATIENT MESSAGE (OUTPATIENT)
Dept: ENDOCRINOLOGY | Facility: CLINIC | Age: 69
End: 2019-04-08

## 2019-04-08 ENCOUNTER — LAB VISIT (OUTPATIENT)
Dept: LAB | Facility: HOSPITAL | Age: 69
End: 2019-04-08
Attending: INTERNAL MEDICINE
Payer: MEDICARE

## 2019-04-08 DIAGNOSIS — E03.9 PRIMARY HYPOTHYROIDISM: ICD-10-CM

## 2019-04-08 DIAGNOSIS — D32.9 MENINGIOMA: ICD-10-CM

## 2019-04-08 LAB
CORTIS SERPL-MCNC: 1.6 UG/DL (ref 4.3–22.4)
FSH SERPL-ACNC: 0.3 MIU/ML (ref 0.95–11.95)
LH SERPL-ACNC: <0.1 MIU/ML (ref 0.6–12.1)
T4 FREE SERPL-MCNC: 0.94 NG/DL (ref 0.71–1.51)
TESTOST SERPL-MCNC: 1131 NG/DL (ref 304–1227)

## 2019-04-08 PROCEDURE — 84403 ASSAY OF TOTAL TESTOSTERONE: CPT

## 2019-04-08 PROCEDURE — 83001 ASSAY OF GONADOTROPIN (FSH): CPT

## 2019-04-08 PROCEDURE — 84439 ASSAY OF FREE THYROXINE: CPT

## 2019-04-08 PROCEDURE — 82024 ASSAY OF ACTH: CPT

## 2019-04-08 PROCEDURE — 82533 TOTAL CORTISOL: CPT

## 2019-04-08 PROCEDURE — 83002 ASSAY OF GONADOTROPIN (LH): CPT

## 2019-04-09 ENCOUNTER — PATIENT MESSAGE (OUTPATIENT)
Dept: ENDOCRINOLOGY | Facility: CLINIC | Age: 69
End: 2019-04-09

## 2019-04-09 LAB — ACTH PLAS-MCNC: 12 PG/ML (ref 0–46)

## 2019-04-10 ENCOUNTER — PATIENT MESSAGE (OUTPATIENT)
Dept: ENDOCRINOLOGY | Facility: CLINIC | Age: 69
End: 2019-04-10

## 2019-04-12 ENCOUNTER — TELEPHONE (OUTPATIENT)
Dept: PHARMACY | Facility: CLINIC | Age: 69
End: 2019-04-12

## 2019-04-12 NOTE — TELEPHONE ENCOUNTER
Pt confirmed shipping of Xeljanz on 4/15 for  delivery.  Pt address and  verified.  $0.00 copay in 004.  Pt has 4 doses remaining.  Pt did not start any new medications or develop any new allergies.  Pt did not miss any doses.  Pt had no further questions or concerns.

## 2019-04-29 ENCOUNTER — OFFICE VISIT (OUTPATIENT)
Dept: RHEUMATOLOGY | Facility: CLINIC | Age: 69
End: 2019-04-29
Payer: MEDICARE

## 2019-04-29 VITALS
BODY MASS INDEX: 28.7 KG/M2 | HEART RATE: 61 BPM | HEIGHT: 71 IN | DIASTOLIC BLOOD PRESSURE: 82 MMHG | SYSTOLIC BLOOD PRESSURE: 139 MMHG | WEIGHT: 205 LBS

## 2019-04-29 DIAGNOSIS — M25.50 ARTHRALGIA, UNSPECIFIED JOINT: ICD-10-CM

## 2019-04-29 DIAGNOSIS — M06.9 RHEUMATOID ARTHRITIS FLARE: Primary | ICD-10-CM

## 2019-04-29 DIAGNOSIS — R53.83 FATIGUE, UNSPECIFIED TYPE: ICD-10-CM

## 2019-04-29 DIAGNOSIS — D49.6 CAVERNOUS SINUS TUMOR: ICD-10-CM

## 2019-04-29 DIAGNOSIS — E03.9 HYPOTHYROIDISM, UNSPECIFIED TYPE: ICD-10-CM

## 2019-04-29 DIAGNOSIS — E55.9 VITAMIN D DEFICIENCY: ICD-10-CM

## 2019-04-29 PROCEDURE — 99999 PR PBB SHADOW E&M-EST. PATIENT-LVL III: ICD-10-PCS | Mod: PBBFAC,,, | Performed by: INTERNAL MEDICINE

## 2019-04-29 PROCEDURE — 99214 PR OFFICE/OUTPT VISIT, EST, LEVL IV, 30-39 MIN: ICD-10-PCS | Mod: S$PBB,,, | Performed by: INTERNAL MEDICINE

## 2019-04-29 PROCEDURE — 99214 OFFICE O/P EST MOD 30 MIN: CPT | Mod: S$PBB,,, | Performed by: INTERNAL MEDICINE

## 2019-04-29 PROCEDURE — 99213 OFFICE O/P EST LOW 20 MIN: CPT | Mod: PBBFAC,PO | Performed by: INTERNAL MEDICINE

## 2019-04-29 PROCEDURE — 99999 PR PBB SHADOW E&M-EST. PATIENT-LVL III: CPT | Mod: PBBFAC,,, | Performed by: INTERNAL MEDICINE

## 2019-04-29 RX ORDER — ERGOCALCIFEROL 1.25 MG/1
CAPSULE ORAL
Refills: 0 | COMMUNITY
Start: 2019-03-29 | End: 2019-11-01 | Stop reason: SDUPTHER

## 2019-04-29 NOTE — PROGRESS NOTES
Subjective:       Patient ID: Aurelio Perkins is a 68 y.o. male.    Chief Complaint: Rheumatoid Arthritis    Follow up: RA was on xeljanz,on diclofenac 100 mg bid but has 1 kidney,  He was recently dx with a meningoma, he was dx with pituitary failure.  Scheduled for surgeryPatient complains of arthralgias and myalgias for which has been present for a few years. Pain is located in multiple joints, both shoulder(s), both elbow(s), both wrist(s), both MCP(s): 1st, 2nd, 3rd, 4th and 5th, both PIP(s): 1st, 2nd, 3rd, 4th and 5th, both DIP(s): 1st and 2nd, both hip(s), both knee(s) and both MTP(s): 1st, 2nd, 3rd, 4th and 5th, is described as aching, pulsating, shooting and throbbing, and is constant, moderate .  Associated symptoms include: crepitation, decreased range of motion, edema, effusion, tenderness and warmth.     Review of Systems   Constitutional: Positive for activity change. Negative for appetite change, chills, diaphoresis and unexpected weight change.   HENT: Negative for congestion, ear pain, facial swelling, mouth sores, nosebleeds, postnasal drip, rhinorrhea, sinus pressure, sneezing, sore throat, tinnitus and voice change.    Eyes: Negative for pain, discharge, redness, itching and visual disturbance.   Respiratory: Negative for apnea, cough, chest tightness, shortness of breath and wheezing.    Cardiovascular: Negative for chest pain, palpitations and leg swelling.   Gastrointestinal: Negative for abdominal pain, constipation, diarrhea, nausea and vomiting.   Endocrine: Negative for cold intolerance, heat intolerance, polydipsia and polyuria.   Genitourinary: Negative for decreased urine volume, difficulty urinating, flank pain, frequency, hematuria and urgency.   Musculoskeletal: Positive for back pain and neck stiffness. Negative for arthralgias, gait problem and neck pain.   Skin: Positive for rash. Negative for pallor and wound.   Allergic/Immunologic: Negative for immunocompromised state.  "  Neurological: Negative for dizziness, tremors, seizures, syncope, weakness and numbness.   Hematological: Negative for adenopathy. Does not bruise/bleed easily.   Psychiatric/Behavioral: Negative for sleep disturbance and suicidal ideas. The patient is not nervous/anxious.          Objective:     /82 (BP Location: Left arm, Patient Position: Sitting, BP Method: Medium (Automatic))   Pulse 61   Ht 5' 11" (1.803 m)   Wt 93 kg (205 lb)   BMI 28.59 kg/m²      Physical Exam   Vitals reviewed.  Constitutional: He is oriented to person, place, and time. No distress.   HENT:   Head: Normocephalic and atraumatic.   Mouth/Throat: Oropharynx is clear and moist.   Eyes: EOM are normal. Pupils are equal, round, and reactive to light.   Neck: Neck supple. No thyromegaly present.   Cardiovascular: Normal rate, regular rhythm and normal heart sounds.  Exam reveals no gallop and no friction rub.    No murmur heard.  Pulmonary/Chest: Breath sounds normal. He has no wheezes. He has no rales. He exhibits no tenderness.   Abdominal: There is no tenderness. There is no rebound and no guarding.       Right Side Rheumatological Exam     Examination finds the 2nd MCP, 3rd MCP, 4th MCP and 5th MCP normal.    The patient is tender to palpation of the 1st PIP, 1st MCP, 2nd PIP, 2nd MCP, 3rd PIP, 3rd MCP, 4th PIP, 4th MCP and 5th PIP    He has swelling of the elbow, wrist, knee, 1st PIP, 1st MCP, 2nd PIP, 2nd MCP, 3rd PIP, 3rd MCP, 4th PIP, 4th MCP, 5th PIP and 5th MCP    The patient has an enlarged wrist, knee, 1st PIP, 2nd PIP, 3rd PIP, 4th PIP and 5th PIP    Shoulder Exam   Tenderness Location: no tenderness    Range of Motion   Active abduction: abnormal   Adduction: abnormal  Sensation: normal    Knee Exam   Tenderness Location: medial joint line and LCL    Range of Motion   Extension: normal   Flexion: normal   Patellofemoral Crepitus: positive  Effusion: positive  Sensation: normal    Hip Exam   Tenderness Location: posterior " and anterior    Range of Motion   Extension: abnormal   Flexion: abnormal   Sensation: normal    Elbow/Wrist Exam   Tenderness Location: no tenderness    Range of Motion   Elbow   Flexion: normal   Sensation: normal    Muscle Strength (0-5 scale):  Neck Flexion:  3  Neck Extension: 3  Deltoid:  3  Biceps: 3/5   Triceps:  3  Quadriceps:  3   Distal Lower Extremity: 3    Left Side Rheumatological Exam     Examination finds the elbow, wrist, 1st MCP, 2nd MCP, 3rd MCP, 4th MCP and 5th MCP normal.    The patient is tender to palpation of the 1st PIP, 1st MCP, 2nd PIP, 2nd MCP, 3rd PIP, 3rd MCP, 4th PIP, 4th MCP, 5th PIP and 5th MCP.    He has swelling of the wrist, knee, 1st PIP, 1st MCP, 2nd PIP, 2nd MCP, 3rd PIP, 3rd MCP, 4th PIP, 4th MCP, 5th PIP and 5th MCP    The patient has an enlarged knee, 1st PIP, 2nd PIP, 3rd PIP, 4th PIP and 5th PIP.    Shoulder Exam   Tenderness Location: no tenderness    Range of Motion   Active abduction: abnormal   Extension: abnormal   Sensation: normal    Knee Exam   Tenderness Location: lateral joint line and medial joint line    Range of Motion   Extension: normal   Flexion: normal     Patellofemoral Crepitus: positive  Effusion: positive  Sensation: normal    Hip Exam   Tenderness Location: posterior and anterior    Range of Motion   Extension: abnormal   Flexion: abnormal   Sensation: normal    Elbow/Wrist Exam     Range of Motion   Elbow   Flexion: normal   Sensation: normal    Muscle Strength (0-5 scale):  Neck Flexion:  3  Neck Extension: 3  Deltoid:  3  Biceps: 3/5   Triceps:  3  Quadriceps:  3   Distal Lower Extremity: 3      Back/Neck Exam   General Inspection   Gait: normal       Tenderness Right paramedian tenderness of the Upper C-Spine, Lower C-Spine, Lower L-Spine and SI Joint.Left paramedian tenderness of the Upper C-Spine, Lower L-Spine, Lower C-Spine and SI Joint.    Back Range of Motion   Extension: abnormal  Flexion: abnormal  Lateral Bend Right: abnormal  Lateral  Bend Left: abnormal  Rotation Right: abnormal  Rotation Left: abnormal    Neck Range of Motion   Flexion: Limited  Extension: Limited  Right Lateral Bend: abnormal  Left Lateral Bend: abnormal  Right Rotation: abnormal  Left Rotation: abnormal  Lymphadenopathy:     He has no cervical adenopathy.   Neurological: He is alert and oriented to person, place, and time. He displays weakness. He exhibits normal muscle tone.   Reflex Scores:       Patellar reflexes are 2+ on the right side and 2+ on the left side.  Skin: No rash noted. No erythema. No pallor.     Psychiatric: Mood and affect normal.   Musculoskeletal: He exhibits edema and deformity. He exhibits no tenderness.           Results for orders placed or performed in visit on 04/08/19   Luteinizing hormone   Result Value Ref Range    LH <0.1 (L) 0.6 - 12.1 mIU/mL   Follicle stimulating hormone   Result Value Ref Range    Follicle Stimulating Hormone 0.30 (L) 0.95 - 11.95 mIU/mL   Cortisol, 8AM   Result Value Ref Range    Cortisol -8 AM 1.60 (L) 4.30 - 22.40 ug/dL   Testosterone   Result Value Ref Range    Testosterone, Total 1131 304 - 1227 ng/dL   ACTH   Result Value Ref Range    ACTH 12 0 - 46 pg/mL   T4, free   Result Value Ref Range    Free T4 0.94 0.71 - 1.51 ng/dL         Assessment:       Encounter Diagnoses   Name Primary?    Rheumatoid arthritis flare Yes    Fatigue, unspecified type     Arthralgia, unspecified joint     Hypothyroidism, unspecified type     Cavernous sinus tumor     Vitamin D deficiency           Plan:     Rheumatoid arthritis flare  -     Sedimentation rate; Future; Expected date: 04/29/2019  -     Rheumatoid factor; Future; Expected date: 04/29/2019  -     Cyclic citrul peptide antibody, IgG; Future; Expected date: 04/29/2019  -     C-reactive protein; Future; Expected date: 04/29/2019  -     Vitamin D; Future; Expected date: 04/29/2019    Fatigue, unspecified type  -     Sedimentation rate; Future; Expected date: 04/29/2019  -      Rheumatoid factor; Future; Expected date: 04/29/2019  -     Cyclic citrul peptide antibody, IgG; Future; Expected date: 04/29/2019  -     C-reactive protein; Future; Expected date: 04/29/2019  -     Vitamin D; Future; Expected date: 04/29/2019    Arthralgia, unspecified joint  -     Sedimentation rate; Future; Expected date: 04/29/2019  -     Rheumatoid factor; Future; Expected date: 04/29/2019  -     Cyclic citrul peptide antibody, IgG; Future; Expected date: 04/29/2019  -     C-reactive protein; Future; Expected date: 04/29/2019  -     Vitamin D; Future; Expected date: 04/29/2019    Hypothyroidism, unspecified type  -     Sedimentation rate; Future; Expected date: 04/29/2019  -     Rheumatoid factor; Future; Expected date: 04/29/2019  -     Cyclic citrul peptide antibody, IgG; Future; Expected date: 04/29/2019  -     C-reactive protein; Future; Expected date: 04/29/2019  -     Vitamin D; Future; Expected date: 04/29/2019    Cavernous sinus tumor  -     Sedimentation rate; Future; Expected date: 04/29/2019  -     Rheumatoid factor; Future; Expected date: 04/29/2019  -     Cyclic citrul peptide antibody, IgG; Future; Expected date: 04/29/2019  -     C-reactive protein; Future; Expected date: 04/29/2019  -     Vitamin D; Future; Expected date: 04/29/2019    Vitamin D deficiency   -     Vitamin D; Future; Expected date: 04/29/2019       He is  Cleared for surgery will hold xeljanz and Nsaid prior.

## 2019-05-06 ENCOUNTER — TELEPHONE (OUTPATIENT)
Dept: PHARMACY | Facility: CLINIC | Age: 69
End: 2019-05-06

## 2019-05-09 RX ORDER — INSULIN ASPART 100 [IU]/ML
INJECTION, SOLUTION INTRAVENOUS; SUBCUTANEOUS
Refills: 0 | OUTPATIENT
Start: 2019-05-09 | End: 2020-05-08

## 2019-05-15 RX ORDER — LEVOTHYROXINE SODIUM 50 UG/1
TABLET ORAL
Qty: 30 TABLET | Refills: 1 | Status: SHIPPED | OUTPATIENT
Start: 2019-05-15 | End: 2019-06-08 | Stop reason: SDUPTHER

## 2019-05-21 ENCOUNTER — TELEPHONE (OUTPATIENT)
Dept: PREADMISSION TESTING | Facility: HOSPITAL | Age: 69
End: 2019-05-21

## 2019-05-21 ENCOUNTER — TELEPHONE (OUTPATIENT)
Dept: RHEUMATOLOGY | Facility: CLINIC | Age: 69
End: 2019-05-21

## 2019-05-21 ENCOUNTER — ANESTHESIA EVENT (OUTPATIENT)
Dept: SURGERY | Facility: HOSPITAL | Age: 69
DRG: 025 | End: 2019-05-21
Payer: MEDICARE

## 2019-05-21 ENCOUNTER — PATIENT MESSAGE (OUTPATIENT)
Dept: ENDOCRINOLOGY | Facility: CLINIC | Age: 69
End: 2019-05-21

## 2019-05-21 DIAGNOSIS — D32.9 MENINGIOMA: Primary | ICD-10-CM

## 2019-05-21 DIAGNOSIS — Z01.818 PREOPERATIVE TESTING: Primary | ICD-10-CM

## 2019-05-21 NOTE — TELEPHONE ENCOUNTER
----- Message from Yareli Thompson RN sent at 5/21/2019 10:40 AM CDT -----  Patient is scheduled for Craniotomy on 5/29 with (approximately 280 minutes of general anesthesia). Will need instructions for xeljanz.When to start holding for surgery? Please advise. Thanks!

## 2019-05-21 NOTE — PRE ADMISSION SCREENING
Anesthesia Assessment: Preoperative EQUATION    Planned Procedure: Procedure(s) (LRB):  CRANIOTOMY  (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)  Co-surgery with Dr Vaibhav Jara - please put in his room  Microscope [employer] [employer] Sonopet Stealth (Right)  Requested Anesthesia Type:General  Surgeon: Jimmy Segura DO  Service: Neurosurgery  Known or anticipated Date of Surgery:5/29/2019    Surgeon notes: reviewed    Electronic QUestionnaire Assessment completed via nurse interview with patient.    NO AQ    Triage considerations:       Previous anesthesia records:No problems and Not available    Last PCP note: outside [facility]   Subspecialty notes: Endocrinology, Rheumatology, NEUROSURGERY    Other important co-morbidities: PER Epic: BRAIN TUMOR( MENINGIOMA), RHEUMATOID ARTHRITIS, CKD, VITAMIN D DEFICIENCY, HYPOTHYROIDISM, & ADRENAL INSUFFICIENCY      Tests already available:  Available tests,  within 3 months , within [facility] .4/8/2019 FT4, 3/4/2019 BMP, 3/3/2019 TSH, UA, PT/INR, PTT, EKG, 3/2/2019 MRI BRAIN SYNAPTIVE STEALTH W W/O CONTRAST  OUTSIDE [facility]( IN [facility]) 3/2/2019 CMP, MG, CBC            Instructions given. (See in Nurse's note)    Optimization:  Anesthesia Preop Clinic Assessment  Indicated    Medical Opinion Indicated       Plan:    Testing:  T&S   Pre-anesthesia  visit       Visit focus: concerns in complex and/or prolonged anesthesia     Consultation:Patient's PCP for re-evaluation Patient's PCP for a statement of optimization      Patient  has previously scheduled Medical Appointment:    Navigation: Tests Scheduled. TBD             Consults scheduled.TBD

## 2019-05-21 NOTE — PRE-PROCEDURE INSTRUCTIONS
Patient stated has not had any problems with anesthesia in the past. Will  Need medical clearance from your PCP, Dr. Seymour Avendano. He is in contact with his office to see if he needs another appt. for clearance. Will need poc appt, and lab. Our  will call to set up these appts. Will get medication instructions for xeljanz from Dr. Yoav Oliveira, Rheumatology. He said he already stopped xeljanz on 5/19, Sunday. Preop instructions given. Hold asa, asa containing products, nsaids, vitamins and supplements one week prior to surgery. Verbalizes understanding.

## 2019-05-21 NOTE — TELEPHONE ENCOUNTER
Spoke to pt's wife advised that pt is to hold the Xeljanz 1 week prior to procedure and not restart until cleared by surgeon. Wife advised they are already holding and will make sure his surgeon is aware. No further questions.

## 2019-05-21 NOTE — PROGRESS NOTES
Perio-operative steroid recommendations:  [facility] HC dosing  The day of surgery: Hydrocortisone IV or oral 50mg every 8hr, first dose given on the way to OR    Day 1 after surgery: Hydrocortisone, IV, 25mg every 8hr     Day 2 after surgery: Hydrocortisone, oral, 25mg twice daily     Day 3 after surgery: Hydrocortisone, oral, 20mg morning and 10mg afternoon     Day 4 after surgery: back to home dose, Hydrocortisone, oral, 10 mg morning and 5 mg afternoon

## 2019-05-21 NOTE — TELEPHONE ENCOUNTER
----- Message from Yareli Thompson RN sent at 5/21/2019 11:02 AM CDT -----  Please schedule poc and lab. Thanks!

## 2019-05-21 NOTE — ANESTHESIA PREPROCEDURE EVALUATION
[facility]  Anesthesia Pre-Operative Evaluation         Patient Name: Aurelio Perkins  YOB: 1950  MRN: [id number]    SUBJECTIVE:     Pre-operative evaluation for Procedure(s) (LRB):  CRANIOTOMY  (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)  Co-surgery with Dr Vaibhav Jara - please put in his room  Microscope [employer] (Right)     05/29/2019    Aurelio Perkins is a 68 y.o. male w/ a significant PMHx of PMH including prostate cancer (s/p rxn in 2004, in remission), R nephrectomy (congenital defect in 1988), RA and adrenal insufficiency on home steroids who presents after the incidental discovery of a large right cavernous sinus meningioma. .    Patient now presents for the above procedure(s).     He was seen by endocrine regarding his daily steroid use and shankar-operative steroid  recommendations listed below per their not:   AI Surgery HC dosing   The day of surgery: Hydrocortisone IV or oral 50mg every 8hr, first dose given on the way to OR   Taper to follow in the days following    LDA: None documented.       Prev airway: None documented.    Drips: None documented.      Patient Active Problem List   Diagnosis    Rheumatoid arthritis    Arthritis pain    Brain mass    Meningioma    Primary hypothyroidism    Hyperglycemia    Pituitary abnormality    Cavernous sinus tumor       Review of patient's allergies indicates:   Allergen Reactions    Antihistamines - alkylamine Other (See Comments)     hallucinations    Benadryl [diphenhydramine hcl] Other (See Comments)     hallucinations    Codeine Itching     Turns red    Lodine [etodolac] Other (See Comments)     fatigue    Methotrexate analogues Other (See Comments)     Sunlight sensitivity    Morphine Itching     Turns red       Current Inpatient Medications:      No current facility-administered medications on file prior to encounter.      Current  Outpatient Medications on File Prior to Encounter   Medication Sig Dispense Refill    hydrocortisone (CORTEF) 10 MG Tab Take Hydrocortisone 20 mg in the morning and 10 mg in the evening (4PM). 90 tablet 2    blood-glucose meter (FREESTYLE SYSTEM KIT) kit Use as instructed, test blood sugar before meals and at bedtime. 1 each 0    diclofenac (VOLTAREN) 75 MG EC tablet Take 75 mg by mouth 2 (two) times daily.      syringe, disposable, 1 mL Syrg 1 Syringe by Misc.(Non-Drug; Combo Route) route once a week. 25 Syringe 3    testosterone cypionate (DEPOTESTOTERONE CYPIONATE) 200 mg/mL injection       tofacitinib (XELJANZ) 5 mg Tab Take one tablet (5mg) by mouth 2 (two) times daily. 60 tablet 12     Past Medical History:   Diagnosis Date    Arthritis     Cancer     prostate cancer-Removed Prostate    Chronic kidney disease     one kidney    Mitral valve prolapse        Past Surgical History:   Procedure Laterality Date    HEMORRHOID SURGERY      NEPHRECTOMY      PROSTATECTOMY      TONSILLECTOMY         Social History     Socioeconomic History    Marital status:      Spouse name: Not on file    Number of children: Not on file    Years of education: Not on file    Highest education level: Not on file   Occupational History    Not on file   Social Needs    Financial resource strain: Not on file    Food insecurity:     Worry: Not on file     Inability: Not on file    Transportation needs:     Medical: Not on file     Non-medical: Not on file   Tobacco Use    Smoking status: Never Smoker    Smokeless tobacco: Never Used   Substance and Sexual Activity    Alcohol use: No    Drug use: No    Sexual activity: Not on file   Lifestyle    Physical activity:     Days per week: Not on file     Minutes per session: Not on file    Stress: Not on file   Relationships    Social connections:     Talks on phone: Not on file     Gets together: Not on file     Attends Spiritism service: Not on file     Active  member of club or organization: Not on file     Attends meetings of clubs or organizations: Not on file     Relationship status: Not on file   Other Topics Concern    Not on file   Social History Narrative    Not on file       OBJECTIVE:     Vital Signs Range (Last 24H):  Temp:  [36.8 °C (98.2 °F)]   Pulse:  [98]   Resp:  [18]   BP: (144)/(70)   SpO2:  [98 %]       Significant Labs:  Lab Results   Component Value Date    WBC 4.6 02/08/2019    HGB 12.6 (L) 02/08/2019    HCT 38.0 (L) 02/08/2019     02/08/2019    ALT 41 02/08/2019    AST 29 02/08/2019     03/04/2019    K 4.0 03/04/2019     03/04/2019    CREATININE 1.3 03/04/2019    BUN 22 03/04/2019    CO2 21 (L) 03/04/2019    TSH 0.274 (L) 03/03/2019    INR 1.0 03/03/2019    HGBA1C 5.5 05/27/2016       Diagnostic Studies: No relevant studies.    EKG: 3/2019 NSR 86    2D ECHO:  No results found for this or any previous visit.      ASSESSMENT/PLAN:       Anesthesia Assessment: Preoperative EQUATION     Planned Procedure: Procedure(s) (LRB):  CRANIOTOMY  (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)  Co-surgery with Dr Vaibhav Jara - please put in his room  Microscope [employer] Stealth (Right)  Requested Anesthesia Type:General  Surgeon: Jimmy Segura DO  Service: Neurosurgery  Known or anticipated Date of Surgery:5/29/2019     Surgeon notes: reviewed     Electronic QUestionnaire Assessment completed via nurse interview with patient.    NO AQ     Triage considerations:         Previous anesthesia records:No problems and Not available     Last PCP note: outside [facility]   Subspecialty notes: Endocrinology, Rheumatology, NEUROSURGERY     Other important co-morbidities: PER Epic: BRAIN TUMOR( MENINGIOMA), RHEUMATOID ARTHRITIS, CKD, VITAMIN D DEFICIENCY, HYPOTHYROIDISM, & ADRENAL INSUFFICIENCY      Tests already available:  Available tests,  within 3 months , within [facility] .4/8/2019 FT4, 3/4/2019  BMP, 3/3/2019 TSH, UA, PT/INR, PTT, EKG, 3/2/2019 MRI BRAIN SYNAPTIVE STEALTH W W/O CONTRAST  OUTSIDE [facility]( IN [facility]) 3/2/2019 CMP, MG, CBC                            Instructions given. (See in Nurse's note)     Optimization:  Anesthesia Preop Clinic Assessment  Indicated    Medical Opinion Indicated                            Plan:              Testing:  T&S   Pre-anesthesia  visit                                        Visit focus: concerns in complex and/or prolonged anesthesia                           Consultation:Patient's PCP for re-evaluation Patient's PCP for a statement of optimization                            Patient  has previously scheduled Medical Appointment:     Navigation: Tests Scheduled. TBD                        Consults scheduled.TBD                             Electronically signed by Yareli Thompson, RN at 5/21/2019 10:52 AM       Pre-admit on 5/29/2019          Detailed Report      5/21 Medical clearance given by Dr. Seymour Avendano on 5/21.( scanned to media)  5/21   MD Yareli Lopez, PETAR; KONSTANTIN Mayo Staff             There is nothing from an endocrine perspective that would stop him from having surgery. I can put in a note with steroid recommendations but otherwise be does not need to be seen by me.    5/22 H&P from Dr. Seymour Avendano received.(scanned to media )    5/22 Note from Dr. Maria Esther Hammonds,Endocrinology:   Perio-operative steroid recommendations:  AI Surgery HC dosing  The day of surgery: Hydrocortisone IV or oral 50mg every 8hr, first dose given on the way to OR     Day 1 after surgery: Hydrocortisone, IV, 25mg every 8hr      Day 2 after surgery: Hydrocortisone, oral, 25mg twice daily      Day 3 after surgery: Hydrocortisone, oral, 20mg morning and 10mg afternoon      Day 4 after surgery: back to home dose, Hydrocortisone, oral, 10 mg morning and 5 mg afternoon         Electronically signed by Maria Esther Hammonds MD at 5/21/2019  2:12 PM     5/23 Keyla Maya LPN (  "with Dr. Yoav Oliveira, Rheumatology)        5/21/19 11:20 AM   Note      Spoke to pt's wife advised that pt is to hold the Xeljanz 1 week prior to procedure and not restart until cleared by surgeon. Wife advised they are already holding and will make sure his surgeon is aware. No further questions.       5/23 T&S resulted and noted.                                                                                                             05/29/2019  Aurelio Perkins is a 68 y.o., male.    Anesthesia Evaluation    I have reviewed the Patient Summary Reports.    I have reviewed the Nursing Notes.   I have reviewed the Medications.   Steroids Taken In Past Year: Hydrocortisone    Review of Systems  Anesthesia Hx:  Denies Hx of Anesthetic complications Hallucinations and jerking movements with many older antihistamines  History of prior surgery of interest to airway management or planning: Previous anesthesia: General Denies Family Hx of Anesthesia complications.   Denies Personal Hx of Anesthesia complications.   Social:  Non-Smoker, No Alcohol Use    Hematology/Oncology:  Hematology Normal       -- Cancer in past history:  Oncology Comments: Prostate ca s/p prostatectomy     EENT/Dental:EENT/Dental Normal   Cardiovascular:   Exercise tolerance: good Denies Hypertension. Valvular problems/Murmurs, MVP  Denies CAD.    Denies Dysrhythmias.   Denies Angina.     ECG has been reviewed.    Pulmonary:   Denies COPD.  Denies Asthma.  Denies Shortness of breath. Sleep Apnea    Renal/:   Chronic Renal Disease, CRI S/p Nephrectomy in 1990 2/2 "congenital non-functioning kidney"   Hepatic/GI:   Denies GERD. Denies Liver Disease.    Musculoskeletal:   Arthritis (RA)     Neurological:   Denies CVA. Denies Seizures. cavernous sinus meningioma   Endocrine:   Hypothyroidism Secondary adrenal insufficiency - on supplemental steroids, stress dose steroid plan described by Endo       Physical Exam  General:  Well nourished  "   Airway/Jaw/Neck:  Airway Findings: Mouth Opening: Small, but > 3cm Tongue: Normal  General Airway Assessment: Adult, Average  Mallampati: III  Improves to II with phonation.  TM Distance: Normal, at least 6 cm  Jaw/Neck Findings:  Micrognathia: Negative Neck ROM: Normal ROM  Neck Findings:      Dental:  Dental Findings: Periodontal disease, Severe    Chest/Lungs:  Chest/Lungs Findings: Clear to auscultation, Normal Respiratory Rate     Heart/Vascular:  Heart Findings: Rate: Normal  Rhythm: Regular Rhythm  Heart Murmur  Systolic  Systolic Heart Murmur Description: R Upper Sternal Border, L Upper Sternal Border, Apical  Systolic Heart Murmur Grade: Grade III     Abdomen:  Abdomen Findings:  Normal     Musculoskeletal:  Musculoskeletal Findings:    Skin:  Skin Findings:     Mental Status:  Mental Status Findings:  Cooperative, Alert and Oriented         Anesthesia Plan  Type of Anesthesia, risks & benefits discussed:  Anesthesia Type:  general  Patient's Preference: GA  Intra-op Monitoring Plan: standard ASA monitors  Intra-op Monitoring Plan Comments:   Post Op Pain Control Plan: multimodal analgesia, IV/PO Opiods PRN and per primary service following discharge from PACU  Post Op Pain Control Plan Comments:   Induction:   IV  Beta Blocker:  Patient is not currently on a Beta-Blocker (No further documentation required).       Informed Consent: Patient understands risks and agrees with Anesthesia plan.  Questions answered. Anesthesia consent signed with patient.  ASA Score: 3     Day of Surgery Review of History & Physical:    H&P update referred to the surgeon.     Anesthesia Plan Notes: The patient's PMH was reviewed and PE was performed  Plan for GETA. A-line, 2nd PIV. No CVC needed per surgeon for post-op care.   Stress dose steroids per endo recommended schedule.   All questions answered. Informed consent obtained. Pt agrees to proceed.           Ready For Surgery From Anesthesia Perspective.

## 2019-05-23 ENCOUNTER — HOSPITAL ENCOUNTER (OUTPATIENT)
Dept: PREADMISSION TESTING | Facility: HOSPITAL | Age: 69
Discharge: HOME | End: 2019-05-23
Attending: ANESTHESIOLOGY
Payer: MEDICARE

## 2019-05-23 ENCOUNTER — TELEPHONE (OUTPATIENT)
Dept: PREADMISSION TESTING | Facility: HOSPITAL | Age: 69
End: 2019-05-23

## 2019-05-23 VITALS
DIASTOLIC BLOOD PRESSURE: 75 MMHG | HEART RATE: 68 BPM | BODY MASS INDEX: 27.36 KG/M2 | HEIGHT: 72 IN | WEIGHT: 202 LBS | OXYGEN SATURATION: 95 % | SYSTOLIC BLOOD PRESSURE: 136 MMHG | TEMPERATURE: 98 F | RESPIRATION RATE: 18 BRPM

## 2019-05-23 NOTE — PRE-PROCEDURE INSTRUCTIONS
Medication instructions given. Shower the night before surgery and the morning of surgery with an antibacterial soap( hibiclens or dial antibacterial soap).  Nothing on the skin once shower. Do not apply any deodorant, lotion, powder, perfume,or aftershave.  No jewelry going to surgery.   May have solid foods, gum, and hard candy until 8 hours before surgery/procedure time.  May have clear liquids( water, gatorade, powerade or apple juice) until 2 hours prior to surgery/procedure time.  No red or orange drinks. If in doubt , drink water. Nothing to drink 2 hours before arrival time for surgery/procedure. If you are told to take medication in the morning of surgery, it may be taken with a sip of water. If your doctor tells you something different pertaining to when to stop eating or drinking, follow your doctor's instructions. Directions given to the surgery center. Verbalizes understanding.

## 2019-05-23 NOTE — TELEPHONE ENCOUNTER
----- Message from Maria Esther Hammonds MD sent at 5/21/2019  2:07 PM CDT -----  There is nothing from an endocrine perspective that would stop him from having surgery. I can put in a note with steroid recommendations but otherwise be does not need to be seen by me.    If his PCP does not feel comfortable clearing him, I recommend he be seen in pre-op clinic.    Thank you,  Maria Esther   ----- Message -----  From: Yareli Thompson RN  Sent: 5/21/2019  12:36 PM  To: Yareli Thompson RN, Maria Esther Hammonds MD, #    Patient is scheduled for craniotomy with  on 5/29(approximately 280 minutes of general anesthesia) He was seen by  His PCP, Dr. Seymour Avendano for medical clearance,. He is recommending endocrine clearance for this surgery. Patient has appt scheduled with yo on 6/4. Could you please move his appt up so he can get clearance before his surgery? Thanks!.

## 2019-05-29 ENCOUNTER — ANESTHESIA (OUTPATIENT)
Dept: SURGERY | Facility: HOSPITAL | Age: 69
DRG: 025 | End: 2019-05-29
Payer: MEDICARE

## 2019-05-29 ENCOUNTER — HOSPITAL ENCOUNTER (INPATIENT)
Facility: HOSPITAL | Age: 69
LOS: 2 days | Discharge: HOME | DRG: 025 | End: 2019-05-31
Attending: NEUROLOGICAL SURGERY | Admitting: NEUROLOGICAL SURGERY
Payer: MEDICARE

## 2019-05-29 DIAGNOSIS — D32.9 MENINGIOMA: ICD-10-CM

## 2019-05-29 PROBLEM — H02.401 PTOSIS OF RIGHT EYELID: Status: ACTIVE | Noted: 2019-05-29

## 2019-05-29 PROBLEM — I10 ESSENTIAL HYPERTENSION: Status: ACTIVE | Noted: 2019-05-29

## 2019-05-29 PROBLEM — G93.6 CEREBRAL EDEMA: Status: ACTIVE | Noted: 2019-05-29

## 2019-05-29 PROBLEM — G93.5 BRAIN COMPRESSION: Status: ACTIVE | Noted: 2019-05-29

## 2019-05-29 LAB
ALBUMIN SERPL BCP-MCNC: 3 G/DL (ref 3.5–5.2)
ALP SERPL-CCNC: 39 U/L (ref 55–135)
ALT SERPL W/O P-5'-P-CCNC: 63 U/L (ref 10–44)
ANION GAP SERPL CALC-SCNC: 8 MMOL/L (ref 8–16)
ANION GAP SERPL CALC-SCNC: 9 MMOL/L (ref 8–16)
APTT BLDCRRT: 24.8 SEC (ref 21–32)
AST SERPL-CCNC: 52 U/L (ref 10–40)
BASOPHILS # BLD AUTO: 0.01 K/UL (ref 0–0.2)
BASOPHILS # BLD AUTO: 0.01 K/UL (ref 0–0.2)
BASOPHILS NFR BLD: 0.1 % (ref 0–1.9)
BASOPHILS NFR BLD: 0.1 % (ref 0–1.9)
BILIRUB SERPL-MCNC: 0.7 MG/DL (ref 0.1–1)
BUN SERPL-MCNC: 14 MG/DL (ref 8–23)
BUN SERPL-MCNC: 15 MG/DL (ref 8–23)
CALCIUM SERPL-MCNC: 7.7 MG/DL (ref 8.7–10.5)
CALCIUM SERPL-MCNC: 8.3 MG/DL (ref 8.7–10.5)
CHLORIDE SERPL-SCNC: 106 MMOL/L (ref 95–110)
CHLORIDE SERPL-SCNC: 108 MMOL/L (ref 95–110)
CO2 SERPL-SCNC: 20 MMOL/L (ref 23–29)
CO2 SERPL-SCNC: 22 MMOL/L (ref 23–29)
CREAT SERPL-MCNC: 1.4 MG/DL (ref 0.5–1.4)
CREAT SERPL-MCNC: 1.4 MG/DL (ref 0.5–1.4)
DIFFERENTIAL METHOD: ABNORMAL
DIFFERENTIAL METHOD: ABNORMAL
EOSINOPHIL # BLD AUTO: 0 K/UL (ref 0–0.5)
EOSINOPHIL # BLD AUTO: 0 K/UL (ref 0–0.5)
EOSINOPHIL NFR BLD: 0 % (ref 0–8)
EOSINOPHIL NFR BLD: 0 % (ref 0–8)
ERYTHROCYTE [DISTWIDTH] IN BLOOD BY AUTOMATED COUNT: 12.7 % (ref 11.5–14.5)
ERYTHROCYTE [DISTWIDTH] IN BLOOD BY AUTOMATED COUNT: 12.8 % (ref 11.5–14.5)
EST. GFR  (AFRICAN AMERICAN): 59.7 ML/MIN/1.73 M^2
EST. GFR  (AFRICAN AMERICAN): 59.7 ML/MIN/1.73 M^2
EST. GFR  (NON AFRICAN AMERICAN): 51.6 ML/MIN/1.73 M^2
EST. GFR  (NON AFRICAN AMERICAN): 51.6 ML/MIN/1.73 M^2
ESTIMATED AVG GLUCOSE: 117 MG/DL (ref 68–131)
FIBRINOGEN PPP-MCNC: 489 MG/DL (ref 182–366)
GLUCOSE SERPL-MCNC: 164 MG/DL (ref 70–110)
GLUCOSE SERPL-MCNC: 165 MG/DL (ref 70–110)
HBA1C MFR BLD HPLC: 5.7 % (ref 4–5.6)
HCT VFR BLD AUTO: 35.2 % (ref 40–54)
HCT VFR BLD AUTO: 35.8 % (ref 40–54)
HGB BLD-MCNC: 11.8 G/DL (ref 14–18)
HGB BLD-MCNC: 12 G/DL (ref 14–18)
IMM GRANULOCYTES # BLD AUTO: 0.04 K/UL (ref 0–0.04)
IMM GRANULOCYTES # BLD AUTO: 0.05 K/UL (ref 0–0.04)
IMM GRANULOCYTES NFR BLD AUTO: 0.4 % (ref 0–0.5)
IMM GRANULOCYTES NFR BLD AUTO: 0.5 % (ref 0–0.5)
INR PPP: 0.9 (ref 0.8–1.2)
INR PPP: 1 (ref 0.8–1.2)
LYMPHOCYTES # BLD AUTO: 0.3 K/UL (ref 1–4.8)
LYMPHOCYTES # BLD AUTO: 0.6 K/UL (ref 1–4.8)
LYMPHOCYTES NFR BLD: 2.9 % (ref 18–48)
LYMPHOCYTES NFR BLD: 7.1 % (ref 18–48)
MAGNESIUM SERPL-MCNC: 2 MG/DL (ref 1.6–2.6)
MCH RBC QN AUTO: 28 PG (ref 27–31)
MCH RBC QN AUTO: 28.1 PG (ref 27–31)
MCHC RBC AUTO-ENTMCNC: 33.5 G/DL (ref 32–36)
MCHC RBC AUTO-ENTMCNC: 33.5 G/DL (ref 32–36)
MCV RBC AUTO: 83 FL (ref 82–98)
MCV RBC AUTO: 84 FL (ref 82–98)
MONOCYTES # BLD AUTO: 0.1 K/UL (ref 0.3–1)
MONOCYTES # BLD AUTO: 0.2 K/UL (ref 0.3–1)
MONOCYTES NFR BLD: 1.7 % (ref 4–15)
MONOCYTES NFR BLD: 1.8 % (ref 4–15)
NEUTROPHILS # BLD AUTO: 11.1 K/UL (ref 1.8–7.7)
NEUTROPHILS # BLD AUTO: 7.5 K/UL (ref 1.8–7.7)
NEUTROPHILS NFR BLD: 90.6 % (ref 38–73)
NEUTROPHILS NFR BLD: 94.8 % (ref 38–73)
NRBC BLD-RTO: 0 /100 WBC
NRBC BLD-RTO: 0 /100 WBC
PHOSPHATE SERPL-MCNC: 3.7 MG/DL (ref 2.7–4.5)
PLATELET # BLD AUTO: 194 K/UL (ref 150–350)
PLATELET # BLD AUTO: 205 K/UL (ref 150–350)
PMV BLD AUTO: 10.2 FL (ref 9.2–12.9)
PMV BLD AUTO: 9.9 FL (ref 9.2–12.9)
POCT GLUCOSE: 152 MG/DL (ref 70–110)
POTASSIUM SERPL-SCNC: 4.6 MMOL/L (ref 3.5–5.1)
POTASSIUM SERPL-SCNC: 5.6 MMOL/L (ref 3.5–5.1)
PROT SERPL-MCNC: 6 G/DL (ref 6–8.4)
PROTHROMBIN TIME: 10 SEC (ref 9–12.5)
PROTHROMBIN TIME: 9.9 SEC (ref 9–12.5)
RBC # BLD AUTO: 4.22 M/UL (ref 4.6–6.2)
RBC # BLD AUTO: 4.27 M/UL (ref 4.6–6.2)
SODIUM SERPL-SCNC: 135 MMOL/L (ref 136–145)
SODIUM SERPL-SCNC: 138 MMOL/L (ref 136–145)
WBC # BLD AUTO: 11.69 K/UL (ref 3.9–12.7)
WBC # BLD AUTO: 8.26 K/UL (ref 3.9–12.7)

## 2019-05-29 PROCEDURE — 88331 PATH CONSLTJ SURG 1 BLK 1SPC: CPT | Performed by: PATHOLOGY

## 2019-05-29 PROCEDURE — 61606 RESECT/EXCISE CRANIAL LESION: CPT | Mod: 62,51,, | Performed by: NEUROLOGICAL SURGERY

## 2019-05-29 PROCEDURE — 88307 TISSUE SPECIMEN TO PATHOLOGY - SURGERY: ICD-10-PCS | Mod: 26,,, | Performed by: PATHOLOGY

## 2019-05-29 PROCEDURE — 25000003 PHARM REV CODE 250: Performed by: STUDENT IN AN ORGANIZED HEALTH CARE EDUCATION/TRAINING PROGRAM

## 2019-05-29 PROCEDURE — 88307 TISSUE EXAM BY PATHOLOGIST: CPT | Mod: 26,,, | Performed by: PATHOLOGY

## 2019-05-29 PROCEDURE — 36000713 HC OR TIME LEV V EA ADD 15 MIN: Performed by: NEUROLOGICAL SURGERY

## 2019-05-29 PROCEDURE — 61592 ORBITOCRANIAL APPROACH/SKULL: CPT | Mod: 62,RT,, | Performed by: NEUROLOGICAL SURGERY

## 2019-05-29 PROCEDURE — 80053 COMPREHEN METABOLIC PANEL: CPT

## 2019-05-29 PROCEDURE — 88331 PATH CONSLTJ SURG 1 BLK 1SPC: CPT | Mod: 26,,, | Performed by: PATHOLOGY

## 2019-05-29 PROCEDURE — 20000000 HC ICU ROOM

## 2019-05-29 PROCEDURE — 85730 THROMBOPLASTIN TIME PARTIAL: CPT

## 2019-05-29 PROCEDURE — D9220A PRA ANESTHESIA: ICD-10-PCS | Mod: CRNA,,, | Performed by: NURSE ANESTHETIST, CERTIFIED REGISTERED

## 2019-05-29 PROCEDURE — 63600175 PHARM REV CODE 636 W HCPCS: Performed by: STUDENT IN AN ORGANIZED HEALTH CARE EDUCATION/TRAINING PROGRAM

## 2019-05-29 PROCEDURE — 61592 PR ORBITOCRANIAL APPROACH/ZYGOMATIC: ICD-10-PCS | Mod: 62,RT,, | Performed by: NEUROLOGICAL SURGERY

## 2019-05-29 PROCEDURE — 27201423 OPTIME MED/SURG SUP & DEVICES STERILE SUPPLY: Performed by: NEUROLOGICAL SURGERY

## 2019-05-29 PROCEDURE — D9220A PRA ANESTHESIA: ICD-10-PCS | Mod: ANES,,, | Performed by: ANESTHESIOLOGY

## 2019-05-29 PROCEDURE — 36620 INSERTION CATHETER ARTERY: CPT | Mod: 59,,, | Performed by: ANESTHESIOLOGY

## 2019-05-29 PROCEDURE — 85025 COMPLETE CBC W/AUTO DIFF WBC: CPT

## 2019-05-29 PROCEDURE — 36620 PR INSERT CATH,ART,PERCUT,SHORTTERM: ICD-10-PCS | Mod: 59,,, | Performed by: ANESTHESIOLOGY

## 2019-05-29 PROCEDURE — 85610 PROTHROMBIN TIME: CPT

## 2019-05-29 PROCEDURE — 99223 PR INITIAL HOSPITAL CARE,LEVL III: ICD-10-PCS | Mod: GC,,, | Performed by: PSYCHIATRY & NEUROLOGY

## 2019-05-29 PROCEDURE — 83735 ASSAY OF MAGNESIUM: CPT

## 2019-05-29 PROCEDURE — 80048 BASIC METABOLIC PNL TOTAL CA: CPT

## 2019-05-29 PROCEDURE — 36000710: Performed by: NEUROLOGICAL SURGERY

## 2019-05-29 PROCEDURE — D9220A PRA ANESTHESIA: Mod: ANES,,, | Performed by: ANESTHESIOLOGY

## 2019-05-29 PROCEDURE — 69990 PR MICROSURG TECHNIQUES,REQ OPER MICROSCOPE: ICD-10-PCS | Mod: 59,,, | Performed by: NEUROLOGICAL SURGERY

## 2019-05-29 PROCEDURE — 36620 INSERTION CATHETER ARTERY: CPT | Performed by: ANESTHESIOLOGY

## 2019-05-29 PROCEDURE — 99223 1ST HOSP IP/OBS HIGH 75: CPT | Mod: GC,,, | Performed by: PSYCHIATRY & NEUROLOGY

## 2019-05-29 PROCEDURE — 37000008 HC ANESTHESIA 1ST 15 MINUTES: Performed by: NEUROLOGICAL SURGERY

## 2019-05-29 PROCEDURE — 84100 ASSAY OF PHOSPHORUS: CPT

## 2019-05-29 PROCEDURE — C1713 ANCHOR/SCREW BN/BN,TIS/BN: HCPCS | Performed by: NEUROLOGICAL SURGERY

## 2019-05-29 PROCEDURE — 36000711: Performed by: NEUROLOGICAL SURGERY

## 2019-05-29 PROCEDURE — 88331 TISSUE SPECIMEN TO PATHOLOGY - SURGERY: ICD-10-PCS | Mod: 26,,, | Performed by: PATHOLOGY

## 2019-05-29 PROCEDURE — 61781 SCAN PROC CRANIAL INTRA: CPT | Mod: ,,, | Performed by: NEUROLOGICAL SURGERY

## 2019-05-29 PROCEDURE — 25000003 PHARM REV CODE 250: Performed by: NEUROLOGICAL SURGERY

## 2019-05-29 PROCEDURE — 69990 MICROSURGERY ADD-ON: CPT | Mod: 59,,, | Performed by: NEUROLOGICAL SURGERY

## 2019-05-29 PROCEDURE — 85610 PROTHROMBIN TIME: CPT | Mod: 91

## 2019-05-29 PROCEDURE — 61606 PR RESECT INFRATEMP FOSSA/INTRADURL: ICD-10-PCS | Mod: 62,51,, | Performed by: NEUROLOGICAL SURGERY

## 2019-05-29 PROCEDURE — D9220A PRA ANESTHESIA: Mod: CRNA,,, | Performed by: NURSE ANESTHETIST, CERTIFIED REGISTERED

## 2019-05-29 PROCEDURE — 63600175 PHARM REV CODE 636 W HCPCS: Performed by: NEUROLOGICAL SURGERY

## 2019-05-29 PROCEDURE — 83036 HEMOGLOBIN GLYCOSYLATED A1C: CPT

## 2019-05-29 PROCEDURE — 37000009 HC ANESTHESIA EA ADD 15 MINS: Performed by: NEUROLOGICAL SURGERY

## 2019-05-29 PROCEDURE — 27800903 OPTIME MED/SURG SUP & DEVICES OTHER IMPLANTS: Performed by: NEUROLOGICAL SURGERY

## 2019-05-29 PROCEDURE — 27201037 HC PRESSURE MONITORING SET UP

## 2019-05-29 PROCEDURE — 86920 COMPATIBILITY TEST SPIN: CPT

## 2019-05-29 PROCEDURE — 36000712 HC OR TIME LEV V 1ST 15 MIN: Performed by: NEUROLOGICAL SURGERY

## 2019-05-29 PROCEDURE — 85384 FIBRINOGEN ACTIVITY: CPT

## 2019-05-29 PROCEDURE — 61781 PR STEREOTACTIC COMP ASSIST PROC,CRANIAL,INTRADURAL: ICD-10-PCS | Mod: ,,, | Performed by: NEUROLOGICAL SURGERY

## 2019-05-29 DEVICE — PLATE BONE BUR HOLE COVER 10MM: Type: IMPLANTABLE DEVICE | Site: CRANIAL | Status: FUNCTIONAL

## 2019-05-29 DEVICE — PLATE BONE 6 HOLE DBL Y SHAPE: Type: IMPLANTABLE DEVICE | Site: CRANIAL | Status: FUNCTIONAL

## 2019-05-29 DEVICE — DURAMATRIX ONLAY 3X3: Type: IMPLANTABLE DEVICE | Site: CRANIAL | Status: FUNCTIONAL

## 2019-05-29 DEVICE — PLATE BONE 2 HOLE TAB: Type: IMPLANTABLE DEVICE | Site: CRANIAL | Status: FUNCTIONAL

## 2019-05-29 DEVICE — SCREW UN3 AXS SD 1.5X4MM: Type: IMPLANTABLE DEVICE | Site: CRANIAL | Status: FUNCTIONAL

## 2019-05-29 RX ORDER — BACITRACIN ZINC 500 UNIT/G
OINTMENT (GRAM) TOPICAL
Status: DISCONTINUED | OUTPATIENT
Start: 2019-05-29 | End: 2019-05-29 | Stop reason: HOSPADM

## 2019-05-29 RX ORDER — SODIUM CHLORIDE 9 MG/ML
INJECTION, SOLUTION INTRAVENOUS CONTINUOUS
Status: DISCONTINUED | OUTPATIENT
Start: 2019-05-29 | End: 2019-05-30

## 2019-05-29 RX ORDER — SODIUM CHLORIDE 9 MG/ML
INJECTION, SOLUTION INTRAVENOUS CONTINUOUS PRN
Status: DISCONTINUED | OUTPATIENT
Start: 2019-05-29 | End: 2019-05-29

## 2019-05-29 RX ORDER — ACETAMINOPHEN 650 MG/1
650 SUPPOSITORY RECTAL EVERY 6 HOURS PRN
Status: DISCONTINUED | OUTPATIENT
Start: 2019-05-29 | End: 2019-05-31 | Stop reason: HOSPADM

## 2019-05-29 RX ORDER — IBUPROFEN 200 MG
24 TABLET ORAL
Status: DISCONTINUED | OUTPATIENT
Start: 2019-05-29 | End: 2019-05-31 | Stop reason: HOSPADM

## 2019-05-29 RX ORDER — HYDROCORTISONE 10 MG/1
10 TABLET ORAL NIGHTLY
Status: DISCONTINUED | OUTPATIENT
Start: 2019-05-29 | End: 2019-05-30

## 2019-05-29 RX ORDER — CEFAZOLIN SODIUM 1 G/3ML
2 INJECTION, POWDER, FOR SOLUTION INTRAMUSCULAR; INTRAVENOUS
Status: DISCONTINUED | OUTPATIENT
Start: 2019-05-29 | End: 2019-05-31 | Stop reason: HOSPADM

## 2019-05-29 RX ORDER — LABETALOL HCL 20 MG/4 ML
10 SYRINGE (ML) INTRAVENOUS EVERY 4 HOURS PRN
Status: DISCONTINUED | OUTPATIENT
Start: 2019-05-29 | End: 2019-05-31 | Stop reason: HOSPADM

## 2019-05-29 RX ORDER — MUPIROCIN 20 MG/G
1 OINTMENT TOPICAL
Status: COMPLETED | OUTPATIENT
Start: 2019-05-29 | End: 2019-05-29

## 2019-05-29 RX ORDER — ESZOPICLONE 3 MG/1
3 TABLET, FILM COATED ORAL NIGHTLY
COMMUNITY
End: 2019-09-06 | Stop reason: SDUPTHER

## 2019-05-29 RX ORDER — HEPARIN SODIUM 5000 [USP'U]/ML
5000 INJECTION, SOLUTION INTRAVENOUS; SUBCUTANEOUS EVERY 8 HOURS
Status: DISCONTINUED | OUTPATIENT
Start: 2019-05-30 | End: 2019-05-31 | Stop reason: HOSPADM

## 2019-05-29 RX ORDER — LEVETIRACETAM 500 MG/5ML
INJECTION, SOLUTION, CONCENTRATE INTRAVENOUS
Status: DISCONTINUED | OUTPATIENT
Start: 2019-05-29 | End: 2019-05-29

## 2019-05-29 RX ORDER — HYDROCODONE BITARTRATE AND ACETAMINOPHEN 5; 325 MG/1; MG/1
1 TABLET ORAL EVERY 4 HOURS PRN
Status: DISCONTINUED | OUTPATIENT
Start: 2019-05-29 | End: 2019-05-31 | Stop reason: HOSPADM

## 2019-05-29 RX ORDER — MUPIROCIN 20 MG/G
1 OINTMENT TOPICAL 2 TIMES DAILY
Status: DISCONTINUED | OUTPATIENT
Start: 2019-05-29 | End: 2019-05-31 | Stop reason: HOSPADM

## 2019-05-29 RX ORDER — LIDOCAINE HCL/PF 100 MG/5ML
SYRINGE (ML) INTRAVENOUS
Status: DISCONTINUED | OUTPATIENT
Start: 2019-05-29 | End: 2019-05-29

## 2019-05-29 RX ORDER — HYDROCORTISONE 10 MG/1
20 TABLET ORAL EVERY MORNING
Status: DISCONTINUED | OUTPATIENT
Start: 2019-05-30 | End: 2019-05-31 | Stop reason: HOSPADM

## 2019-05-29 RX ORDER — MIDAZOLAM HYDROCHLORIDE 1 MG/ML
INJECTION, SOLUTION INTRAMUSCULAR; INTRAVENOUS
Status: DISCONTINUED | OUTPATIENT
Start: 2019-05-29 | End: 2019-05-29

## 2019-05-29 RX ORDER — BACITRACIN 50000 [IU]/1
INJECTION, POWDER, FOR SOLUTION INTRAMUSCULAR
Status: DISCONTINUED | OUTPATIENT
Start: 2019-05-29 | End: 2019-05-29 | Stop reason: HOSPADM

## 2019-05-29 RX ORDER — LEVETIRACETAM 5 MG/ML
500 INJECTION INTRAVASCULAR EVERY 12 HOURS
Status: DISCONTINUED | OUTPATIENT
Start: 2019-05-29 | End: 2019-05-30

## 2019-05-29 RX ORDER — ACETAMINOPHEN 325 MG/1
650 TABLET ORAL EVERY 6 HOURS PRN
Status: DISCONTINUED | OUTPATIENT
Start: 2019-05-29 | End: 2019-05-31 | Stop reason: HOSPADM

## 2019-05-29 RX ORDER — INSULIN ASPART 100 [IU]/ML
1-10 INJECTION, SOLUTION INTRAVENOUS; SUBCUTANEOUS
Status: DISCONTINUED | OUTPATIENT
Start: 2019-05-29 | End: 2019-05-31 | Stop reason: HOSPADM

## 2019-05-29 RX ORDER — HYDRALAZINE HYDROCHLORIDE 20 MG/ML
10 INJECTION INTRAMUSCULAR; INTRAVENOUS EVERY 6 HOURS PRN
Status: DISCONTINUED | OUTPATIENT
Start: 2019-05-29 | End: 2019-05-31 | Stop reason: HOSPADM

## 2019-05-29 RX ORDER — MANNITOL 250 MG/ML
INJECTION, SOLUTION INTRAVENOUS
Status: DISCONTINUED | OUTPATIENT
Start: 2019-05-29 | End: 2019-05-29

## 2019-05-29 RX ORDER — DOCUSATE SODIUM 100 MG/1
100 CAPSULE, LIQUID FILLED ORAL 3 TIMES DAILY
Status: DISCONTINUED | OUTPATIENT
Start: 2019-05-29 | End: 2019-05-29

## 2019-05-29 RX ORDER — ONDANSETRON 2 MG/ML
4 INJECTION INTRAMUSCULAR; INTRAVENOUS DAILY PRN
Status: CANCELLED | OUTPATIENT
Start: 2019-05-29

## 2019-05-29 RX ORDER — ACETAMINOPHEN 10 MG/ML
INJECTION, SOLUTION INTRAVENOUS
Status: DISCONTINUED | OUTPATIENT
Start: 2019-05-29 | End: 2019-05-29

## 2019-05-29 RX ORDER — NICARDIPINE HYDROCHLORIDE 0.2 MG/ML
1 INJECTION INTRAVENOUS CONTINUOUS
Status: DISCONTINUED | OUTPATIENT
Start: 2019-05-29 | End: 2019-05-30

## 2019-05-29 RX ORDER — FAMOTIDINE 20 MG/1
20 TABLET, FILM COATED ORAL 2 TIMES DAILY
Status: DISCONTINUED | OUTPATIENT
Start: 2019-05-29 | End: 2019-05-31 | Stop reason: HOSPADM

## 2019-05-29 RX ORDER — LIDOCAINE HYDROCHLORIDE AND EPINEPHRINE 10; 10 MG/ML; UG/ML
INJECTION, SOLUTION INFILTRATION; PERINEURAL
Status: DISCONTINUED | OUTPATIENT
Start: 2019-05-29 | End: 2019-05-29 | Stop reason: HOSPADM

## 2019-05-29 RX ORDER — ONDANSETRON 2 MG/ML
8 INJECTION INTRAMUSCULAR; INTRAVENOUS EVERY 4 HOURS PRN
Status: DISCONTINUED | OUTPATIENT
Start: 2019-05-29 | End: 2019-05-31 | Stop reason: HOSPADM

## 2019-05-29 RX ORDER — MUPIROCIN 20 MG/G
OINTMENT TOPICAL
Status: DISCONTINUED | OUTPATIENT
Start: 2019-05-29 | End: 2019-05-29 | Stop reason: HOSPADM

## 2019-05-29 RX ORDER — HYDROMORPHONE HYDROCHLORIDE 1 MG/ML
0.5 INJECTION, SOLUTION INTRAMUSCULAR; INTRAVENOUS; SUBCUTANEOUS EVERY 5 MIN PRN
Status: CANCELLED | OUTPATIENT
Start: 2019-05-29

## 2019-05-29 RX ORDER — HYDROMORPHONE HYDROCHLORIDE 1 MG/ML
1 INJECTION, SOLUTION INTRAMUSCULAR; INTRAVENOUS; SUBCUTANEOUS EVERY 4 HOURS PRN
Status: DISCONTINUED | OUTPATIENT
Start: 2019-05-29 | End: 2019-05-31 | Stop reason: HOSPADM

## 2019-05-29 RX ORDER — PHENYLEPHRINE HYDROCHLORIDE 10 MG/ML
INJECTION INTRAVENOUS
Status: DISCONTINUED | OUTPATIENT
Start: 2019-05-29 | End: 2019-05-29

## 2019-05-29 RX ORDER — IBUPROFEN 200 MG
16 TABLET ORAL
Status: DISCONTINUED | OUTPATIENT
Start: 2019-05-29 | End: 2019-05-31 | Stop reason: HOSPADM

## 2019-05-29 RX ORDER — GLUCAGON 1 MG
1 KIT INJECTION
Status: DISCONTINUED | OUTPATIENT
Start: 2019-05-29 | End: 2019-05-31 | Stop reason: HOSPADM

## 2019-05-29 RX ORDER — ROCURONIUM BROMIDE 10 MG/ML
INJECTION, SOLUTION INTRAVENOUS
Status: DISCONTINUED | OUTPATIENT
Start: 2019-05-29 | End: 2019-05-29

## 2019-05-29 RX ORDER — SODIUM CHLORIDE 0.9 % (FLUSH) 0.9 %
10 SYRINGE (ML) INJECTION
Status: CANCELLED | OUTPATIENT
Start: 2019-05-29

## 2019-05-29 RX ORDER — DEXAMETHASONE SODIUM PHOSPHATE 4 MG/ML
INJECTION, SOLUTION INTRA-ARTICULAR; INTRALESIONAL; INTRAMUSCULAR; INTRAVENOUS; SOFT TISSUE
Status: DISCONTINUED | OUTPATIENT
Start: 2019-05-29 | End: 2019-05-29

## 2019-05-29 RX ORDER — PANTOPRAZOLE SODIUM 40 MG/10ML
40 INJECTION, POWDER, LYOPHILIZED, FOR SOLUTION INTRAVENOUS DAILY
Status: DISCONTINUED | OUTPATIENT
Start: 2019-05-30 | End: 2019-05-29

## 2019-05-29 RX ORDER — AMOXICILLIN 250 MG
1 CAPSULE ORAL DAILY
Status: DISCONTINUED | OUTPATIENT
Start: 2019-05-29 | End: 2019-05-31 | Stop reason: HOSPADM

## 2019-05-29 RX ORDER — FENTANYL CITRATE 50 UG/ML
INJECTION, SOLUTION INTRAMUSCULAR; INTRAVENOUS
Status: DISCONTINUED | OUTPATIENT
Start: 2019-05-29 | End: 2019-05-29

## 2019-05-29 RX ORDER — ACETAMINOPHEN 325 MG/1
650 TABLET ORAL EVERY 4 HOURS PRN
Status: DISCONTINUED | OUTPATIENT
Start: 2019-05-29 | End: 2019-05-31 | Stop reason: HOSPADM

## 2019-05-29 RX ORDER — PROPOFOL 10 MG/ML
VIAL (ML) INTRAVENOUS
Status: DISCONTINUED | OUTPATIENT
Start: 2019-05-29 | End: 2019-05-29

## 2019-05-29 RX ORDER — LEVOTHYROXINE SODIUM 50 UG/1
50 TABLET ORAL
Status: DISCONTINUED | OUTPATIENT
Start: 2019-05-30 | End: 2019-05-30

## 2019-05-29 RX ADMIN — SODIUM CHLORIDE 0.02 MCG/KG/MIN: 9 INJECTION, SOLUTION INTRAVENOUS at 10:05

## 2019-05-29 RX ADMIN — DEXAMETHASONE SODIUM PHOSPHATE 12 MG: 4 INJECTION, SOLUTION INTRAMUSCULAR; INTRAVENOUS at 09:05

## 2019-05-29 RX ADMIN — ROCURONIUM BROMIDE 10 MG: 10 INJECTION, SOLUTION INTRAVENOUS at 11:05

## 2019-05-29 RX ADMIN — PHENYLEPHRINE HYDROCHLORIDE 50 MCG: 10 INJECTION INTRAVENOUS at 09:05

## 2019-05-29 RX ADMIN — FENTANYL CITRATE 100 MCG: 50 INJECTION, SOLUTION INTRAMUSCULAR; INTRAVENOUS at 08:05

## 2019-05-29 RX ADMIN — MUPIROCIN 1 G: 20 OINTMENT TOPICAL at 06:05

## 2019-05-29 RX ADMIN — PHENYLEPHRINE HYDROCHLORIDE 100 MCG: 10 INJECTION INTRAVENOUS at 01:05

## 2019-05-29 RX ADMIN — MUPIROCIN 1 G: 20 OINTMENT TOPICAL at 11:05

## 2019-05-29 RX ADMIN — HYDROCORTISONE SODIUM SUCCINATE 50 MG: 100 INJECTION, POWDER, FOR SOLUTION INTRAMUSCULAR; INTRAVENOUS at 08:05

## 2019-05-29 RX ADMIN — PROPOFOL 20 MG: 10 INJECTION, EMULSION INTRAVENOUS at 09:05

## 2019-05-29 RX ADMIN — PHENYLEPHRINE HYDROCHLORIDE 100 MCG: 10 INJECTION INTRAVENOUS at 09:05

## 2019-05-29 RX ADMIN — CEFTRIAXONE 2 G: 2 INJECTION, SOLUTION INTRAVENOUS at 08:05

## 2019-05-29 RX ADMIN — SODIUM CHLORIDE: 0.9 INJECTION, SOLUTION INTRAVENOUS at 06:05

## 2019-05-29 RX ADMIN — ACETAMINOPHEN 1000 MG: 10 INJECTION, SOLUTION INTRAVENOUS at 09:05

## 2019-05-29 RX ADMIN — MIDAZOLAM HYDROCHLORIDE 2 MG: 1 INJECTION, SOLUTION INTRAMUSCULAR; INTRAVENOUS at 07:05

## 2019-05-29 RX ADMIN — ROCURONIUM BROMIDE 10 MG: 10 INJECTION, SOLUTION INTRAVENOUS at 12:05

## 2019-05-29 RX ADMIN — FENTANYL CITRATE 50 MCG: 50 INJECTION, SOLUTION INTRAMUSCULAR; INTRAVENOUS at 11:05

## 2019-05-29 RX ADMIN — FENTANYL CITRATE 50 MCG: 50 INJECTION, SOLUTION INTRAMUSCULAR; INTRAVENOUS at 09:05

## 2019-05-29 RX ADMIN — ROCURONIUM BROMIDE 10 MG: 10 INJECTION, SOLUTION INTRAVENOUS at 01:05

## 2019-05-29 RX ADMIN — LIDOCAINE HYDROCHLORIDE 90 MG: 20 INJECTION, SOLUTION INTRAVENOUS at 08:05

## 2019-05-29 RX ADMIN — SODIUM CHLORIDE: 0.9 INJECTION, SOLUTION INTRAVENOUS at 05:05

## 2019-05-29 RX ADMIN — ROCURONIUM BROMIDE 50 MG: 10 INJECTION, SOLUTION INTRAVENOUS at 08:05

## 2019-05-29 RX ADMIN — PROPOFOL 150 MG: 10 INJECTION, EMULSION INTRAVENOUS at 08:05

## 2019-05-29 RX ADMIN — SODIUM CHLORIDE, SODIUM GLUCONATE, SODIUM ACETATE, POTASSIUM CHLORIDE, MAGNESIUM CHLORIDE, SODIUM PHOSPHATE, DIBASIC, AND POTASSIUM PHOSPHATE: .53; .5; .37; .037; .03; .012; .00082 INJECTION, SOLUTION INTRAVENOUS at 08:05

## 2019-05-29 RX ADMIN — HYDROMORPHONE HYDROCHLORIDE 1 MG: 1 INJECTION, SOLUTION INTRAMUSCULAR; INTRAVENOUS; SUBCUTANEOUS at 04:05

## 2019-05-29 RX ADMIN — LEVETIRACETAM 500 MG: 5 INJECTION INTRAVENOUS at 08:05

## 2019-05-29 RX ADMIN — MANNITOL 50 G: 250 INJECTION, SOLUTION INTRAVENOUS at 10:05

## 2019-05-29 RX ADMIN — FAMOTIDINE 20 MG: 20 TABLET, FILM COATED ORAL at 08:05

## 2019-05-29 RX ADMIN — HYDROCORTISONE 10 MG: 10 TABLET ORAL at 08:05

## 2019-05-29 RX ADMIN — FENTANYL CITRATE 50 MCG: 50 INJECTION, SOLUTION INTRAMUSCULAR; INTRAVENOUS at 01:05

## 2019-05-29 RX ADMIN — PHENYLEPHRINE HYDROCHLORIDE 200 MCG: 10 INJECTION INTRAVENOUS at 09:05

## 2019-05-29 RX ADMIN — LEVETIRACETAM 1000 MG: 100 INJECTION, SOLUTION, CONCENTRATE INTRAVENOUS at 09:05

## 2019-05-29 RX ADMIN — HYDRALAZINE HYDROCHLORIDE 10 MG: 20 INJECTION INTRAMUSCULAR; INTRAVENOUS at 07:05

## 2019-05-29 RX ADMIN — HYDROCODONE BITARTRATE AND ACETAMINOPHEN 1 TABLET: 5; 325 TABLET ORAL at 04:05

## 2019-05-29 RX ADMIN — CEFAZOLIN 2 G: 1 INJECTION, POWDER, FOR SOLUTION INTRAMUSCULAR; INTRAVENOUS at 06:05

## 2019-05-29 RX ADMIN — PROPOFOL 40 MG: 10 INJECTION, EMULSION INTRAVENOUS at 09:05

## 2019-05-29 RX ADMIN — PROPOFOL 50 MG: 10 INJECTION, EMULSION INTRAVENOUS at 08:05

## 2019-05-29 RX ADMIN — ROCURONIUM BROMIDE 20 MG: 10 INJECTION, SOLUTION INTRAVENOUS at 09:05

## 2019-05-29 RX ADMIN — FENTANYL CITRATE 50 MCG: 50 INJECTION, SOLUTION INTRAMUSCULAR; INTRAVENOUS at 08:05
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
## 2019-05-29 NOTE — BRIEF OP NOTE
[facility]  Brief Operative Note    SUMMARY     Surgery Date: 5/29/2019     Surgeon(s) and Role:     * Jimmy Segura,  - Primary     * Tonio Mera MD - Resident - Assisting     * Vaibhav Jara MD - Co-Surgeon        Pre-op Diagnosis:  Cavernous sinus tumor [D49.6]    Post-op Diagnosis:  Post-Op Diagnosis Codes:     * Cavernous sinus tumor [D49.6]    Procedure(s) (LRB):  CRANIOTOMY  (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)  Co-surgery with Dr Vaibhav Jara - please put in his room  Microscope [employer] (Right)    Anesthesia: General    Description of Procedure: Right pterional craniotomy for resection of mass.        Estimated Blood Loss: 200 mL         Specimens:   Specimen (12h ago, onward)    Start     Ordered    05/29/19 1407  Specimen to Pathology - Surgery  Once     Comments:  1. Right frontal tumor- frozen- to lab.2. Right frontal tumor- permanent- to refrigerator.     Start Status     05/29/19 1407 Collected (05/29/19 1408) Order ID: [id number]       05/29/19 1408

## 2019-05-29 NOTE — PLAN OF CARE
Problem: Adult Inpatient Plan of Care  Goal: Plan of Care Review  Outcome: Ongoing (interventions implemented as appropriate)  POC reviewed with pt and family at 1700.   Pt verbalized understanding.   Questions and concerns addressed.   See flowsheets for full assessment and VS info.

## 2019-05-29 NOTE — H&P
History and Physical  Neurosurgery    Admit Date: 5/29/2019  LOS: 0    Code Status: Prior     CC: <principal problem not specified>    SUBJECTIVE:     History of Present Illness: 69 yo male with known right sphenocaverous meningioma presenting for elective resection.           OBJECTIVE:   Vital Signs (Most Recent):        Vital Signs (24h Range):          I & O (Last 24h):  No intake or output data in the 24 hours ending 05/29/19 0638    Physical Exam:  General: well developed, well nourished, no distress.   Head: normocephalic, atraumatic  Cervical Spine: No midline tenderness to palpation.  Thoracolumbosacral Spine: No midline tenderness to palpation.  GCS: Motor: 6/Verbal: 5/Eyes: 4 GCS Total: 15  Mental Status: Awake, Alert, Oriented x 4  Language: No aphasia  Speech: No dysarthria  Facial Droop: None   Cranial nerves: CN III-XII grossly intact.  Visual Fields: Intact.   Eyes: Pupils equal and reactive to light. Intact Conjugate horizontal and vertical pursuit. No nystagmus. No gaze deviation.   Pulmonary: No distress.  Sensory: No deficit.  Propioception: No deficit in 1st digit of toe bilaterally.  Rectal Tone: Not tested.  Drift: None.  Upper Extremity Ataxia: No Dysmetria Bilaterally  Lower Extremity Ataxia: Not tested.  Dysdiadochokinesia: Not tested.  Reflexes: 2+ patellar bilaterally.  Hodge: Absent  Clonus: Absent  Babinski: Absent  Romberg: Not Tested  Pulses: Brisk and symmetric radial, DP and tibial pulses.  Motor Strength:    Strength  Shoulder Abduction Elbow Extension Elbow Flexion Wrist Extension Wrist Flexion Finger Opposition Finger Add Finger Abd   Upper: R 5/5 5/5 5/5 5/5 5/5 5/5 5/5 5/5    L 5/5 5/5 5/5 5/5 5/5 5/5 5/5 5/5     Hip Flexion Knee Extension Knee  Flexion Ankle Dflexion Ankle Pflexion EHL     Lower: R 5/5 5/5 5/5 5/5 5/5 5/5      L 5/5 5/5 5/5 5/5 5/5 5/5         Lines/Drains/Airway:          Nutrition/Tube Feeds:   Current Diet Order   Procedures    Diet NPO       Labs:  ABG:  No results for input(s): PH, PO2, PCO2, HCO3, POCSATURATED, BE in the last 24 hours.  BMP:No results for input(s): NA, K, CL, CO2, BUN, CREATININE, GLU, MG, PHOS in the last 24 hours.  LFT:   Lab Results   Component Value Date    AST 29 02/08/2019    ALT 41 02/08/2019    ALKPHOS 34 (L) 02/08/2019    BILITOT 0.7 02/08/2019    ALBUMIN 4.1 02/08/2019    PROT 6.2 02/08/2019     CBC:   Lab Results   Component Value Date    WBC 4.6 02/08/2019    HGB 12.6 (L) 02/08/2019    HCT 38.0 (L) 02/08/2019    MCV 87.4 02/08/2019     02/08/2019     Microbiology x 7d:   Microbiology Results (last 7 days)     ** No results found for the last 168 hours. **            ASSESSMENT/PLAN:   67 yo male with known right sphenocaverous meningioma presenting for elective resection.    --To OR for craniotomy for resection.  --We will continue to monitor closely, please contact us with any questions or concerns.    Tonio Mera

## 2019-05-29 NOTE — ANESTHESIA PROCEDURE NOTES
Arterial    Diagnosis: cavernous sinus meningioma    Patient location during procedure: pre-op  Procedure start time: 5/29/2019 7:26 AM  Timeout: 5/29/2019 7:25 AM  Procedure end time: 5/29/2019 7:31 AM  Staffing  Anesthesiologist: Jayshree Lacey MD  Resident/CRNA: Erum Price MD  Performed: resident/CRNA   Anesthesiologist was present at the time of the procedure.  Preanesthetic Checklist  Completed: patient identified, site marked, surgical consent, pre-op evaluation, timeout performed, IV checked, risks and benefits discussed, monitors and equipment checked and anesthesia consent givenArterial  Skin Prep: chlorhexidine gluconate  Local Infiltration: lidocaine  Orientation: right  Location: radial  Catheter Size: 20 G  Catheter placement by Anatomical landmarks. Heme positive aspiration all ports.Insertion Attempts: 2  Assessment  Dressing: secured with tape and tegaderm

## 2019-05-29 NOTE — NURSING
Patient arrived to [facility] from OR via stretcher to room 9074    Type of stroke/diagnosis:  Meningoma resection    TPA start and end time n/a    Thrombectomy start and end time n/a    Current symptoms: still waking up from anesthesia     Skin assessment done: Yes  Wounds noted: R crani incision  R subgaleal drain to full suction    NCC notified: Dr Gio MD and Lyric Chatterjee NP   SBP <160

## 2019-05-30 LAB
ALBUMIN SERPL BCP-MCNC: 3.1 G/DL (ref 3.5–5.2)
ALP SERPL-CCNC: 39 U/L (ref 55–135)
ALT SERPL W/O P-5'-P-CCNC: 51 U/L (ref 10–44)
ANION GAP SERPL CALC-SCNC: 9 MMOL/L (ref 8–16)
AST SERPL-CCNC: 39 U/L (ref 10–40)
BASOPHILS # BLD AUTO: 0.01 K/UL (ref 0–0.2)
BASOPHILS NFR BLD: 0.1 % (ref 0–1.9)
BILIRUB SERPL-MCNC: 0.5 MG/DL (ref 0.1–1)
BUN SERPL-MCNC: 16 MG/DL (ref 8–23)
CALCIUM SERPL-MCNC: 8.2 MG/DL (ref 8.7–10.5)
CHLORIDE SERPL-SCNC: 107 MMOL/L (ref 95–110)
CO2 SERPL-SCNC: 21 MMOL/L (ref 23–29)
CREAT SERPL-MCNC: 1.4 MG/DL (ref 0.5–1.4)
DIFFERENTIAL METHOD: ABNORMAL
EOSINOPHIL # BLD AUTO: 0 K/UL (ref 0–0.5)
EOSINOPHIL NFR BLD: 0 % (ref 0–8)
ERYTHROCYTE [DISTWIDTH] IN BLOOD BY AUTOMATED COUNT: 12.9 % (ref 11.5–14.5)
EST. GFR  (AFRICAN AMERICAN): 59.7 ML/MIN/1.73 M^2
EST. GFR  (NON AFRICAN AMERICAN): 51.6 ML/MIN/1.73 M^2
GLUCOSE SERPL-MCNC: 104 MG/DL (ref 70–110)
GLUCOSE SERPL-MCNC: 110 MG/DL (ref 70–110)
GLUCOSE SERPL-MCNC: 141 MG/DL (ref 70–110)
HCO3 UR-SCNC: 20.3 MMOL/L (ref 24–28)
HCO3 UR-SCNC: 22.9 MMOL/L (ref 24–28)
HCT VFR BLD AUTO: 33.9 % (ref 40–54)
HCT VFR BLD CALC: 30 %PCV (ref 36–54)
HCT VFR BLD CALC: 33 %PCV (ref 36–54)
HGB BLD-MCNC: 10.9 G/DL (ref 14–18)
IMM GRANULOCYTES # BLD AUTO: 0.05 K/UL (ref 0–0.04)
IMM GRANULOCYTES NFR BLD AUTO: 0.4 % (ref 0–0.5)
LYMPHOCYTES # BLD AUTO: 0.6 K/UL (ref 1–4.8)
LYMPHOCYTES NFR BLD: 5.6 % (ref 18–48)
MAGNESIUM SERPL-MCNC: 2.1 MG/DL (ref 1.6–2.6)
MCH RBC QN AUTO: 27.8 PG (ref 27–31)
MCHC RBC AUTO-ENTMCNC: 32.2 G/DL (ref 32–36)
MCV RBC AUTO: 87 FL (ref 82–98)
MONOCYTES # BLD AUTO: 0.8 K/UL (ref 0.3–1)
MONOCYTES NFR BLD: 6.8 % (ref 4–15)
NEUTROPHILS # BLD AUTO: 9.9 K/UL (ref 1.8–7.7)
NEUTROPHILS NFR BLD: 87.1 % (ref 38–73)
NRBC BLD-RTO: 0 /100 WBC
PCO2 BLDA: 34.4 MMHG (ref 35–45)
PCO2 BLDA: 35.5 MMHG (ref 35–45)
PH SMN: 7.38 [PH] (ref 7.35–7.45)
PH SMN: 7.42 [PH] (ref 7.35–7.45)
PHOSPHATE SERPL-MCNC: 2.5 MG/DL (ref 2.7–4.5)
PLATELET # BLD AUTO: 198 K/UL (ref 150–350)
PMV BLD AUTO: 10.1 FL (ref 9.2–12.9)
PO2 BLDA: 118 MMHG (ref 80–100)
PO2 BLDA: 150 MMHG (ref 80–100)
POC BE: -2 MMOL/L
POC BE: -5 MMOL/L
POC IONIZED CALCIUM: 1 MMOL/L (ref 1.06–1.42)
POC IONIZED CALCIUM: 1.06 MMOL/L (ref 1.06–1.42)
POC SATURATED O2: 99 % (ref 95–100)
POC SATURATED O2: 99 % (ref 95–100)
POC TCO2: 21 MMOL/L (ref 23–27)
POC TCO2: 24 MMOL/L (ref 23–27)
POCT GLUCOSE: 107 MG/DL (ref 70–110)
POCT GLUCOSE: 118 MG/DL (ref 70–110)
POCT GLUCOSE: 121 MG/DL (ref 70–110)
POTASSIUM BLD-SCNC: 4.1 MMOL/L (ref 3.5–5.1)
POTASSIUM BLD-SCNC: 4.4 MMOL/L (ref 3.5–5.1)
POTASSIUM SERPL-SCNC: 4.2 MMOL/L (ref 3.5–5.1)
PROT SERPL-MCNC: 5.8 G/DL (ref 6–8.4)
RBC # BLD AUTO: 3.92 M/UL (ref 4.6–6.2)
SAMPLE: ABNORMAL
SAMPLE: ABNORMAL
SODIUM BLD-SCNC: 133 MMOL/L (ref 136–145)
SODIUM BLD-SCNC: 140 MMOL/L (ref 136–145)
SODIUM SERPL-SCNC: 137 MMOL/L (ref 136–145)
WBC # BLD AUTO: 11.33 K/UL (ref 3.9–12.7)

## 2019-05-30 PROCEDURE — 25000003 PHARM REV CODE 250: Performed by: NURSE PRACTITIONER

## 2019-05-30 PROCEDURE — 99024 PR POST-OP FOLLOW-UP VISIT: ICD-10-PCS | Mod: POP,,, | Performed by: NEUROLOGICAL SURGERY

## 2019-05-30 PROCEDURE — 25500020 PHARM REV CODE 255: Performed by: NEUROLOGICAL SURGERY

## 2019-05-30 PROCEDURE — 85025 COMPLETE CBC W/AUTO DIFF WBC: CPT

## 2019-05-30 PROCEDURE — 99024 POSTOP FOLLOW-UP VISIT: CPT | Mod: POP,,, | Performed by: NEUROLOGICAL SURGERY

## 2019-05-30 PROCEDURE — 25000003 PHARM REV CODE 250: Performed by: STUDENT IN AN ORGANIZED HEALTH CARE EDUCATION/TRAINING PROGRAM

## 2019-05-30 PROCEDURE — 83735 ASSAY OF MAGNESIUM: CPT

## 2019-05-30 PROCEDURE — 97165 OT EVAL LOW COMPLEX 30 MIN: CPT

## 2019-05-30 PROCEDURE — 99233 PR SUBSEQUENT HOSPITAL CARE,LEVL III: ICD-10-PCS | Mod: GC,,, | Performed by: PSYCHIATRY & NEUROLOGY

## 2019-05-30 PROCEDURE — 25000003 PHARM REV CODE 250: Performed by: PSYCHIATRY & NEUROLOGY

## 2019-05-30 PROCEDURE — 94761 N-INVAS EAR/PLS OXIMETRY MLT: CPT

## 2019-05-30 PROCEDURE — 25000003 PHARM REV CODE 250: Performed by: PHYSICIAN ASSISTANT

## 2019-05-30 PROCEDURE — A9585 GADOBUTROL INJECTION: HCPCS | Performed by: NEUROLOGICAL SURGERY

## 2019-05-30 PROCEDURE — 20000000 HC ICU ROOM

## 2019-05-30 PROCEDURE — 84100 ASSAY OF PHOSPHORUS: CPT

## 2019-05-30 PROCEDURE — 99233 SBSQ HOSP IP/OBS HIGH 50: CPT | Mod: GC,,, | Performed by: PSYCHIATRY & NEUROLOGY

## 2019-05-30 PROCEDURE — 97161 PT EVAL LOW COMPLEX 20 MIN: CPT

## 2019-05-30 PROCEDURE — 63600175 PHARM REV CODE 636 W HCPCS: Performed by: STUDENT IN AN ORGANIZED HEALTH CARE EDUCATION/TRAINING PROGRAM

## 2019-05-30 PROCEDURE — 80053 COMPREHEN METABOLIC PANEL: CPT

## 2019-05-30 RX ORDER — GADOBUTROL 604.72 MG/ML
9 INJECTION INTRAVENOUS
Status: COMPLETED | OUTPATIENT
Start: 2019-05-30 | End: 2019-05-30

## 2019-05-30 RX ORDER — LEVOTHYROXINE SODIUM 100 UG/1
100 TABLET ORAL
Status: DISCONTINUED | OUTPATIENT
Start: 2019-05-31 | End: 2019-05-31 | Stop reason: HOSPADM

## 2019-05-30 RX ORDER — LORAZEPAM 0.5 MG/1
0.5 TABLET ORAL ONCE
Status: COMPLETED | OUTPATIENT
Start: 2019-05-30 | End: 2019-05-30

## 2019-05-30 RX ORDER — HYDROCORTISONE 10 MG/1
10 TABLET ORAL DAILY
Status: DISCONTINUED | OUTPATIENT
Start: 2019-05-30 | End: 2019-05-31 | Stop reason: HOSPADM

## 2019-05-30 RX ORDER — LEVOTHYROXINE SODIUM 50 UG/1
50 TABLET ORAL ONCE
Status: COMPLETED | OUTPATIENT
Start: 2019-05-30 | End: 2019-05-30

## 2019-05-30 RX ORDER — LEVETIRACETAM 500 MG/1
500 TABLET ORAL 2 TIMES DAILY
Status: DISCONTINUED | OUTPATIENT
Start: 2019-05-30 | End: 2019-05-31 | Stop reason: HOSPADM

## 2019-05-30 RX ADMIN — CEFAZOLIN 2 G: 1 INJECTION, POWDER, FOR SOLUTION INTRAMUSCULAR; INTRAVENOUS at 09:05

## 2019-05-30 RX ADMIN — HYDROCORTISONE 20 MG: 10 TABLET ORAL at 06:05

## 2019-05-30 RX ADMIN — HYDROCORTISONE 10 MG: 10 TABLET ORAL at 04:05

## 2019-05-30 RX ADMIN — FAMOTIDINE 20 MG: 20 TABLET, FILM COATED ORAL at 09:05

## 2019-05-30 RX ADMIN — HYDROCODONE BITARTRATE AND ACETAMINOPHEN 1 TABLET: 5; 325 TABLET ORAL at 01:05

## 2019-05-30 RX ADMIN — LEVETIRACETAM 500 MG: 500 TABLET ORAL at 08:05

## 2019-05-30 RX ADMIN — CEFAZOLIN 2 G: 1 INJECTION, POWDER, FOR SOLUTION INTRAMUSCULAR; INTRAVENOUS at 04:05

## 2019-05-30 RX ADMIN — GADOBUTROL 9 ML: 604.72 INJECTION INTRAVENOUS at 02:05

## 2019-05-30 RX ADMIN — HYDROCODONE BITARTRATE AND ACETAMINOPHEN 1 TABLET: 5; 325 TABLET ORAL at 06:05

## 2019-05-30 RX ADMIN — CEFAZOLIN 2 G: 1 INJECTION, POWDER, FOR SOLUTION INTRAMUSCULAR; INTRAVENOUS at 01:05

## 2019-05-30 RX ADMIN — HEPARIN SODIUM 5000 UNITS: 5000 INJECTION, SOLUTION INTRAVENOUS; SUBCUTANEOUS at 05:05

## 2019-05-30 RX ADMIN — LEVOTHYROXINE SODIUM 50 MCG: 50 TABLET ORAL at 05:05

## 2019-05-30 RX ADMIN — HYDROCODONE BITARTRATE AND ACETAMINOPHEN 1 TABLET: 5; 325 TABLET ORAL at 04:05

## 2019-05-30 RX ADMIN — HYDROCODONE BITARTRATE AND ACETAMINOPHEN 1 TABLET: 5; 325 TABLET ORAL at 12:05

## 2019-05-30 RX ADMIN — MUPIROCIN 1 G: 20 OINTMENT TOPICAL at 08:05

## 2019-05-30 RX ADMIN — LEVETIRACETAM 500 MG: 5 INJECTION INTRAVENOUS at 09:05

## 2019-05-30 RX ADMIN — LORAZEPAM 0.5 MG: 0.5 TABLET ORAL at 05:05

## 2019-05-30 RX ADMIN — HYDROCODONE BITARTRATE AND ACETAMINOPHEN 1 TABLET: 5; 325 TABLET ORAL at 08:05

## 2019-05-30 RX ADMIN — HEPARIN SODIUM 5000 UNITS: 5000 INJECTION, SOLUTION INTRAVENOUS; SUBCUTANEOUS at 09:05

## 2019-05-30 RX ADMIN — FAMOTIDINE 20 MG: 20 TABLET, FILM COATED ORAL at 08:05

## 2019-05-30 RX ADMIN — LEVOTHYROXINE SODIUM 50 MCG: 50 TABLET ORAL at 09:05

## 2019-05-30 RX ADMIN — HEPARIN SODIUM 5000 UNITS: 5000 INJECTION, SOLUTION INTRAVENOUS; SUBCUTANEOUS at 02:05

## 2019-05-30 RX ADMIN — MUPIROCIN 1 G: 20 OINTMENT TOPICAL at 09:05

## 2019-05-30 RX ADMIN — SENNOSIDES AND DOCUSATE SODIUM 1 TABLET: 8.6; 5 TABLET ORAL at 09:05

## 2019-05-30 NOTE — PT/OT/SLP EVAL
Occupational Therapy   Evaluation    Name: Aurelio Perkins  MRN: [id number]  Admitting Diagnosis:  Meningioma 1 Day Post-Op  CRANIOTOMY  (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)    Recommendations:     Discharge Recommendations: home  Discharge Equipment Recommendations:  none  Barriers to discharge:  None    Assessment:     Aurelio Perkins is a 68 y.o. male with a medical diagnosis of Meningioma.  He presents with performance deficits including impaired self care skills, impaired functional mobilty, gait instability, impaired cardiopulmonary response to activity. Pt would continue to benefit from OT to maximize functional independence and safety before returning home.     Rehab Prognosis: Good; patient would benefit from acute skilled OT services to address these deficits and reach maximum level of function.       Plan:     Patient to be seen 2 x/week to address the above listed problems via self-care/home management, therapeutic activities, therapeutic exercises  · Plan of Care Expires: 06/30/19  · Plan of Care Reviewed with: patient, spouse    Subjective     Chief Complaint: None stated  Patient/Family Comments/goals: Return home    Occupational Profile:  Living Environment: Pt lives with his spouse in 1-story house with 1 VANDA.  Previous level of function: (I) with ADLs/IADLs and mobility; driving and works physical job managing commercial properties; uses cane with RA flare-ups as needed  Equipment Used at Home:  cane, straight  Assistance upon Discharge: Wife is able to assist    Pain/Comfort:  · Pain Rating 1: 0/10  · Pain Rating Post-Intervention 1: 0/10    Patients cultural, spiritual, Presybeterian conflicts given the current situation: no    Objective:     Communicated with: RN prior to session. Patient found HOB elevated with blood pressure cuff, telemetry, pulse ox (continuous), hemovac, peripheral IV upon OT entry to room, family present.    General Precautions:  Standard, fall   Orthopedic Precautions:N/A   Braces: N/A     Occupational Performance:    Bed Mobility:    · Patient completed Supine to Sit with stand by assistance    Functional Mobility/Transfers:  · Patient completed Sit <> Stand Transfer with stand by assistance with no assistive device from EOB   · Functional Mobility: ~5 ft to bedside chair with SBA no AD    Activities of Daily Living:  · Upper Body Dressing: minimum assistance to don gown around back/lines    Cognitive/Visual Perceptual:  Cognitive/Psychosocial Skills:     -       Oriented to: Person, Place, Time and Situation   -       Follows Commands/attention: Follows multistep commands  -       Communication: clear/fluent  -       Safety awareness/insight to disability: intact   Visual/Perceptual:      -Intact      Physical Exam:  Balance:    -       good sitting and standing balance  Skin integrity: Visible skin intact  Dominant hand:    -       Right  Upper Extremity Range of Motion:     -       Right Upper Extremity: WFL  -       Left Upper Extremity: WFL  Upper Extremity Strength:    -       Right Upper Extremity: WFL  -       Left Upper Extremity: WFL   Strength:    -       Right Upper Extremity: WFL  -       Left Upper Extremity: WFL  Fine Motor Coordination:    -       Intact    AMPAC 6 Click ADL:  AMPAC Total Score: 20    Treatment & Education:  OT eval; educated on OT role and POC and to call for assistance before getting up  Education:    Patient left up in chair with all lines intact, call button in reach, RN notified and family present    GOALS:   Multidisciplinary Problems     Occupational Therapy Goals        Problem: Occupational Therapy Goal    Goal Priority Disciplines Outcome Interventions   Occupational Therapy Goal     OT, PT/OT Ongoing (interventions implemented as appropriate)    Description:  Goals to be met by: 7 days (6/6/19)     Patient will increase functional independence with ADLs by performing:    UE Dressing with  [place].  LE Dressing with [place].  Grooming while standing at sink with Modified [place].  Toileting from toilet with Modified [place] for hygiene and clothing management.   Supine to sit with [place].  Toilet transfer to toilet with [place].  Complete functional mobility household distance with Mod I.                      History:     Past Medical History:   Diagnosis Date    Arthritis     Cancer     prostate cancer-Removed Prostate    Chronic kidney disease     one kidney    Mitral valve prolapse        Past Surgical History:   Procedure Laterality Date    CRANIOTOMY  (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)  Co-surgery with Dr Vaibhav Jara - please put in his room  Microscope [place] Sonopet Stealth Right 5/29/2019    Performed by Jimmy Segura DO at [facility] OR [address] [place] [address]    HEMORRHOID SURGERY      NEPHRECTOMY      PROSTATECTOMY      TONSILLECTOMY         Time Tracking:     OT Date of Treatment: 05/30/19  OT Start Time: 0931  OT Stop Time: 0950  OT Total Time (min): 19 min    Billable Minutes:Evaluation 19 minutes    DARIAN Lopez  5/30/2019

## 2019-05-30 NOTE — ASSESSMENT & PLAN NOTE
69 yo male with R sphenocavernous meningioma with significant tentorial extension along the superior surface now s/p R pterional craniotomy and transsylvian approach for subtotal resection of mass (5/28).    No acute events overnight. Post-operative imaging and clinical exam stable.    --Continue care per primary team.  --Continue Blood Pressure parameters, SBP < 160.  --Continue subgaleal hemovac drain to full suction.  --Continue prophylactic antibiotics while surgical drain is in place.  --Will obtain post-operative MRI w/wo lennox today.  --Pt cleared from neurosurgical perspective for stepdown to neurosurgical stepdown unit under neurosurgery service.   --We will continue to monitor closely, please contact us with any questions or concerns.

## 2019-05-30 NOTE — PROGRESS NOTES
[facility]  Neurosurgery  Progress Note    Subjective:     History of Present Illness: No notes on file    Post-Op Info:  Procedure(s) (LRB):  CRANIOTOMY  (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)  Co-surgery with Dr Vaibhav Jara - please put in his room  Microscope [place] [employer] Sonopet Stealth (Right)   1 Day Post-Op     Medications Prior to Admission   Medication Sig Dispense Refill Last Dose    diclofenac (VOLTAREN) 75 MG EC tablet Take 75 mg by mouth 2 (two) times daily.       eszopiclone (LUNESTA) 3 mg Tab Take 3 mg by mouth every evening.       hydrocortisone (CORTEF) 10 MG Tab Take Hydrocortisone 20 mg in the morning and 10 mg in the evening (4PM). 90 tablet 2 5/29/2019 at 0415    levothyroxine (SYNTHROID) 50 MCG tablet TAKE 2 TABLETS BY MOUTH BEFORE BREAKFAST. 30 tablet 1 5/29/2019 at 0200    testosterone cypionate (DEPOTESTOTERONE CYPIONATE) 200 mg/mL injection        tofacitinib (XELJANZ) 5 mg Tab Take one tablet (5mg) by mouth 2 (two) times daily. 60 tablet 12     VITAMIN D2 50,000 unit capsule TAKE ONE CAPSULE BY MOUTH ONE TIME PER WEEK  0     blood-glucose meter (FREESTYLE SYSTEM KIT) kit Use as instructed, test blood sugar before meals and at bedtime. 1 each 0 Not Taking    empty container (SHARPS CONTAINER) Misc by Misc.(Non-Drug; Combo Route) route.   Taking    syringe, disposable, 1 mL Syrg 1 Syringe by Misc.(Non-Drug; Combo Route) route once a week. 25 Syringe 3 Taking       Review of patient's allergies indicates:   Allergen Reactions    Antihistamines - alkylamine Other (See Comments)     hallucinations    Benadryl [diphenhydramine hcl] Other (See Comments)     hallucinations    Codeine Itching     Turns red    Lodine [etodolac] Other (See Comments)     fatigue    Methotrexate analogues Other (See Comments)     Sunlight sensitivity    Morphine Itching     Turns red       Past Medical History:   Diagnosis Date     Arthritis     Cancer     prostate cancer-Removed Prostate    Chronic kidney disease     one kidney    Mitral valve prolapse      Past Surgical History:   Procedure Laterality Date    HEMORRHOID SURGERY      NEPHRECTOMY      PROSTATECTOMY      TONSILLECTOMY       Family History     None        Tobacco Use    Smoking status: Never Smoker    Smokeless tobacco: Never Used   Substance and Sexual Activity    Alcohol use: No    Drug use: No    Sexual activity: Not on file     Review of Systems  Objective:     Weight: 88.5 kg (195 lb)  Body mass index is 27.2 kg/m².  Vital Signs (Most Recent):  Temp: 98.7 °F (37.1 °C) (05/30/19 0705)  Pulse: 67 (05/30/19 1005)  Resp: 16 (05/30/19 1005)  BP: 134/64 (05/30/19 1005)  SpO2: 98 % (05/30/19 1005) Vital Signs (24h Range):  Temp:  [97.7 °F (36.5 °C)-100.9 °F (38.3 °C)] 98.7 °F (37.1 °C)  Pulse:  [67-98] 67  Resp:  [12-46] 16  SpO2:  [92 %-100 %] 98 %  BP: (101-153)/(52-79) 134/64  Arterial Line BP: (132-153)/(54-75) 132/61     Date 05/30/19 0700 - 05/31/19 0659   Shift 3157-9315 7217-6323 6655-0424 24 Hour Total   INTAKE   I.V.(mL/kg) 191.7(2.2)   191.7(2.2)   IV Piggyback 100   100   Shift Total(mL/kg) 291.7(3.3)   291.7(3.3)   OUTPUT   Drains 250   250   Shift Total(mL/kg) 250(2.8)   250(2.8)   Weight (kg) 88.4 88.4 88.4 88.4                        Closed/Suction Drain 05/29/19 1437 Right Other (Comment) Accordion 10 Fr. (Active)   Site Description Unable to view 5/30/2019  7:05 AM   Dressing Type [facility] 5/30/2019  7:05 AM   Dressing Status Clean;Dry;Intact 5/30/2019  7:05 AM   Dressing Intervention New dressing 5/29/2019  4:05 PM   Drainage Serosanguineous 5/30/2019  3:05 AM   Status Other (Comment) 5/30/2019  7:05 AM   Output (mL) 250 mL 5/30/2019  9:00 AM       Neurosurgery Physical Exam    General: well developed, well nourished, no distress.   Head: normocephalic, atraumatic  Cervical Spine: No midline tenderness to palpation.  Thoracolumbosacral Spine: No  midline tenderness to palpation.  GCS: Motor: 6/Verbal: 5/Eyes: 4 GCS Total: 15  Mental Status: Awake, Alert, Oriented x 4  Language: No aphasia  Speech: No dysarthria  Facial Droop: None   Cranial nerves: CN III-XII grossly intact.  Visual Fields: Intact.   Eyes: Pupils equal and reactive to light. Intact Conjugate horizontal and vertical pursuit. No nystagmus. No gaze deviation.   Pulmonary: No distress.  Sensory: No deficit.  Propioception: No deficit in 1st digit of toe bilaterally.  Rectal Tone: Not tested.  Drift: None.  Upper Extremity Ataxia: No Dysmetria Bilaterally  Lower Extremity Ataxia: Not tested.  Dysdiadochokinesia: Not tested.  Reflexes: 2+ patellar bilaterally.  Hodge: Absent  Clonus: Absent  Babinski: Absent  Romberg: Not Tested  Pulses: Brisk and symmetric radial, DP and tibial pulses.  Motor Strength:    Strength  Shoulder Abduction Elbow Extension Elbow Flexion Wrist Extension Wrist Flexion Finger Opposition Finger Add Finger Abd   Upper: R 5/5 5/5 5/5 5/5 5/5 5/5 5/5 5/5    L 5/5 5/5 5/5 5/5 5/5 5/5 5/5 5/5     Hip Flexion Knee Extension Knee  Flexion Ankle Dflexion Ankle Pflexion EHL     Lower: R 5/5 5/5 5/5 5/5 5/5 5/5      L 5/5 5/5 5/5 5/5 5/5 5/5           Significant Labs:  Recent Labs   Lab 05/29/19  1533 05/29/19  1759 05/30/19  0303   * 165* 141*   * 138 137   K 5.6* 4.6 4.2    108 107   CO2 20* 22* 21*   BUN 14 15 16   CREATININE 1.4 1.4 1.4   CALCIUM 7.7* 8.3* 8.2*   MG  --  2.0 2.1     Recent Labs   Lab 05/29/19  1533 05/29/19  1759 05/30/19  0303   WBC 8.26 11.69 11.33   HGB 11.8* 12.0* 10.9*   HCT 35.2* 35.8* 33.9*    194 198     Recent Labs   Lab 05/29/19  0612 05/29/19  1533   INR 0.9 1.0   APTT 24.8  --      Microbiology Results (last 7 days)     ** No results found for the last 168 hours. **        All pertinent labs from the last 24 hours have been reviewed.    Significant Diagnostics:  I have reviewed all pertinent imaging results/findings within  the past 24 hours.    Assessment/Plan:     * Meningioma  69 yo male with R sphenocavernous meningioma with significant tentorial extension along the superior surface now s/p R pterional craniotomy and transsylvian approach for subtotal resection of mass (5/28).    No acute events overnight. Post-operative imaging and clinical exam stable.    --Continue care per primary team.  --Continue Blood Pressure parameters, SBP < 160.  --Continue subgaleal hemovac drain to full suction.  --Continue prophylactic antibiotics while surgical drain is in place.  --Will obtain post-operative MRI w/wo lennox today.  --Pt cleared from neurosurgical perspective for stepdown to neurosurgical stepdown unit under neurosurgery service.   --We will continue to monitor closely, please contact us with any questions or concerns.        Tonio Mera MD  Neurosurgery  [facility]
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
## 2019-05-30 NOTE — PLAN OF CARE
Problem: Physical Therapy Goal  Goal: Physical Therapy Goal  Goals to be met by: 19     Patient will increase functional independence with mobility by performin. Supine to sit with Modified [place]  2. Sit to stand transfer with Supervision  3. Gait  x 150 feet with Supervision without AD.  4. Lower extremity exercise program x15 reps, with supervision, in order to increase LE strength and (I) with functional mobility.      Outcome: Ongoing (interventions implemented as appropriate)  PT evaluation complete and appropriate goals established.    Sarah Thompson, PT, DPT   2019  [phone]

## 2019-05-30 NOTE — ANESTHESIA POSTPROCEDURE EVALUATION
Anesthesia Post Evaluation    Patient: Aurelio Perkins    Procedure(s) Performed: Procedure(s) (LRB):  CRANIOTOMY  (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)  Co-surgery with Dr Vaibhav Jara - please put in his room  Microscope [place] [employer] [facility] Stealth (Right)    Final Anesthesia Type: general  Patient location during evaluation: ICU  Patient participation: Yes- Able to Participate  Level of consciousness: awake and alert  Post-procedure vital signs: reviewed and stable  Pain management: adequate  Airway patency: patent  PONV status at discharge: No PONV  Anesthetic complications: no      Cardiovascular status: blood pressure returned to baseline  Respiratory status: unassisted  Hydration status: euvolemic  Follow-up not needed.          Vitals Value Taken Time   /60 5/29/2019  9:02 PM   Temp 36.5 °C (97.7 °F) 5/29/2019  5:05 PM   Pulse 67 5/29/2019  9:34 PM   Resp 10 5/29/2019  9:34 PM   SpO2 99 % 5/29/2019  9:34 PM   Vitals shown include unvalidated device data.      No case tracking events are documented in the log.      Pain/Justin Score: Pain Rating Prior to Med Admin: 5 (5/29/2019  4:51 PM)

## 2019-05-30 NOTE — SUBJECTIVE & OBJECTIVE
Review of Systems   Constitutional: Negative for chills and fever.   HENT: Negative for postnasal drip and rhinorrhea.    Eyes: Negative for photophobia and visual disturbance.   Respiratory: Negative for chest tightness, shortness of breath and wheezing.    Cardiovascular: Negative for chest pain, palpitations and leg swelling.   Gastrointestinal: Negative for abdominal pain, constipation, nausea and vomiting.   Genitourinary: Negative for dysuria.   Musculoskeletal: Positive for arthralgias.   Skin: Negative for rash.   Neurological: Negative for dizziness, seizures, facial asymmetry, weakness and headaches.   Psychiatric/Behavioral: Negative for agitation.     Objective:     Vitals:  Temp: 98.7 °F (37.1 °C)  Pulse: 82  Rhythm: normal sinus rhythm  BP: (!) 128/58  MAP (mmHg): 83  Resp: 19  SpO2: 97 %  O2 Device (Oxygen Therapy): room air    Temp  Min: 97.7 °F (36.5 °C)  Max: 100.9 °F (38.3 °C)  Pulse  Min: 69  Max: 98  BP  Min: 101/55  Max: 153/65  MAP (mmHg)  Min: 73  Max: 108  Resp  Min: 12  Max: 46  SpO2  Min: 92 %  Max: 100 %    05/29 0701 - 05/30 0700  In: 4253.3 [I.V.:4153.3]  Out: 3940 [Urine:3510; Drains:230]   Unmeasured Output  Stool Occurrence: 0       Physical Exam   Constitutional: No distress.   HENT:   Right crani sutures clean and dry   Eyes: Pupils are equal, round, and reactive to light. Conjunctivae are normal. No scleral icterus.   Neck: Normal range of motion. No JVD present.   Cardiovascular: Normal rate and regular rhythm.   No murmur heard.  Pulmonary/Chest: Effort normal and breath sounds normal. He has no wheezes.   Abdominal: Soft. Bowel sounds are normal. He exhibits no distension.   Musculoskeletal: Normal range of motion. He exhibits no edema or tenderness.   Neurological:   Alert and oriented x 4    Fluent speech    PERRL. EOMI. Facial exp symmetric except R ptosis.    5/5 BUE/BLE    Tactile sensation intact/symmetric   Skin: Skin is warm and dry. No rash noted. He is not  diaphoretic.     Medications:  Continuous Scheduled  ceFAZolin (ANCEF) IVPB 2 g Q8H   famotidine 20 mg BID   heparin (porcine) 5,000 Units Q8H   hydrocortisone 10 mg Daily   hydrocortisone 20 mg QAM   levetiracetam IVPB 500 mg Q12H   [START ON 5/31/2019] levothyroxine 100 mcg Before breakfast   mupirocin 1 g BID   senna-docusate 8.6-50 mg 1 tablet Daily   PRN  acetaminophen 650 mg Q6H PRN   acetaminophen 650 mg Q6H PRN   acetaminophen 650 mg Q4H PRN   dextrose 50% 12.5 g PRN   dextrose 50% 25 g PRN   glucagon (human recombinant) 1 mg PRN   glucose 16 g PRN   glucose 24 g PRN   hydrALAZINE 10 mg Q6H PRN   HYDROcodone-acetaminophen 1 tablet Q4H PRN   HYDROmorphone 1 mg Q4H PRN   insulin aspart U-100 1-10 Units QID (AC + HS) PRN   labetalol 10 mg Q4H PRN   ondansetron 8 mg Q4H PRN     Today I personally reviewed pertinent medications, laboratory results,    Diet  Diet diabetic [facility]; 2000 Calorie  Diet diabetic [facility]; 2000 Calorie

## 2019-05-30 NOTE — PLAN OF CARE
Problem: Occupational Therapy Goal  Goal: Occupational Therapy Goal  Goals to be met by: 7 days (6/6/19)     Patient will increase functional independence with ADLs by performing:    UE Dressing with [place].  LE Dressing with [place].  Grooming while standing at sink with Modified [place].  Toileting from toilet with Modified [place] for hygiene and clothing management.   Supine to sit with [place].  Toilet transfer to toilet with [place].  Complete functional mobility household distance with Mod I.    Outcome: Ongoing (interventions implemented as appropriate)  Eval and POC set 5/30/19

## 2019-05-30 NOTE — HOSPITAL COURSE
05/30: AMANDA. Eating, talking (overall, neuro exam stable). Will step down to neurosurgery.   5/31: AMANDA. Pending Step down to NSGY when bed is available.

## 2019-05-30 NOTE — SUBJECTIVE & OBJECTIVE
Review of Systems   Constitutional: Negative for chills and fever.   HENT: Negative for rhinorrhea and sore throat.    Respiratory: Negative for chest tightness, shortness of breath and wheezing.    Cardiovascular: Negative for chest pain, palpitations and leg swelling.   Gastrointestinal: Negative for abdominal pain, constipation and nausea.   Genitourinary: Negative for dysuria.   Musculoskeletal: Negative for back pain.   Skin: Negative for rash.   Neurological: Positive for headaches. Negative for dizziness, tremors, seizures, facial asymmetry, weakness and light-headedness.   Psychiatric/Behavioral: Negative for agitation.     Objective:     Vitals:  Temp: 97.7 °F (36.5 °C)  Pulse: 69  Rhythm: normal sinus rhythm  BP: 136/75  MAP (mmHg): 98  Resp: 18  SpO2: 100 %  O2 Device (Oxygen Therapy): Simple Face Mask    Temp  Min: 97.7 °F (36.5 °C)  Max: 100.9 °F (38.3 °C)  Pulse  Min: 69  Max: 98  BP  Min: 136/75  Max: 144/70  MAP (mmHg)  Min: 98  Max: 105  Resp  Min: 18  Max: 46  SpO2  Min: 92 %  Max: 100 %    No intake/output data recorded.           Physical Exam   Constitutional: No distress.   HENT:   S/p right craniotomy; dressing clean and dry   Eyes: Pupils are equal, round, and reactive to light. No scleral icterus.   Neck: Normal range of motion.   Cardiovascular: Normal rate, regular rhythm and normal heart sounds.   Pulmonary/Chest: Effort normal and breath sounds normal. He has no wheezes.   Musculoskeletal: Normal range of motion. He exhibits no edema or tenderness.   Neurological:   Alert and oriented x 4    Fluent speech    PERRL. EOMI. Facial expression symmetric. R eye ptosis.    5/5 BUE/BLE    Tactile sensation intact in all 4 limbs and symmetric   Skin: Skin is warm and dry. No rash noted. He is not diaphoretic.     Medications:  Continuous  sodium chloride 0.9% Last Rate: 100 mL/hr at 05/29/19 0656   sodium chloride 0.9% Last Rate: 100 mL/hr at 05/29/19 1758   nicardipine Last Rate: Stopped  (05/29/19 1700)   Scheduled  ceFAZolin (ANCEF) IVPB 2 g Q8H   famotidine 20 mg BID   [START ON 5/30/2019] heparin (porcine) 5,000 Units Q8H   hydrocortisone 10 mg QHS   [START ON 5/30/2019] hydrocortisone 20 mg QAM   levetiracetam IVPB 500 mg Q12H   [START ON 5/30/2019] levothyroxine 50 mcg Before breakfast   mupirocin 1 g BID   senna-docusate 8.6-50 mg 1 tablet Daily   PRN  acetaminophen 650 mg Q6H PRN   acetaminophen 650 mg Q6H PRN   acetaminophen 650 mg Q4H PRN   dextrose 50% 12.5 g PRN   dextrose 50% 25 g PRN   glucagon (human recombinant) 1 mg PRN   glucose 16 g PRN   glucose 24 g PRN   hydrALAZINE 10 mg Q6H PRN   HYDROcodone-acetaminophen 1 tablet Q4H PRN   HYDROmorphone 1 mg Q4H PRN   insulin aspart U-100 1-10 Units QID (AC + HS) PRN   labetalol 10 mg Q4H PRN   ondansetron 8 mg Q4H PRN     Today I personally reviewed pertinent medications, laboratory results,    Diet  [facility]; 2000 Calorie  [facility] Cal[facility]

## 2019-05-30 NOTE — PROGRESS NOTES
Pharmacist Intervention IV to PO Note    Aurelio Perkins is a 68 y.o. male being treated with IV medication levetiracetam.    Patient Data:    Vital Signs (Most Recent):  Temp: 98.7 °F (37.1 °C) (05/30/19 0705)  Pulse: 67 (05/30/19 1005)  Resp: 16 (05/30/19 1005)  BP: 134/64 (05/30/19 1005)  SpO2: 98 % (05/30/19 1005) Vital Signs (72h Range):  Temp:  [97.7 °F (36.5 °C)-100.9 °F (38.3 °C)]   Pulse:  [67-98]   Resp:  [12-46]   BP: (101-153)/(52-79)   SpO2:  [92 %-100 %]   Arterial Line BP: (132-153)/(54-75)      CBC:  Recent Labs   Lab 05/29/19  1533 05/29/19  1759 05/30/19  0303   WBC 8.26 11.69 11.33   RBC 4.22* 4.27* 3.92*   HGB 11.8* 12.0* 10.9*   HCT 35.2* 35.8* 33.9*    194 198   MCV 83 84 87   MCH 28.0 28.1 27.8   MCHC 33.5 33.5 32.2     CMP:     Recent Labs   Lab 05/29/19  1533 05/29/19  1759 05/30/19  0303   * 165* 141*   CALCIUM 7.7* 8.3* 8.2*   ALBUMIN 3.0*  --  3.1*   PROT 6.0  --  5.8*   * 138 137   K 5.6* 4.6 4.2   CO2 20* 22* 21*    108 107   BUN 14 15 16   CREATININE 1.4 1.4 1.4   ALKPHOS 39*  --  39*   ALT 63*  --  51*   AST 52*  --  39   BILITOT 0.7  --  0.5       Dietary Orders:  Diet Orders            Diet diabetic [facility]; 2000 Calorie: Diabetic starting at 05/29 1545            Based on the following criteria, this patient qualifies for intravenous to oral conversion:  [x] The patients gastrointestinal tract is functioning (tolerating medications via oral or enteral route for 24 hours and tolerating food or enteral feeds for 24 hours).    IV levetiracetam will be changed to the oral equivalent. Bioavailability is 100% so the dose will be the same. Oral tabs to start this evening at 2100.    Please call with questions.    Paulette Morris, PharmD, BCCCP  Neurocritical Care Clinical Pharmacist  Spectralink: [id number]

## 2019-05-30 NOTE — SUBJECTIVE & OBJECTIVE
Medications Prior to Admission   Medication Sig Dispense Refill Last Dose    diclofenac (VOLTAREN) 75 MG EC tablet Take 75 mg by mouth 2 (two) times daily.       eszopiclone (LUNESTA) 3 mg Tab Take 3 mg by mouth every evening.       hydrocortisone (CORTEF) 10 MG Tab Take Hydrocortisone 20 mg in the morning and 10 mg in the evening (4PM). 90 tablet 2 5/29/2019 at 0415    levothyroxine (SYNTHROID) 50 MCG tablet TAKE 2 TABLETS BY MOUTH BEFORE BREAKFAST. 30 tablet 1 5/29/2019 at 0200    testosterone cypionate (DEPOTESTOTERONE CYPIONATE) 200 mg/mL injection        tofacitinib (XELJANZ) 5 mg Tab Take one tablet (5mg) by mouth 2 (two) times daily. 60 tablet 12     VITAMIN D2 50,000 unit capsule TAKE ONE CAPSULE BY MOUTH ONE TIME PER WEEK  0     blood-glucose meter (FREESTYLE SYSTEM KIT) kit Use as instructed, test blood sugar before meals and at bedtime. 1 each 0 Not Taking    empty container (SHARPS CONTAINER) Misc by Misc.(Non-Drug; Combo Route) route.   Taking    syringe, disposable, 1 mL Syrg 1 Syringe by Misc.(Non-Drug; Combo Route) route once a week. 25 Syringe 3 Taking       Review of patient's allergies indicates:   Allergen Reactions    Antihistamines - alkylamine Other (See Comments)     hallucinations    Benadryl [diphenhydramine hcl] Other (See Comments)     hallucinations    Codeine Itching     Turns red    Lodine [etodolac] Other (See Comments)     fatigue    Methotrexate analogues Other (See Comments)     Sunlight sensitivity    Morphine Itching     Turns red       Past Medical History:   Diagnosis Date    Arthritis     Cancer     prostate cancer-Removed Prostate    Chronic kidney disease     one kidney    Mitral valve prolapse      Past Surgical History:   Procedure Laterality Date    HEMORRHOID SURGERY      NEPHRECTOMY      PROSTATECTOMY      TONSILLECTOMY       Family History     None        Tobacco Use    Smoking status: Never Smoker    Smokeless tobacco: Never Used   Substance  and Sexual Activity    Alcohol use: No    Drug use: No    Sexual activity: Not on file     Review of Systems  Objective:     Weight: 88.5 kg (195 lb)  Body mass index is 27.2 kg/m².  Vital Signs (Most Recent):  Temp: 98.7 °F (37.1 °C) (05/30/19 0705)  Pulse: 67 (05/30/19 1005)  Resp: 16 (05/30/19 1005)  BP: 134/64 (05/30/19 1005)  SpO2: 98 % (05/30/19 1005) Vital Signs (24h Range):  Temp:  [97.7 °F (36.5 °C)-100.9 °F (38.3 °C)] 98.7 °F (37.1 °C)  Pulse:  [67-98] 67  Resp:  [12-46] 16  SpO2:  [92 %-100 %] 98 %  BP: (101-153)/(52-79) 134/64  Arterial Line BP: (132-153)/(54-75) 132/61     Date 05/30/19 0700 - 05/31/19 0659   Shift 7665-3345 0491-1215 0264-9744 24 Hour Total   INTAKE   I.V.(mL/kg) 191.7(2.2)   191.7(2.2)   IV Piggyback 100   100   Shift Total(mL/kg) 291.7(3.3)   291.7(3.3)   OUTPUT   Drains 250   250   Shift Total(mL/kg) 250(2.8)   250(2.8)   Weight (kg) 88.4 88.4 88.4 88.4                        Closed/Suction Drain 05/29/19 1437 Right Other (Comment) Accordion 10 Fr. (Active)   Site Description Unable to view 5/30/2019  7:05 AM   Dressing Type [place] 5/30/2019  7:05 AM   Dressing Status Clean;Dry;Intact 5/30/2019  7:05 AM   Dressing Intervention New dressing 5/29/2019  4:05 PM   Drainage Serosanguineous 5/30/2019  3:05 AM   Status Other (Comment) 5/30/2019  7:05 AM   Output (mL) 250 mL 5/30/2019  9:00 AM       Neurosurgery Physical Exam    General: well developed, well nourished, no distress.   Head: normocephalic, atraumatic  Cervical Spine: No midline tenderness to palpation.  Thoracolumbosacral Spine: No midline tenderness to palpation.  GCS: Motor: 6/Verbal: 5/Eyes: 4 GCS Total: 15  Mental Status: Awake, Alert, Oriented x 4  Language: No aphasia  Speech: No dysarthria  Facial Droop: None   Cranial nerves: CN III-XII grossly intact.  Visual Fields: Intact.   Eyes: Pupils equal and reactive to light. Intact Conjugate horizontal and vertical pursuit. No nystagmus. No gaze deviation.    Pulmonary: No distress.  Sensory: No deficit.  Propioception: No deficit in 1st digit of toe bilaterally.  Rectal Tone: Not tested.  Drift: None.  Upper Extremity Ataxia: No Dysmetria Bilaterally  Lower Extremity Ataxia: Not tested.  Dysdiadochokinesia: Not tested.  Reflexes: 2+ patellar bilaterally.  Hodge: Absent  Clonus: Absent  Babinski: Absent  Romberg: Not Tested  Pulses: Brisk and symmetric radial, DP and tibial pulses.  Motor Strength:    Strength  Shoulder Abduction Elbow Extension Elbow Flexion Wrist Extension Wrist Flexion Finger Opposition Finger Add Finger Abd   Upper: R 5/5 5/5 5/5 5/5 5/5 5/5 5/5 5/5    L 5/5 5/5 5/5 5/5 5/5 5/5 5/5 5/5     Hip Flexion Knee Extension Knee  Flexion Ankle Dflexion Ankle Pflexion EHL     Lower: R 5/5 5/5 5/5 5/5 5/5 5/5      L 5/5 5/5 5/5 5/5 5/5 5/5           Significant Labs:  Recent Labs   Lab 05/29/19  1533 05/29/19  1759 05/30/19  0303   * 165* 141*   * 138 137   K 5.6* 4.6 4.2    108 107   CO2 20* 22* 21*   BUN 14 15 16   CREATININE 1.4 1.4 1.4   CALCIUM 7.7* 8.3* 8.2*   MG  --  2.0 2.1     Recent Labs   Lab 05/29/19  1533 05/29/19  1759 05/30/19  0303   WBC 8.26 11.69 11.33   HGB 11.8* 12.0* 10.9*   HCT 35.2* 35.8* 33.9*    194 198     Recent Labs   Lab 05/29/19  0612 05/29/19  1533   INR 0.9 1.0   APTT 24.8  --      Microbiology Results (last 7 days)     ** No results found for the last 168 hours. **        All pertinent labs from the last 24 hours have been reviewed.    Significant Diagnostics:  I have reviewed all pertinent imaging results/findings within the past 24 hours.

## 2019-05-30 NOTE — PLAN OF CARE
Problem: Adult Inpatient Plan of Care  Goal: Plan of Care Review  Outcome: Ongoing (interventions implemented as appropriate)  POC reviewed with pt and his spouse at 0400. Both verbalized understanding. Questions and concerns addressed. No acute events overnight. Norco administered x1 for back pain. No BM. Antony to be removed at 0600 per order. JAYLON passed. Pt progressing toward goals. Will continue to monitor. See flowsheets for full assessment and VS info

## 2019-05-30 NOTE — ASSESSMENT & PLAN NOTE
Meningioma extends into the right aspect suprasellar cistern extending to the middle cranial fossa, cavernous sinus and right aspect of the anterior cranial fossa most compatible with meningioma.      POD 1 s/p right frontotemporal craniotomy for resection of cavernous sinus meningioma. No complications reported after procedure; patient stable on exam on POD 1    - ppx antbx cefazolin     - keppra 500 mg q12h ppx perioperatively    - perioperatively, keep SBP < 150; hydralazine PRN ordered; cardene not needed

## 2019-05-30 NOTE — PLAN OF CARE
05/30/19 1106   Discharge Assessment   Assessment Type Discharge Planning Assessment   Confirmed/corrected address and phone number on facesheet? Yes   Assessment information obtained from? Patient   Expected Length of Stay (days) 3   Communicated expected length of stay with patient/caregiver yes   Prior to hospitilization cognitive status: Alert/Oriented   Prior to hospitalization functional status: Independent   Current cognitive status: Alert/Oriented   Current Functional Status: Needs Assistance   Lives With spouse   Able to Return to Prior Arrangements yes   Is patient able to care for self after discharge? Unable to determine at this time (comments)   Who are your caregiver(s) and their phone number(s)? Gabby Perkins (wife) [phone]   Patient's perception of discharge disposition home or selfcare   Readmission Within the Last 30 Days no previous admission in last 30 days   Patient currently being followed by outpatient case management? No   Patient currently receives any other outside agency services? No   Equipment Currently Used at Home cane, straight   Do you have any problems affording any of your prescribed medications? No   Is the patient taking medications as prescribed? yes   Does the patient have transportation home? Yes   Transportation Anticipated family or friend will provide   Does the patient receive services at the [facility]? No   Discharge Plan A Home with family;Home Health   Discharge Plan B Home with family   DME Needed Upon Discharge  none   Patient/Family in Agreement with Plan yes         Discharge/ My Health Packet Folder Given to patient/family:      yes      PCP:  Seymour Avendano MD        Pharmacy:    [facility] [address] [facility] [place] - [address] [place] [address]  [address] [place] [address]  [place] [address]  Phone: [phone] Fax: [phone]    [facility] [address] [facility] [address] [place] [address]  Phone: [phone] Fax: [phone]    [facility]  [address] [address]  [facility] [address]  Phone: [phone] Fax: [phone]        Emergency Contacts:    Extended Emergency Contact Information  Primary Emergency Contact: GregorioGabby  Address: [address]           [place] [address] [facility]  Mobile Phone: [phone]  Relation: Spouse    Insurance:  Payor: MEDICARE / Plan: MEDICARE PART A & B / Product Type: Government /     Zara Hawkins RN, CCRN-K, [facility]  Neuro-Critical Care   X [phone]

## 2019-05-30 NOTE — PT/OT/SLP EVAL
Physical Therapy Evaluation    Patient Name:  Aurelio Perkins   MRN:  [id number]    Recommendations:     Discharge Recommendations:  home   Discharge Equipment Recommendations: none   Barriers to discharge: None    Assessment:     Aurelio Perkins is a 68 y.o. male admitted with a medical diagnosis of Meningioma.  He presents with the following impairments/functional limitations:  weakness, gait instability, impaired endurance, impaired balance, impaired self care skills, impaired functional mobilty. Pt completed functional mobility with SBA. Ambulation distance limited to steps during stand pivot to bedside chair 2* ICU setting and acuity of current condition. No LOB or major gait deviations noted during stand pivot transfer. Pt would continue to benefit from skilled acute PT in order to address current deficits and progress functional mobility.     Rehab Prognosis: Good; patient would benefit from acute skilled PT services to address these deficits and reach maximum level of function.    Recent Surgery: Procedure(s) (LRB):  CRANIOTOMY  (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)  Co-surgery with Dr Vaibhav Jara - please put in his room  Microscope Allred Amnis [employer] [facility] (Right) 1 Day Post-Op    Plan:     During this hospitalization, patient to be seen 3 x/week to address the identified rehab impairments via gait training, therapeutic exercises, therapeutic activities, neuromuscular re-education and progress toward the following goals:    · Plan of Care Expires:  06/28/19    Subjective     Chief Complaint: none noted   Patient/Family Comments/goals: return to PLOF  Pain/Comfort:  · Pain Rating 1: 0/10    Patients cultural, spiritual, Yarsanism conflicts given the current situation: no    Living Environment:  Pt lives with his wife in a 1SH with 1 VANDA.   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  However, pt reports that he occasionally uses SPC for transfer assist during RA exacerbations. Equipment used at home: cane, straight.  DME owned (not currently used): none.  Upon discharge, patient will have assistance from wife.    Objective:     Communicated with RN prior to session.  Patient found supine with peripheral IV, telemetry, pulse ox (continuous), blood pressure cuff, hemovac, arterial line  upon PT entry to room.    General Precautions: Standard, fall   Orthopedic Precautions:N/A   Braces: N/A     Exams:  · Cognitive Exam:  Patient is oriented to Person, Place, Time and Situation  · Sensation:    · -       Intact  · RLE ROM: WFL  · RLE Strength: WFL    · LLE ROM: WFL  · LLE Strength: WFL    Functional Mobility:  · Bed Mobility:     · Supine to Sit: stand by assistance and with HOB elevated  · Transfers:     · Sit to Stand:  stand by assistance with no AD  · Bed to Chair: stand by assistance with  no AD  using  Stand Pivot  · Gait: ~3 ft. stand pivot bed>chair with SBA and no AD   · No LOB or major gait deviations noted       Therapeutic Activities and Exercises:   Pt educated on role of PT and PT POC. Pt verbalized understanding.   Pt oriented to call bell and instructed to have staff assist for standing tasks/transfers. Pt v/u.     AM-PAC 6 CLICK MOBILITY  Total Score:17     Patient left up in chair with all lines intact, call button in reach, RN  notified and pt's wife and niece present.    GOALS:   Multidisciplinary Problems     Physical Therapy Goals        Problem: Physical Therapy Goal    Goal Priority Disciplines Outcome Goal Variances Interventions   Physical Therapy Goal     PT, PT/OT Ongoing (interventions implemented as appropriate)     Description:  Goals to be met by: 19     Patient will increase functional independence with mobility by performin. Supine to sit with Modified Ralls  2. Sit to stand  transfer with Supervision  3. Gait  x 150 feet with Supervision without AD.  4. Lower extremity exercise program x15 reps, with supervision, in order to increase LE strength and (I) with functional mobility.                        History:     Past Medical History:   Diagnosis Date    Arthritis     Cancer     prostate cancer-Removed Prostate    Chronic kidney disease     one kidney    Mitral valve prolapse        Past Surgical History:   Procedure Laterality Date    HEMORRHOID SURGERY      NEPHRECTOMY      PROSTATECTOMY      TONSILLECTOMY         Time Tracking:     PT Received On: 05/30/19  PT Start Time: 0928     PT Stop Time: 0950  PT Total Time (min): 22 min     Billable Minutes: Evaluation 22  (co-eval with OT)    Sarah Thompson, PT, DPT   5/30/2019  [phone]

## 2019-05-30 NOTE — PLAN OF CARE
Problem: Adult Inpatient Plan of Care  Goal: Plan of Care Review  POC reviewed with pt and pt's spouse @ 1000. All questions and concerns were addressed. Patient went for post op MRI - no complications during transport. See flowsheets for full assessments and additional documentation. No acute events throughout shift.

## 2019-05-30 NOTE — ASSESSMENT & PLAN NOTE
Meningioma extends into the right aspect suprasellar cistern extending to the middle cranial fossa, cavernous sinus and right aspect of the anterior cranial fossa most compatible with meningioma.      POD 0 s/p right frontotemporal craniotomy for resection of cavernous sinus meningioma. No complications reported.    - ppx antbx cefazolin     - keppra 500 mg q12h ppx perioperatively    - perioperatively, keep SBP < 150; cardene gtt and hydralazine PRN ordered
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
## 2019-05-30 NOTE — ASSESSMENT & PLAN NOTE
- holding home meds as contraindicated perioperatively per neurosurgery    - pain meds ordered by neurosurgery - PRN tylenol, percocet, hydrocortisone, hydromorphone

## 2019-05-31 VITALS
BODY MASS INDEX: 27.3 KG/M2 | WEIGHT: 195 LBS | HEART RATE: 83 BPM | RESPIRATION RATE: 23 BRPM | TEMPERATURE: 98 F | DIASTOLIC BLOOD PRESSURE: 70 MMHG | HEIGHT: 71 IN | OXYGEN SATURATION: 100 % | SYSTOLIC BLOOD PRESSURE: 140 MMHG

## 2019-05-31 LAB
ALBUMIN SERPL BCP-MCNC: 3.3 G/DL (ref 3.5–5.2)
ALP SERPL-CCNC: 39 U/L (ref 55–135)
ALT SERPL W/O P-5'-P-CCNC: 39 U/L (ref 10–44)
ANION GAP SERPL CALC-SCNC: 9 MMOL/L (ref 8–16)
AST SERPL-CCNC: 36 U/L (ref 10–40)
BASOPHILS # BLD AUTO: 0.01 K/UL (ref 0–0.2)
BASOPHILS NFR BLD: 0.1 % (ref 0–1.9)
BILIRUB SERPL-MCNC: 0.5 MG/DL (ref 0.1–1)
BUN SERPL-MCNC: 19 MG/DL (ref 8–23)
CALCIUM SERPL-MCNC: 9.1 MG/DL (ref 8.7–10.5)
CHLORIDE SERPL-SCNC: 108 MMOL/L (ref 95–110)
CO2 SERPL-SCNC: 24 MMOL/L (ref 23–29)
CREAT SERPL-MCNC: 1.3 MG/DL (ref 0.5–1.4)
DIFFERENTIAL METHOD: ABNORMAL
EOSINOPHIL # BLD AUTO: 0 K/UL (ref 0–0.5)
EOSINOPHIL NFR BLD: 0.1 % (ref 0–8)
ERYTHROCYTE [DISTWIDTH] IN BLOOD BY AUTOMATED COUNT: 12.9 % (ref 11.5–14.5)
EST. GFR  (AFRICAN AMERICAN): >60 ML/MIN/1.73 M^2
EST. GFR  (NON AFRICAN AMERICAN): 56.5 ML/MIN/1.73 M^2
GLUCOSE SERPL-MCNC: 108 MG/DL (ref 70–110)
HCT VFR BLD AUTO: 33.5 % (ref 40–54)
HGB BLD-MCNC: 11.2 G/DL (ref 14–18)
IMM GRANULOCYTES # BLD AUTO: 0.06 K/UL (ref 0–0.04)
IMM GRANULOCYTES NFR BLD AUTO: 0.5 % (ref 0–0.5)
LYMPHOCYTES # BLD AUTO: 1.2 K/UL (ref 1–4.8)
LYMPHOCYTES NFR BLD: 10.1 % (ref 18–48)
MAGNESIUM SERPL-MCNC: 2.3 MG/DL (ref 1.6–2.6)
MCH RBC QN AUTO: 27.8 PG (ref 27–31)
MCHC RBC AUTO-ENTMCNC: 33.4 G/DL (ref 32–36)
MCV RBC AUTO: 83 FL (ref 82–98)
MONOCYTES # BLD AUTO: 1.1 K/UL (ref 0.3–1)
MONOCYTES NFR BLD: 8.7 % (ref 4–15)
NEUTROPHILS # BLD AUTO: 9.7 K/UL (ref 1.8–7.7)
NEUTROPHILS NFR BLD: 80.5 % (ref 38–73)
NRBC BLD-RTO: 0 /100 WBC
PHOSPHATE SERPL-MCNC: 2.4 MG/DL (ref 2.7–4.5)
PLATELET # BLD AUTO: 199 K/UL (ref 150–350)
PMV BLD AUTO: 10 FL (ref 9.2–12.9)
POCT GLUCOSE: 80 MG/DL (ref 70–110)
POTASSIUM SERPL-SCNC: 3.9 MMOL/L (ref 3.5–5.1)
PROT SERPL-MCNC: 6.5 G/DL (ref 6–8.4)
RBC # BLD AUTO: 4.03 M/UL (ref 4.6–6.2)
SODIUM SERPL-SCNC: 141 MMOL/L (ref 136–145)
WBC # BLD AUTO: 12.03 K/UL (ref 3.9–12.7)

## 2019-05-31 PROCEDURE — 25000003 PHARM REV CODE 250: Performed by: STUDENT IN AN ORGANIZED HEALTH CARE EDUCATION/TRAINING PROGRAM

## 2019-05-31 PROCEDURE — 99233 PR SUBSEQUENT HOSPITAL CARE,LEVL III: ICD-10-PCS | Mod: ,,, | Performed by: PSYCHIATRY & NEUROLOGY

## 2019-05-31 PROCEDURE — 85025 COMPLETE CBC W/AUTO DIFF WBC: CPT

## 2019-05-31 PROCEDURE — 99233 SBSQ HOSP IP/OBS HIGH 50: CPT | Mod: ,,, | Performed by: PSYCHIATRY & NEUROLOGY

## 2019-05-31 PROCEDURE — 80053 COMPREHEN METABOLIC PANEL: CPT

## 2019-05-31 PROCEDURE — 25000003 PHARM REV CODE 250: Performed by: NURSE PRACTITIONER

## 2019-05-31 PROCEDURE — 25000003 PHARM REV CODE 250: Performed by: PSYCHIATRY & NEUROLOGY

## 2019-05-31 PROCEDURE — 84100 ASSAY OF PHOSPHORUS: CPT

## 2019-05-31 PROCEDURE — 63600175 PHARM REV CODE 636 W HCPCS: Performed by: STUDENT IN AN ORGANIZED HEALTH CARE EDUCATION/TRAINING PROGRAM

## 2019-05-31 PROCEDURE — 83735 ASSAY OF MAGNESIUM: CPT

## 2019-05-31 RX ORDER — LEVETIRACETAM 500 MG/1
500 TABLET ORAL 2 TIMES DAILY
Qty: 60 TABLET | Refills: 11 | Status: SHIPPED | OUTPATIENT
Start: 2019-05-31 | End: 2019-10-03

## 2019-05-31 RX ORDER — LEVETIRACETAM 500 MG/1
500 TABLET ORAL 2 TIMES DAILY
Qty: 60 TABLET | Refills: 11 | OUTPATIENT
Start: 2019-05-31 | End: 2019-05-31 | Stop reason: SDUPTHER

## 2019-05-31 RX ORDER — HYDROCODONE BITARTRATE AND ACETAMINOPHEN 5; 325 MG/1; MG/1
1 TABLET ORAL EVERY 4 HOURS PRN
Qty: 50 TABLET | Refills: 0 | Status: SHIPPED | OUTPATIENT
Start: 2019-05-31 | End: 2019-06-10

## 2019-05-31 RX ADMIN — HYDROCODONE BITARTRATE AND ACETAMINOPHEN 1 TABLET: 5; 325 TABLET ORAL at 05:05

## 2019-05-31 RX ADMIN — HYDROCODONE BITARTRATE AND ACETAMINOPHEN 1 TABLET: 5; 325 TABLET ORAL at 09:05

## 2019-05-31 RX ADMIN — LEVETIRACETAM 500 MG: 500 TABLET ORAL at 08:05

## 2019-05-31 RX ADMIN — CEFAZOLIN 2 G: 1 INJECTION, POWDER, FOR SOLUTION INTRAMUSCULAR; INTRAVENOUS at 12:05

## 2019-05-31 RX ADMIN — CEFAZOLIN 2 G: 1 INJECTION, POWDER, FOR SOLUTION INTRAMUSCULAR; INTRAVENOUS at 08:05

## 2019-05-31 RX ADMIN — HEPARIN SODIUM 5000 UNITS: 5000 INJECTION, SOLUTION INTRAVENOUS; SUBCUTANEOUS at 05:05

## 2019-05-31 RX ADMIN — HYDROCORTISONE 20 MG: 10 TABLET ORAL at 08:05

## 2019-05-31 RX ADMIN — FAMOTIDINE 20 MG: 20 TABLET, FILM COATED ORAL at 08:05

## 2019-05-31 RX ADMIN — LEVOTHYROXINE SODIUM 100 MCG: 100 TABLET ORAL at 05:05

## 2019-05-31 RX ADMIN — HYDROCODONE BITARTRATE AND ACETAMINOPHEN 1 TABLET: 5; 325 TABLET ORAL at 12:05

## 2019-05-31 NOTE — ASSESSMENT & PLAN NOTE
Meningioma extends into the right aspect suprasellar cistern extending to the middle cranial fossa, cavernous sinus and right aspect of the anterior cranial fossa most compatible with meningioma.      POD 2 s/p right frontotemporal craniotomy for resection of cavernous sinus meningioma. No complications reported after procedure; patient stable on exam on POD 1  -Neuro checks q 1hr  - ppx antbx cefazolin   - keppra 500 mg q12h ppx perioperatively   - perioperatively, keep SBP < 150; hydralazine PRN ordered; cardene not needed  -discharge per Neurosurgery pending
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
## 2019-05-31 NOTE — PROGRESS NOTES
Pt discharged via WC with DC instructions, wife and in NAD, VSS stable, denies pain, DC instructions reviewed again with pt and wife.

## 2019-05-31 NOTE — SUBJECTIVE & OBJECTIVE
Interval History:  westley PATEL discharged by NSGY.     Review of Systems   Constitutional: Negative for fever.   HENT: Positive for facial swelling.    Eyes: Negative for photophobia and visual disturbance.   Respiratory: Negative for chest tightness and shortness of breath.    Cardiovascular: Negative for chest pain.   Gastrointestinal: Negative for diarrhea, nausea and vomiting.   Genitourinary: Negative for difficulty urinating.   Neurological: Negative for facial asymmetry and speech difficulty.   Psychiatric/Behavioral: Negative for agitation and behavioral problems. The patient is nervous/anxious.        Objective:     Vitals:  Temp: 98.4 °F (36.9 °C)  Pulse: 71  Rhythm: normal sinus rhythm  BP: (!) 141/65  MAP (mmHg): 94  Resp: 18  SpO2: 98 %  O2 Device (Oxygen Therapy): room air    Temp  Min: 97.9 °F (36.6 °C)  Max: 98.5 °F (36.9 °C)  Pulse  Min: 62  Max: 93  BP  Min: 114/57  Max: 169/78  MAP (mmHg)  Min: 82  Max: 112  Resp  Min: 16  Max: 39  SpO2  Min: 94 %  Max: 100 %    05/30 0701 - 05/31 0700  In: 531.7 [P.O.:240; I.V.:191.7]  Out: 1850 [Urine:750; Drains:1100]   Unmeasured Output  Urine Occurrence: 1  Stool Occurrence: 0       Physical Exam   Constitutional: He appears well-developed and well-nourished.   HENT:   Head: Normocephalic and atraumatic.   Incision on right are intact, approximated, and non-edema.    Neck: Normal range of motion.   Cardiovascular: Normal rate and regular rhythm.   Pulmonary/Chest: Effort normal and breath sounds normal.   Abdominal: Soft. Bowel sounds are normal.   Neurological:   AAOx4, follows commands   E4V5M6 GCS (15)   PERRLA   EOMi   Slight Facial swelling on R d/t surgery   Cranial nerves ii-xii grossly intact   RUE-5/5  LUE-5/5  RLE-5/5  LLE-5/5  Sensation intact         Medications:  Continuous Scheduled  ceFAZolin (ANCEF) IVPB 2 g Q8H   famotidine 20 mg BID   heparin (porcine) 5,000 Units Q8H   hydrocortisone 10 mg Daily   hydrocortisone 20 mg QAM   levETIRAcetam 500  mg BID   levothyroxine 100 mcg Before breakfast   mupirocin 1 g BID   senna-docusate 8.6-50 mg 1 tablet Daily   PRN  acetaminophen 650 mg Q6H PRN   acetaminophen 650 mg Q6H PRN   acetaminophen 650 mg Q4H PRN   dextrose 50% 12.5 g PRN   dextrose 50% 25 g PRN   glucagon (human recombinant) 1 mg PRN   glucose 16 g PRN   glucose 24 g PRN   hydrALAZINE 10 mg Q6H PRN   HYDROcodone-acetaminophen 1 tablet Q4H PRN   HYDROmorphone 1 mg Q4H PRN   insulin aspart U-100 1-10 Units QID (AC + HS) PRN   labetalol 10 mg Q4H PRN   ondansetron 8 mg Q4H PRN     Today I personally reviewed pertinent medications, lines/drains/airways, imaging, cardiology results, laboratory results, microbiology results, notably:    Diet  Diet diabetic [facility]; 2000 Calorie  Diet Adult Regular  Diet Adult Regular  Diet diabetic [facility]; 2000 Calorie  Diet Adult Regular  Diet Adult Regular

## 2019-05-31 NOTE — DISCHARGE INSTRUCTIONS
Please follow ONLY the instructions that are checked below.    Activity Restrictions:  [x]  Return to work will be determined on an individual basis.  [x]  No lifting greater than 10 pounds.  [x]  No driving or operating machinery:  [x]  until cleared by your surgeon.  [x]  while taking narcotic pain medications or muscle relaxants.  [x]  Increase ambulation over the next 2 weeks so that you are walking 2 miles per day at 2 weeks post-operatively.  [x]  Walk on paved surfaces only. It is okay to walk up and down stairs while holding onto a side rail.  [x]  No sexual activity for 2-3 weeks.    Discharge Medication/Follow-up:  [x]  Please refer to discharge medication reconciliation form.  [x]  Do not take ANY non-steroidal anti-inflammatory drugs (NSAIDS), including the following: ibuprofen, naprosyn, Aleve, Advil, Indocin, Mobic, or Celebrex for:  [x]  4 weeks  [x]  Prescriptions for appropriate medication will be given upon discharge.  [x]  Take docusate (Colace 100 mg): take one capsule a day as needed for constipation. You can get this over the counter.  [x]  Follow-up appointment:  [x]  10-14 days post-op for wound check by physician assistant/nurse  [x]  An appointment will be mailed to you.    Wound Care:  [x]  No bandage required. Keep your incision open to the air.  [x]  You may shower on the 2nd day after your surgery. Have the force of water hit you opposite from the incision. Pat the incision dry after your shower; do not scrub the incision.  [x]  You cannot take a bath until 8 weeks after surgery.      Call your doctor or go to the Emergency Room for any signs of infection, including: increased redness, drainage, pain, or fever (temperature ?101.5 for 24 hours). Call your doctor or go to the Emergency Room if there are any localized neurological changes; problems with speech, vision, numbness, tingling, weakness, or severe headache; or for other concerns.    Special Instructions:  []  No use of tobacco  products.  []  Diet: Please eat a regular diet as tolerated.  []  Other diet:              Specific physician instructions:           Physicians need 3 days' notice for pain medicine to be refilled. Pain medicine will only be refilled between 8 AM and 5 PM, Monday through Friday, due to Food and Drug Administration regulation of documentation.    If you have any questions about this form, please call [phone].    Form No. 93145 (Revised 10/31/2013)          Anesthesia: General Anesthesia     You are watched continuously during your procedure by your anesthesia provider.     Youre due to have surgery. During surgery, youll be given medicine called anesthesia or anesthetic. This will keep you comfortable and pain-free. Your anesthesia provider will use general anesthesia.  What is general anesthesia?  General anesthesia puts you into a state like deep sleep. It goes into the bloodstream (IV anesthetics), into the lungs (gas anesthetics), or both. You feel nothing during the procedure. You will not remember it. During the procedure, the anesthesia provider monitors you continuously. He or she checks your heart rate and rhythm, blood pressure, breathing, and blood oxygen.  · IV anesthetics. IV anesthetics are given through an IV line in your arm. Theyre often given first. This is so you are asleep before a gas anesthetic is started. Some kinds of IV anesthetics relieve pain. Others relax you. Your doctor will decide which kind is best in your case.  · Gas anesthetics. Gas anesthetics are breathed into the lungs. They are often used to keep you asleep. They can be given through a facemask or a tube placed in your larynx or trachea (breathing tube).  ? If you have a facemask, your anesthesia provider will most likely place it over your nose and mouth while youre still awake. Youll breathe oxygen through the mask as your IV anesthetic is started. Gas anesthetic may be added through the mask.  ? If you have a tube  in the larynx or trachea, it will be inserted into your throat after youre asleep.  Anesthesia tools and medicines  You will likely have:  · IV anesthetics. These are put into an IV line into your bloodstream.  · Gas anesthetics. You breathe these anesthetics into your lungs, where they pass into your bloodstream.  · Pulse oximeter. This is a small clip that is attached to the end of your finger. This measures your blood oxygen level.  · Electrocardiography leads (electrodes). These are small sticky pads that are placed on your chest. They record your heart rate and rhythm.  · Blood pressure cuff. This reads your blood pressure.  Risks and possible complications  General anesthesia has some risks. These include:  · Breathing problems  · Nausea and vomiting  · Sore throat or hoarseness (usually temporary)  · Allergic reaction to the anesthetic  · Irregular heartbeat (rare)  · Cardiac arrest (rare)   Anesthesia safety  · Follow all instructions you are given for how long not to eat or drink before your procedure.  · Be sure your doctor knows what medicines and drugs you take. This includes over-the-counter medicines, herbs, supplements, alcohol or other drugs. You will be asked when those were last taken.  · Have an adult family member or friend drive you home after the procedure.  · For the first 24 hours after your surgery:  ? Do not drive or use heavy equipment.  ? Do not make important decisions or sign legal documents. If important decisions or signing legal documents is necessary during the first 24 hours after surgery, have a trusted family member or spouse act on your behalf.  ? Avoid alcohol.  ? Have a responsible adult stay with you. He or she can watch for problems and help keep you safe.  Date Last Reviewed: 12/1/2016  © 7924-1725 [employer]. [address] [place] [address]. All rights reserved. This information is not intended as a substitute for professional medical care. Always  follow your healthcare professional's instructions  Your surgery has been scheduled for:__________________________________________    You should report to:  ____[facility], located on the [place] side of the first floor of the           [facility] ([phone])  ____[facility], located on the [facility] side of the            Second floor of the [facility] ([phone])  ____3rd Floor [facility] located on the [facility] side of the [facility] [phone]  Please Note   - Tell your doctor if you take Aspirin, products containing Aspirin, herbal medications  or blood thinners, such as Coumadin, Ticlid, or Plavix.  (Consult your provider regarding holding or stopping before surgery).  - Arrange for someone to drive you home following surgery.  You will not be allowed to leave the surgical facility alone or drive yourself home following sedation and anesthesia.  Before Surgery  - Stop taking all herbal medications 14days prior to surgery  - No Motrin/Advil (Ibuprofen) 7 days before surgery  - No Aleve (Naproxen) 7 days before surgery  - No Goody's/BC powder 7 days before surgery  - Stop Taking Asprin, products containing Asprin _____days before surgery  - Stop taking blood thinners_______days before surgery  - Refrain from drinking alcoholic beverages for 24hours before and after surgery  - Stop or limit smoking _________days before surgery  - You may take Tylenol for pain  Night before Surgery  - Take a shower or bath (shower is recommended).  Bathe with Hibiclens soap or an antibacterial soap from the neck down.  If not supplied by your surgeon, hibiclens soap will need to be purchased over the counter in pharmacy.  Rinse soap off thoroughly.  - Shampoo your hair with your regular shampoo                           Food and Beverage Instructions  1. Stop ALL solid food, gum, candy (including vitamins) 8 hours before  surgery/procedure time.  2. The patient should be ENCOURAGED to drink carbohydrate-rich clear liquids (sports drinks, clear juices) until 2 hours prior to surgery/procedure time.  3. CLEAR liquids include only water, black coffee NO creamer, clear oral rehydration drinks, clear sports drinks or clear fruit juices (no orange juice, no pulpy juices, no apple cider). Advise patients if they can read newsprint through the liquid, it qualifies as clear liquid.   4. IF IN DOUBT, drink water instead.   5. NOTHING  TO DRINK 2 hours before to arrival for surgery/procedure time. If you are told to take medication on the morning of surgery, it may be taken with a sip of water.     FOLLOW YOUR SURGEON'S INSTRUCTIONS REGARDING FOOD AND BEVERAGES IF DIFFERENT THAN ABOVE INSTRUCTIONS.    The Day of Surgery  - Take another bath or shower with hibiclens or any antibacterial soap, to reduce the chance of infection.  - Take heart and blood pressure medications with a small sip of water, as advised by the perioperative team.  - Do not take fluid pills  - You may brush your teeth and rinse your mouth, but do not swall any additional water.   - Do not apply perfumes, powder, body lotions or deodorant on the day of surgery.  - Nail polish should be removed.  - Do not wear makeup or moisturizer  - Wear comfortable clothes, such as a button front shirt and loose fitting pants.  - Leave all jewelry, including body piercings, and valuables at home.    - Bring any devices you will neeed after surgery such as crutches or canes.  - If you have sleep apnea, please bring your CPAP machine  In the event that your physical condition changes including the onset of a cold or respiratory illness, or if you have to delay or cancel your surgery, please notify your surgeon.
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
## 2019-05-31 NOTE — PLAN OF CARE
Problem: Adult Inpatient Plan of Care  Goal: Plan of Care Review  POC reviewed with pt and family at 0200. Pt verbalized understanding. Questions and concerns addressed. No acute events today.  Continue with pain control. Pt progressing toward goals. Will continue to monitor. See flowsheets for full assessment and VS info.

## 2019-05-31 NOTE — PLAN OF CARE
05/31/19 0915   Final Note   Assessment Type Final Discharge Note   Anticipated Discharge Disposition Home   Hospital Follow Up  Appt(s) scheduled? Yes   Discharge plans and expectations educations in teach back method with documentation complete? Yes   Right Care Referral Info   Post Acute Recommendation No Care       Future Appointments   Date Time Provider Department Center   6/21/2019 10:00 AM Jimmy Segura DO [facility] NEUROS7 [facility]   6/25/2019  9:00 AM Maria Esther Hammonds MD [facility] ENDO PI [facility]   7/19/2019 10:20 AM Jimmy Segura DO [facility] NEUROS7 [facility]   9/26[facility]2019 10:05 [facility]   10/3/2019  3:00 PM Yo[facility]         Follow-up Information     Jimmy Segura DO On 6/21/2019.    Why:  Hospital follow up at 10 am at [facility] 7th floor   Contact information:  [address] [place] [address]  [facility] floor Neurosurgery Clinic           Seymour Avendano MD On 6/7/2019.    Specialty:  Internal Medicine  Why:  Hospital follow up with Dona Retana NP in Dr. Avendano's office at 8 am   Contact information:  [address]  [address]  [place] [address]  [phone]             Jimmy Segura DO On 7/19/2019.    Why:  Hospital follow up at 10:20 am at [address]   Contact information:  [address] [place] [address]  [facility] floor Neurosurgery Clinic                   Zara Hawkins RN, CCRN-K, [facility]  Neuro-Critical Care   [phone]

## 2019-05-31 NOTE — PHYSICIAN QUERY
PT Name: Aurelio Perkins  MR #: [id number]  Physician Query Form - CKD Clarification     CDS/: Nina Post               Contact information: [phone]  This form is a permanent document in the medical record.     Query Date: May 31, 2019    By submitting this query, we are merely seeking further clarification of documentation. Please utilize your independent clinical judgment when addressing the question(s) below.    The Medical record contains the following:     Indicators   Supporting Clinical Findings   Location in Medical Record   x CKD or Chronic Kidney (Renal) Failure / Disease  CKD Anesthesia Event 5/29   x BUN/Creatinine                          GFR Bun 14, 19    Creatinine 1.4, 1.3    GRF 51.6, 56.5 Labs 5/29, 5/31    Dehydration      Nausea / Vomiting      Dialysis / CRRT      Medication      Treatment     x Other Chronic Conditions right sphenocaverous meningioma presenting for elective resection. H/P 5/29 Neuro SX ([facility])    Other       Provider, please further specify the stage of CKD.    [   ] Chronic Kidney Disease (CKD) (please specify stage* below)      [employer] Definitions     Stage Description eGFR (mL/min)   [   ]   II Mildly reduced kidney function 60-89   [   ]    III Moderately reduced kidney function 30-59   [   ] Other (please specify): ____________    [ x  ]  Clinically Undetermined          Please document in your progress notes daily for the duration of treatment until resolved and include in your discharge summary.

## 2019-05-31 NOTE — DISCHARGE SUMMARY
[facility]  Neurosurgery  Discharge Summary      Patient Name: Aurelio Perkins  MRN: [id number]  Admission Date: 5/29/2019  Hospital Length of Stay: 2 days  Discharge Date and Time:  05/31/2019 9:20 AM  Attending Physician: Jimmy Segura DO   Discharging Provider: Tonio Mera MD  Primary Care Provider: Seymour Avendano MD     HPI: 69 yo male with known right sphenocavernous meningioma admitted for elective pterional craniotomy for resection which he tolerated without complication. He will be discharged home today with instructions to follow up in two weeks in outpatient clinic. He will be given prescriptions for a short course of oral analgesics and antiepileptics and is instructed to resume his home neuroendocrine regimen. He may resume a diabetic diet and normal activity as tolerated.    Procedure(s) (LRB):  CRANIOTOMY  (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)  Co-surgery with Dr Vaibhav Jara - please put in his room  Microscope [place] [address] Sonopet Stealth (Right)     Hospital Course: As above.    Consults:     Significant Diagnostic Studies: Labs: All labs within the past 24 hours have been reviewed    Pending Diagnostic Studies:     Procedure Component Value Units Date/Time    MRI Brain W WO Contrast [[id number]]     Order Status:  Sent Lab Status:  No result         Final Active Diagnoses:    Diagnosis Date Noted POA    PRINCIPAL PROBLEM:  Meningioma [D32.9]  Yes    Ptosis of right eyelid [H02.401] 05/29/2019 Unknown    Cerebral edema [G93.6] 05/29/2019 Yes    Brain compression [G93.5] 05/29/2019 Yes    Essential hypertension [I10] 05/29/2019 Yes    Primary hypothyroidism [E03.9]  Yes    Rheumatoid arthritis [M06.9] 11/16/2014 Yes      Problems Resolved During this Admission:      Discharged Condition: good    Disposition: Home or Self Care    Follow Up:  Follow-up Information     Jimmy Segura DO On 6/21/2019.    Why:  Hospital  follow up at 10 am at [facility]   Contact information:  [address]  [place] [address]  7th floor Neurosurgery Clinic           Seymour Avendano MD On 6/7/2019.    Specialty:  Internal Medicine  Why:  Hospital follow up with Dona Retana NP in Dr. Avendano's office at 8 am   Contact information:  [address]  [address]  [place] [address]  [phone]             Jimmy Segura DO On 7/19/2019.    Why:  Hospital follow up at 10:20 am at [address]   Contact information:  [address]  [place] [address]  7th floor Neurosurgery Clinic               Patient Instructions:      Diet Adult Regular     Diet Adult Regular     Notify your health care provider if you experience any of the following:  redness, tenderness, or signs of infection (pain, swelling, redness, odor or green/yellow discharge around incision site)     Notify your health care provider if you experience any of the following:  severe persistent headache     Notify your health care provider if you experience any of the following:  increased confusion or weakness     Notify your health care provider if you experience any of the following:  temperature >100.4     Notify your health care provider if you experience any of the following:  persistent nausea and vomiting or diarrhea     Notify your health care provider if you experience any of the following:  severe uncontrolled pain     Notify your health care provider if you experience any of the following:  redness, tenderness, or signs of infection (pain, swelling, redness, odor or green/yellow discharge around incision site)     Notify your health care provider if you experience any of the following:  difficulty breathing or increased cough     Notify your health care provider if you experience any of the following:  severe persistent headache     Notify your health care provider if you experience any of the following:  worsening rash     Notify your  health care provider if you experience any of the following:  persistent dizziness, light-headedness, or visual disturbances     Notify your health care provider if you experience any of the following:  increased confusion or weakness     Activity as tolerated     Activity as tolerated     Medications:  Reconciled Home Medications:      Medication List      START taking these medications    HYDROcodone-acetaminophen 5-325 mg per tablet  Commonly known as:  NORCO  Take 1 tablet by mouth every 4 (four) hours as needed.     levETIRAcetam 500 MG Tab  Commonly known as:  KEPPRA  Take 1 tablet (500 mg total) by mouth 2 (two) times daily.        CONTINUE taking these medications    blood-glucose meter kit  Commonly known as:  FREESTYLE SYSTEM KIT  Use as instructed, test blood sugar before meals and at bedtime.     hydrocortisone 10 MG Tab  Commonly known as:  CORTEF  Take Hydrocortisone 20 mg in the morning and 10 mg in the evening (4PM).     levothyroxine 50 MCG tablet  Commonly known as:  SYNTHROID  TAKE 2 TABLETS BY MOUTH BEFORE BREAKFAST.     LUNESTA 3 mg Tab  Generic drug:  eszopiclone  Take 3 mg by mouth every evening.     SHARPS CONTAINER Misc  Generic drug:  empty container  by Misc.(Non-Drug; Combo Route) route.     syringe (disposable) 1 mL Syrg  1 Syringe by Misc.(Non-Drug; Combo Route) route once a week.     testosterone cypionate 200 mg/mL injection  Commonly known as:  DEPOTESTOTERONE CYPIONATE     VITAMIN D2 50,000 unit Cap  Generic drug:  ergocalciferol  TAKE ONE CAPSULE BY MOUTH ONE TIME PER WEEK        STOP taking these medications    diclofenac 75 MG EC tablet  Commonly known as:  VOLTAREN     XELJANZ 5 mg Tab  Generic drug:  tofacitinib            Tonio Mera MD  Neurosurgery  [facility]

## 2019-06-02 LAB
BLD PROD TYP BPU: NORMAL
BLOOD UNIT EXPIRATION DATE: NORMAL
BLOOD UNIT TYPE CODE: 6200
BLOOD UNIT TYPE: NORMAL
CODING SYSTEM: NORMAL
DISPENSE STATUS: NORMAL
TRANS ERYTHROCYTES VOL PATIENT: NORMAL ML

## 2019-06-02 NOTE — PT/OT/SLP DISCHARGE
Occupational Therapy Discharge Summary    Aurelio Perkins  MRN: [id number]   Principal Problem: Meningioma      Patient Discharged from acute Occupational Therapy on 5/31/19.  Please refer to prior OT note dated 5/30/19 for functional status.    Assessment:      Patient appropriate for care in another setting. Patient has not met goals.    Objective:     GOALS:   Multidisciplinary Problems     Occupational Therapy Goals        Problem: Occupational Therapy Goal    Goal Priority Disciplines Outcome Interventions   Occupational Therapy Goal     OT, PT/OT Ongoing (interventions implemented as appropriate)    Description:  Goals to be met by: 7 days (6/6/19)     Patient will increase functional independence with ADLs by performing:    UE Dressing with [place].  LE Dressing with [place].  Grooming while standing at sink with Modified [place].  Toileting from toilet with Modified [place] for hygiene and clothing management.   Supine to sit with [place].  Toilet transfer to toilet with [place].  Complete functional mobility household distance with Mod I.                      Reasons for Discontinuation of Therapy Services  Transfer to alternate level of care.      Plan:     Patient Discharged to: Home no OT services needed    DARIAN Lopez  6/2/2019

## 2019-06-03 ENCOUNTER — TELEPHONE (OUTPATIENT)
Dept: NEUROSURGERY | Facility: CLINIC | Age: 69
End: 2019-06-03

## 2019-06-03 NOTE — TELEPHONE ENCOUNTER
----- Message from Becca Higgins sent at 6/3/2019 11:00 AM CDT -----  Contact: Gabby/ Spouse/[phone]  Type: Patient Call Back    Who called:  Patient    What is the request in detail:  Patient had surgery on the 29th of May, Dr Segura want to see patient in 2 weeks but his appt is June 21st.  She would like staff to give her a call.  Thank you    Would the patient rather a call back or a response via [facility]?  Call back    Best call back number:  [phone]

## 2019-06-04 ENCOUNTER — PATIENT OUTREACH (OUTPATIENT)
Dept: ADMINISTRATIVE | Facility: CLINIC | Age: 69
End: 2019-06-04

## 2019-06-04 NOTE — PATIENT INSTRUCTIONS
Discharge Instructions for Craniotomy  You had a craniotomy. This means your healthcare provider made an opening in your skull. Your provider needed to do this to do brain surgery. Recovery after a craniotomy varies, depending on why you had the procedure. The guidelines provided here are for general care. Ask your healthcare provider to provide more information based on your own condition.  Home care  Do's and don'ts include:   · Increase your activity slowly. Talk with your healthcare provider about which activities you can start with.  · Dont drive until your healthcare provider says its OK.  · Dont lift anything until your healthcare provider says its OK. Your provider may tell you not to lift more than 10 pounds for a period of time.  · Take your medicine exactly as directed.  · Shower as needed. But keep your incision dry. You can wash your hair with mild soap after your stitches or staples have been removed.  · Dont put creams, lotions, or other ointments to your incision unless your provider tells you to. Keeping the incision clean and dry will help it to heal quickly. Most stitches or staples in the scalp are removed in 7 to 10 days.  · Don't drink alcohol or use recreational drugs.  · Get plenty of rest and sleep.  · Don't take aspirin, ibuprofen, or similar medicines unless your healthcare provider says it's OK.   Follow-up  Make a follow-up appointment.     When to call your healthcare provider  Call your healthcare provider right away if you have any of the following:  · Swelling on the face or scalp  · Incision that becomes red and hot or drainage from incision  · Fever of 100.4°F (38°C) or higher, or as directed by your healthcare provider  · Chills  · Confusion, memory loss, trouble speaking, or hallucinations  · Fainting or blacking out  · Double or blurred vision, or partial or total loss of vision  · Numbness, tingling, or weakness in your face, arms, hands, legs, or feet  · Stiffness in  your neck  · Severe sensitivity to light (photophobia) or severe headache  · Seizure  · Trouble controlling your bowels or bladder  · Nausea or vomiting  · Fluid draining from the nose or ear  · Changes in behavior      Date Last Reviewed: 11/5/2015  © 6002-0750 [employer]. [address] [place] [address]. All rights reserved. This information is not intended as a substitute for professional medical care. Always follow your healthcare professional's instructions.

## 2019-06-10 ENCOUNTER — PATIENT MESSAGE (OUTPATIENT)
Dept: NEUROSURGERY | Facility: CLINIC | Age: 69
End: 2019-06-10

## 2019-06-10 RX ORDER — HYDROCODONE BITARTRATE AND ACETAMINOPHEN 5; 325 MG/1; MG/1
1 TABLET ORAL EVERY 6 HOURS PRN
Qty: 40 TABLET | Refills: 0 | Status: SHIPPED | OUTPATIENT
Start: 2019-06-10 | End: 2019-11-01 | Stop reason: SDUPTHER

## 2019-06-17 ENCOUNTER — OFFICE VISIT (OUTPATIENT)
Dept: NEUROSURGERY | Facility: CLINIC | Age: 69
End: 2019-06-17
Payer: MEDICARE

## 2019-06-17 ENCOUNTER — PATIENT MESSAGE (OUTPATIENT)
Dept: NEUROSURGERY | Facility: CLINIC | Age: 69
End: 2019-06-17

## 2019-06-17 VITALS
WEIGHT: 194.44 LBS | HEIGHT: 71 IN | BODY MASS INDEX: 27.22 KG/M2 | SYSTOLIC BLOOD PRESSURE: 125 MMHG | DIASTOLIC BLOOD PRESSURE: 64 MMHG | HEART RATE: 63 BPM

## 2019-06-17 DIAGNOSIS — Z98.890 S/P CRANIOTOMY: ICD-10-CM

## 2019-06-17 DIAGNOSIS — H02.401 PTOSIS OF RIGHT EYELID: ICD-10-CM

## 2019-06-17 DIAGNOSIS — D32.9 MENINGIOMA OF RIGHT SPHENOID WING INVOLVING CAVERNOUS SINUS: Primary | ICD-10-CM

## 2019-06-17 PROCEDURE — 99213 OFFICE O/P EST LOW 20 MIN: CPT | Mod: PBBFAC | Performed by: NEUROLOGICAL SURGERY

## 2019-06-17 PROCEDURE — 99024 POSTOP FOLLOW-UP VISIT: CPT | Mod: POP,,, | Performed by: NEUROLOGICAL SURGERY

## 2019-06-17 PROCEDURE — 99999 PR PBB SHADOW E&M-EST. PATIENT-LVL III: ICD-10-PCS | Mod: PBBFAC,,, | Performed by: NEUROLOGICAL SURGERY

## 2019-06-17 PROCEDURE — 99024 PR POST-OP FOLLOW-UP VISIT: ICD-10-PCS | Mod: POP,,, | Performed by: NEUROLOGICAL SURGERY

## 2019-06-17 PROCEDURE — 99999 PR PBB SHADOW E&M-EST. PATIENT-LVL III: CPT | Mod: PBBFAC,,, | Performed by: NEUROLOGICAL SURGERY

## 2019-06-17 RX ORDER — LEVOTHYROXINE SODIUM 50 UG/1
TABLET ORAL
Qty: 30 TABLET | Refills: 1 | Status: SHIPPED | OUTPATIENT
Start: 2019-06-17 | End: 2019-06-25

## 2019-06-17 NOTE — PROGRESS NOTES
CHIEF COMPLAINT:  Post-operative wound check    HPI:    Aurelio Perkins is a 68 y.o.-year-old male who presents today for post-operative follow-up s/p left crani for subtotal resection on 5/29/19.  He reports that he is doing very well.  He reports that the right ptosis has improved and his vision in his right eye has improved and he thinks it is better than the left since surgery.  He reports some tenderness along the suture line just anterior to his ear which has been difficult to lay on otherwise he has had no other wound issues.  He has resume some driving and wants to know when he can return to work.      Denies any seizure activity, numbness or weakness, and has stopped taking his narcotics.     ROS:  As stated in the above HPI    PE:  Vitals:    06/17/19 0956   BP: 125/64   Pulse: 63       AAOX3  NAD  Cranial nerves 2-12 intact, right ptosis improved    Strength:      Deltoids Biceps Triceps Wrist Ext. Wrist Flex. Hand    RUE 5 5 5 5 5 5   LUE 5 5 5 5 5 5    Hip Flex. Knee Flex. Knee Ext. Dorsi Flex Plantar Flex EHL   RLE 5 5 5 5 5 5   LLE 5 5 5 5 5 5     Sensation:  Intact to light touch (All 4 extremities)  Intact to pin prick (All 4 extremities)    Gait:  normal    DTR:  2+ and symmetric Biceps and knees    Cranial incision:  C/D/I  Healing well, well approximated, no fluid collections.  Staples were removed without difficulty.  Some very mild erythema after removing the staples.    IMAGING:  All imaging reviewed by me.    MRI:  Postop demonstrates subtotal resection with residual in the cavernous sinus and along the tentorium, otherwise significant reduction in the size of the tumor and decompression of the optical carotid cistern    ASSESSMENT:   S/p  Right pterional craniotomy for subtotal resection of sphenoid wing meningioma with extension into the cavernous sinus, pituitary fossa, and along the tentorium.  Path revealed WHO I. Neuro intact with improvement in his right ptosis and right  vision.  He has weaned himself off of narcotics and is doing well functionally.  He is eager to return to work however I have asked him to wait until his next appointment for full clearance.  His wound is healing well with some small areas of tenderness which I explained is common and likely due to temporalis disruption during surgery.    PLAN:   - RTC in 4 wks as scheduled  - Will need BMRI in 6 months and then annually thereafter

## 2019-06-19 DIAGNOSIS — D32.9 MENINGIOMA: Primary | ICD-10-CM

## 2019-06-20 ENCOUNTER — TELEPHONE (OUTPATIENT)
Dept: NEUROSURGERY | Facility: CLINIC | Age: 69
End: 2019-06-20

## 2019-06-20 NOTE — TELEPHONE ENCOUNTER
Spoke w/pt. He will go for fasting labs tomorrow am at [facility]. Instructed to hold cortef night before, morning of, and to fast x 10 hrs. Pt v/u

## 2019-06-21 ENCOUNTER — PATIENT MESSAGE (OUTPATIENT)
Dept: ENDOCRINOLOGY | Facility: CLINIC | Age: 69
End: 2019-06-21

## 2019-06-21 DIAGNOSIS — Z12.5 PROSTATE CANCER SCREENING: Primary | ICD-10-CM
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
## 2019-06-25 ENCOUNTER — TELEPHONE (OUTPATIENT)
Dept: NEUROSURGERY | Facility: CLINIC | Age: 69
End: 2019-06-25

## 2019-06-25 ENCOUNTER — OFFICE VISIT (OUTPATIENT)
Dept: ENDOCRINOLOGY | Facility: CLINIC | Age: 69
End: 2019-06-25
Payer: MEDICARE

## 2019-06-25 ENCOUNTER — PATIENT OUTREACH (OUTPATIENT)
Dept: ADMINISTRATIVE | Facility: HOSPITAL | Age: 69
End: 2019-06-25

## 2019-06-25 VITALS
HEART RATE: 56 BPM | WEIGHT: 197.56 LBS | DIASTOLIC BLOOD PRESSURE: 78 MMHG | SYSTOLIC BLOOD PRESSURE: 136 MMHG | BODY MASS INDEX: 27.55 KG/M2 | TEMPERATURE: 99 F

## 2019-06-25 DIAGNOSIS — E27.49 SECONDARY ADRENAL INSUFFICIENCY: ICD-10-CM

## 2019-06-25 DIAGNOSIS — D32.9 MENINGIOMA: Primary | ICD-10-CM

## 2019-06-25 DIAGNOSIS — Z86.39 H/O SECONDARY HYPOGONADISM: ICD-10-CM

## 2019-06-25 DIAGNOSIS — E29.1 HYPOGONADISM IN MALE: ICD-10-CM

## 2019-06-25 DIAGNOSIS — D32.9 MENINGIOMA OF RIGHT SPHENOID WING INVOLVING CAVERNOUS SINUS: ICD-10-CM

## 2019-06-25 DIAGNOSIS — R73.9 HYPERGLYCEMIA: ICD-10-CM

## 2019-06-25 DIAGNOSIS — E03.9 PRIMARY HYPOTHYROIDISM: ICD-10-CM

## 2019-06-25 DIAGNOSIS — I10 ESSENTIAL HYPERTENSION: ICD-10-CM

## 2019-06-25 PROCEDURE — 99214 OFFICE O/P EST MOD 30 MIN: CPT | Mod: S$PBB,,, | Performed by: INTERNAL MEDICINE

## 2019-06-25 PROCEDURE — 99214 PR OFFICE/OUTPT VISIT, EST, LEVL IV, 30-39 MIN: ICD-10-PCS | Mod: S$PBB,,, | Performed by: INTERNAL MEDICINE

## 2019-06-25 PROCEDURE — 99999 PR PBB SHADOW E&M-EST. PATIENT-LVL III: ICD-10-PCS | Mod: PBBFAC,,, | Performed by: INTERNAL MEDICINE

## 2019-06-25 PROCEDURE — 99213 OFFICE O/P EST LOW 20 MIN: CPT | Mod: PBBFAC | Performed by: INTERNAL MEDICINE

## 2019-06-25 PROCEDURE — 99999 PR PBB SHADOW E&M-EST. PATIENT-LVL III: CPT | Mod: PBBFAC,,, | Performed by: INTERNAL MEDICINE

## 2019-06-25 RX ORDER — LEVOTHYROXINE SODIUM 125 UG/1
125 TABLET ORAL DAILY
Qty: 30 TABLET | Refills: 11 | Status: SHIPPED | OUTPATIENT
Start: 2019-06-25 | End: 2019-12-09

## 2019-06-25 RX ORDER — TESTOSTERONE CYPIONATE 200 MG/ML
INJECTION, SOLUTION INTRAMUSCULAR
Qty: 5 ML | Refills: 5 | Status: SHIPPED | OUTPATIENT
Start: 2019-06-25 | End: 2019-12-09 | Stop reason: SDUPTHER

## 2019-06-25 NOTE — ASSESSMENT & PLAN NOTE
S/p resection with Dr. Segura  Residual tumor on last MRI  Will continue to follow with neurosurgeon
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
## 2019-06-27 RX ORDER — HYDROCORTISONE 10 MG/1
TABLET ORAL
Qty: 60 TABLET | Refills: 11 | Status: SHIPPED | OUTPATIENT
Start: 2019-06-27 | End: 2020-07-12

## 2019-07-09 ENCOUNTER — OFFICE VISIT (OUTPATIENT)
Dept: FAMILY MEDICINE | Facility: CLINIC | Age: 69
End: 2019-07-09
Payer: MEDICARE

## 2019-07-09 VITALS
WEIGHT: 201.38 LBS | BODY MASS INDEX: 27.27 KG/M2 | DIASTOLIC BLOOD PRESSURE: 77 MMHG | HEART RATE: 67 BPM | TEMPERATURE: 98 F | HEIGHT: 72 IN | SYSTOLIC BLOOD PRESSURE: 142 MMHG

## 2019-07-09 DIAGNOSIS — Z79.899 ENCOUNTER FOR LONG-TERM (CURRENT) USE OF MEDICATIONS: ICD-10-CM

## 2019-07-09 DIAGNOSIS — Z13.6 SCREENING FOR AAA (ABDOMINAL AORTIC ANEURYSM): ICD-10-CM

## 2019-07-09 DIAGNOSIS — I34.1 MITRAL VALVE PROLAPSE: Primary | ICD-10-CM

## 2019-07-09 DIAGNOSIS — Z11.59 NEED FOR HEPATITIS C SCREENING TEST: ICD-10-CM

## 2019-07-09 DIAGNOSIS — E55.9 VITAMIN D DEFICIENCY: ICD-10-CM

## 2019-07-09 DIAGNOSIS — E78.5 HYPERLIPIDEMIA, UNSPECIFIED HYPERLIPIDEMIA TYPE: ICD-10-CM

## 2019-07-09 DIAGNOSIS — E53.8 VITAMIN B12 DEFICIENCY: ICD-10-CM

## 2019-07-09 PROBLEM — R53.82 CHRONIC FATIGUE: Status: ACTIVE | Noted: 2017-11-09

## 2019-07-09 PROBLEM — Z00.01 ENCOUNTER FOR GENERAL ADULT MEDICAL EXAMINATION WITH ABNORMAL FINDINGS: Status: ACTIVE | Noted: 2019-07-09

## 2019-07-09 PROBLEM — E03.9 HYPOTHYROIDISM: Status: ACTIVE | Noted: 2017-11-09

## 2019-07-09 PROBLEM — R79.89 LOW TESTOSTERONE IN MALE: Status: ACTIVE | Noted: 2017-11-09

## 2019-07-09 PROCEDURE — 99205 PR OFFICE/OUTPT VISIT, NEW, LEVL V, 60-74 MIN: ICD-10-PCS | Mod: S$PBB,,, | Performed by: FAMILY MEDICINE

## 2019-07-09 PROCEDURE — 99214 OFFICE O/P EST MOD 30 MIN: CPT | Mod: PBBFAC,PO | Performed by: FAMILY MEDICINE

## 2019-07-09 PROCEDURE — 99205 OFFICE O/P NEW HI 60 MIN: CPT | Mod: S$PBB,,, | Performed by: FAMILY MEDICINE

## 2019-07-09 PROCEDURE — 99999 PR PBB SHADOW E&M-EST. PATIENT-LVL IV: CPT | Mod: PBBFAC,,, | Performed by: FAMILY MEDICINE

## 2019-07-09 PROCEDURE — 99999 PR PBB SHADOW E&M-EST. PATIENT-LVL IV: ICD-10-PCS | Mod: PBBFAC,,, | Performed by: FAMILY MEDICINE

## 2019-07-09 NOTE — PROGRESS NOTES
Subjective:      Patient ID: Aurelio Perkins is a 68 y.o. male.    Chief Complaint: Annual Exam (had brain sugery 5/29/19) and Mitral Valve Prolapse (needs cardio referral, his caridiologist passed away Dr. Jaden Williamson)      Problem List Items Addressed This Visit     Encounter for general adult medical examination with abnormal findings - Primary    Overview     Well Adult Physical: Patient here for a comprehensive physical exam.The patient reports problems - See problem list  Do you take any herbs or supplements that were not prescribed by a doctor? yes Are you taking calcium supplements? no Are you taking aspirin daily? no   History:  Any STD's in the past? none   Sees Urology Dr. Rodney  \         Mitral valve prolapse    Overview     Chronic.  Asking for referral to cardiologist.  His previous provider is no longer seeing patients.  No symptoms reported.         Hyperlipidemia    Overview     Lab Results   Component Value Date    CHOL 247 (H) 07/10/2019     Lab Results   Component Value Date    HDL 46 07/10/2019     Lab Results   Component Value Date    LDLCALC 167.0 (H) 07/10/2019     Lab Results   Component Value Date    TRIG 170 (H) 07/10/2019     Lab Results   Component Value Date    CHOLHDL 18.6 (L) 07/10/2019    Chronic.  Uncontrolled.  Patient not taking any medication for this.         Need for hepatitis C screening test    Screening for AAA (abdominal aortic aneurysm)    Encounter for long-term (current) use of medications    Overview     07/11/2019   Patient is on CHRONIC long-term drug therapy for managed conditions. See medication list. Reports compliance.  No side effects reported.  Routine lab work is being monitored.  Patient does not  need refills today.     Lab Results   Component Value Date    WBC 12.03 05/31/2019    HGB 11.2 (L) 05/31/2019    HCT 33.5 (L) 05/31/2019    MCV 83 05/31/2019     05/31/2019      CMP  Sodium   Date Value Ref Range Status   06/21/2019 141 136 - 145  mmol/L Final     Potassium   Date Value Ref Range Status   06/21/2019 4.3 3.5 - 5.1 mmol/L Final     Chloride   Date Value Ref Range Status   06/21/2019 106 95 - 110 mmol/L Final     CO2   Date Value Ref Range Status   06/21/2019 24 23 - 29 mmol/L Final     Glucose   Date Value Ref Range Status   06/21/2019 84 70 - 110 mg/dL Final     BUN, Bld   Date Value Ref Range Status   06/21/2019 18 8 - 23 mg/dL Final     Creatinine   Date Value Ref Range Status   06/21/2019 1.4 0.5 - 1.4 mg/dL Final     Calcium   Date Value Ref Range Status   06/21/2019 9.3 8.7 - 10.5 mg/dL Final     Total Protein   Date Value Ref Range Status   05/31/2019 6.5 6.0 - 8.4 g/dL Final     Albumin   Date Value Ref Range Status   05/31/2019 3.3 (L) 3.5 - 5.2 g/dL Final     Total Bilirubin   Date Value Ref Range Status   05/31/2019 0.5 0.1 - 1.0 mg/dL Final     Comment:     For infants and newborns, interpretation of results should be based  on gestational age, weight and in agreement with clinical  observations.  Premature Infant recommended reference ranges:  Up to 24 hours.............<8.0 mg/dL  Up to 48 hours............<12.0 mg/dL  3-5 days..................<15.0 mg/dL  6-29 days.................<15.0 mg/dL       Alkaline Phosphatase   Date Value Ref Range Status   05/31/2019 39 (L) 55 - 135 U/L Final     AST   Date Value Ref Range Status   05/31/2019 36 10 - 40 U/L Final     ALT   Date Value Ref Range Status   05/31/2019 39 10 - 44 U/L Final     Anion Gap   Date Value Ref Range Status   06/21/2019 11 8 - 16 mmol/L Final     eGFR if    Date Value Ref Range Status   06/21/2019 59.7 (A) >60 mL/min/1.73 m^2 Final     eGFR if non    Date Value Ref Range Status   06/21/2019 51.6 (A) >60 mL/min/1.73 m^2 Final     Comment:     Calculation used to obtain the estimated glomerular filtration  rate (eGFR) is the CKD-EPI equation.        Lab Results   Component Value Date    TSH 0.098 (L) 06/21/2019               Current  Assessment & Plan     Complete history and physical was completed today.  Complete and thorough medication reconciliation was performed.  Discussed risks and benefits of medications.  Advised patient on orders and health maintenance.  We discussed old records and old labs if available.  Will request any records not available through epic.  Continue current medications listed on your summary sheet.           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)  Co-surgery with Dr Vaibhav Jara - please put in his room  Microscope [employer] [facility] 5/29/2019    Performed by Jimmy Segura DO at [facility] OR [address] [place] [address]    HEMORRHOID SURGERY      NEPHRECTOMY      PROSTATECTOMY      TONSILLECTOMY       Review of patient's allergies indicates:   Allergen Reactions    Antihistamines - alkylamine Other (See Comments)     hallucinations    Benadryl [diphenhydramine hcl] Other (See Comments)     hallucinations    Codeine Itching     Turns red    Lodine [etodolac] Other (See Comments)     fatigue    Methotrexate analogues Other (See Comments)     Sunlight sensitivity    Morphine Itching     Turns red     Current Outpatient Medications on File Prior to Visit   Medication Sig Dispense Refill    blood-glucose meter (FREESTYLE SYSTEM KIT) kit Use as instructed, test blood sugar before meals and at bedtime. 1 each 0    empty container (SHARPS CONTAINER) Misc by Misc.(Non-Drug; Combo Route) route.      eszopiclone (LUNESTA) 3 mg Tab Take 3 mg by mouth every evening.      HYDROcodone-acetaminophen  (NORCO) 5-325 mg per tablet Take 1 tablet by mouth every 6 (six) hours as needed for Pain. 40 tablet 0    hydrocortisone (CORTEF) 10 MG Tab Take Hydrocortisone 15  mg in the morning and 5 mg in the evening (4PM). 60 tablet 11    levETIRAcetam (KEPPRA) 500 MG Tab Take 1 tablet (500 mg total) by mouth 2 (two) times daily. 60 tablet 11    levothyroxine (SYNTHROID) 125 MCG tablet Take 1 tablet (125 mcg total) by mouth once daily. 30 tablet 11    syringe, disposable, 1 mL Syrg 1 Syringe by Misc.(Non-Drug; Combo Route) route once a week. 25 Syringe 3    testosterone cypionate (DEPOTESTOTERONE CYPIONATE) 200 mg/mL injection Inject 100 mg every 10 days 5 mL 5    VITAMIN D2 50,000 unit capsule TAKE ONE CAPSULE BY MOUTH ONE TIME PER WEEK  0     No current facility-administered medications on file prior to visit.      Social History     Socioeconomic History    Marital status:      Spouse name: Not on file    Number of children: Not on file    Years of education: Not on file    Highest education level: Not on file   Occupational History    Not on file   Social Needs    Financial resource strain: Not hard at all    Food insecurity:     Worry: Never true     Inability: Never true    Transportation needs:     Medical: No     Non-medical: No   Tobacco Use    Smoking status: Never Smoker    Smokeless tobacco: Never Used   Substance and Sexual Activity    Alcohol use: No    Drug use: No    Sexual activity: Yes     Partners: Female   Lifestyle    Physical activity:     Days per week: 5 days     Minutes per session: 60 min    Stress: Only a little   Relationships    Social connections:     Talks on phone: More than three times a week     Gets together: Three times a week     Attends Jain service: 1 to 4 times per year     Active member of club or organization: No     Attends meetings of clubs or organizations: Never     Relationship status:    Other Topics Concern    Not on file   Social  History Narrative    Not on file     Family History   Adopted: Yes   Family history unknown: Yes       I have reviewed the complete PMH, social history, surgical history, allergies and medications.  As well as family history.    Review of Systems   Constitutional: Positive for fatigue. Negative for activity change and unexpected weight change.   HENT: Negative for hearing loss, rhinorrhea and trouble swallowing.    Eyes: Negative for discharge and visual disturbance.   Respiratory: Negative for chest tightness and wheezing.    Cardiovascular: Negative for chest pain and palpitations.   Gastrointestinal: Negative for blood in stool, constipation, diarrhea and vomiting.   Endocrine: Negative for polydipsia and polyuria.   Genitourinary: Negative for difficulty urinating, hematuria and urgency.   Musculoskeletal: Negative for arthralgias, joint swelling and neck pain.   Neurological: Negative for weakness and headaches.   Psychiatric/Behavioral: Negative for confusion and dysphoric mood.       Objective:     BP (!) 142/77   Pulse 67   Temp 98.1 °F (36.7 °C) (Oral)   Ht 6' (1.829 m)   Wt 91.4 kg (201 lb 6.4 oz)   BMI 27.31 kg/m²     Physical Exam   Constitutional: He is oriented to person, place, and time. He appears well-developed and well-nourished. No distress.   HENT:   Head: Normocephalic and atraumatic.   Eyes: Pupils are equal, round, and reactive to light. EOM are normal.   Neck: Normal range of motion. Neck supple.   Cardiovascular: Normal rate, regular rhythm, normal heart sounds and intact distal pulses.   No murmur heard.  Pulmonary/Chest: Effort normal and breath sounds normal. No respiratory distress. He has no wheezes.   Musculoskeletal: Normal range of motion. He exhibits no edema.   Neurological: He is alert and oriented to person, place, and time. No cranial nerve deficit.   Skin: Skin is warm and dry. Capillary refill takes less than 2 seconds.   Psychiatric: He has a normal mood and affect. His  behavior is normal.   Nursing note and vitals reviewed.      Assessment:     1. Encounter for general adult medical examination with abnormal findings    2. Mitral valve prolapse    3. Hyperlipidemia, unspecified hyperlipidemia type    4. Need for hepatitis C screening test    5. Screening for AAA (abdominal aortic aneurysm)    6. Encounter for long-term (current) use of medications    7. Vitamin B12 deficiency    8. Vitamin D deficiency        Plan:     I have Reviewed and summarized old records.  I have performed thorough medication reconciliation today and discussed risk and benefits of each medication.  I have reviewed labs and discussed with patient.  All questions were answered.  I am requesting old records and will review them once they are available.    Problem List Items Addressed This Visit     Encounter for general adult medical examination with abnormal findings - Primary    Mitral valve prolapse    Relevant Orders    Ambulatory referral to Cardiology    Hyperlipidemia    Relevant Orders    Lipid panel (Completed)    Need for hepatitis C screening test    Relevant Orders    Hepatitis C antibody    Screening for AAA (abdominal aortic aneurysm)    Relevant Orders    US Abdominal Aorta    Encounter for long-term (current) use of medications     Complete history and physical was completed today.  Complete and thorough medication reconciliation was performed.  Discussed risks and benefits of medications.  Advised patient on orders and health maintenance.  We discussed old records and old labs if available.  Will request any records not available through epic.  Continue current medications listed on your summary sheet.           Vitamin B12 deficiency    Relevant Orders    Vitamin B12 (Completed)    Vitamin D deficiency          Follow up in about 3 months (around 10/9/2019).    DISCLAIMER: This note was compiled by using a speech recognition dictation system and therefore please be aware that typographical /  speech recognition errors can and do occur.  Please contact me if you see any errors specifically.    Cuauhtemoc Gardner MD  We Offer Telehealth & Same Day Appointments!   Book your Telehealth appointment with me through my nurse or   Clinic appointments on [place] [employer]!    Office: [phone]          Check out my Facebook Page and Follow Me at: CLICK HERE    Check out my website at [facility] by clicking on: CLICK HERE    To Schedule appointments online, go to [place] [employer]: CLICK HERE     Location: [email or web address]    [address] [place] [address]    FAX: [phone]

## 2019-07-09 NOTE — PATIENT INSTRUCTIONS
Healthy Heart Diet  GENERAL INFORMATION:  What is a heart healthy diet? The goal of a heart healthy diet is to decrease your risk for heart disease. There are several health conditions that can increase your risk for heart disease. Some of these conditions include unhealthy blood cholesterol levels, high blood pressure, and obesity (weighing more than your caregiver recommends). If you have any of these conditions, you should make diet and lifestyle changes as part of your treatment plan. People who have had a heart attack or stroke also should follow the heart healthy diet. The heart healthy diet may help to decrease the risk of having another heart attack or stroke.  What should I know about the different types of fat in my diet?   Unhealthy fats: A diet that is high in cholesterol, saturated fat, and trans fat may cause unhealthy cholesterol levels.    Cholesterol: Limit intake of cholesterol to less than 200 mg per day. Cholesterol is found in meat, eggs, and dairy (milk, cheese).    Saturated fat: Limit saturated fat to less than seven percent of total daily calories. Ask your caregiver how many calories you need each day. Saturated fats are found in meat and dairy.    Trans fat: Limit trans fat to less than one percent of total calories. Ask your caregiver how many calories you need each day. Foods that say trans fat free on the label may still have up to 0.5 grams of fat per serving. Trans fats are used in fried and baked foods.  Healthy fats: Unsaturated fats can be healthy for you and can help to improve your cholesterol levels. Increase your intake of healthy fats by replacing saturated and trans fats in your diet with unsaturated fat.     Monounsaturated fats: Monounsaturated fats are found in nuts and vegetable oils, such as olive, canola, safflower, and sunflower.    Polyunsaturated fats: Unsaturated fat can be found in vegetable oils, such as soybean or corn. Omega-3 fats are a type of polyunsaturated  fat that can help to decrease the risk of heart disease. Omega-3 fats are found in fish, such as salmon, herring, trout, and tuna. Omega-3 fats can also be found in plant foods, such as walnuts, flaxseed, soybeans, and canola oil.  What are other diet guidelines I should follow?   Maintain a healthy weight: Your risk of heart disease is higher if you are overweight. Your caregiver may suggest that you lose weight if you are overweight. The following are some diet changes you can make to lose weight.     Eat fewer calories: A healthy way of decreasing calories is to eat fewer foods that have added sugars and fats. Foods that are have added sugars are also high in calories, which can cause you to gain weight. Some foods that have added sugars are sweet drinks (soda and fruit drinks), candy, cakes, cookies, and pies.    Eat smaller portions: You can also decrease calories in your diet by eating smaller portions at each meal and eating fewer snacks. Ask your caregiver for more information about how to lose weight.  Decrease sodium in your diet to less than 2300 mg each day: Sodium is found in table salt and foods that have added salt. A diet that is lower in sodium may decrease blood pressure or prevent high blood pressure. Keep your blood pressure within a normal range to decrease your risk of stroke, heart disease, and heart failure.    Include omega-3 fats in your diet: Eat two servings of fish per week. One serving is about four ounces. Fish is a good source of healthy omega-3 fats. Most fish contain some mercury, but many fish contain levels that are not harmful to most people. Higher amounts of mercury can be harmful to pregnant women and children. Children and pregnant women should avoid eating fish high in mercury, such as shark or swordfish. Fish that have lower amounts of mercury include salmon, canned light tuna, and catfish.    Include high fiber foods in your diet each day: You can decrease your risk of  heart disease by following a diet that is high in fiber. Include fruit and vegetables, legumes (beans), and whole-grain foods in your diet each day to get enough fiber.    Limit alcohol: Limit the amount of alcohol you drink. Drinking too much can damage your brain, heart, and liver. The risk of getting high blood pressure and certain types of cancer are greater for people who drink too much alcohol. Drinking too much alcohol also increases the risk of having a stroke. Women should limit alcohol to one drink a day. Men should limit alcohol to two drinks a day. A drink of alcohol is 12 ounces of beer, or five ounces of wine. One and one-half ounces of liquor, such as whiskey, is one drink of alcohol. If you drink alcohol, talk to your caregiver.   What should I avoid eating and drinking while on a heart healthy diet? Learn to read labels on packaged foods before buying them. Ask your caregiver for more information about how to read food labels. The following foods are high in fat, saturated fat, cholesterol, and sodium.   Bread and other carbohydrates:     High-fat baked goods, such as doughnuts, pastries, cookies, and biscuits.    Chips, snack mixes, regular crackers, and flavored popcorn.    Pretzels, salted nuts (high in sodium).  Fruit and vegetables:     Regular, canned vegetables (high in sodium).    Fried vegetables or vegetables in butter or high-fat sauces.    Fried fruit, or fruit served with cream.  Dairy:     Whole milk, two percent milk, half-and-half creamer.    Cheese, cream cheese, sour cream.  Meats and meat substitutes:     High-fat cuts of meat (T-bone steak, regular hamburger, ribs, aguilar, sausage).    Cold cuts and hot dogs.    Whole eggs and egg yolks (limit to three servings or less per week).  Fats:     Butter, hard margarine, shortening, partially hydrogenated or tropical (coconut, palm) oils.    Other:     Salt or seasonings made with salt (high in sodium).    Soy sauce, miso soup, canned or  dried soups (high in sodium).    Ketchup, barbecue sauce, and other high-sodium sauces.    High-fat gravy and sauces, such as Enoc or cheese sauces.  What can I eat and drink while on a heart healthy diet? Ask your dietitian or caregiver how many servings to eat each day from each of the following groups of foods. The amount of servings you should eat from each food group will depend on your daily calorie needs.   Breads and other carbohydrates:     Whole grain breads, cereals (oatmeal), and pasta.    Brown rice.    Low fat, low-sodium crackers and pretzels.  Fruits and vegetables:     Fresh, frozen, or canned vegetables (no salt or low-sodium).    Fresh, frozen, dried, or canned fruit (canned in light syrup or fruit juice).  Dairy:     Nonfat (skim), one-half percent, or one percent milk.    Nonfat or low fat yogurt or cottage cheese.    Fat free or low fat cheese.  Meats and meat products:     Fish and poultry (chicken, turkey) with no skin.    Lean beef and pork (loin, round, extra lean hamburger).    Dried beans and peas, unsalted nuts, soy products.    Egg whites and substitutes.  Fats:     Unsaturated oils (olive, soy, peanut, canola, safflower, sunflower).    Vegetable oil spreads or soft margarine.    Avocado.  Other:     Herbs and spices in place of salt.    Low fat snacks (unsalted pretzels, plain popcorn).  What are some other lifestyle changes I should make?   Do not smoke: Smoking causes lung cancer and other long-term lung diseases. It increases your risk of many cancer types. Smoking also increases your risk of blood vessel disease, heart attack, and vision disorders. Not smoking may help prevent such symptoms as headaches and dizziness for yourself and those around you. Smokers have shorter lifespans than nonsmokers.     Exercise regularly: Regular exercise can help improve your cholesterol levels and decrease your risk for coronary artery disease. Regular exercise can also help you reach or  maintain a healthy weight. Get 30 minutes or more of moderate exercise or 20 minutes of intense exercise on most days of the week. To lose weight, get at least 60 minutes of exercise on most days of the week. Children should exercise for at least 60 minutes each day. Talk to your caregiver about the best exercise program for you.  What are the risks of not following a heart healthy diet? You may develop heart disease if you do not follow a heart healthy diet. High blood cholesterol puts you at a higher risk for heart disease. Untreated high blood pressure may lead to a stroke. It can also lead to a heart attack or heart or kidney failure. Obesity is linked to medical problems, such as heart disease, high blood pressure, stroke, and diabetes. You may need to follow this diet if you already had a heart attack or stroke. You may be more likely to have another stroke or heart attack if you do not follow this diet.  When should I call my caregiver? You have questions or concerns about your illness, medicine, or this diet.  CARE AGREEMENT:  You have the right to help plan your care. Discuss treatment options with your caregivers to decide what care you want to receive. You always have the right to refuse treatment.

## 2019-07-10 ENCOUNTER — LAB VISIT (OUTPATIENT)
Dept: LAB | Facility: HOSPITAL | Age: 69
End: 2019-07-10
Attending: FAMILY MEDICINE
Payer: MEDICARE

## 2019-07-10 DIAGNOSIS — E03.9 PRIMARY HYPOTHYROIDISM: ICD-10-CM

## 2019-07-10 DIAGNOSIS — Z11.59 NEED FOR HEPATITIS C SCREENING TEST: ICD-10-CM

## 2019-07-10 DIAGNOSIS — E53.8 VITAMIN B12 DEFICIENCY: ICD-10-CM

## 2019-07-10 DIAGNOSIS — E78.5 HYPERLIPIDEMIA, UNSPECIFIED HYPERLIPIDEMIA TYPE: ICD-10-CM

## 2019-07-10 DIAGNOSIS — R53.83 FATIGUE, UNSPECIFIED TYPE: ICD-10-CM

## 2019-07-10 LAB
CHOLEST SERPL-MCNC: 247 MG/DL (ref 120–199)
CHOLEST/HDLC SERPL: 5.4 {RATIO} (ref 2–5)
HDLC SERPL-MCNC: 46 MG/DL (ref 40–75)
HDLC SERPL: 18.6 % (ref 20–50)
LDLC SERPL CALC-MCNC: 167 MG/DL (ref 63–159)
NONHDLC SERPL-MCNC: 201 MG/DL
T4 FREE SERPL-MCNC: 1.25 NG/DL (ref 0.71–1.51)
TRIGL SERPL-MCNC: 170 MG/DL (ref 30–150)
VIT B12 SERPL-MCNC: 531 PG/ML (ref 210–950)

## 2019-07-10 PROCEDURE — 86803 HEPATITIS C AB TEST: CPT

## 2019-07-10 PROCEDURE — 84439 ASSAY OF FREE THYROXINE: CPT

## 2019-07-10 PROCEDURE — 82607 VITAMIN B-12: CPT

## 2019-07-10 PROCEDURE — 80061 LIPID PANEL: CPT

## 2019-07-11 ENCOUNTER — PATIENT MESSAGE (OUTPATIENT)
Dept: FAMILY MEDICINE | Facility: CLINIC | Age: 69
End: 2019-07-11

## 2019-07-11 ENCOUNTER — PATIENT MESSAGE (OUTPATIENT)
Dept: ENDOCRINOLOGY | Facility: CLINIC | Age: 69
End: 2019-07-11

## 2019-07-11 PROBLEM — E53.8 VITAMIN B12 DEFICIENCY: Status: ACTIVE | Noted: 2019-07-11

## 2019-07-11 PROBLEM — E55.9 VITAMIN D DEFICIENCY: Status: ACTIVE | Noted: 2019-07-11

## 2019-07-11 LAB — HCV AB SERPL QL IA: NEGATIVE

## 2019-07-11 NOTE — PROGRESS NOTES
The patient has an abnormal lipid profile.  Book the patient for a lipid/liver panel in 3 months and send the patient the appointment for this.   The patient will need to have a change in the medicine to include atorvastatin 40 mg.        Give 1 month of medicine and refill it x 2 months and send the medicine in to the patient's pharmacy.  Book a lipid and liver panel in 3 months for the patient.    Ask the patient to follow a low fat diet and avoid red meat and fried foods.     Your cholesterol panel is abnormal.  You have a very elevated total cholesterol triglycerides are elevated in LDL which is the bad cholesterol is above goal.  We need to start you also medications to get your risk down.  We will call in a medication called atorvastatin to her pharmacy.  Please follow up with me to discuss these results in detail.  Still waiting on other results.  Vitamin B12 is within normal limits.

## 2019-07-12 ENCOUNTER — PATIENT MESSAGE (OUTPATIENT)
Dept: PHARMACY | Facility: CLINIC | Age: 69
End: 2019-07-12

## 2019-07-12 ENCOUNTER — TELEPHONE (OUTPATIENT)
Dept: PHARMACY | Facility: CLINIC | Age: 69
End: 2019-07-12

## 2019-07-12 NOTE — TELEPHONE ENCOUNTER
Patient returned call to [facility] for refill and follow up Xeljanz. Patient confirmed need for refill. Shipment set for 7/15 for patient to receive 7/16. $0 copay (004). He says he has enough on hand through Tuesday. Address verified. He denies any missed doses or changes in other medications. Has still been taking 5mg BID. No other questions or concerns today.

## 2019-07-12 NOTE — PROGRESS NOTES
Your hepatitis C screening test was negative.  So you do not have hepatitis-C.    Cuauhtemoc Gardner MD  We Offer Telehealth & Same Day Appointments!   Book your Telehealth appointment with me through my nurse or   Clinic appointments on WebAction!  [phone]     Check out my Facebook Page and Follow Me at: [email or web address]    Check out my website at [employer] by clicking on: [email or web address]    To Schedule appointments online, go to WebAction: [email or web address]

## 2019-07-18 ENCOUNTER — HOSPITAL ENCOUNTER (OUTPATIENT)
Dept: RADIOLOGY | Facility: HOSPITAL | Age: 69
Discharge: HOME | End: 2019-07-18
Attending: FAMILY MEDICINE
Payer: MEDICARE

## 2019-07-18 DIAGNOSIS — Z13.6 SCREENING FOR AAA (ABDOMINAL AORTIC ANEURYSM): ICD-10-CM

## 2019-07-18 PROCEDURE — 76775 US EXAM ABDO BACK WALL LIM: CPT | Mod: TC,PO

## 2019-07-18 PROCEDURE — 76775 US EXAM ABDO BACK WALL LIM: CPT | Mod: 26,,, | Performed by: RADIOLOGY

## 2019-07-18 PROCEDURE — 76775 US ABDOMINAL AORTA: ICD-10-PCS | Mod: 26,,, | Performed by: RADIOLOGY

## 2019-07-18 NOTE — PROGRESS NOTES
Your abdominal aortic aneurysm screening was at the upper limits of normal.  2.9 cm.  Repeat AAA ultrasound in 1 year..  Call patient about results and make appointment to follow up in 1 year for this condition

## 2019-07-19 ENCOUNTER — OFFICE VISIT (OUTPATIENT)
Dept: NEUROSURGERY | Facility: CLINIC | Age: 69
End: 2019-07-19
Payer: MEDICARE

## 2019-07-19 VITALS
WEIGHT: 199.5 LBS | RESPIRATION RATE: 18 BRPM | BODY MASS INDEX: 27.02 KG/M2 | SYSTOLIC BLOOD PRESSURE: 120 MMHG | HEART RATE: 59 BPM | TEMPERATURE: 97 F | HEIGHT: 72 IN | DIASTOLIC BLOOD PRESSURE: 76 MMHG

## 2019-07-19 DIAGNOSIS — D32.9 MENINGIOMA OF RIGHT SPHENOID WING INVOLVING CAVERNOUS SINUS: ICD-10-CM

## 2019-07-19 DIAGNOSIS — Z98.890 S/P CRANIOTOMY: Primary | ICD-10-CM

## 2019-07-19 PROCEDURE — 99213 OFFICE O/P EST LOW 20 MIN: CPT | Mod: PBBFAC | Performed by: NEUROLOGICAL SURGERY

## 2019-07-19 PROCEDURE — 99999 PR PBB SHADOW E&M-EST. PATIENT-LVL III: ICD-10-PCS | Mod: PBBFAC,,, | Performed by: NEUROLOGICAL SURGERY

## 2019-07-19 PROCEDURE — 99024 PR POST-OP FOLLOW-UP VISIT: ICD-10-PCS | Mod: POP,,, | Performed by: NEUROLOGICAL SURGERY

## 2019-07-19 PROCEDURE — 99024 POSTOP FOLLOW-UP VISIT: CPT | Mod: POP,,, | Performed by: NEUROLOGICAL SURGERY

## 2019-07-19 PROCEDURE — 99999 PR PBB SHADOW E&M-EST. PATIENT-LVL III: CPT | Mod: PBBFAC,,, | Performed by: NEUROLOGICAL SURGERY

## 2019-07-20 NOTE — PROGRESS NOTES
CHIEF COMPLAINT:  6 wk postop f/u    INTERVAL HISTORY (7/19/19):  Continues to do well.  Has returned to work without any difficulties.  R eyelid droop has remain improved but starting notice subtle droop in left eye lid.  Wound well healed.   Energy levels have returned now that hormone replacement stabilized.    HPI:  Aurelio Perkins is a 68 y.o.-year-old male who presents today for post-operative follow-up s/p left crani for subtotal resection on 5/29/19.  He reports that he is doing very well.  He reports that the right ptosis has improved and his vision in his right eye has improved and he thinks it is better than the left since surgery.  He reports some tenderness along the suture line just anterior to his ear which has been difficult to lay on otherwise he has had no other wound issues.  He has resume some driving and wants to know when he can return to work.       Denies any seizure activity, numbness or weakness, and has stopped taking his narcotics.      ROS:  Review of Systems   Constitutional: Negative.    HENT: Negative for congestion, ear discharge, ear pain, hearing loss, nosebleeds, sinus pain and tinnitus.    Eyes: Negative.    Respiratory: Negative.    Cardiovascular: Negative for chest pain, palpitations, claudication and leg swelling.   Gastrointestinal: Negative for abdominal pain, blood in stool, constipation, diarrhea, melena and vomiting.   Genitourinary: Negative for flank pain, frequency and urgency.   Musculoskeletal: Negative.  Negative for falls.   Skin: Negative.    Neurological: Negative.    Endo/Heme/Allergies: Does not bruise/bleed easily.   Psychiatric/Behavioral: Negative.           PE:  Vitals:    07/19/19 1051   BP: 120/76   Pulse: (!) 59   Resp: 18   Temp: 97.1 °F (36.2 °C)     AAOX3  NAD  Cranial nerves 2-12 intact, right ptosis improved     Strength:                  Deltoids Biceps Triceps Wrist Ext. Wrist Flex. Hand    RUE 5 5 5 5 5 5   LUE 5 5 5 5 5 5     Hip Flex.  Knee Flex. Knee Ext. Dorsi Flex Plantar Flex EHL   RLE 5 5 5 5 5 5   LLE 5 5 5 5 5 5      Sensation:  Intact to light touch (All 4 extremities)  Intact to pin prick (All 4 extremities)     Gait:  normal     DTR:  2+ and symmetric Biceps and knees     Cranial incision:  Well healed, hair growing back     IMAGING:  All imaging reviewed by me.    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  Path revealed WHO I.     Neuro intact with continued improvement in his right ptosis and right vision.  Possible subtle left ptosis (per patient), which might be due to contralateral cavernous sinus invasion.  Wound well healed without any infection.  Ready to return to work.     PLAN:   - RTC in 6 months with BMRI +/- contrast  - Ok to return to work

## 2019-07-23 ENCOUNTER — PATIENT MESSAGE (OUTPATIENT)
Dept: ENDOCRINOLOGY | Facility: CLINIC | Age: 69
End: 2019-07-23

## 2019-07-23 ENCOUNTER — PATIENT MESSAGE (OUTPATIENT)
Dept: FAMILY MEDICINE | Facility: CLINIC | Age: 69
End: 2019-07-23

## 2019-07-24 ENCOUNTER — PATIENT MESSAGE (OUTPATIENT)
Dept: NEUROSURGERY | Facility: CLINIC | Age: 69
End: 2019-07-24

## 2019-07-29 ENCOUNTER — OFFICE VISIT (OUTPATIENT)
Dept: CARDIOLOGY | Facility: CLINIC | Age: 69
End: 2019-07-29
Payer: MEDICARE

## 2019-07-29 VITALS
SYSTOLIC BLOOD PRESSURE: 125 MMHG | BODY MASS INDEX: 26.41 KG/M2 | WEIGHT: 195 LBS | HEART RATE: 59 BPM | DIASTOLIC BLOOD PRESSURE: 70 MMHG | HEIGHT: 72 IN

## 2019-07-29 DIAGNOSIS — D32.9 MENINGIOMA OF RIGHT SPHENOID WING INVOLVING CAVERNOUS SINUS: ICD-10-CM

## 2019-07-29 DIAGNOSIS — E78.5 HYPERLIPIDEMIA, UNSPECIFIED HYPERLIPIDEMIA TYPE: ICD-10-CM

## 2019-07-29 DIAGNOSIS — I35.0 NONRHEUMATIC AORTIC VALVE STENOSIS: ICD-10-CM

## 2019-07-29 DIAGNOSIS — I10 ESSENTIAL HYPERTENSION: Primary | ICD-10-CM

## 2019-07-29 PROBLEM — I34.1 MITRAL VALVE PROLAPSE: Status: RESOLVED | Noted: 2019-07-09 | Resolved: 2019-07-29

## 2019-07-29 PROCEDURE — 99204 OFFICE O/P NEW MOD 45 MIN: CPT | Mod: S$PBB,,, | Performed by: INTERNAL MEDICINE

## 2019-07-29 PROCEDURE — 99213 OFFICE O/P EST LOW 20 MIN: CPT | Mod: PBBFAC,PO | Performed by: INTERNAL MEDICINE

## 2019-07-29 PROCEDURE — 99999 PR PBB SHADOW E&M-EST. PATIENT-LVL III: ICD-10-PCS | Mod: PBBFAC,,, | Performed by: INTERNAL MEDICINE

## 2019-07-29 PROCEDURE — 99204 PR OFFICE/OUTPT VISIT, NEW, LEVL IV, 45-59 MIN: ICD-10-PCS | Mod: S$PBB,,, | Performed by: INTERNAL MEDICINE

## 2019-07-29 PROCEDURE — 99999 PR PBB SHADOW E&M-EST. PATIENT-LVL III: CPT | Mod: PBBFAC,,, | Performed by: INTERNAL MEDICINE

## 2019-07-29 RX ORDER — ROSUVASTATIN CALCIUM 20 MG/1
20 TABLET, COATED ORAL
COMMUNITY
Start: 2019-07-25 | End: 2019-07-29

## 2019-07-29 RX ORDER — METOPROLOL SUCCINATE 25 MG/1
25 TABLET, EXTENDED RELEASE ORAL
COMMUNITY
Start: 2019-07-25 | End: 2019-10-21 | Stop reason: SDUPTHER

## 2019-07-29 RX ORDER — AMLODIPINE BESYLATE 5 MG/1
5 TABLET ORAL
COMMUNITY
Start: 2019-07-25 | End: 2019-10-03 | Stop reason: SINTOL

## 2019-07-29 RX ORDER — ROSUVASTATIN CALCIUM 20 MG/1
20 TABLET, COATED ORAL DAILY
Qty: 90 TABLET | Refills: 3 | Status: SHIPPED | OUTPATIENT
Start: 2019-07-29 | End: 2019-10-03 | Stop reason: SINTOL

## 2019-07-29 RX ORDER — ASPIRIN 81 MG/1
81 TABLET ORAL
COMMUNITY
Start: 2019-07-25 | End: 2019-10-21 | Stop reason: SDUPTHER

## 2019-07-29 RX ORDER — NITROGLYCERIN 0.4 MG/1
0.4 TABLET SUBLINGUAL
COMMUNITY
Start: 2019-07-24 | End: 2023-12-18

## 2019-07-29 RX ORDER — DICLOFENAC SODIUM 75 MG/1
75 TABLET, DELAYED RELEASE ORAL 2 TIMES DAILY
Refills: 8 | COMMUNITY
Start: 2019-07-20 | End: 2019-10-03

## 2019-07-29 NOTE — PROGRESS NOTES
Subjective:    Patient ID:  Aurelio Perkins is a 68 y.o. male who presents for evaluation of Consult (ref from pcp)      HPI68 yo WM S/P craniotomy in May of this year for a meningioma who has pituitary insufficiency on hormone replacement. He had severe HTN and chest pain several weeks ago and went to [place]. EKG unremarkable but cardiac enzymes elevated.  Had LHC that showed non-obstructive disease. Did have an echo which showed normal LV function with moderate to severe AS.     Review of Systems   Constitution: Negative for decreased appetite, fever, malaise/fatigue, weight gain and weight loss.   HENT: Negative for hearing loss and nosebleeds.    Eyes: Negative for visual disturbance.   Cardiovascular: Positive for dyspnea on exertion. Negative for chest pain, claudication, cyanosis, irregular heartbeat, leg swelling, near-syncope, orthopnea, palpitations, paroxysmal nocturnal dyspnea and syncope.   Respiratory: Negative for cough, hemoptysis, shortness of breath, sleep disturbances due to breathing, snoring and wheezing.    Endocrine: Negative for cold intolerance, heat intolerance, polydipsia and polyuria.   Hematologic/Lymphatic: Negative for adenopathy and bleeding problem. Does not bruise/bleed easily.   Skin: Negative for color change, itching, poor wound healing, rash and suspicious lesions.   Musculoskeletal: Positive for arthritis. Negative for back pain, falls, joint pain, joint swelling, muscle cramps, muscle weakness and myalgias.   Gastrointestinal: Negative for bloating, abdominal pain, change in bowel habit, constipation, flatus, heartburn, hematemesis, hematochezia, hemorrhoids, jaundice, melena, nausea and vomiting.   Genitourinary: Negative for bladder incontinence, decreased libido, frequency, hematuria, hesitancy and urgency.   Neurological: Negative for brief paralysis, difficulty with concentration, excessive daytime sleepiness, dizziness, focal weakness, headaches,  light-headedness, loss of balance, numbness, vertigo and weakness.   Psychiatric/Behavioral: Negative for altered mental status, depression and memory loss. The patient does not have insomnia and is not nervous/anxious.    Allergic/Immunologic: Negative for environmental allergies, hives and persistent infections.        Objective:    Physical Exam   Constitutional: He is oriented to person, place, and time. He appears well-developed and well-nourished. No distress.   /70   Pulse (!) 59   Ht 6' (1.829 m)   Wt 88.5 kg (195 lb)   BMI 26.45 kg/m²      HENT:   Head: Normocephalic and atraumatic.   Eyes: Pupils are equal, round, and reactive to light. Conjunctivae and lids are normal. Right eye exhibits no discharge. No scleral icterus.   Neck: Normal range of motion. Neck supple. No JVD present. No tracheal deviation present. No thyromegaly present.   Cardiovascular: Normal rate, regular rhythm, S1 normal, S2 normal and intact distal pulses. Exam reveals no gallop and no friction rub.   Murmur heard.   Harsh midsystolic murmur is present with a grade of 3/6 at the upper right sternal border radiating to the neck.  Pulses:       Carotid pulses are 2+ on the right side, and 2+ on the left side.       Radial pulses are 2+ on the right side, and 2+ on the left side.        Femoral pulses are 2+ on the right side, and 2+ on the left side.       Popliteal pulses are 2+ on the right side, and 2+ on the left side.        Dorsalis pedis pulses are 2+ on the right side, and 2+ on the left side.        Posterior tibial pulses are 2+ on the right side, and 2+ on the left side.   Pulmonary/Chest: Effort normal and breath sounds normal. No respiratory distress. He has no wheezes. He has no rales. He exhibits no tenderness.   Abdominal: Soft. Bowel sounds are normal. He exhibits no distension and no mass. There is no hepatosplenomegaly or hepatomegaly. There is no tenderness. There is no rebound and no guarding.    Musculoskeletal: Normal range of motion. He exhibits no edema or tenderness.   Lymphadenopathy:     He has no cervical adenopathy.   Neurological: He is alert and oriented to person, place, and time. He has normal reflexes. No cranial nerve deficit. Coordination normal.   Skin: Skin is warm and dry. No rash noted. He is not diaphoretic. No erythema. No pallor.   Psychiatric: He has a normal mood and affect. His speech is normal and behavior is normal. Judgment and thought content normal. Cognition and memory are normal.         Assessment:       1. Essential hypertension    2. Hyperlipidemia, unspecified hyperlipidemia type    3. 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 Nonrheumatic aortic valve stenosis         Plan:    Cannot explain the elevation in cardiac enzymes   Discussed progression of AS and symptoms to be aware of   Will continue current meds     Orders Placed This Encounter   Procedures    Comprehensive metabolic panel in 3 months    Lipid panel    2D Echo w/ Color Flow Doppler in 6 months     Follow up in about 6 months (around 1/29/2020).

## 2019-07-29 NOTE — LETTER
July 29, 2019      Cuauhtemoc Gardner MD  [address]  [place] [address]           [facility]  [address] [place] [address]  Phone: [phone]  Fax: [phone]          Patient: Aurelio Perkins   MR Number: [id number]   YOB: 1950   Date of Visit: 7/29/2019       Dear Dr. Cuauhtemoc Gardner:    Thank you for referring Aurelio Perkins to me for evaluation. Attached you will find relevant portions of my assessment and plan of care.    If you have questions, please do not hesitate to call me. I look forward to following Aurelio Perkins along with you.    Sincerely,    Valente Regalado Jr., MD    Enclosure  CC:  No Recipients    If you would like to receive this communication electronically, please contact [email or web address] or [phone] to request more information on [employer] access.    For providers and/or their staff who would like to refer a patient to [facility], please contact us through our one-stop-shop provider referral line, [facility] , at [phone].    If you feel you have received this communication in error or would no longer like to receive these types of communications, please e-mail [email or web address]

## 2019-08-06 ENCOUNTER — TELEPHONE (OUTPATIENT)
Dept: PHARMACY | Facility: CLINIC | Age: 69
End: 2019-08-06

## 2019-08-07 ENCOUNTER — PATIENT MESSAGE (OUTPATIENT)
Dept: ENDOCRINOLOGY | Facility: CLINIC | Age: 69
End: 2019-08-07

## 2019-08-10 RX ORDER — DICLOFENAC SODIUM 100 MG/1
TABLET, EXTENDED RELEASE ORAL
Qty: 60 TABLET | Refills: 2 | Status: SHIPPED | OUTPATIENT
Start: 2019-08-10 | End: 2019-10-03

## 2019-08-15 RX ORDER — ESZOPICLONE 2 MG/1
TABLET, FILM COATED ORAL
Qty: 21 TABLET | Refills: 3 | Status: SHIPPED | OUTPATIENT
Start: 2019-08-15 | End: 2019-09-09

## 2019-09-06 ENCOUNTER — PATIENT MESSAGE (OUTPATIENT)
Dept: RHEUMATOLOGY | Facility: CLINIC | Age: 69
End: 2019-09-06

## 2019-09-06 ENCOUNTER — TELEPHONE (OUTPATIENT)
Dept: ENDOCRINOLOGY | Facility: CLINIC | Age: 69
End: 2019-09-06

## 2019-09-06 NOTE — TELEPHONE ENCOUNTER
----- Message from Lori Jett sent at 9/6/2019  9:49 AM CDT -----  Contact: Pharmacy/ [phone]   Patient pharmacy would like a call back to speak with a nurse to ask questions about the patient injection for the shingles.

## 2019-09-06 NOTE — TELEPHONE ENCOUNTER
Spoke with pharmaist patient would like to know if he can get the shingles vaccine while taking hydrocortisone please advise.

## 2019-09-06 NOTE — TELEPHONE ENCOUNTER
Spoke with patient informed him he ok to get shingles vaccine patient verbalized understanding of this.

## 2019-09-09 RX ORDER — ESZOPICLONE 3 MG/1
3 TABLET, FILM COATED ORAL NIGHTLY
Qty: 30 TABLET | Refills: 3 | Status: SHIPPED | OUTPATIENT
Start: 2019-09-09 | End: 2019-10-03 | Stop reason: SDUPTHER

## 2019-09-12 ENCOUNTER — TELEPHONE (OUTPATIENT)
Dept: PHARMACY | Facility: CLINIC | Age: 69
End: 2019-09-12

## 2019-09-12 NOTE — TELEPHONE ENCOUNTER
Pt confirmed shipping of Xeljanz on  to arrive on . Address and  verified. $0 copay in 004. Pt reported 1 week on hand. Pt reported no missed doses. Pt had no further questions or concerns.

## 2019-09-26 ENCOUNTER — LAB VISIT (OUTPATIENT)
Dept: LAB | Facility: HOSPITAL | Age: 69
End: 2019-09-26
Attending: FAMILY MEDICINE
Payer: MEDICARE

## 2019-09-26 DIAGNOSIS — D32.9 MENINGIOMA OF RIGHT SPHENOID WING INVOLVING CAVERNOUS SINUS: ICD-10-CM

## 2019-09-26 DIAGNOSIS — D49.6 CAVERNOUS SINUS TUMOR: ICD-10-CM

## 2019-09-26 DIAGNOSIS — I35.0 NONRHEUMATIC AORTIC VALVE STENOSIS: ICD-10-CM

## 2019-09-26 DIAGNOSIS — E03.9 HYPOTHYROIDISM, UNSPECIFIED TYPE: ICD-10-CM

## 2019-09-26 DIAGNOSIS — R53.83 FATIGUE, UNSPECIFIED TYPE: ICD-10-CM

## 2019-09-26 DIAGNOSIS — I10 ESSENTIAL HYPERTENSION: ICD-10-CM

## 2019-09-26 DIAGNOSIS — E78.5 HYPERLIPIDEMIA, UNSPECIFIED HYPERLIPIDEMIA TYPE: ICD-10-CM

## 2019-09-26 DIAGNOSIS — M06.9 RHEUMATOID ARTHRITIS FLARE: ICD-10-CM

## 2019-09-26 DIAGNOSIS — E55.9 VITAMIN D DEFICIENCY: ICD-10-CM

## 2019-09-26 DIAGNOSIS — M25.50 ARTHRALGIA, UNSPECIFIED JOINT: ICD-10-CM

## 2019-09-26 LAB
25(OH)D3+25(OH)D2 SERPL-MCNC: 23 NG/ML (ref 30–96)
ALBUMIN SERPL BCP-MCNC: 4 G/DL (ref 3.5–5.2)
ALP SERPL-CCNC: 53 U/L (ref 55–135)
ALT SERPL W/O P-5'-P-CCNC: 40 U/L (ref 10–44)
ANION GAP SERPL CALC-SCNC: 9 MMOL/L (ref 8–16)
AST SERPL-CCNC: 26 U/L (ref 10–40)
BILIRUB SERPL-MCNC: 0.9 MG/DL (ref 0.1–1)
BUN SERPL-MCNC: 23 MG/DL (ref 8–23)
CALCIUM SERPL-MCNC: 9.4 MG/DL (ref 8.7–10.5)
CCP AB SER IA-ACNC: 1.5 U/ML
CHLORIDE SERPL-SCNC: 103 MMOL/L (ref 95–110)
CHOLEST SERPL-MCNC: 271 MG/DL (ref 120–199)
CHOLEST/HDLC SERPL: 5 {RATIO} (ref 2–5)
CO2 SERPL-SCNC: 27 MMOL/L (ref 23–29)
CREAT SERPL-MCNC: 1.6 MG/DL (ref 0.5–1.4)
CRP SERPL-MCNC: 0.4 MG/L (ref 0–8.2)
ERYTHROCYTE [SEDIMENTATION RATE] IN BLOOD BY WESTERGREN METHOD: 6 MM/HR (ref 0–10)
EST. GFR  (AFRICAN AMERICAN): 50.8 ML/MIN/1.73 M^2
EST. GFR  (NON AFRICAN AMERICAN): 43.9 ML/MIN/1.73 M^2
GLUCOSE SERPL-MCNC: 85 MG/DL (ref 70–110)
HDLC SERPL-MCNC: 54 MG/DL (ref 40–75)
HDLC SERPL: 19.9 % (ref 20–50)
LDLC SERPL CALC-MCNC: 186.4 MG/DL (ref 63–159)
NONHDLC SERPL-MCNC: 217 MG/DL
POTASSIUM SERPL-SCNC: 4.3 MMOL/L (ref 3.5–5.1)
PROT SERPL-MCNC: 6.8 G/DL (ref 6–8.4)
RHEUMATOID FACT SERPL-ACNC: <10 IU/ML (ref 0–15)
SODIUM SERPL-SCNC: 139 MMOL/L (ref 136–145)
TRIGL SERPL-MCNC: 153 MG/DL (ref 30–150)

## 2019-09-26 PROCEDURE — 86140 C-REACTIVE PROTEIN: CPT

## 2019-09-26 PROCEDURE — 86431 RHEUMATOID FACTOR QUANT: CPT

## 2019-09-26 PROCEDURE — 80053 COMPREHEN METABOLIC PANEL: CPT

## 2019-09-26 PROCEDURE — 86200 CCP ANTIBODY: CPT

## 2019-09-26 PROCEDURE — 36415 COLL VENOUS BLD VENIPUNCTURE: CPT | Mod: PO

## 2019-09-26 PROCEDURE — 80061 LIPID PANEL: CPT

## 2019-09-26 PROCEDURE — 85651 RBC SED RATE NONAUTOMATED: CPT | Mod: PO

## 2019-09-26 PROCEDURE — 82306 VITAMIN D 25 HYDROXY: CPT

## 2019-09-27 ENCOUNTER — TELEPHONE (OUTPATIENT)
Dept: CARDIOLOGY | Facility: CLINIC | Age: 69
End: 2019-09-27

## 2019-09-27 NOTE — PROGRESS NOTES
Please CALL patient with lab results.  Start vitamin-D protocol.  Which means 65072 units weekly with 3 refills.  Check vitamin-D in 3 months.  Make sure patient has follow-up appointment with me.

## 2019-10-03 ENCOUNTER — OFFICE VISIT (OUTPATIENT)
Dept: RHEUMATOLOGY | Facility: CLINIC | Age: 69
End: 2019-10-03
Payer: MEDICARE

## 2019-10-03 ENCOUNTER — OFFICE VISIT (OUTPATIENT)
Dept: FAMILY MEDICINE | Facility: CLINIC | Age: 69
End: 2019-10-03
Payer: MEDICARE

## 2019-10-03 ENCOUNTER — PATIENT MESSAGE (OUTPATIENT)
Dept: ADMINISTRATIVE | Facility: OTHER | Age: 69
End: 2019-10-03

## 2019-10-03 VITALS
BODY MASS INDEX: 26.66 KG/M2 | TEMPERATURE: 98 F | HEIGHT: 72 IN | DIASTOLIC BLOOD PRESSURE: 82 MMHG | WEIGHT: 196.81 LBS | SYSTOLIC BLOOD PRESSURE: 120 MMHG | HEART RATE: 58 BPM

## 2019-10-03 VITALS
DIASTOLIC BLOOD PRESSURE: 82 MMHG | HEART RATE: 58 BPM | BODY MASS INDEX: 26.55 KG/M2 | WEIGHT: 196 LBS | HEIGHT: 72 IN | SYSTOLIC BLOOD PRESSURE: 120 MMHG

## 2019-10-03 DIAGNOSIS — R53.83 FATIGUE, UNSPECIFIED TYPE: ICD-10-CM

## 2019-10-03 DIAGNOSIS — E55.9 VITAMIN D DEFICIENCY, UNSPECIFIED: ICD-10-CM

## 2019-10-03 DIAGNOSIS — E78.2 MIXED HYPERLIPIDEMIA: ICD-10-CM

## 2019-10-03 DIAGNOSIS — I70.0 ABDOMINAL AORTIC ATHEROSCLEROSIS: ICD-10-CM

## 2019-10-03 DIAGNOSIS — G89.4 CHRONIC PAIN SYNDROME: ICD-10-CM

## 2019-10-03 DIAGNOSIS — M06.9 RHEUMATOID ARTHRITIS FLARE: Primary | ICD-10-CM

## 2019-10-03 DIAGNOSIS — F51.01 PRIMARY INSOMNIA: ICD-10-CM

## 2019-10-03 DIAGNOSIS — I10 ESSENTIAL HYPERTENSION: ICD-10-CM

## 2019-10-03 DIAGNOSIS — I25.118 CORONARY ARTERY DISEASE OF NATIVE ARTERY OF NATIVE HEART WITH STABLE ANGINA PECTORIS: Primary | ICD-10-CM

## 2019-10-03 DIAGNOSIS — Z53.09 STATINS CONTRAINDICATED: ICD-10-CM

## 2019-10-03 DIAGNOSIS — Z12.11 ENCOUNTER FOR SCREENING COLONOSCOPY: ICD-10-CM

## 2019-10-03 DIAGNOSIS — M25.50 ARTHRALGIA, UNSPECIFIED JOINT: ICD-10-CM

## 2019-10-03 DIAGNOSIS — Z13.6 SCREENING FOR AAA (ABDOMINAL AORTIC ANEURYSM): ICD-10-CM

## 2019-10-03 PROBLEM — I21.4 NSTEMI (NON-ST ELEVATED MYOCARDIAL INFARCTION): Status: ACTIVE | Noted: 2019-07-23

## 2019-10-03 PROCEDURE — 96372 THER/PROPH/DIAG INJ SC/IM: CPT | Mod: PBBFAC,PO

## 2019-10-03 PROCEDURE — 99215 PR OFFICE/OUTPT VISIT, EST, LEVL V, 40-54 MIN: ICD-10-PCS | Mod: S$PBB,,, | Performed by: INTERNAL MEDICINE

## 2019-10-03 PROCEDURE — 99214 OFFICE O/P EST MOD 30 MIN: CPT | Mod: S$PBB,,, | Performed by: FAMILY MEDICINE

## 2019-10-03 PROCEDURE — 99213 OFFICE O/P EST LOW 20 MIN: CPT | Mod: PBBFAC,PO | Performed by: FAMILY MEDICINE

## 2019-10-03 PROCEDURE — 99999 PR PBB SHADOW E&M-EST. PATIENT-LVL III: CPT | Mod: PBBFAC,,, | Performed by: FAMILY MEDICINE

## 2019-10-03 PROCEDURE — 99999 PR PBB SHADOW E&M-EST. PATIENT-LVL III: ICD-10-PCS | Mod: PBBFAC,,, | Performed by: INTERNAL MEDICINE

## 2019-10-03 PROCEDURE — 99999 PR PBB SHADOW E&M-EST. PATIENT-LVL III: CPT | Mod: PBBFAC,,, | Performed by: INTERNAL MEDICINE

## 2019-10-03 PROCEDURE — 99214 PR OFFICE/OUTPT VISIT, EST, LEVL IV, 30-39 MIN: ICD-10-PCS | Mod: S$PBB,,, | Performed by: FAMILY MEDICINE

## 2019-10-03 PROCEDURE — 99213 OFFICE O/P EST LOW 20 MIN: CPT | Mod: PBBFAC,27,PO | Performed by: INTERNAL MEDICINE

## 2019-10-03 PROCEDURE — 99215 OFFICE O/P EST HI 40 MIN: CPT | Mod: S$PBB,,, | Performed by: INTERNAL MEDICINE

## 2019-10-03 PROCEDURE — 99999 PR PBB SHADOW E&M-EST. PATIENT-LVL III: ICD-10-PCS | Mod: PBBFAC,,, | Performed by: FAMILY MEDICINE

## 2019-10-03 RX ORDER — HYDROCODONE BITARTRATE AND ACETAMINOPHEN 5; 325 MG/1; MG/1
1 TABLET ORAL EVERY 8 HOURS PRN
Qty: 90 TABLET | Refills: 0 | Status: SHIPPED | OUTPATIENT
Start: 2019-10-03 | End: 2019-11-02

## 2019-10-03 RX ORDER — ESZOPICLONE 3 MG/1
3 TABLET, FILM COATED ORAL NIGHTLY
Qty: 30 TABLET | Refills: 3 | Status: SHIPPED | OUTPATIENT
Start: 2019-10-03 | End: 2019-11-02

## 2019-10-03 RX ORDER — CYANOCOBALAMIN 1000 UG/ML
1000 INJECTION, SOLUTION INTRAMUSCULAR; SUBCUTANEOUS
Status: COMPLETED | OUTPATIENT
Start: 2019-10-03 | End: 2019-10-03

## 2019-10-03 RX ORDER — PREDNISONE 2.5 MG/1
5 TABLET ORAL DAILY PRN
Qty: 60 TABLET | Refills: 3 | Status: SHIPPED | OUTPATIENT
Start: 2019-10-03 | End: 2019-11-02

## 2019-10-03 RX ADMIN — CYANOCOBALAMIN 1000 MCG: 1000 INJECTION, SOLUTION INTRAMUSCULAR at 06:10

## 2019-10-03 ASSESSMENT — ROUTINE ASSESSMENT OF PATIENT INDEX DATA (RAPID3)
FATIGUE SCORE: 2.2
MDHAQ FUNCTION SCORE: .5
TOTAL RAPID3 SCORE: 5.06
PATIENT GLOBAL ASSESSMENT SCORE: 6.5
PSYCHOLOGICAL DISTRESS SCORE: 1.1
PAIN SCORE: 7

## 2019-10-03 NOTE — ASSESSMENT & PLAN NOTE
Recheck in 3 months. NMR and lipoprotein a.     Discussed hyperlipidemia disease course.  Discussed the risk of cardiovascular disease, increase stroke and heart attack risk.  Patient voiced understanding and understood the treatment plan. All questions were answered.  Discussed healthy diet and increased need for exercise.

## 2019-10-03 NOTE — PROGRESS NOTES
Subjective:       Patient ID: Aurelio Perkins is a 68 y.o. male.    Chief Complaint: Rheumatoid Arthritis    Follow up: RA was on xeljanz,on diclofenac 100 mg bid but has 1 kidney, he had his meningioma removed and now is one hormones for  pituitary failure. Pain is located in multiple joints, both shoulder(s), both elbow(s), both wrist(s), both MCP(s): 1st, 2nd, 3rd, 4th and 5th, both PIP(s): 1st, 2nd, 3rd, 4th and 5th, both DIP(s): 1st and 2nd, both hip(s), both knee(s) and both MTP(s): 1st, 2nd, 3rd, 4th and 5th, is described as aching, pulsating, shooting and throbbing, and is constant, moderate .  Associated symptoms include: crepitation, decreased range of motion, edema, effusion, tenderness and warmth.     Review of Systems   Constitutional: Positive for activity change. Negative for appetite change, chills, diaphoresis and unexpected weight change.   HENT: Negative for congestion, ear pain, facial swelling, mouth sores, nosebleeds, postnasal drip, rhinorrhea, sinus pressure, sneezing, sore throat, tinnitus and voice change.    Eyes: Negative for pain, discharge, redness, itching and visual disturbance.   Respiratory: Negative for apnea, cough, chest tightness, shortness of breath and wheezing.    Cardiovascular: Negative for chest pain, palpitations and leg swelling.   Gastrointestinal: Negative for abdominal pain, constipation, diarrhea, nausea and vomiting.   Endocrine: Negative for cold intolerance, heat intolerance, polydipsia and polyuria.   Genitourinary: Negative for decreased urine volume, difficulty urinating, flank pain, frequency, hematuria and urgency.   Musculoskeletal: Positive for back pain and neck stiffness. Negative for arthralgias, gait problem and neck pain.   Skin: Positive for rash. Negative for pallor and wound.   Allergic/Immunologic: Negative for immunocompromised state.   Neurological: Negative for dizziness, tremors, seizures, syncope, weakness and numbness.   Hematological:  Negative for adenopathy. Does not bruise/bleed easily.   Psychiatric/Behavioral: Negative for sleep disturbance and suicidal ideas. The patient is not nervous/anxious.          Objective:     /82 (BP Location: Left arm, Patient Position: Sitting, BP Method: Medium (Automatic))   Pulse (!) 58   Ht 6' (1.829 m)   Wt 88.9 kg (196 lb)   BMI 26.58 kg/m²      Physical Exam   Vitals reviewed.  Constitutional: He is oriented to person, place, and time. No distress.   HENT:   Head: Normocephalic and atraumatic.   Mouth/Throat: Oropharynx is clear and moist.   Eyes: EOM are normal. Pupils are equal, round, and reactive to light.   Neck: Neck supple. No thyromegaly present.   Cardiovascular: Normal rate, regular rhythm and normal heart sounds.  Exam reveals no gallop and no friction rub.    No murmur heard.  Pulmonary/Chest: Breath sounds normal. He has no wheezes. He has no rales. He exhibits no tenderness.   Abdominal: There is no tenderness. There is no rebound and no guarding.       Right Side Rheumatological Exam     Examination finds the 2nd MCP, 3rd MCP, 4th MCP and 5th MCP normal.    The patient is tender to palpation of the 1st PIP, 1st MCP, 2nd PIP, 2nd MCP, 3rd PIP, 3rd MCP, 4th PIP, 4th MCP and 5th PIP    He has swelling of the elbow, wrist, knee, 1st PIP, 1st MCP, 2nd PIP, 2nd MCP, 3rd PIP, 3rd MCP, 4th PIP, 4th MCP, 5th PIP and 5th MCP    The patient has an enlarged wrist, knee, 1st PIP, 2nd PIP, 3rd PIP, 4th PIP and 5th PIP    Shoulder Exam   Tenderness Location: no tenderness    Range of Motion   Active abduction: abnormal   Adduction: abnormal  Sensation: normal    Knee Exam   Tenderness Location: medial joint line and LCL    Range of Motion   Extension: normal   Flexion: normal   Patellofemoral Crepitus: positive  Effusion: positive  Sensation: normal    Hip Exam   Tenderness Location: posterior and anterior    Range of Motion   Extension: abnormal   Flexion: abnormal   Sensation:  normal    Elbow/Wrist Exam   Tenderness Location: no tenderness    Range of Motion   Elbow   Flexion: normal   Sensation: normal    Muscle Strength (0-5 scale):  Neck Flexion:  3  Neck Extension: 3  Deltoid:  3  Biceps: 3/5   Triceps:  3  Quadriceps:  3   Distal Lower Extremity: 3    Left Side Rheumatological Exam     Examination finds the elbow, wrist, 1st MCP, 2nd MCP, 3rd MCP, 4th MCP and 5th MCP normal.    The patient is tender to palpation of the 1st PIP, 1st MCP, 2nd PIP, 2nd MCP, 3rd PIP, 3rd MCP, 4th PIP, 4th MCP, 5th PIP and 5th MCP.    He has swelling of the wrist, knee, 1st PIP, 1st MCP, 2nd PIP, 2nd MCP, 3rd PIP, 3rd MCP, 4th PIP, 4th MCP, 5th PIP and 5th MCP    The patient has an enlarged knee, 1st PIP, 2nd PIP, 3rd PIP, 4th PIP and 5th PIP.    Shoulder Exam   Tenderness Location: no tenderness    Range of Motion   Active abduction: abnormal   Extension: abnormal   Sensation: normal    Knee Exam   Tenderness Location: lateral joint line and medial joint line    Range of Motion   Extension: normal   Flexion: normal     Patellofemoral Crepitus: positive  Effusion: positive  Sensation: normal    Hip Exam   Tenderness Location: posterior and anterior    Range of Motion   Extension: abnormal   Flexion: abnormal   Sensation: normal    Elbow/Wrist Exam     Range of Motion   Elbow   Flexion: normal   Sensation: normal    Muscle Strength (0-5 scale):  Neck Flexion:  3  Neck Extension: 3  Deltoid:  3  Biceps: 3/5   Triceps:  3  Quadriceps:  3   Distal Lower Extremity: 3      Back/Neck Exam   General Inspection   Gait: normal       Tenderness Right paramedian tenderness of the Upper C-Spine, Lower C-Spine, Lower L-Spine and SI Joint.Left paramedian tenderness of the Upper C-Spine, Lower L-Spine, Lower C-Spine and SI Joint.    Back Range of Motion   Extension: abnormal  Flexion: abnormal  Lateral Bend Right: abnormal  Lateral Bend Left: abnormal  Rotation Right: abnormal  Rotation Left: abnormal    Neck Range of  Motion   Flexion: Limited  Extension: Limited  Right Lateral Bend: abnormal  Left Lateral Bend: abnormal  Right Rotation: abnormal  Left Rotation: abnormal  Lymphadenopathy:     He has no cervical adenopathy.   Neurological: He is alert and oriented to person, place, and time. He displays weakness. He exhibits normal muscle tone.   Reflex Scores:       Patellar reflexes are 2+ on the right side and 2+ on the left side.  Skin: No rash noted. No erythema. No pallor.     Psychiatric: Mood and affect normal.   Musculoskeletal: He exhibits edema and deformity. He exhibits no tenderness.           Results for orders placed or performed in visit on 09/26/19   Sedimentation rate   Result Value Ref Range    Sed Rate 6 0 - 10 mm/Hr   Rheumatoid factor   Result Value Ref Range    Rheumatoid Factor <10.0 0.0 - 15.0 IU/mL   Cyclic citrul peptide antibody, IgG   Result Value Ref Range    CCP Antibodies 1.5 <5.0 U/mL   C-reactive protein   Result Value Ref Range    CRP 0.4 0.0 - 8.2 mg/L   Vitamin D   Result Value Ref Range    Vit D, 25-Hydroxy 23 (L) 30 - 96 ng/mL   Comprehensive metabolic panel   Result Value Ref Range    Sodium 139 136 - 145 mmol/L    Potassium 4.3 3.5 - 5.1 mmol/L    Chloride 103 95 - 110 mmol/L    CO2 27 23 - 29 mmol/L    Glucose 85 70 - 110 mg/dL    BUN, Bld 23 8 - 23 mg/dL    Creatinine 1.6 (H) 0.5 - 1.4 mg/dL    Calcium 9.4 8.7 - 10.5 mg/dL    Total Protein 6.8 6.0 - 8.4 g/dL    Albumin 4.0 3.5 - 5.2 g/dL    Total Bilirubin 0.9 0.1 - 1.0 mg/dL    Alkaline Phosphatase 53 (L) 55 - 135 U/L    AST 26 10 - 40 U/L    ALT 40 10 - 44 U/L    Anion Gap 9 8 - 16 mmol/L    eGFR if African American 50.8 (A) >60 mL/min/1.73 m^2    eGFR if non  43.9 (A) >60 mL/min/1.73 m^2   Lipid panel   Result Value Ref Range    Cholesterol 271 (H) 120 - 199 mg/dL    Triglycerides 153 (H) 30 - 150 mg/dL    HDL 54 40 - 75 mg/dL    LDL Cholesterol 186.4 (H) 63.0 - 159.0 mg/dL    Hdl/Cholesterol Ratio 19.9 (L) 20.0 - 50.0  %    Total Cholesterol/HDL Ratio 5.0 2.0 - 5.0    Non-HDL Cholesterol 217 mg/dL         Assessment:       Encounter Diagnoses   Name Primary?    Rheumatoid arthritis flare Yes    Fatigue, unspecified type     Arthralgia, unspecified joint     Chronic pain syndrome     Primary insomnia     Vitamin D deficiency, unspecified           Plan:     Rheumatoid arthritis flare  -     predniSONE (DELTASONE) 2.5 MG tablet; Take 2 tablets (5 mg total) by mouth daily as needed.  Dispense: 60 tablet; Refill: 3  -     HYDROcodone-acetaminophen (NORCO) 5-325 mg per tablet; Take 1 tablet by mouth every 8 (eight) hours as needed for Pain.  Dispense: 90 tablet; Refill: 0  -     CBC auto differential; Future; Expected date: 10/03/2019  -     Comprehensive metabolic panel; Future; Expected date: 10/03/2019  -     C-reactive protein; Future; Expected date: 10/03/2019  -     Sedimentation rate; Future; Expected date: 10/03/2019  -     Cancel: Vitamin D; Future; Expected date: 10/03/2019  -     PTH, intact; Future; Expected date: 10/03/2019  -     cyanocobalamin injection 1,000 mcg  -     tofacitinib (XELJANZ) 5 mg Tab; Take one tablet (5mg) by mouth 2 (two) times daily.  Dispense: 60 tablet; Refill: 12    Fatigue, unspecified type  -     predniSONE (DELTASONE) 2.5 MG tablet; Take 2 tablets (5 mg total) by mouth daily as needed.  Dispense: 60 tablet; Refill: 3  -     HYDROcodone-acetaminophen (NORCO) 5-325 mg per tablet; Take 1 tablet by mouth every 8 (eight) hours as needed for Pain.  Dispense: 90 tablet; Refill: 0  -     CBC auto differential; Future; Expected date: 10/03/2019  -     Comprehensive metabolic panel; Future; Expected date: 10/03/2019  -     C-reactive protein; Future; Expected date: 10/03/2019  -     Sedimentation rate; Future; Expected date: 10/03/2019  -     Cancel: Vitamin D; Future; Expected date: 10/03/2019  -     PTH, intact; Future; Expected date: 10/03/2019  -     cyanocobalamin injection 1,000 mcg  -      tofacitinib (XELJANZ) 5 mg Tab; Take one tablet (5mg) by mouth 2 (two) times daily.  Dispense: 60 tablet; Refill: 12    Arthralgia, unspecified joint  -     predniSONE (DELTASONE) 2.5 MG tablet; Take 2 tablets (5 mg total) by mouth daily as needed.  Dispense: 60 tablet; Refill: 3  -     HYDROcodone-acetaminophen (NORCO) 5-325 mg per tablet; Take 1 tablet by mouth every 8 (eight) hours as needed for Pain.  Dispense: 90 tablet; Refill: 0  -     CBC auto differential; Future; Expected date: 10/03/2019  -     Comprehensive metabolic panel; Future; Expected date: 10/03/2019  -     C-reactive protein; Future; Expected date: 10/03/2019  -     Sedimentation rate; Future; Expected date: 10/03/2019  -     Cancel: Vitamin D; Future; Expected date: 10/03/2019  -     PTH, intact; Future; Expected date: 10/03/2019  -     cyanocobalamin injection 1,000 mcg  -     tofacitinib (XELJANZ) 5 mg Tab; Take one tablet (5mg) by mouth 2 (two) times daily.  Dispense: 60 tablet; Refill: 12    Chronic pain syndrome  -     predniSONE (DELTASONE) 2.5 MG tablet; Take 2 tablets (5 mg total) by mouth daily as needed.  Dispense: 60 tablet; Refill: 3  -     HYDROcodone-acetaminophen (NORCO) 5-325 mg per tablet; Take 1 tablet by mouth every 8 (eight) hours as needed for Pain.  Dispense: 90 tablet; Refill: 0  -     CBC auto differential; Future; Expected date: 10/03/2019  -     Comprehensive metabolic panel; Future; Expected date: 10/03/2019  -     C-reactive protein; Future; Expected date: 10/03/2019  -     Sedimentation rate; Future; Expected date: 10/03/2019  -     Cancel: Vitamin D; Future; Expected date: 10/03/2019  -     PTH, intact; Future; Expected date: 10/03/2019  -     cyanocobalamin injection 1,000 mcg  -     tofacitinib (XELJANZ) 5 mg Tab; Take one tablet (5mg) by mouth 2 (two) times daily.  Dispense: 60 tablet; Refill: 12    Primary insomnia  -     eszopiclone (LUNESTA) 3 mg Tab; Take 1 tablet (3 mg total) by mouth every evening.   Dispense: 30 tablet; Refill: 3  -     CBC auto differential; Future; Expected date: 10/03/2019  -     Comprehensive metabolic panel; Future; Expected date: 10/03/2019  -     C-reactive protein; Future; Expected date: 10/03/2019  -     Sedimentation rate; Future; Expected date: 10/03/2019  -     Cancel: Vitamin D; Future; Expected date: 10/03/2019  -     PTH, intact; Future; Expected date: 10/03/2019  -     cyanocobalamin injection 1,000 mcg  -     tofacitinib (XELJANZ) 5 mg Tab; Take one tablet (5mg) by mouth 2 (two) times daily.  Dispense: 60 tablet; Refill: 12    Vitamin D deficiency, unspecified   -     Cancel: Vitamin D; Future; Expected date: 10/03/2019  -     cyanocobalamin injection 1,000 mcg  -     tofacitinib (XELJANZ) 5 mg Tab; Take one tablet (5mg) by mouth 2 (two) times daily.  Dispense: 60 tablet; Refill: 12        he needs to DC nsaid because he had a renal issue this 1 kidney.

## 2019-10-03 NOTE — PROGRESS NOTES
Subjective:      Patient ID: Aurelio Perkins is a 68 y.o. male.    Chief Complaint: Follow-up (3 month f/u review labs); Colon Cancer Screening (pt wants ColoGuard); and Hypertension (patient wants to discuss blood pressure medications)      Problem List Items Addressed This Visit     Essential hypertension    Overview     Chronic.  Controlled today.  Patient taking metoprolol.  Blood pressure is controlled today.  Patient reports compliance.  No side effects reported.  Patient is not currently in digital medicine hypertension program.  Will sign him up today.    The patient denies any chest pain, shortness of breath, palpitations, dizziness, lightheadedness, headache, arm numbness tingling or weakness, syncope.         Current Assessment & Plan     Discussed hypertension disease course.  Discussed-DASH-diet and exercise.  Discussed medication regimen importance of treating high blood pressure.  Patient understood and advised of risk of untreated blood pressure.  ER precautions were given for symptoms of hypertensive urgency and emergency.    Signed up for digital hypertension program.         Hyperlipidemia    Overview     Lab Results   Component Value Date    CHOL 247 (H) 07/10/2019     Lab Results   Component Value Date    HDL 46 07/10/2019     Lab Results   Component Value Date    LDLCALC 167.0 (H) 07/10/2019     Lab Results   Component Value Date    TRIG 170 (H) 07/10/2019     Lab Results   Component Value Date    CHOLHDL 18.6 (L) 07/10/2019    Chronic.  Uncontrolled.  Patient not taking any medication for this.  ===================================    Patient reports statins contraindicated per Rheumatology while on Xeljanz.  =========================================    This condition is chronic.  Currently uncontrolled.  Patient reports that Rheumatology as instructed him not to take statins while on Xeljanz.    Patient does have a history of coronary artery disease. Patient takes aspirin.    Lab Results    Component Value Date    CHOL 271 (H) 09/26/2019    CHOL 247 (H) 07/10/2019     Lab Results   Component Value Date    HDL 54 09/26/2019    HDL 46 07/10/2019     Lab Results   Component Value Date    LDLCALC 186.4 (H) 09/26/2019    LDLCALC 167.0 (H) 07/10/2019     Lab Results   Component Value Date    TRIG 153 (H) 09/26/2019    TRIG 170 (H) 07/10/2019     Lab Results   Component Value Date    CHOLHDL 19.9 (L) 09/26/2019    CHOLHDL 18.6 (L) 07/10/2019       Lab Results   Component Value Date    ALT 40 09/26/2019    AST 26 09/26/2019    ALKPHOS 53 (L) 09/26/2019    BILITOT 0.9 09/26/2019       The patient denies any myalgias, chest pain, shortness of breath, abdominal pain, nausea, vomiting, constipation, diarrhea, jaundice, skin changes at this time.         Current Assessment & Plan     Recheck in 3 months. NMR and lipoprotein a.     Discussed hyperlipidemia disease course.  Discussed the risk of cardiovascular disease, increase stroke and heart attack risk.  Patient voiced understanding and understood the treatment plan. All questions were answered.  Discussed healthy diet and increased need for exercise.           Screening for AAA (abdominal aortic aneurysm)    Overview     Former smoker.  ========================  Narrative     EXAMINATION:  US ABDOMINAL AORTA    CLINICAL HISTORY:  Encounter for screening for cardiovascular disorders    TECHNIQUE:  Limited ultrasound was performed of the abdominal aorta, with cross sectional diameter measurements obtained.    COMPARISON:  None.    FINDINGS:  The following measurements are given AP by transverse.    The proximal abdominal aorta measures 2.9 x 2.9 cm.    The mid abdominal aorta measures 2.1 x 2.1 cm.    The distal abdominal aorta measures 1.6 x 1.6 cm.    The right iliac artery measures 1.1 x 1.2 cm.    The left iliac artery measures 1.1 x 1.2 cm.    Aortoiliac atherosclerosis: Present    Hepatic steatosis incidentally noted.      Impression       Ectatic proximal  abdominal aorta measuring up to 2.9 cm without evidence of aneurysm.  Incidentally noted hepatic steatosis.      Electronically signed by: Austin Varela MD  Date: 07/18/2019  Time: 09:35              Current Assessment & Plan     Incidental hepatic steatosis.  And aortic atherosclerosis.  Patient needs to be on Repatha.  Statin is contraindicated due to other medications per rheumatologist.         Encounter for screening colonoscopy    Overview     Patient would like to have another Cologuard.  Patient has had 1 performed previously.         Fatigue    Overview     Chronic.  Uncontrolled.  Checking labs.         Coronary artery disease of native artery of native heart with stable angina pectoris - Primary    Overview     Chronic.  Questionable vasospasms with coronary artery disease. Patient following with Cardiology.  Patient reports that rheumatologist has said that he cannot take statins due to comorbid conditions and medications.    Patient needs to be on Repatha.    ===============================================  Cely Moreno MD     7/24/2019 10:50 AM    Indication for the procedure :                                                                                        NSTEMI    Procedures performed, findings and recommendations  · Moderate sedation  · Coronary angiography  · Left heart catheterization  · Non obstructive CAD without any culprit lesion. ? vasospasm  · Moderate AS      Details of the procedure  Patient was admitted observation for cardiac catheterization. The   risks, benefits and alternative therapy options were discussed in   detail. Informed consent was obtained. Conscious sedation was   used using intravenous midazolam and intravenous fentanyl per   protocol.    Moderate sedation    Physician - Cely Moreno MD, [facility], [facility]    Sedation monitor - homer DAVEY    Monitored conscious sedation time - 45 minutes         Statins contraindicated    Abdominal aortic atherosclerosis            Past Medical History:  Past Medical History:   Diagnosis Date    Arthritis     Cancer     prostate cancer-Removed Prostate    Chronic kidney disease     one kidney    Encounter for long-term (current) use of medications     Mitral valve prolapse      Past Surgical History:   Procedure Laterality Date    CRANIOTOMY Right 5/29/2019    Procedure: CRANIOTOMY  (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)  Co-surgery with Dr Vaibhav Jara - please put in his room  Microscope [employer];  Surgeon: Jimmy Segura DO;  Location: [facility] OR [address] [place] [address];  Service: Neurosurgery;  Laterality: Right;    HEMORRHOID SURGERY      NEPHRECTOMY      PROSTATECTOMY      TONSILLECTOMY       Review of patient's allergies indicates:   Allergen Reactions    Antihistamines - alkylamine Other (See Comments)     hallucinations    Benadryl [diphenhydramine hcl] Other (See Comments)     hallucinations    Codeine Itching     Turns red    Lodine [etodolac] Other (See Comments)     fatigue    Methotrexate analogues Other (See Comments)     Sunlight sensitivity    Morphine Itching     Turns red     Medication List with Changes/Refills   New Medications    EVOLOCUMAB (REPATHA PUSHTRONEX) 420 MG/3.5 ML INJT    Inject 3.5 mLs (420 mg total) into the skin every 30 days.    HYDROCODONE-ACETAMINOPHEN (NORCO) 5-325 MG PER TABLET    Take 1 tablet by mouth every 8 (eight) hours as needed for Pain.    PREDNISONE (DELTASONE) 2.5 MG TABLET    Take 2 tablets (5 mg total) by mouth daily as needed.   Current Medications    ASPIRIN (ECOTRIN) 81 MG EC TABLET    Take 81 mg by mouth.    HYDROCODONE-ACETAMINOPHEN (NORCO) 5-325 MG PER TABLET    Take 1 tablet by mouth every 6 (six) hours as needed for Pain.    HYDROCORTISONE (CORTEF) 10 MG TAB    Take Hydrocortisone 15  mg in the morning and 5 mg in the evening (4PM).    LEVOTHYROXINE (SYNTHROID) 125 MCG TABLET    Take 1 tablet (125 mcg total) by  mouth once daily.    METOPROLOL SUCCINATE (TOPROL-XL) 25 MG 24 HR TABLET    Take 25 mg by mouth.    NITROGLYCERIN (NITROSTAT) 0.4 MG SL TABLET    Place 0.4 mg under the tongue.    SYRINGE, DISPOSABLE, 1 ML SYRG    1 Syringe by Misc.(Non-Drug; Combo Route) route once a week.    TESTOSTERONE CYPIONATE (DEPOTESTOTERONE CYPIONATE) 200 MG/ML INJECTION    Inject 100 mg every 10 days    VITAMIN D2 50,000 UNIT CAPSULE    TAKE ONE CAPSULE BY MOUTH ONE TIME PER WEEK   Changed and/or Refilled Medications    Modified Medication Previous Medication    ESZOPICLONE (LUNESTA) 3 MG TAB eszopiclone (LUNESTA) 3 mg Tab       Take 1 tablet (3 mg total) by mouth every evening.    Take 1 tablet (3 mg total) by mouth every evening.    TOFACITINIB (XELJANZ) 5 MG TAB tofacitinib (XELJANZ) 5 mg Tab       Take one tablet (5mg) by mouth 2 (two) times daily.    Take one tablet (5mg) by mouth 2 (two) times daily.   Discontinued Medications    AMLODIPINE (NORVASC) 5 MG TABLET    Take 5 mg by mouth.    DICLOFENAC (VOLTAREN) 75 MG EC TABLET    Take 75 mg by mouth 2 (two) times daily.    DICLOFENAC SODIUM 100 MG 24 HR TABLET    TAKE TWO TABLETS BY MOUTH DAILY    LEVETIRACETAM (KEPPRA) 500 MG TAB    Take 1 tablet (500 mg total) by mouth 2 (two) times daily.    ROSUVASTATIN (CRESTOR) 20 MG TABLET    Take 1 tablet (20 mg total) by mouth once daily.      Social History     Tobacco Use    Smoking status: Never Smoker    Smokeless tobacco: Never Used   Substance Use Topics    Alcohol use: No      Family History   Adopted: Yes   Family history unknown: Yes       I have reviewed the complete PMH, social history, surgical history, allergies and medications.  As well as family history.    Review of Systems   Constitutional: Negative for activity change and fatigue.   HENT: Negative for congestion and sinus pain.    Eyes: Negative for visual disturbance.   Respiratory: Negative for chest tightness and shortness of breath.    Cardiovascular: Negative for  palpitations and leg swelling.   Gastrointestinal: Negative for abdominal pain, diarrhea and nausea.   Endocrine: Negative for polyuria.   Genitourinary: Negative for difficulty urinating and frequency.   Musculoskeletal: Negative for arthralgias and joint swelling.   Skin: Negative for rash.   Neurological: Negative for dizziness and headaches.   Psychiatric/Behavioral: Negative for agitation. The patient is not nervous/anxious.        Objective:     /82   Pulse (!) 58   Temp 98.2 °F (36.8 °C) (Oral)   Ht 6' (1.829 m)   Wt 89.3 kg (196 lb 12.8 oz)   BMI 26.69 kg/m²     Physical Exam   Constitutional: He is oriented to person, place, and time. He appears well-developed and well-nourished. No distress.   HENT:   Head: Normocephalic and atraumatic.   Eyes: Pupils are equal, round, and reactive to light. EOM are normal.   Neck: Normal range of motion. Neck supple.   Cardiovascular: Normal rate, regular rhythm and intact distal pulses.   Murmur heard.   Systolic murmur is present with a grade of 3/6.  Pulmonary/Chest: Effort normal and breath sounds normal. No respiratory distress. He has no wheezes.   Musculoskeletal: Normal range of motion. He exhibits no edema.   Neurological: He is alert and oriented to person, place, and time. No cranial nerve deficit.   Skin: Skin is warm and dry. Capillary refill takes less than 2 seconds.   Psychiatric: He has a normal mood and affect. His behavior is normal.   Nursing note and vitals reviewed.      Assessment:     1. Coronary artery disease of native artery of native heart with stable angina pectoris    2. Mixed hyperlipidemia    3. Encounter for screening colonoscopy    4. Essential hypertension    5. Fatigue, unspecified type    6. Statins contraindicated    7. Screening for AAA (abdominal aortic aneurysm)    8. Abdominal aortic atherosclerosis        Plan:     I have Reviewed and summarized old records.  I have performed thorough medication reconciliation today  and discussed risk and benefits of each medication.  I have reviewed labs and discussed with patient.  All questions were answered.  I am requesting old records and will review them once they are available.    Problem List Items Addressed This Visit     Essential hypertension     Discussed hypertension disease course.  Discussed-DASH-diet and exercise.  Discussed medication regimen importance of treating high blood pressure.  Patient understood and advised of risk of untreated blood pressure.  ER precautions were given for symptoms of hypertensive urgency and emergency.    Signed up for digital hypertension program.         Relevant Orders    Hypertension Digital Medicine ([facility]) Enrollment Order (Completed)    Hypertension Digital Medicine ([facility]): Assign Onboarding Questionnaires (Completed)    [facility] Patient Entered Blood Pressure    Hyperlipidemia     Recheck in 3 months. NMR and lipoprotein a.     Discussed hyperlipidemia disease course.  Discussed the risk of cardiovascular disease, increase stroke and heart attack risk.  Patient voiced understanding and understood the treatment plan. All questions were answered.  Discussed healthy diet and increased need for exercise.           Relevant Medications    evolocumab (REPATHA PUSHTRONEX) 420 mg/3.5 mL Injt    Other Relevant Orders    LIPOPROTEIN A (LPA)    Lipoprotein Analysis, by NMR    Screening for AAA (abdominal aortic aneurysm)     Incidental hepatic steatosis.  And aortic atherosclerosis.  Patient needs to be on Repatha.  Statin is contraindicated due to other medications per rheumatologist.         Relevant Orders    US Abdominal Aorta    Encounter for screening colonoscopy    Relevant Orders    Misc Sendout Test, Non-Blood COLOGUARD    Fatigue    Coronary artery disease of native artery of native heart with stable angina pectoris - Primary    Relevant Medications    evolocumab (REPATHA PUSHTRONEX) 420 mg/3.5 mL Injt    Other Relevant Orders    Hypertension  Digital Medicine ([facility]) Enrollment Order (Completed)    Hypertension Digital Medicine ([facility]): Assign Onboarding Questionnaires (Completed)    LIPOPROTEIN A (LPA)    Lipoprotein Analysis, by [email or web address] Patient Entered Blood Pressure    Statins contraindicated    Relevant Medications    evolocumab (REPATHA PUSHTRONEX) 420 mg/3.5 mL Injt    Abdominal aortic atherosclerosis    Relevant Orders    US Abdominal Aorta          Follow up in about 4 weeks (around 10/31/2019), or if symptoms worsen or fail to improve, for LAB RESULTS.    If no improvement in symptoms or symptoms worsen, advised to call/follow-up at clinic or go to ER. Patient voiced understanding and all questions/concerns were addressed.     DISCLAIMER: This note was compiled by using a speech recognition dictation system and therefore please be aware that typographical / speech recognition errors can and do occur.  Please contact me if you see any errors specifically.    Cuauhtemoc Gardner MD  We Offer Telehealth & Same Day Appointments!   Book your Telehealth appointment with me through my nurse or   Clinic appointments on [employer]!    Office: [phone]          Check out my Facebook Page and Follow Me at: CLICK HERE    Check out my website at [employer] by clicking on: CLICK HERE    To Schedule appointments online, go to [employer]: CLICK HERE     Location: [email or web address]    [address] [place] [address]    FAX: [phone]

## 2019-10-03 NOTE — PATIENT INSTRUCTIONS
Taking Your Blood Pressure  Blood pressure is the force of blood against the artery wall as it moves from the heart through the blood vessels. You can take your own blood pressure reading using a digital monitor. Take your readings the same each time, using the same arm. Take readings as often as your healthcare provider instructs.  About blood pressure monitors  Blood pressure monitors are designed for certain ages and cases. You can find monitors for older adults, for pregnant women, and for children. Make sure the one you choose is the right one for your age and situation.  The American Heart Association recommends an automatic cuff monitor that fits on your upper arm (bicep). The cuff should fit your arm size. A cuff thats too large or too small will not give an accurate reading. Measure around your upper arm to find your size.  Monitors that attach to your finger or wrist are not as accurate as monitors for your upper arm.  Ask your healthcare provider for help in choosing a monitor. Bring your monitor to your next provider visit if you need help in using it the correct way.  The steps below are general instructions for using an automatic digital monitor.  Step 1. Relax    · Take your blood pressure at the same time every day, such as in the morning or evening, or at the time your healthcare provider recommends.  · Wait at least a half-hour after smoking, eating, or exercising. Don't drink coffee, tea, soda, or other caffeinated beverages before checking your blood pressure.  · Sit comfortably at a table with both feet on the floor. Do not cross your legs or feet. Place the monitor near you.  · Rest for a few minutes before you begin.  Step 2. Wrap the cuff    · Place your arm on the table, palm up. Your arm should be at the level of your heart. Wrap the cuff around your upper arm, just above your elbow. Its best done on bare skin, not over clothing. Most cuffs will indicate where the brachial artery (the  blood vessel in the middle of the arm at the inner side of the elbow) should line up with the cuff. Look in your monitor's instruction booklet for an illustration. You can also bring your cuff to your healthcare provider and have them show you how to correctly place the cuff.  Step 3. Inflate the cuff    · Push the button that starts the pump.  · The cuff will tighten, then loosen.  · The numbers will change. When they stop changing, your blood pressure reading will appear.  · Take 2 or 3 readings one minute apart.  Step 4. Write down the results of each reading    · Write down your blood pressure numbers for each reading. Note the date and time. Keep your results in one place, such as a notebook. Even if your monitor has a built-in memory, keep a hard copy of the readings.  · Remove the cuff from your arm. Turn off the machine.  · Bring your blood pressure records with your healthcare providers at each visit.  · If you start a new blood pressure medicine, note the day you started the new medicine. Also note the day if you change the dose of your medicine. This information goes on your blood pressure recording sheet. This will help your healthcare provider monitor how well the medicine changes are working.  · Ask your healthcare provider what numbers should prompt you to call him or her. Also ask what numbers should prompt you to get help right away.  Date Last Reviewed: 11/1/2016  © 2495-1712 [employer]. [address] [place] [address], [place] [address]. All rights reserved. This information is not intended as a substitute for professional medical care. Always follow your healthcare professional's instructions.

## 2019-10-03 NOTE — PROGRESS NOTES
Administered 1 cc ( 1000 mcg/ml ) of b12 to the left upper outer arm. Informed of s/s to report verbalized understanding. No adverse reactions noted.    Lot # [id number]  Expiration feb 21

## 2019-10-04 ENCOUNTER — PATIENT MESSAGE (OUTPATIENT)
Dept: FAMILY MEDICINE | Facility: CLINIC | Age: 69
End: 2019-10-04

## 2019-10-05 PROBLEM — Z53.09 STATINS CONTRAINDICATED: Status: ACTIVE | Noted: 2019-10-05

## 2019-10-05 PROBLEM — I70.0 ABDOMINAL AORTIC ATHEROSCLEROSIS: Status: ACTIVE | Noted: 2019-10-05

## 2019-10-05 PROBLEM — I25.118 CORONARY ARTERY DISEASE OF NATIVE ARTERY OF NATIVE HEART WITH STABLE ANGINA PECTORIS: Status: ACTIVE | Noted: 2019-10-05

## 2019-10-05 NOTE — ASSESSMENT & PLAN NOTE
Incidental hepatic steatosis.  And aortic atherosclerosis.  Patient needs to be on Repatha.  Statin is contraindicated due to other medications per rheumatologist.

## 2019-10-05 NOTE — ASSESSMENT & PLAN NOTE
Discussed hypertension disease course.  Discussed-DASH-diet and exercise.  Discussed medication regimen importance of treating high blood pressure.  Patient understood and advised of risk of untreated blood pressure.  ER precautions were given for symptoms of hypertensive urgency and emergency.    Signed up for digital hypertension program.

## 2019-10-09 ENCOUNTER — PATIENT OUTREACH (OUTPATIENT)
Dept: OTHER | Facility: OTHER | Age: 69
End: 2019-10-09

## 2019-10-14 ENCOUNTER — TELEPHONE (OUTPATIENT)
Dept: PHARMACY | Facility: CLINIC | Age: 69
End: 2019-10-14

## 2019-10-14 PROBLEM — Z00.01 ENCOUNTER FOR GENERAL ADULT MEDICAL EXAMINATION WITH ABNORMAL FINDINGS: Status: RESOLVED | Noted: 2019-07-09 | Resolved: 2019-10-14

## 2019-10-14 NOTE — PROGRESS NOTES
Digital Medicine: Health  Introduction    Introduced Aurelio Perkins to Digital Medicine. Discussed health  role and recommended lifestyle modifications.    The history is provided by the patient.     HYPERTENSION  Our goal is to get BP to consistently below 130/80mmHg and make the process convenient so patient can avoid extra trips to the office. Getting your blood pressure below 130/80mmHg (definition of control) will reduce your risk for heart attack, kidney failure, stroke and death (as well as kidney failure, eye disease, & dementia)      Reviewed that the Digital Medicine care team - consisting of a clinician and a health  - will follow the most current evidence-based national guidelines for treating your condition.  The health  will focus on lifestyle modifications and motivation while the clinician will focus on medication therapy.  The care team will review all data on a regular basis and reach out as needed.      Explained that one of the key parts of the program is communication with the care team.  Asked patient to respond to outreach attempts and complete questionnaires.  Stressed importance of medication adherence.    Explained that we expect patient to obtain several blood pressures per week at random times of day.  Instructed patient not to allow anyone else to use phone and monitoring device.  Confirmed appropriate BP monitoring technique.      Explained to patient that the digital medicine team is not available for emergencies.  Patient will call [facility] on-call ([phone] or [phone]) or [phone] if needed.      Patient's BP goal is 130/80.Patient's BP average is 128/77 mmHg, which is above goal, per 2017 ACC/AHA Hypertension Guidelines.    Patient stated he does not have the [facility] [employer] blood pressure cuff yet and has been using his own blood pressure cuff at home and entering the data into the thiago. Patient would like to know if his insurance is going to cover the cuff.        Last 5 Patient Entered Readings                                      Current 30 Day Average: 128/77     Recent Readings 10/14/2019 10/12/2019 10/9/2019 10/7/2019 10/5/2019    SBP (mmHg) 136 137 127 117 127    DBP (mmHg) 82 77 73 74 79            Assessment/Plan    SDOH    Intervention/Plan    There are no preventive care reminders to display for this patient.    Reviewed the importance of self-monitoring, medication adherence, and that the health  can be used as a resource for lifestyle modifications to help reduce or maintain a healthy lifestyle.    Sent link to [facility] webpages and my contact information via [employer] for future questions. Follow up scheduled.

## 2019-10-17 NOTE — TELEPHONE ENCOUNTER
[facility] successfully reached in regards to XELJANZ XR. Medication will ship 10/21 and arrive 10/22 with consent. Copay 0.00@004. Address and  confirmed. Patient stated he was unsure, but stated he has about 10 (5 days) on hand at this time. Patient has not missed doses and no side effects present. Patient is currently taking the medication as directed by doctors instruction. Patient states he does not have any questions or concerns at this time.

## 2019-10-18 ENCOUNTER — PATIENT OUTREACH (OUTPATIENT)
Dept: OTHER | Facility: OTHER | Age: 69
End: 2019-10-18

## 2019-10-18 NOTE — PROGRESS NOTES
Digital Medicine: Health  Follow-Up    Patient stated during enrollment call that he had not received his blood pressure cuff yet and did not know when or how he would be getting it. Talked to patient today, I informed him that he would have to purchase the cuff at the nearest [facility] and submit his receipt to his insurance company as a claim and he would be able to be reimbursed. Patient stated he was currently on his way there for an appointment so he would stop and  the cuff. Will check on patient next week.     The history is provided by the patient.     HYPERTENSION    Patient's BP goal is 130/80.Patient's BP average is 130/77 mmHg, which is above goal, per 2017 ACC/AHA Hypertension Guidelines.          Assessment/Plan    SDOH    Intervention/Plan    There are no preventive care reminders to display for this patient.    Last 5 Patient Entered Readings                                      Current 30 Day Average: 130/77     Recent Readings 10/16/2019 10/15/2019 10/14/2019 10/12/2019 10/9/2019    SBP (mmHg) 136 137 136 137 127    DBP (mmHg) 74 80 82 77 73

## 2019-10-21 ENCOUNTER — PATIENT MESSAGE (OUTPATIENT)
Dept: FAMILY MEDICINE | Facility: CLINIC | Age: 69
End: 2019-10-21

## 2019-10-21 RX ORDER — ASPIRIN 81 MG/1
81 TABLET ORAL DAILY
Qty: 90 TABLET | Refills: 3 | Status: SHIPPED | OUTPATIENT
Start: 2019-10-21 | End: 2020-10-01 | Stop reason: SDUPTHER

## 2019-10-21 RX ORDER — METOPROLOL SUCCINATE 25 MG/1
25 TABLET, EXTENDED RELEASE ORAL DAILY
Qty: 90 TABLET | Refills: 3 | Status: SHIPPED | OUTPATIENT
Start: 2019-10-21 | End: 2019-11-01 | Stop reason: SDUPTHER

## 2019-10-25 ENCOUNTER — PATIENT OUTREACH (OUTPATIENT)
Dept: OTHER | Facility: OTHER | Age: 69
End: 2019-10-25

## 2019-10-25 NOTE — PROGRESS NOTES
"Digital Medicine: Health  Follow-Up    Patient received the blood pressure cuff from the [facility] and got it set up but reports that his blood pressure is "considerably higher on the digital machine compared to his own blood pressure machine." Patient thinks his cuff may be too small but does not quite know. He reported that he was taking his blood pressure back to back using both machines, so I recommended that he try to space the readings out a little bit and try to relax prior to taking the readings. Patient said he would try to "play around with the cuff some more" before he returns to the [facility].     The history is provided by the patient.           Diet:   Patient reports eating or drinking the following: fruit, water, fresh vegetables, caffeine and restaurant foodHe has the following dietary restrictions: low sodium diet    The patient states that he typically eats a non-home cooked meal (ex: dining out, take out, frozen meals) 5 or more times per week.  He cooks for self, has meals prepared by family and utilizes a .      Patient stated he eats cereal (mix of rice chex and raisin bran) for breakfast with a banana. Patient reported eating at a restaurant for lunch everyday. Lately he's been having wraps but usually gets "hot plate lunches" that consist of pork chops and mashed potatoes. Patient stated he's been going to the same restaurant for the last few years, so I recommended that he tell the  next time to go easy on the salt or not to add any extra salt into his plate of food. For dinner, he usually has a home cooked meal that either his wife or he will cook. Last night he had pork roast, spinach with sweet potatoes. Patient reported only seasoning his food with salt, pepper and garlic and he does not salt to taste.   Patient said he drinks about 64 oz of water daily, 2-3 cups of water with a little bit of cream and sugar, and will have sweet tea with his lunch.     Physical Activity: "   When asked if exercising, patient responded: yesHis level of intensity when exercising is low.    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 Patient said on average he walks about 2 miles a day.       [facility]    INTERVENTION(S)  recommended diet modifications and encouragement/support    Patient reported there were no health goals he wanted to actively pursue at this time other than remembering to take his Blood pressure regularly.       There are no preventive care reminders to display for this patient.    Last 5 Patient Entered Readings                                      Current 30 Day Average: 131/79     Recent Readings 10/23/2019 10/21/2019 10/21/2019 10/21/2019 10/21/2019    SBP (mmHg) 127 136 136 136 136    DBP (mmHg) 78 82 82 82 82    Pulse - - 49 - 49

## 2019-10-31 DIAGNOSIS — I10 ESSENTIAL HYPERTENSION: Primary | ICD-10-CM

## 2019-10-31 PROBLEM — Z78.9 STATIN INTOLERANCE: Chronic | Status: ACTIVE | Noted: 2019-10-31

## 2019-10-31 RX ORDER — HYDROCHLOROTHIAZIDE 12.5 MG/1
12.5 CAPSULE ORAL DAILY
Qty: 30 CAPSULE | Refills: 11 | Status: SHIPPED | OUTPATIENT
Start: 2019-10-31 | End: 2019-11-01

## 2019-10-31 RX ORDER — DICLOFENAC SODIUM 75 MG/1
75 TABLET, DELAYED RELEASE ORAL 2 TIMES DAILY
Refills: 8 | COMMUNITY
Start: 2019-10-10 | End: 2020-01-13

## 2019-10-31 NOTE — Clinical Note
Call the patient let him know that I have started him on blood pressure medication called hydrochlorothiazide.  Continue monitoring with flow sheet data

## 2019-10-31 NOTE — PROGRESS NOTES
I received your message which was reviewed along with the the medication list and allergies that we have below.  Please review it for accuracy to make sure that we have the most recent records on your history.     Based on this, the following orders were placed AND/OR medicines were sent in.     Orders Placed This Encounter   Procedures    Morgant Patient Entered Pulse     Order Specific Question:   After how many days would you like to receive a notification of this patient's flowsheet entries?     Answer:   30       Medications written and sent at this time include:  Medications Ordered This Encounter   Medications    hydroCHLOROthiazide (MICROZIDE) 12.5 mg capsule     Sig: Take 1 capsule (12.5 mg total) by mouth once daily.     Dispense:  30 capsule     Refill:  11       Your pharmacy(ies) of choice at this time on record include the list below and any medications would have been sent to the one at the top.    [facility] [id number] [facility] [place] - [address] [address]  [address] [address]  [place] [address]  Phone: [phone] Fax: [phone]    [facility]  [address] [facility] [address] [place] [address]  Phone: [phone] Fax: [phone]    [facility]  [address] [facility] [address]  Phone: [phone] Fax: [phone]      Thank you for choosing us as your healthcare provider!  Dr. Cuauhtemoc Gardner    ALLERGY LIST  Review of patient's allergies indicates:   Allergen Reactions    Antihistamines - alkylamine Other (See Comments)     hallucinations    Benadryl [diphenhydramine hcl] Other (See Comments)     hallucinations    Codeine Itching     Turns red    Lodine [etodolac] Other (See Comments)     fatigue    Methotrexate analogues Other (See Comments)     Sunlight sensitivity    Morphine Itching     Turns red       MEDICATION LIST  Current Outpatient Medications on File Prior to Visit   Medication Sig Dispense Refill    aspirin (ECOTRIN) 81 MG EC tablet  Take 1 tablet (81 mg total) by mouth once daily. 90 tablet 3    diclofenac (VOLTAREN) 75 MG EC tablet Take 75 mg by mouth 2 (two) times daily.  8    eszopiclone (LUNESTA) 3 mg Tab Take 1 tablet (3 mg total) by mouth every evening. 30 tablet 3    evolocumab (REPATHA PUSHTRONEX) 420 mg/3.5 mL Injt Inject 3.5 mLs (420 mg total) into the skin every 30 days. 1 Cartridge 11    HYDROcodone-acetaminophen (NORCO) 5-325 mg per tablet Take 1 tablet by mouth every 6 (six) hours as needed for Pain. 40 tablet 0    HYDROcodone-acetaminophen (NORCO) 5-325 mg per tablet Take 1 tablet by mouth every 8 (eight) hours as needed for Pain. 90 tablet 0    hydrocortisone (CORTEF) 10 MG Tab Take Hydrocortisone 15  mg in the morning and 5 mg in the evening (4PM). 60 tablet 11    levothyroxine (SYNTHROID) 125 MCG tablet Take 1 tablet (125 mcg total) by mouth once daily. 30 tablet 11    metoprolol succinate (TOPROL-XL) 25 MG 24 hr tablet Take 1 tablet (25 mg total) by mouth once daily. 90 tablet 3    nitroGLYCERIN (NITROSTAT) 0.4 MG SL tablet Place 0.4 mg under the tongue.      predniSONE (DELTASONE) 2.5 MG tablet Take 2 tablets (5 mg total) by mouth daily as needed. 60 tablet 3    syringe, disposable, 1 mL Syrg 1 Syringe by Misc.(Non-Drug; Combo Route) route once a week. 25 Syringe 3    testosterone cypionate (DEPOTESTOTERONE CYPIONATE) 200 mg/mL injection Inject 100 mg every 10 days 5 mL 5    tofacitinib (XELJANZ) 5 mg Tab Take one tablet (5mg) by mouth 2 (two) times daily. 60 tablet 12    VITAMIN D2 50,000 unit capsule TAKE ONE CAPSULE BY MOUTH ONE TIME PER WEEK  0     No current facility-administered medications on file prior to visit.        HEALTH MAINTENANCE THAT IS OVERDUE OR NEEDS TO BE UPDATED ON OUR CHART IS LISTED BELOW.  IF YOU HAVE HAD IT DONE ELSEWHERE, PLEASE SEND US DATES AND RECORDS IF YOU HAVE THEM TO MAKE YOUR CHART ACCURATE.  IF YOU HAVE NOT HAD THESE DONE AND ARE READY FOR US TO SCHEDULE THEM, PLEASE SEND US  A MESSAGE.  Health Maintenance Due   Topic Date Due    High Dose Statin  12/09/1971       DISCLAIMER: This note was compiled by using a speech recognition dictation system and therefore please be aware that typographical / speech recognition errors can and do occur.  Please contact me if you see any errors specifically.    Cuauhtemoc Gardner MD  We Offer Telehealth & Same Day Appointments!   Book your Telehealth appointment with me through my nurse or   Clinic appointments on [employer]!  [phone]     Check out my Facebook Page and Follow Me at: CLICK HERE    Check out my website at [employer] by clicking on: CLICK HERE    To Schedule appointments online, go to [employer]: CLICK HERE     Location: [email or web address]    [address] [place] [address]    FAX: [phone]

## 2019-10-31 NOTE — PROGRESS NOTES
PLAN:      Problem List Items Addressed This Visit     Statin intolerance (Chronic)     Continue Repatha.  Recheck lipids in a few months         Essential hypertension - Primary     Discussed risks and benefits of starting hydrochlorothiazide with decreased kidney function and solitary kidney.  Patient understood and reports that he spoke with his nephrologist who said that this was okay to start this medication to control his blood pressure.  Will go ahead and refill hydrochlorothiazide 12.5 mg.    Discussed hypertension disease course.  Discussed-DASH-diet and exercise.  Discussed medication regimen importance of treating high blood pressure.  Patient understood and advised of risk of untreated blood pressure.  ER precautions were given for symptoms of hypertensive urgency and emergency.           Relevant Medications    metoprolol succinate (TOPROL-XL) 25 MG 24 hr tablet    Need for hepatitis C screening test     Hepatitis C screening was negative         Encounter for long-term (current) use of medications     Reviewed labs.  Refill medications as prescribed.    Complete history and physical was completed today.  Complete and thorough medication reconciliation was performed.  Discussed risks and benefits of medications.  Advised patient on orders and health maintenance.  We discussed old records and old labs if available.  Will request any records not available through epic.  Continue current medications listed on your summary sheet.           Relevant Medications    metoprolol succinate (TOPROL-XL) 25 MG 24 hr tablet    VITAMIN D2 50,000 unit capsule    Vitamin D deficiency     Continue vitamin-D supplementation.  Recheck level.         Relevant Orders    Vitamin D        Future Appointments     Date Provider Specialty Appt Notes    11/12/2019  Radiology US [facility]    12/2/2019  Lab     12/9/2019 Maria Esther Hammonds MD Endocrinology Thyroid    1/30/2020  Cardiology echo    2/3/2020 Valente Regalado Jr., MD  Cardiology [facility]     2/5/2020  Lab Dr Oliveira    2/11/2020 Brea Godoy PA-C Rheumatology 4 month f/u labs [facility]    2/14/2020 Cuauhtemoc Gardner MD Family Medicine 3 month f/u review labs and med check    6/4/2020 Yoav Oliveira MD Rheumatology 4 month f/u           Medication List with Changes/Refills   Current Medications    ASPIRIN (ECOTRIN) 81 MG EC TABLET    Take 1 tablet (81 mg total) by mouth once daily.    DICLOFENAC (VOLTAREN) 75 MG EC TABLET    Take 75 mg by mouth 2 (two) times daily.    EVOLOCUMAB (REPATHA PUSHTRONEX) 420 MG/3.5 ML INJT    Inject 3.5 mLs (420 mg total) into the skin every 30 days.    HYDROCORTISONE (CORTEF) 10 MG TAB    Take Hydrocortisone 15  mg in the morning and 5 mg in the evening (4PM).    LEVOTHYROXINE (SYNTHROID) 125 MCG TABLET    Take 1 tablet (125 mcg total) by mouth once daily.    NITROGLYCERIN (NITROSTAT) 0.4 MG SL TABLET    Place 0.4 mg under the tongue.    SYRINGE, DISPOSABLE, 1 ML SYRG    1 Syringe by Misc.(Non-Drug; Combo Route) route once a week.    TESTOSTERONE CYPIONATE (DEPOTESTOTERONE CYPIONATE) 200 MG/ML INJECTION    Inject 100 mg every 10 days    TOFACITINIB (XELJANZ) 5 MG TAB    Take one tablet (5mg) by mouth 2 (two) times daily.   Changed and/or Refilled Medications    Modified Medication Previous Medication    HYDROCHLOROTHIAZIDE (MICROZIDE) 12.5 MG CAPSULE hydroCHLOROthiazide (MICROZIDE) 12.5 mg capsule       Take 1 capsule (12.5 mg total) by mouth once daily.    Take 1 capsule (12.5 mg total) by mouth once daily.    METOPROLOL SUCCINATE (TOPROL-XL) 25 MG 24 HR TABLET metoprolol succinate (TOPROL-XL) 25 MG 24 hr tablet       Take 1 tablet (25 mg total) by mouth once daily.    Take 1 tablet (25 mg total) by mouth once daily.    VITAMIN D2 50,000 UNIT CAPSULE VITAMIN D2 50,000 unit capsule       TAKE ONE CAPSULE BY MOUTH ONE TIME PER WEEK    TAKE ONE CAPSULE BY MOUTH ONE TIME PER WEEK   Discontinued Medications    HYDROCODONE-ACETAMINOPHEN (NORCO) 5-325 MG PER  TABLET    Take 1 tablet by mouth every 6 (six) hours as needed for Pain.       Cuauhtemoc Gardner M.D.     ==========================================================================  Subjective:      Patient ID: Aurelio Perkins is a 68 y.o. male.  has a past medical history of Arthritis, Cancer, Chronic kidney disease, Encounter for long-term (current) use of medications, and Mitral valve prolapse.     Chief Complaint: 4 week f/u Review lab results    Patient has 1 more Shingles Vaccine due, but he can't have it for another 2 weeks, patient will receive at his pharmacy.    Problem List Items Addressed This Visit     Statin intolerance (Chronic)    Overview     Patient is intolerant to all statins.  Patient is not able to take statins due to other medications that he is on for autoimmune disease.     Patient has started Repatha and doing well.         Current Assessment & Plan     Continue Repatha.  Recheck lipids in a few months         Essential hypertension - Primary    Overview     Chronic.  Controlled today.  Patient taking metoprolol.  Blood pressure is controlled today.  Patient reports compliance.  No side effects reported.  Patient is not currently in digital medicine hypertension program.  Will sign him up today.  The patient denies any chest pain, shortness of breath, palpitations, dizziness, lightheadedness, headache, arm numbness tingling or weakness, syncope.  =======================================================  November 2019:    This condition is chronic.  Currently stable.  Blood pressure is below goal.  Patient reports compliance with blood pressure medications as listed.  No side effects are reported at this time.    However patient reports blood pressures been elevated at home.  Patient did take one of his wife's hydrochlorothiazide which brought his blood pressure down.  Patient has check with his nephrologist Dr. SAUCEDA and he is okay with starting hydrochlorothiazide with his solitary  kidney and decreased kidney function.    The patient denies any chest pain, shortness of breath, palpitations, dizziness, lightheadedness, headache, arm numbness tingling or weakness, syncope.    Creatinine   Date Value Ref Range Status   09/26/2019 1.6 (H) 0.5 - 1.4 mg/dL Final               Current Assessment & Plan     Discussed risks and benefits of starting hydrochlorothiazide with decreased kidney function and solitary kidney.  Patient understood and reports that he spoke with his nephrologist who said that this was okay to start this medication to control his blood pressure.  Will go ahead and refill hydrochlorothiazide 12.5 mg.    Discussed hypertension disease course.  Discussed-DASH-diet and exercise.  Discussed medication regimen importance of treating high blood pressure.  Patient understood and advised of risk of untreated blood pressure.  ER precautions were given for symptoms of hypertensive urgency and emergency.           Need for hepatitis C screening test    Overview     Patient born in 1950.  No other risk factors.  Has never been screened for hepatitis C.  Patient is due for hepatitis C screening.   Patient advised of risk of crow hepatitis C including being Born between 1945 and 1965, blood transfusions prior to 1992, IV or nasal drug use, tattoos, sexual intercourse.  Patient denies being tested.  Patient denies known history of hepatitis-C.    Lab Results   Component Value Date    HEPCAB Negative 07/10/2019              Current Assessment & Plan     Hepatitis C screening was negative         Encounter for long-term (current) use of medications    Overview     07/11/2019   Patient is on CHRONIC long-term drug therapy for managed conditions. See medication list. Reports compliance.  No side effects reported.  Routine lab work is being monitored.  Patient does not  need refills today.   =======================================================    Patient is on CHRONIC long-term drug therapy  for managed conditions. See medication list. Reports compliance.  No side effects reported.  Routine lab work is being monitored.  Patient does need refills today.     Lab Results   Component Value Date    WBC 12.03 05/31/2019    HGB 11.2 (L) 05/31/2019    HCT 33.5 (L) 05/31/2019    MCV 83 05/31/2019     05/31/2019         Chemistry        Component Value Date/Time     09/26/2019 0758    K 4.3 09/26/2019 0758     09/26/2019 0758    CO2 27 09/26/2019 0758    BUN 23 09/26/2019 0758    CREATININE 1.6 (H) 09/26/2019 0758    GLU 85 09/26/2019 0758        Component Value Date/Time    CALCIUM 9.4 09/26/2019 0758    ALKPHOS 53 (L) 09/26/2019 0758    AST 26 09/26/2019 0758    ALT 40 09/26/2019 0758    BILITOT 0.9 09/26/2019 0758    ESTGFRAFRICA 50.8 (A) 09/26/2019 0758    EGFRNONAA 43.9 (A) 09/26/2019 0758          Lab Results   Component Value Date    TSH 0.098 (L) 06/21/2019    H1GDWYT 52 (L) 03/03/2019    E2BGKRK 3.9 (L) 03/03/2019    FREET4 1.25 07/10/2019    T3FREE 2.0 (L) 06/30/2017              Current Assessment & Plan     Reviewed labs.  Refill medications as prescribed.    Complete history and physical was completed today.  Complete and thorough medication reconciliation was performed.  Discussed risks and benefits of medications.  Advised patient on orders and health maintenance.  We discussed old records and old labs if available.  Will request any records not available through epic.  Continue current medications listed on your summary sheet.           Vitamin D deficiency    Overview     Patient states vitamin-D.  Chronic.  Associated with fatigue.       Vit d deficiency. Takes vitamin d supplement. No SE reported. Fatigue is slightly improved.            Current Assessment & Plan     Continue vitamin-D supplementation.  Recheck level.                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)  Co-surgery with Dr Vaibhav Jara - please put in his room  Microscope [place] [employer] Courtney Fierro;  Surgeon: Jimmy Segura DO;  Location: [facility] OR [address] [place] [address];  Service: Neurosurgery;  Laterality: Right;    HEMORRHOID SURGERY      NEPHRECTOMY      PROSTATECTOMY      TONSILLECTOMY       Review of patient's allergies indicates:   Allergen Reactions    Antihistamines - alkylamine Other (See Comments)     hallucinations    Benadryl [diphenhydramine hcl] Other (See Comments)     hallucinations    Codeine Itching     Turns red    Lodine [etodolac] Other (See Comments)     fatigue    Methotrexate analogues Other (See Comments)     Sunlight sensitivity    Morphine Itching     Turns red    Statins-hmg-coa reductase inhibitors      Medication List with Changes/Refills   Current Medications    ASPIRIN (ECOTRIN) 81 MG EC TABLET    Take 1 tablet (81 mg total) by mouth once daily.    DICLOFENAC (VOLTAREN) 75 MG EC TABLET    Take 75 mg by mouth 2 (two) times daily.    EVOLOCUMAB (REPATHA PUSHTRONEX) 420 MG/3.5 ML INJT    Inject 3.5 mLs (420 mg total) into the skin every 30 days.    HYDROCORTISONE (CORTEF) 10 MG TAB    Take Hydrocortisone 15  mg in the morning and 5 mg in the evening (4PM).    LEVOTHYROXINE (SYNTHROID) 125 MCG TABLET    Take 1 tablet (125 mcg total) by mouth once daily.    NITROGLYCERIN (NITROSTAT) 0.4 MG SL TABLET    Place 0.4 mg under the tongue.    SYRINGE, DISPOSABLE, 1 ML SYRG    1 Syringe by Misc.(Non-Drug; Combo Route) route once a week.    TESTOSTERONE CYPIONATE (DEPOTESTOTERONE CYPIONATE) 200 MG/ML INJECTION    Inject 100 mg every 10 days    TOFACITINIB (XELJANZ) 5 MG TAB    Take one tablet  (5mg) by mouth 2 (two) times daily.   Changed and/or Refilled Medications    Modified Medication Previous Medication    HYDROCHLOROTHIAZIDE (MICROZIDE) 12.5 MG CAPSULE hydroCHLOROthiazide (MICROZIDE) 12.5 mg capsule       Take 1 capsule (12.5 mg total) by mouth once daily.    Take 1 capsule (12.5 mg total) by mouth once daily.    METOPROLOL SUCCINATE (TOPROL-XL) 25 MG 24 HR TABLET metoprolol succinate (TOPROL-XL) 25 MG 24 hr tablet       Take 1 tablet (25 mg total) by mouth once daily.    Take 1 tablet (25 mg total) by mouth once daily.    VITAMIN D2 50,000 UNIT CAPSULE VITAMIN D2 50,000 unit capsule       TAKE ONE CAPSULE BY MOUTH ONE TIME PER WEEK    TAKE ONE CAPSULE BY MOUTH ONE TIME PER WEEK   Discontinued Medications    HYDROCODONE-ACETAMINOPHEN (NORCO) 5-325 MG PER TABLET    Take 1 tablet by mouth every 6 (six) hours as needed for Pain.      Social History     Tobacco Use    Smoking status: Never Smoker    Smokeless tobacco: Never Used   Substance Use Topics    Alcohol use: No      Family History   Adopted: Yes   Family history unknown: Yes       I have reviewed the complete PMH, social history, surgical history, allergies and medications.  As well as family history.    Review of Systems   Constitutional: Negative for activity change and unexpected weight change.   HENT: Negative for hearing loss, rhinorrhea and trouble swallowing.    Eyes: Negative for discharge and visual disturbance.   Respiratory: Negative for chest tightness and wheezing.    Cardiovascular: Negative for chest pain and palpitations.   Gastrointestinal: Negative for blood in stool, constipation, diarrhea and vomiting.   Endocrine: Negative for polydipsia and polyuria.   Genitourinary: Negative for difficulty urinating, hematuria and urgency.   Musculoskeletal: Positive for arthralgias and joint swelling. Negative for neck pain.   Neurological: Negative for weakness and headaches.   Psychiatric/Behavioral: Negative for confusion and  dysphoric mood.       Objective:     /68   Pulse (!) 58   Temp 98.1 °F (36.7 °C) (Oral)   Ht 6' (1.829 m)   Wt 88.9 kg (196 lb)   BMI 26.58 kg/m²     Physical Exam   Constitutional: He is oriented to person, place, and time. He appears well-developed and well-nourished. No distress.   HENT:   Head: Normocephalic and atraumatic.   Eyes: Pupils are equal, round, and reactive to light. EOM are normal.   Right eye ptosis chronic   Neck: Normal range of motion. Neck supple.   Cardiovascular: Normal rate, regular rhythm and intact distal pulses.   Murmur heard.   Systolic murmur is present with a grade of 3/6.  Pulmonary/Chest: Effort normal and breath sounds normal. No respiratory distress. He has no wheezes.   Musculoskeletal: Normal range of motion. He exhibits no edema.   Neurological: He is alert and oriented to person, place, and time. No cranial nerve deficit.   Skin: Skin is warm and dry. Capillary refill takes less than 2 seconds.   Psychiatric: He has a normal mood and affect. His behavior is normal.   Nursing note and vitals reviewed.      Assessment:     1. Essential hypertension    2. Encounter for long-term (current) use of medications    3. Vitamin D deficiency    4. Statin intolerance    5. Need for hepatitis C screening test        MDM:     I have Reviewed and summarized old records.  I have performed thorough medication reconciliation today and discussed risk and benefits of each medication.  I have reviewed labs and discussed with patient.  All questions were answered.  I am requesting old records and will review them once they are available.    Follow up in about 3 months (around 2/1/2020), or if symptoms worsen or fail to improve, for HTN, LAB RESULTS.    If no improvement in symptoms or symptoms worsen, advised to call/follow-up at clinic or go to ER. Patient voiced understanding and all questions/concerns were addressed.     DISCLAIMER: This note was compiled by using a speech recognition  dictation system and therefore please be aware that typographical / speech recognition errors can and do occur.  Please contact me if you see any errors specifically.    Cuauhtemoc Gardner M.D.         We Offer Telehealth & Same Day Appointments!   Book your Telehealth appointment with me through my nurse or   Clinic appointments on Pin digital!    Office: [phone]     Check out my Facebook Page and Follow Me at: CLICK HERE    Check out my website at [employer] by clicking on: CLICK HERE    To Schedule appointments online, go to Pin digital: CLICK HERE     Location: [email or web address]    [address] [place] [address]    FAX: [phone]

## 2019-11-01 ENCOUNTER — PATIENT MESSAGE (OUTPATIENT)
Dept: FAMILY MEDICINE | Facility: CLINIC | Age: 69
End: 2019-11-01

## 2019-11-01 ENCOUNTER — OFFICE VISIT (OUTPATIENT)
Dept: FAMILY MEDICINE | Facility: CLINIC | Age: 69
End: 2019-11-01
Payer: MEDICARE

## 2019-11-01 VITALS
HEIGHT: 72 IN | BODY MASS INDEX: 26.55 KG/M2 | WEIGHT: 196 LBS | DIASTOLIC BLOOD PRESSURE: 68 MMHG | HEART RATE: 58 BPM | SYSTOLIC BLOOD PRESSURE: 116 MMHG | TEMPERATURE: 98 F

## 2019-11-01 DIAGNOSIS — Z78.9 STATIN INTOLERANCE: Chronic | ICD-10-CM

## 2019-11-01 DIAGNOSIS — E55.9 VITAMIN D DEFICIENCY: ICD-10-CM

## 2019-11-01 DIAGNOSIS — I10 ESSENTIAL HYPERTENSION: Primary | ICD-10-CM

## 2019-11-01 DIAGNOSIS — Z11.59 NEED FOR HEPATITIS C SCREENING TEST: ICD-10-CM

## 2019-11-01 DIAGNOSIS — Z79.899 ENCOUNTER FOR LONG-TERM (CURRENT) USE OF MEDICATIONS: ICD-10-CM

## 2019-11-01 DIAGNOSIS — I10 ESSENTIAL HYPERTENSION: ICD-10-CM

## 2019-11-01 PROCEDURE — 99213 OFFICE O/P EST LOW 20 MIN: CPT | Mod: PBBFAC,PO | Performed by: FAMILY MEDICINE

## 2019-11-01 PROCEDURE — 99214 OFFICE O/P EST MOD 30 MIN: CPT | Mod: S$PBB,,, | Performed by: FAMILY MEDICINE

## 2019-11-01 PROCEDURE — 99999 PR PBB SHADOW E&M-EST. PATIENT-LVL III: CPT | Mod: PBBFAC,,, | Performed by: FAMILY MEDICINE

## 2019-11-01 PROCEDURE — 99999 PR PBB SHADOW E&M-EST. PATIENT-LVL III: ICD-10-PCS | Mod: PBBFAC,,, | Performed by: FAMILY MEDICINE

## 2019-11-01 PROCEDURE — 99214 PR OFFICE/OUTPT VISIT, EST, LEVL IV, 30-39 MIN: ICD-10-PCS | Mod: S$PBB,,, | Performed by: FAMILY MEDICINE

## 2019-11-01 RX ORDER — ERGOCALCIFEROL 1.25 MG/1
CAPSULE ORAL
Qty: 4 CAPSULE | Refills: 5 | Status: SHIPPED | OUTPATIENT
Start: 2019-11-01 | End: 2020-03-27

## 2019-11-01 RX ORDER — METOPROLOL SUCCINATE 25 MG/1
25 TABLET, EXTENDED RELEASE ORAL DAILY
Qty: 90 TABLET | Refills: 3 | Status: SHIPPED | OUTPATIENT
Start: 2019-11-01 | End: 2020-08-03 | Stop reason: SDUPTHER

## 2019-11-01 NOTE — ASSESSMENT & PLAN NOTE
Reviewed labs.  Refill medications as prescribed.    Complete history and physical was completed today.  Complete and thorough medication reconciliation was performed.  Discussed risks and benefits of medications.  Advised patient on orders and health maintenance.  We discussed old records and old labs if available.  Will request any records not available through epic.  Continue current medications listed on your summary sheet.

## 2019-11-01 NOTE — PATIENT INSTRUCTIONS
Follow up in about 3 months (around 2/1/2020), or if symptoms worsen or fail to improve, for HTN, LAB RESULTS.     If no improvement in symptoms or symptoms worsen, please be advised to call MD, follow-up at clinic and/or go to ER if becomes severe.

## 2019-11-02 RX ORDER — HYDROCHLOROTHIAZIDE 12.5 MG/1
12.5 CAPSULE ORAL DAILY
Qty: 30 CAPSULE | Refills: 11 | Status: SHIPPED | OUTPATIENT
Start: 2019-11-02 | End: 2020-02-03 | Stop reason: SDUPTHER

## 2019-11-03 PROBLEM — I21.4 NSTEMI (NON-ST ELEVATED MYOCARDIAL INFARCTION): Status: RESOLVED | Noted: 2019-07-23 | Resolved: 2019-11-03

## 2019-11-03 PROBLEM — G93.6 CEREBRAL EDEMA: Status: RESOLVED | Noted: 2019-05-29 | Resolved: 2019-11-03

## 2019-11-03 PROBLEM — G93.5 BRAIN COMPRESSION: Status: RESOLVED | Noted: 2019-05-29 | Resolved: 2019-11-03

## 2019-11-03 NOTE — ASSESSMENT & PLAN NOTE
Discussed risks and benefits of starting hydrochlorothiazide with decreased kidney function and solitary kidney.  Patient understood and reports that he spoke with his nephrologist who said that this was okay to start this medication to control his blood pressure.  Will go ahead and refill hydrochlorothiazide 12.5 mg.    Discussed hypertension disease course.  Discussed-DASH-diet and exercise.  Discussed medication regimen importance of treating high blood pressure.  Patient understood and advised of risk of untreated blood pressure.  ER precautions were given for symptoms of hypertensive urgency and emergency.

## 2019-11-03 NOTE — TELEPHONE ENCOUNTER
I received your message which was reviewed along with the the medication list and allergies that we have below.  Please review it for accuracy to make sure that we have the most recent records on your history.     Based on this, the following orders were placed AND/OR medicines were sent in.     No orders of the defined types were placed in this encounter.      Medications written and sent at this time include:  Medications Ordered This Encounter   Medications    hydroCHLOROthiazide (MICROZIDE) 12.5 mg capsule     Sig: Take 1 capsule (12.5 mg total) by mouth once daily.     Dispense:  30 capsule     Refill:  11       Your pharmacy(ies) of choice at this time on record include the list below and any medications would have been sent to the one at the top.    [id number] [place] - [address] [place] [address]  [address] [place] [address]  [place] [address]  Phone: [phone] Fax: [phone]    [facility]  [address] [facility] [address] [place] [address]  Phone: [phone] Fax: [phone]    [facility]  [address] [facility] [address]  Phone: [phone] Fax: [phone]      Thank you for choosing us as your healthcare provider!  Dr. Cuauhtemoc Gardner    ALLERGY LIST  Review of patient's allergies indicates:   Allergen Reactions    Antihistamines - alkylamine Other (See Comments)     hallucinations    Benadryl [diphenhydramine hcl] Other (See Comments)     hallucinations    Codeine Itching     Turns red    Lodine [etodolac] Other (See Comments)     fatigue    Methotrexate analogues Other (See Comments)     Sunlight sensitivity    Morphine Itching     Turns red    Statins-hmg-coa reductase inhibitors        MEDICATION LIST  Current Outpatient Medications on File Prior to Visit   Medication Sig Dispense Refill    aspirin (ECOTRIN) 81 MG EC tablet Take 1 tablet (81 mg total) by mouth once daily. 90 tablet 3    diclofenac (VOLTAREN) 75 MG EC tablet Take 75 mg by  mouth 2 (two) times daily.  8    eszopiclone (LUNESTA) 3 mg Tab Take 1 tablet (3 mg total) by mouth every evening. 30 tablet 3    evolocumab (REPATHA PUSHTRONEX) 420 mg/3.5 mL Injt Inject 3.5 mLs (420 mg total) into the skin every 30 days. 1 Cartridge 11    HYDROcodone-acetaminophen (NORCO) 5-325 mg per tablet Take 1 tablet by mouth every 8 (eight) hours as needed for Pain. 90 tablet 0    hydrocortisone (CORTEF) 10 MG Tab Take Hydrocortisone 15  mg in the morning and 5 mg in the evening (4PM). 60 tablet 11    levothyroxine (SYNTHROID) 125 MCG tablet Take 1 tablet (125 mcg total) by mouth once daily. 30 tablet 11    metoprolol succinate (TOPROL-XL) 25 MG 24 hr tablet Take 1 tablet (25 mg total) by mouth once daily. 90 tablet 3    nitroGLYCERIN (NITROSTAT) 0.4 MG SL tablet Place 0.4 mg under the tongue.      predniSONE (DELTASONE) 2.5 MG tablet Take 2 tablets (5 mg total) by mouth daily as needed. 60 tablet 3    syringe, disposable, 1 mL Syrg 1 Syringe by Misc.(Non-Drug; Combo Route) route once a week. 25 Syringe 3    testosterone cypionate (DEPOTESTOTERONE CYPIONATE) 200 mg/mL injection Inject 100 mg every 10 days 5 mL 5    tofacitinib (XELJANZ) 5 mg Tab Take one tablet (5mg) by mouth 2 (two) times daily. 60 tablet 12    VITAMIN D2 50,000 unit capsule TAKE ONE CAPSULE BY MOUTH ONE TIME PER WEEK 4 capsule 5     No current facility-administered medications on file prior to visit.        HEALTH MAINTENANCE THAT IS OVERDUE OR NEEDS TO BE UPDATED ON OUR CHART IS LISTED BELOW.  IF YOU HAVE HAD IT DONE ELSEWHERE, PLEASE SEND US DATES AND RECORDS IF YOU HAVE THEM TO MAKE YOUR CHART ACCURATE.  IF YOU HAVE NOT HAD THESE DONE AND ARE READY FOR US TO SCHEDULE THEM, PLEASE SEND US A MESSAGE.  There are no preventive care reminders to display for this patient.    DISCLAIMER: This note was compiled by using a speech recognition dictation system and therefore please be aware that typographical / speech recognition errors  can and do occur.  Please contact me if you see any errors specifically.    Cuauhtemoc Gardner MD  We Offer Telehealth & Same Day Appointments!   Book your Telehealth appointment with me through my nurse or   Clinic appointments on Monthlys!  [phone]     Check out my Facebook Page and Follow Me at: CLICK HERE    Check out my website at [employer] by clicking on: CLICK HERE    To Schedule appointments online, go to [employer]: CLICK HERE     Location: [email or web address]    [address] [place] [address]    FAX: [phone]

## 2019-11-12 ENCOUNTER — CLINICAL SUPPORT (OUTPATIENT)
Dept: FAMILY MEDICINE | Facility: CLINIC | Age: 69
End: 2019-11-12
Payer: MEDICARE

## 2019-11-12 ENCOUNTER — TELEPHONE (OUTPATIENT)
Dept: PHARMACY | Facility: CLINIC | Age: 69
End: 2019-11-12

## 2019-11-12 ENCOUNTER — HOSPITAL ENCOUNTER (OUTPATIENT)
Dept: RADIOLOGY | Facility: HOSPITAL | Age: 69
Discharge: HOME | End: 2019-11-12
Attending: FAMILY MEDICINE
Payer: MEDICARE

## 2019-11-12 VITALS — HEART RATE: 60 BPM | DIASTOLIC BLOOD PRESSURE: 89 MMHG | SYSTOLIC BLOOD PRESSURE: 123 MMHG

## 2019-11-12 DIAGNOSIS — Z13.6 SCREENING FOR AAA (ABDOMINAL AORTIC ANEURYSM): ICD-10-CM

## 2019-11-12 DIAGNOSIS — I70.0 ABDOMINAL AORTIC ATHEROSCLEROSIS: ICD-10-CM

## 2019-11-12 DIAGNOSIS — Z01.30 BP CHECK: Primary | ICD-10-CM

## 2019-11-12 PROCEDURE — 99999 PR PBB SHADOW E&M-EST. PATIENT-LVL III: CPT | Mod: PBBFAC,,,

## 2019-11-12 PROCEDURE — 99999 PR PBB SHADOW E&M-EST. PATIENT-LVL III: ICD-10-PCS | Mod: PBBFAC,,,

## 2019-11-12 PROCEDURE — 99213 OFFICE O/P EST LOW 20 MIN: CPT | Mod: PBBFAC,PO

## 2019-11-12 NOTE — Clinical Note
Pt in clinic for BP check to compare home machines to clinic machine. Pt states [facility] BP machine always reads significantly higher than pt  [facility] brand BP machine. [facility] BP machine 141/83 HR 59. Pt [facility] brand BP machine 123/89 HR 60. Clinic machine 126/73 HR 53. PCP notified.

## 2019-11-12 NOTE — PROGRESS NOTES
Discrepancy in blood pressure cuff was noted. Blood pressure is well controlled based off of manual and [facility] blood pressure cuff.  Will continue current blood pressure regimen.  Follow-up as scheduled.

## 2019-11-15 ENCOUNTER — PATIENT OUTREACH (OUTPATIENT)
Dept: OTHER | Facility: OTHER | Age: 69
End: 2019-11-15

## 2019-11-15 NOTE — PROGRESS NOTES
Recently took cuff in for a direct comparison by RN. Readings were deemed in accurate. Recommend patient take device and cuff to [facility] for evaluation and consideration for replacement.     Will schedule next outreach in 2-3 weeks to monitor after patient has cuff evaluated. Patient is agreeable.

## 2019-11-19 ENCOUNTER — PATIENT MESSAGE (OUTPATIENT)
Dept: ADMINISTRATIVE | Facility: OTHER | Age: 69
End: 2019-11-19

## 2019-11-19 ENCOUNTER — TELEPHONE (OUTPATIENT)
Dept: PHARMACY | Facility: CLINIC | Age: 69
End: 2019-11-19

## 2019-11-22 ENCOUNTER — PATIENT OUTREACH (OUTPATIENT)
Dept: OTHER | Facility: OTHER | Age: 69
End: 2019-11-22

## 2019-11-22 NOTE — LETTER
December 2, 2019     Aurelio Perkins  [address] [facility] [address]       Dear Aurelio,    Welcome to [facility]! Our goal is to make care effective, proactive and convenient by using data you send us from home to better treat your chronic conditions.              My name is Sanju Garcia, and I am your dedicated Digital Medicine clinician. As an expert in medication management, I will help ensure that the medications you are taking continue to provide the intended benefits and help you reach your goals. You can reach me directly at [phone] or by sending me a message directly through your [facility] account.      I am Brenna Baugh and I will be your health . My job is to help you identify lifestyle changes to improve your disease control. We will talk about nutrition, exercise, and other ways you may be able to adjust your current habits to better your health. Additionally, we will help ensure you are completing the tests and screenings that are necessary to help manage your conditions. You can reach me directly at [phone] or by sending me a message directly through your [facility] account.    Most importantly, YOU are at the center of this team. Together, we will work to improve your overall health and encourage you to meet your goals for a healthier lifestyle.     What we expect from YOU:  · Please take frequent home blood pressure measurements. We ask that you take at least 1 blood pressure reading per week, but more information will better help us get you know you. Be sure you rest for a few minutes before taking the reading in a quiet, comfortable place.     Be available to receive phone calls or [facility] messages, when appropriate, from your care team. Please let us know if there are any specific days or times that work best for us to reach you via phone.     Complete routine tests and screenings. Dont worry, we will help keep you on track!           What  you should expect from your Digital Medicine Care Team:   We will work with you to create a personalized plan of care and provide you with encouragement and education, including regarding lifestyle changes, that could help you manage your disease states.     We will adjust your current medications, if needed, and continue to monitor your long-term progress.     We will provide you and your physician with monthly progress reports after you have been in the program for more than 30 days.     We will send you reminders through [facility] and text messages to help ensure you do not miss any testing deadlines to help manage your disease states.    You will be able to reach us by phone or through your [facility] account by clicking our names under Care Team on the right side of the home screen.    I look forward to working with you to achieve your blood pressure goals!    We look forward to working with you to help manage your health,    Sincerely,    Your Digital Medicine Team    Please visit our websites to learn more:   · Hypertension: [email or web address]      Remember, we are not available for emergencies. If you have an emergency, please contact your doctors office directly or call [facility] on-call ([phone] or [phone]) or [phone].

## 2019-11-22 NOTE — LETTER
December 2, 2019     Aurelio Perkins  [address] [facility] [address]       Dear Aurelio,    Welcome to [facility]! Our goal is to make care effective, proactive and convenient by using data you send us from home to better treat your chronic conditions.              My name is Sanju Garcia, and I am your dedicated Digital Medicine clinician. As an expert in medication management, I will help ensure that the medications you are taking continue to provide the intended benefits and help you reach your goals. You can reach me directly at [phone] or by sending me a message directly through your [facility] account.      I am Brenna Baugh and I will be your health . My job is to help you identify lifestyle changes to improve your disease control. We will talk about nutrition, exercise, and other ways you may be able to adjust your current habits to better your health. Additionally, we will help ensure you are completing the tests and screenings that are necessary to help manage your conditions. You can reach me directly at [phone] or by sending me a message directly through your [facility] account.    Most importantly, YOU are at the center of this team. Together, we will work to improve your overall health and encourage you to meet your goals for a healthier lifestyle.     What we expect from YOU:  · Please take frequent home blood pressure measurements. We ask that you take at least 1 blood pressure reading per week, but more information will better help us get you know you. Be sure you rest for a few minutes before taking the reading in a quiet, comfortable place.     Be available to receive phone calls or [facility] messages, when appropriate, from your care team. Please let us know if there are any specific days or times that work best for us to reach you via phone.     Complete routine tests and screenings. Dont worry, we will help keep you on track!           What  you should expect from your Digital Medicine Care Team:   We will work with you to create a personalized plan of care and provide you with encouragement and education, including regarding lifestyle changes, that could help you manage your disease states.     We will adjust your current medications, if needed, and continue to monitor your long-term progress.     We will provide you and your physician with monthly progress reports after you have been in the program for more than 30 days.     We will send you reminders through [facility] and text messages to help ensure you do not miss any testing deadlines to help manage your disease states.    You will be able to reach us by phone or through your [facility] account by clicking our names under Care Team on the right side of the home screen.    I look forward to working with you to achieve your blood pressure goals!    We look forward to working with you to help manage your health,    Sincerely,    Your Digital Medicine Team    Please visit our websites to learn more:   · Hypertension: [email or web address]      Remember, we are not available for emergencies. If you have an emergency, please contact your doctors office directly or call [facility] on-call ([phone] or [phone]) or [phone].

## 2019-12-02 ENCOUNTER — LAB VISIT (OUTPATIENT)
Dept: LAB | Facility: HOSPITAL | Age: 69
End: 2019-12-02
Attending: FAMILY MEDICINE
Payer: MEDICARE

## 2019-12-02 DIAGNOSIS — E23.7 PITUITARY ABNORMALITY: ICD-10-CM

## 2019-12-02 DIAGNOSIS — E29.1 HYPOGONADISM IN MALE: ICD-10-CM

## 2019-12-02 DIAGNOSIS — E03.9 PRIMARY HYPOTHYROIDISM: ICD-10-CM

## 2019-12-02 DIAGNOSIS — D32.9 MENINGIOMA: ICD-10-CM

## 2019-12-02 DIAGNOSIS — E55.9 VITAMIN D DEFICIENCY: ICD-10-CM

## 2019-12-02 LAB
25(OH)D3+25(OH)D2 SERPL-MCNC: 32 NG/ML (ref 30–96)
BASOPHILS # BLD AUTO: 0.03 K/UL (ref 0–0.2)
BASOPHILS NFR BLD: 0.6 % (ref 0–1.9)
DIFFERENTIAL METHOD: ABNORMAL
EOSINOPHIL # BLD AUTO: 0.1 K/UL (ref 0–0.5)
EOSINOPHIL NFR BLD: 1.8 % (ref 0–8)
ERYTHROCYTE [DISTWIDTH] IN BLOOD BY AUTOMATED COUNT: 13.9 % (ref 11.5–14.5)
HCT VFR BLD AUTO: 39.9 % (ref 40–54)
HGB BLD-MCNC: 12.5 G/DL (ref 14–18)
IMM GRANULOCYTES # BLD AUTO: 0.03 K/UL (ref 0–0.04)
IMM GRANULOCYTES NFR BLD AUTO: 0.6 % (ref 0–0.5)
LYMPHOCYTES # BLD AUTO: 1 K/UL (ref 1–4.8)
LYMPHOCYTES NFR BLD: 19.2 % (ref 18–48)
MCH RBC QN AUTO: 27.4 PG (ref 27–31)
MCHC RBC AUTO-ENTMCNC: 31.3 G/DL (ref 32–36)
MCV RBC AUTO: 87 FL (ref 82–98)
MONOCYTES # BLD AUTO: 0.5 K/UL (ref 0.3–1)
MONOCYTES NFR BLD: 9.8 % (ref 4–15)
NEUTROPHILS # BLD AUTO: 3.5 K/UL (ref 1.8–7.7)
NEUTROPHILS NFR BLD: 68 % (ref 38–73)
NRBC BLD-RTO: 0 /100 WBC
PLATELET # BLD AUTO: 197 K/UL (ref 150–350)
PMV BLD AUTO: 10.4 FL (ref 9.2–12.9)
PROLACTIN SERPL IA-MCNC: 52.9 NG/ML (ref 3.5–19.4)
RBC # BLD AUTO: 4.57 M/UL (ref 4.6–6.2)
T4 FREE SERPL-MCNC: 1.52 NG/DL (ref 0.71–1.51)
TESTOST SERPL-MCNC: 540 NG/DL (ref 304–1227)
WBC # BLD AUTO: 5.11 K/UL (ref 3.9–12.7)

## 2019-12-02 PROCEDURE — 82024 ASSAY OF ACTH: CPT

## 2019-12-02 PROCEDURE — 84403 ASSAY OF TOTAL TESTOSTERONE: CPT

## 2019-12-02 PROCEDURE — 84439 ASSAY OF FREE THYROXINE: CPT

## 2019-12-02 PROCEDURE — 36415 COLL VENOUS BLD VENIPUNCTURE: CPT | Mod: PO

## 2019-12-02 PROCEDURE — 82306 VITAMIN D 25 HYDROXY: CPT

## 2019-12-02 PROCEDURE — 84146 ASSAY OF PROLACTIN: CPT

## 2019-12-02 PROCEDURE — 85025 COMPLETE CBC W/AUTO DIFF WBC: CPT

## 2019-12-03 ENCOUNTER — PATIENT MESSAGE (OUTPATIENT)
Dept: FAMILY MEDICINE | Facility: CLINIC | Age: 69
End: 2019-12-03

## 2019-12-03 ENCOUNTER — PATIENT MESSAGE (OUTPATIENT)
Dept: ENDOCRINOLOGY | Facility: CLINIC | Age: 69
End: 2019-12-03

## 2019-12-03 LAB — ACTH PLAS-MCNC: 5 PG/ML (ref 0–46)

## 2019-12-03 RX ORDER — ESZOPICLONE 3 MG/1
TABLET, FILM COATED ORAL
COMMUNITY
Start: 2019-11-26 | End: 2020-01-13

## 2019-12-03 NOTE — PROGRESS NOTES
CALL THE PATIENT to discuss results and document verification.    I have sent a message to them with the interpretation (see below).  See if they have any questions and make a follow-up appointment if not already schedule and if needed.    Also please address any outstanding health maintenance that may be due: There are no preventive care reminders to display for this patient.  ====================================    My interpretation that was sent to them through [employer]:    I have reviewed your recent vitamin-D level has improved to normal.  Continue supplementation.  Recheck level in one year.    Please reply so that we know that you have received this message.

## 2019-12-06 NOTE — PROGRESS NOTES
"Digital Medicine: Health  Follow-Up    The history is provided by the patient.     Follow Up  Patient has been using his own BP cuff since the digital cuff was reporting higher readings. Patient brought the digital cuff into his dr appt and had a nurse compare the readings. There were some discrepancies so Mr. Kingsley is using his old cuff for the time being. Patient stated he plans to [facility] and [facility] another [facility] soon but has been "very busy". Will follow up with patient in a month.        Intervention/Plan    There are no preventive care reminders to display for this patient.    Last 5 Patient Entered Readings                                      Current 30 Day Average: 133/76     Recent Readings 12/3/2019 12/1/2019 11/27/2019 11/25/2019 11/17/2019    SBP (mmHg) 133 137 116 119 135    DBP (mmHg) 80 75 74 76 78                      Diet Screening   No change to diet.      Physical Activity Screening   No change to exercise routine.          SDOH  "

## 2019-12-09 ENCOUNTER — OFFICE VISIT (OUTPATIENT)
Dept: ENDOCRINOLOGY | Facility: CLINIC | Age: 69
End: 2019-12-09
Payer: MEDICARE

## 2019-12-09 VITALS
DIASTOLIC BLOOD PRESSURE: 72 MMHG | RESPIRATION RATE: 18 BRPM | BODY MASS INDEX: 27.31 KG/M2 | SYSTOLIC BLOOD PRESSURE: 106 MMHG | HEART RATE: 64 BPM | HEIGHT: 72 IN | WEIGHT: 201.63 LBS

## 2019-12-09 DIAGNOSIS — R73.9 HYPERGLYCEMIA: ICD-10-CM

## 2019-12-09 DIAGNOSIS — D32.9 MENINGIOMA OF RIGHT SPHENOID WING INVOLVING CAVERNOUS SINUS: ICD-10-CM

## 2019-12-09 DIAGNOSIS — E03.9 PRIMARY HYPOTHYROIDISM: Primary | ICD-10-CM

## 2019-12-09 DIAGNOSIS — R79.89 LOW TESTOSTERONE IN MALE: ICD-10-CM

## 2019-12-09 DIAGNOSIS — I10 ESSENTIAL HYPERTENSION: ICD-10-CM

## 2019-12-09 DIAGNOSIS — E27.49 SECONDARY ADRENAL INSUFFICIENCY: ICD-10-CM

## 2019-12-09 DIAGNOSIS — E03.9 HYPOTHYROIDISM, UNSPECIFIED TYPE: ICD-10-CM

## 2019-12-09 PROCEDURE — 1126F AMNT PAIN NOTED NONE PRSNT: CPT | Mod: ,,, | Performed by: INTERNAL MEDICINE

## 2019-12-09 PROCEDURE — 99214 PR OFFICE/OUTPT VISIT, EST, LEVL IV, 30-39 MIN: ICD-10-PCS | Mod: S$PBB,,, | Performed by: INTERNAL MEDICINE

## 2019-12-09 PROCEDURE — 1159F MED LIST DOCD IN RCRD: CPT | Mod: ,,, | Performed by: INTERNAL MEDICINE

## 2019-12-09 PROCEDURE — 1159F PR MEDICATION LIST DOCUMENTED IN MEDICAL RECORD: ICD-10-PCS | Mod: ,,, | Performed by: INTERNAL MEDICINE

## 2019-12-09 PROCEDURE — 99213 OFFICE O/P EST LOW 20 MIN: CPT | Mod: PBBFAC | Performed by: INTERNAL MEDICINE

## 2019-12-09 PROCEDURE — 99214 OFFICE O/P EST MOD 30 MIN: CPT | Mod: S$PBB,,, | Performed by: INTERNAL MEDICINE

## 2019-12-09 PROCEDURE — 99999 PR PBB SHADOW E&M-EST. PATIENT-LVL III: CPT | Mod: PBBFAC,,, | Performed by: INTERNAL MEDICINE

## 2019-12-09 PROCEDURE — 1126F PR PAIN SEVERITY QUANTIFIED, NO PAIN PRESENT: ICD-10-PCS | Mod: ,,, | Performed by: INTERNAL MEDICINE

## 2019-12-09 PROCEDURE — 99999 PR PBB SHADOW E&M-EST. PATIENT-LVL III: ICD-10-PCS | Mod: PBBFAC,,, | Performed by: INTERNAL MEDICINE

## 2019-12-09 RX ORDER — TESTOSTERONE CYPIONATE 200 MG/ML
INJECTION, SOLUTION INTRAMUSCULAR
Qty: 5 ML | Refills: 5 | Status: SHIPPED | OUTPATIENT
Start: 2019-12-09 | End: 2020-06-19 | Stop reason: SDUPTHER

## 2019-12-09 RX ORDER — LEVOTHYROXINE SODIUM 112 UG/1
112 TABLET ORAL DAILY
Qty: 30 TABLET | Refills: 11 | Status: SHIPPED | OUTPATIENT
Start: 2019-12-09 | End: 2020-12-09

## 2019-12-09 NOTE — ASSESSMENT & PLAN NOTE
S/p resection with Dr. Segura  Residual tumor on last MRI with plan for repeat in early 2020  Will continue to follow with neurosurgery  Hormonal deficiencies as below

## 2019-12-09 NOTE — ASSESSMENT & PLAN NOTE
Testosterone at goal on recent labs  Continue 100 mg every 10 days  CBC without erythrocytosis  PSA undetectable 6/2019

## 2019-12-09 NOTE — ASSESSMENT & PLAN NOTE
A1c preDM range 5/2019 although this was following several weeks of high dose steroids  Repeat with next labs

## 2019-12-09 NOTE — ASSESSMENT & PLAN NOTE
FT4 above goal. He also asks about change to Synthroid today which is reasonable to try  Reduce dose to Synthroid 112 mcg daily  Repeat FT4 6 weeks
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
## 2019-12-11 NOTE — PROGRESS NOTES
Patient notes he has not had an opportunity to go to the [facility], but reports his blood pressures have been controlled at home. Denies readings over 137/78.    Asked patient if I could get him in touch with our Tech Support for him to enter readings manually. Patient prefers to wait until he is able to get a new monitor.     Will follow-up.

## 2019-12-19 ENCOUNTER — TELEPHONE (OUTPATIENT)
Dept: PHARMACY | Facility: CLINIC | Age: 69
End: 2019-12-19

## 2019-12-31 NOTE — PROGRESS NOTES
No recent readings. Will defer next outreach for 2 weeks.     Health  scheduled to call next week.

## 2020-01-06 PROBLEM — Z13.6 SCREENING FOR AAA (ABDOMINAL AORTIC ANEURYSM): Status: RESOLVED | Noted: 2019-07-09 | Resolved: 2020-01-06

## 2020-01-07 ENCOUNTER — PATIENT OUTREACH (OUTPATIENT)
Dept: OTHER | Facility: OTHER | Age: 70
End: 2020-01-07

## 2020-01-07 NOTE — PROGRESS NOTES
Digital Medicine: Health  Follow-Up    The history is provided by the patient.     Follow Up  Will be in town tomorrow       INTERVENTION(S)  encouragement/support and denied questions    PLAN  patient verbalizes understanding    Patient reported that he is feeling fine. Patient stated he is still using his personal BP cuff, but will be in town tomorrow so he plans to go to the [facility] to try and get a new BP cuff. Patient stated there were no health goals he wanted to work on at this time. Will follow up with patient in 6 weeks.       There are no preventive care reminders to display for this patient.    Last 5 Patient Entered Readings                                      Current 30 Day Average: 122/73     Recent Readings 1/5/2020 12/30/2019 12/23/2019 12/20/2019 12/17/2019    SBP (mmHg) 120 125 124 125 125    DBP (mmHg) 70 75 74 75 76                      Diet Screening   No change to diet.      Physical Activity Screening   No change to exercise routine.          SDOH

## 2020-01-09 ENCOUNTER — PATIENT MESSAGE (OUTPATIENT)
Dept: RHEUMATOLOGY | Facility: CLINIC | Age: 70
End: 2020-01-09

## 2020-01-11 ENCOUNTER — PATIENT MESSAGE (OUTPATIENT)
Dept: RHEUMATOLOGY | Facility: CLINIC | Age: 70
End: 2020-01-11

## 2020-01-11 DIAGNOSIS — M06.9 RHEUMATOID ARTHRITIS FLARE: Primary | ICD-10-CM

## 2020-01-11 DIAGNOSIS — N28.9 DECREASED RENAL FUNCTION: ICD-10-CM

## 2020-01-13 RX ORDER — ESZOPICLONE 3 MG/1
TABLET, FILM COATED ORAL
Qty: 21 TABLET | Refills: 5 | Status: SHIPPED | OUTPATIENT
Start: 2020-01-13 | End: 2020-06-04 | Stop reason: SDUPTHER

## 2020-01-13 RX ORDER — DICLOFENAC SODIUM 75 MG/1
75 TABLET, DELAYED RELEASE ORAL 2 TIMES DAILY PRN
Qty: 60 TABLET | Refills: 1 | Status: SHIPPED | OUTPATIENT
Start: 2020-01-13 | End: 2020-01-16

## 2020-01-13 RX ORDER — ESZOPICLONE 3 MG/1
TABLET, FILM COATED ORAL
Qty: 21 TABLET | Refills: 5 | Status: SHIPPED | OUTPATIENT
Start: 2020-01-13 | End: 2020-01-13 | Stop reason: SDUPTHER

## 2020-01-13 NOTE — TELEPHONE ENCOUNTER
----- Message from Laura Mello sent at 1/13/2020  9:24 AM CST -----  Type:  Pharmacy Calling to Clarify an RX    Name of Caller:  Susan  Pharmacy Name:    [id number] [place] - [address]  [address]  [place] [address]  Phone: [phone] Fax: [phone]  Prescription Name:  diclofenac (VOLTAREN) 75 MG EC tablet  What do they need to clarify?: requesting a refill for patient

## 2020-01-15 ENCOUNTER — PATIENT OUTREACH (OUTPATIENT)
Dept: ADMINISTRATIVE | Facility: HOSPITAL | Age: 70
End: 2020-01-15

## 2020-01-16 ENCOUNTER — PATIENT MESSAGE (OUTPATIENT)
Dept: NEUROSURGERY | Facility: CLINIC | Age: 70
End: 2020-01-16

## 2020-01-16 RX ORDER — DICLOFENAC SODIUM 75 MG/1
75 TABLET, DELAYED RELEASE ORAL DAILY PRN
Qty: 30 TABLET | Refills: 0 | Status: SHIPPED | OUTPATIENT
Start: 2020-01-16 | End: 2020-02-11 | Stop reason: SDUPTHER

## 2020-01-16 NOTE — TELEPHONE ENCOUNTER
"Dr. Oliveira's last office visit from 10/3/19 states "DC NSAID because of renal issue and 1 kidney."  "

## 2020-01-20 ENCOUNTER — TELEPHONE (OUTPATIENT)
Dept: PHARMACY | Facility: CLINIC | Age: 70
End: 2020-01-20

## 2020-01-20 RX ORDER — CHLORHEXIDINE GLUCONATE ORAL RINSE 1.2 MG/ML
SOLUTION DENTAL
Qty: 473 ML | Refills: 6 | Status: SHIPPED | OUTPATIENT
Start: 2020-01-20 | End: 2022-02-23

## 2020-01-21 ENCOUNTER — PATIENT MESSAGE (OUTPATIENT)
Dept: NEUROSURGERY | Facility: CLINIC | Age: 70
End: 2020-01-21

## 2020-01-21 NOTE — PROGRESS NOTES
No blood pressure in past 2 months on [employer] cuff.     Patient reported plan to get [facility] cuff 2 weeks ago when speaking to health . Will ask health  to follow-up.     Will defer outreach for 2 weeks until adequate readings available for review. Manual readings have been entered which are at goal.

## 2020-01-23 ENCOUNTER — TELEPHONE (OUTPATIENT)
Dept: CARDIOLOGY | Facility: CLINIC | Age: 70
End: 2020-01-23

## 2020-01-23 ENCOUNTER — CLINICAL SUPPORT (OUTPATIENT)
Dept: CARDIOLOGY | Facility: CLINIC | Age: 70
End: 2020-01-23
Attending: INTERNAL MEDICINE
Payer: MEDICARE

## 2020-01-23 DIAGNOSIS — I35.0 NONRHEUMATIC AORTIC VALVE STENOSIS: ICD-10-CM

## 2020-01-23 DIAGNOSIS — I10 ESSENTIAL HYPERTENSION: ICD-10-CM

## 2020-01-23 DIAGNOSIS — D32.9 MENINGIOMA OF RIGHT SPHENOID WING INVOLVING CAVERNOUS SINUS: ICD-10-CM

## 2020-01-23 DIAGNOSIS — E78.5 HYPERLIPIDEMIA, UNSPECIFIED HYPERLIPIDEMIA TYPE: ICD-10-CM

## 2020-01-23 LAB
AORTIC VALVE REGURGITATION: ABNORMAL
AORTIC VALVE STENOSIS: ABNORMAL
DIASTOLIC DYSFUNCTION: NO
ESTIMATED PA SYSTOLIC PRESSURE: 22.71
GLOBAL PERICARDIAL EFFUSION: ABNORMAL
MITRAL VALVE MOBILITY: NORMAL
RETIRED EF AND QEF - SEE NOTES: 70 (ref 55–65)
TRICUSPID VALVE REGURGITATION: ABNORMAL

## 2020-01-23 PROCEDURE — 93306 2D ECHO WITH COLOR FLOW DOPPLER: ICD-10-PCS | Mod: 26,S$PBB,, | Performed by: INTERNAL MEDICINE

## 2020-01-23 PROCEDURE — 93306 TTE W/DOPPLER COMPLETE: CPT | Mod: PBBFAC,PO | Performed by: INTERNAL MEDICINE

## 2020-02-03 ENCOUNTER — OFFICE VISIT (OUTPATIENT)
Dept: CARDIOLOGY | Facility: CLINIC | Age: 70
End: 2020-02-03
Payer: MEDICARE

## 2020-02-03 VITALS
SYSTOLIC BLOOD PRESSURE: 119 MMHG | WEIGHT: 204 LBS | DIASTOLIC BLOOD PRESSURE: 73 MMHG | HEART RATE: 59 BPM | BODY MASS INDEX: 27.63 KG/M2 | HEIGHT: 72 IN

## 2020-02-03 DIAGNOSIS — Z78.9 STATIN INTOLERANCE: Chronic | ICD-10-CM

## 2020-02-03 DIAGNOSIS — I10 ESSENTIAL HYPERTENSION: ICD-10-CM

## 2020-02-03 DIAGNOSIS — I35.0 NONRHEUMATIC AORTIC VALVE STENOSIS: Primary | ICD-10-CM

## 2020-02-03 PROCEDURE — 99212 OFFICE O/P EST SF 10 MIN: CPT | Mod: PBBFAC,PO | Performed by: INTERNAL MEDICINE

## 2020-02-03 PROCEDURE — 99214 PR OFFICE/OUTPT VISIT, EST, LEVL IV, 30-39 MIN: ICD-10-PCS | Mod: S$PBB,,, | Performed by: INTERNAL MEDICINE

## 2020-02-03 PROCEDURE — 99999 PR PBB SHADOW E&M-EST. PATIENT-LVL II: ICD-10-PCS | Mod: PBBFAC,,, | Performed by: INTERNAL MEDICINE

## 2020-02-03 PROCEDURE — 99214 OFFICE O/P EST MOD 30 MIN: CPT | Mod: S$PBB,,, | Performed by: INTERNAL MEDICINE

## 2020-02-03 PROCEDURE — 99999 PR PBB SHADOW E&M-EST. PATIENT-LVL II: CPT | Mod: PBBFAC,,, | Performed by: INTERNAL MEDICINE

## 2020-02-03 RX ORDER — HYDROCHLOROTHIAZIDE 12.5 MG/1
12.5 CAPSULE ORAL DAILY
Qty: 90 CAPSULE | Refills: 3 | Status: SHIPPED | OUTPATIENT
Start: 2020-02-03 | End: 2021-01-28 | Stop reason: ALTCHOICE

## 2020-02-03 NOTE — PROGRESS NOTES
Subjective:    Patient ID:  Aurelio Perkins is a 69 y.o. male who presents for evaluation of Follow-up (follow up )      HPI69 yo WM with HTN, moderate to severe AS by echo and cath in 2019 with non-obstructive disease.Denies chest pain, SOB, or edema Denies palpitations, weak spells, and syncope    CONCLUSIONS     1 - Normal left ventricular systolic function (EF 65-70%).     2 - Concentric hypertrophy.     3 - Normal left ventricular diastolic function.     4 - Normal right ventricular systolic function .     5 - Trivial to mild aortic regurgitation.     6 - Moderate aortic stenosis, MELVIN = 1.17 cm2, AVAi = 0.55 cm2/m2, peak velocity = 3.42 m/s, mean gradient = 28 mmHg.     7 - Mild tricuspid regurgitation.     8 - Trivial pericardial effusion.     9 - The estimated PA systolic pressure is 23 mmHg.             This document has been electronically    SIGNED BY: Valente Regalado MD On: 01/23/2020 12:19  Review of Systems   Constitution: Negative for decreased appetite, fever, malaise/fatigue, weight gain and weight loss.   HENT: Negative for hearing loss and nosebleeds.    Eyes: Negative for visual disturbance.   Cardiovascular: Negative for chest pain, claudication, cyanosis, dyspnea on exertion, irregular heartbeat, leg swelling, near-syncope, orthopnea, palpitations, paroxysmal nocturnal dyspnea and syncope.   Respiratory: Negative for cough, hemoptysis, shortness of breath, sleep disturbances due to breathing, snoring and wheezing.    Endocrine: Negative for cold intolerance, heat intolerance, polydipsia and polyuria.   Hematologic/Lymphatic: Negative for adenopathy and bleeding problem. Does not bruise/bleed easily.   Skin: Negative for color change, itching, poor wound healing, rash and suspicious lesions.   Musculoskeletal: Negative for arthritis, back pain, falls, joint pain, joint swelling, muscle cramps, muscle weakness and myalgias.   Gastrointestinal: Negative for bloating, abdominal pain, change in  bowel habit, constipation, flatus, heartburn, hematemesis, hematochezia, hemorrhoids, jaundice, melena, nausea and vomiting.   Genitourinary: Negative for bladder incontinence, decreased libido, frequency, hematuria, hesitancy and urgency.   Neurological: Negative for brief paralysis, difficulty with concentration, excessive daytime sleepiness, dizziness, focal weakness, headaches, light-headedness, loss of balance, numbness, vertigo and weakness.   Psychiatric/Behavioral: Negative for altered mental status, depression and memory loss. The patient does not have insomnia and is not nervous/anxious.    Allergic/Immunologic: Negative for environmental allergies, hives and persistent infections.        Objective:    Physical Exam   Constitutional: He is oriented to person, place, and time. He appears well-developed and well-nourished. No distress.   /73   Pulse (!) 59   Ht 6' (1.829 m)   Wt 92.5 kg (204 lb)   BMI 27.67 kg/m²      HENT:   Head: Normocephalic and atraumatic.   Eyes: Pupils are equal, round, and reactive to light. Conjunctivae and lids are normal. Right eye exhibits no discharge. No scleral icterus.   Neck: Normal range of motion. Neck supple. No JVD present. No tracheal deviation present. No thyromegaly present.   Cardiovascular: Normal rate, regular rhythm, S1 normal, S2 normal and intact distal pulses. Exam reveals no gallop and no friction rub.   Murmur heard.   Harsh midsystolic murmur is present with a grade of 3/6 at the upper right sternal border radiating to the neck.  Pulses:       Carotid pulses are 2+ on the right side, and 2+ on the left side.       Radial pulses are 2+ on the right side, and 2+ on the left side.        Femoral pulses are 2+ on the right side, and 2+ on the left side.       Popliteal pulses are 2+ on the right side, and 2+ on the left side.        Dorsalis pedis pulses are 2+ on the right side, and 2+ on the left side.        Posterior tibial pulses are 2+ on the  right side, and 2+ on the left side.   Pulmonary/Chest: Effort normal and breath sounds normal. No respiratory distress. He has no wheezes. He has no rales. He exhibits no tenderness.   Abdominal: Soft. Bowel sounds are normal. He exhibits no distension and no mass. There is no hepatosplenomegaly or hepatomegaly. There is no tenderness. There is no rebound and no guarding.   Musculoskeletal: Normal range of motion. He exhibits no edema or tenderness.   Lymphadenopathy:     He has no cervical adenopathy.   Neurological: He is alert and oriented to person, place, and time. He has normal reflexes. No cranial nerve deficit. Coordination normal.   Skin: Skin is warm and dry. No rash noted. He is not diaphoretic. No erythema. No pallor.   Psychiatric: He has a normal mood and affect. His speech is normal and behavior is normal. Judgment and thought content normal. Cognition and memory are normal.         Assessment:       1. Nonrheumatic aortic valve stenosis    2. Essential hypertension    3. Statin intolerance         Plan:     The current medical regimen is effective;  continue present plan and medications.    Progression of AS discussed and symptoms to be aware of reviewed    No orders of the defined types were placed in this encounter.    Follow up in about 6 months (around 8/3/2020).

## 2020-02-05 ENCOUNTER — APPOINTMENT (OUTPATIENT)
Dept: LAB | Facility: HOSPITAL | Age: 70
End: 2020-02-05
Attending: FAMILY MEDICINE
Payer: MEDICARE

## 2020-02-05 ENCOUNTER — LAB VISIT (OUTPATIENT)
Dept: LAB | Facility: HOSPITAL | Age: 70
End: 2020-02-05
Attending: INTERNAL MEDICINE
Payer: MEDICARE

## 2020-02-05 DIAGNOSIS — M25.50 ARTHRALGIA, UNSPECIFIED JOINT: ICD-10-CM

## 2020-02-05 DIAGNOSIS — F51.01 PRIMARY INSOMNIA: ICD-10-CM

## 2020-02-05 DIAGNOSIS — G89.4 CHRONIC PAIN SYNDROME: ICD-10-CM

## 2020-02-05 DIAGNOSIS — E78.2 MIXED HYPERLIPIDEMIA: ICD-10-CM

## 2020-02-05 DIAGNOSIS — I25.118 CORONARY ARTERY DISEASE OF NATIVE ARTERY OF NATIVE HEART WITH STABLE ANGINA PECTORIS: ICD-10-CM

## 2020-02-05 DIAGNOSIS — R53.83 FATIGUE, UNSPECIFIED TYPE: ICD-10-CM

## 2020-02-05 DIAGNOSIS — M06.9 RHEUMATOID ARTHRITIS FLARE: ICD-10-CM

## 2020-02-05 DIAGNOSIS — E03.9 PRIMARY HYPOTHYROIDISM: ICD-10-CM

## 2020-02-05 LAB
ALBUMIN SERPL BCP-MCNC: 4 G/DL (ref 3.5–5.2)
ALP SERPL-CCNC: 55 U/L (ref 55–135)
ALT SERPL W/O P-5'-P-CCNC: 43 U/L (ref 10–44)
ANION GAP SERPL CALC-SCNC: 9 MMOL/L (ref 8–16)
AST SERPL-CCNC: 34 U/L (ref 10–40)
BASOPHILS # BLD AUTO: 0.01 K/UL (ref 0–0.2)
BASOPHILS NFR BLD: 0.2 % (ref 0–1.9)
BILIRUB SERPL-MCNC: 1.1 MG/DL (ref 0.1–1)
BUN SERPL-MCNC: 19 MG/DL (ref 8–23)
CALCIUM SERPL-MCNC: 8.5 MG/DL (ref 8.7–10.5)
CHLORIDE SERPL-SCNC: 106 MMOL/L (ref 95–110)
CO2 SERPL-SCNC: 28 MMOL/L (ref 23–29)
CREAT SERPL-MCNC: 1.4 MG/DL (ref 0.5–1.4)
CRP SERPL-MCNC: 1.4 MG/L (ref 0–8.2)
DIFFERENTIAL METHOD: ABNORMAL
EOSINOPHIL # BLD AUTO: 0.1 K/UL (ref 0–0.5)
EOSINOPHIL NFR BLD: 1.4 % (ref 0–8)
ERYTHROCYTE [DISTWIDTH] IN BLOOD BY AUTOMATED COUNT: 12.9 % (ref 11.5–14.5)
ERYTHROCYTE [SEDIMENTATION RATE] IN BLOOD BY WESTERGREN METHOD: 5 MM/HR (ref 0–10)
EST. GFR  (AFRICAN AMERICAN): 59.3 ML/MIN/1.73 M^2
EST. GFR  (NON AFRICAN AMERICAN): 51.3 ML/MIN/1.73 M^2
GLUCOSE SERPL-MCNC: 85 MG/DL (ref 70–110)
HCT VFR BLD AUTO: 42.3 % (ref 40–54)
HGB BLD-MCNC: 13.2 G/DL (ref 14–18)
IMM GRANULOCYTES # BLD AUTO: 0.01 K/UL (ref 0–0.04)
IMM GRANULOCYTES NFR BLD AUTO: 0.2 % (ref 0–0.5)
LYMPHOCYTES # BLD AUTO: 1.4 K/UL (ref 1–4.8)
LYMPHOCYTES NFR BLD: 32 % (ref 18–48)
MCH RBC QN AUTO: 28.3 PG (ref 27–31)
MCHC RBC AUTO-ENTMCNC: 31.2 G/DL (ref 32–36)
MCV RBC AUTO: 91 FL (ref 82–98)
MONOCYTES # BLD AUTO: 0.4 K/UL (ref 0.3–1)
MONOCYTES NFR BLD: 8.7 % (ref 4–15)
NEUTROPHILS # BLD AUTO: 2.5 K/UL (ref 1.8–7.7)
NEUTROPHILS NFR BLD: 57.5 % (ref 38–73)
NRBC BLD-RTO: 0 /100 WBC
PLATELET # BLD AUTO: 170 K/UL (ref 150–350)
PMV BLD AUTO: 10.8 FL (ref 9.2–12.9)
POTASSIUM SERPL-SCNC: 3.9 MMOL/L (ref 3.5–5.1)
PROT SERPL-MCNC: 6.6 G/DL (ref 6–8.4)
PTH-INTACT SERPL-MCNC: 96 PG/ML (ref 9–77)
RBC # BLD AUTO: 4.66 M/UL (ref 4.6–6.2)
SODIUM SERPL-SCNC: 143 MMOL/L (ref 136–145)
T4 FREE SERPL-MCNC: 1.13 NG/DL (ref 0.71–1.51)
WBC # BLD AUTO: 4.35 K/UL (ref 3.9–12.7)

## 2020-02-05 PROCEDURE — 86140 C-REACTIVE PROTEIN: CPT

## 2020-02-05 PROCEDURE — 83695 ASSAY OF LIPOPROTEIN(A): CPT

## 2020-02-05 PROCEDURE — 36415 COLL VENOUS BLD VENIPUNCTURE: CPT | Mod: PO

## 2020-02-05 PROCEDURE — 80061 LIPID PANEL: CPT

## 2020-02-05 PROCEDURE — 83970 ASSAY OF PARATHORMONE: CPT

## 2020-02-05 PROCEDURE — 85025 COMPLETE CBC W/AUTO DIFF WBC: CPT

## 2020-02-05 PROCEDURE — 84439 ASSAY OF FREE THYROXINE: CPT

## 2020-02-05 PROCEDURE — 80053 COMPREHEN METABOLIC PANEL: CPT

## 2020-02-05 PROCEDURE — 85651 RBC SED RATE NONAUTOMATED: CPT | Mod: PO

## 2020-02-06 ENCOUNTER — PATIENT MESSAGE (OUTPATIENT)
Dept: ENDOCRINOLOGY | Facility: CLINIC | Age: 70
End: 2020-02-06

## 2020-02-06 DIAGNOSIS — E03.9 HYPOTHYROIDISM, UNSPECIFIED TYPE: ICD-10-CM

## 2020-02-06 DIAGNOSIS — R79.89 LOW TESTOSTERONE IN MALE: ICD-10-CM

## 2020-02-06 DIAGNOSIS — E03.9 PRIMARY HYPOTHYROIDISM: Primary | ICD-10-CM

## 2020-02-06 DIAGNOSIS — R73.9 HYPERGLYCEMIA: ICD-10-CM

## 2020-02-06 DIAGNOSIS — E27.49 SECONDARY ADRENAL INSUFFICIENCY: ICD-10-CM

## 2020-02-07 ENCOUNTER — PATIENT MESSAGE (OUTPATIENT)
Dept: FAMILY MEDICINE | Facility: CLINIC | Age: 70
End: 2020-02-07

## 2020-02-07 LAB — LPA SERPL-MCNC: 32 MG/DL (ref 0–30)

## 2020-02-09 LAB
CHOLEST SERPL-MCNC: 172 MG/DL
HDL SERPL QN: 8.2 NM
HDL SERPL-SCNC: 33.6 UMOL/L
HDLC SERPL-MCNC: 44 MG/DL (ref 40–59)
HLD.LARGE SERPL-SCNC: <2.8 UMOL/L
LDL SERPL QN: 20.4 NM
LDL SERPL-SCNC: 1581 NMOL/L
LDL SMALL SERPL-SCNC: 847 NMOL/L
LDLC SERPL CALC-MCNC: 90 MG/DL
PATHOLOGY STUDY: ABNORMAL
TRIGL SERPL-MCNC: 189 MG/DL (ref 30–149)
VLDL LARGE SERPL-SCNC: 7.8 NMOL/L
VLDL SERPL QN: 51.7 NM

## 2020-02-10 NOTE — PROGRESS NOTES
#CALL THE PATIENT# to discuss results/see if they have questions and document verification. Make FU appt if needed.    #Pend me the orders in my interpretation below.#    I have sent a message to them with the interpretation (see below).  See if they have any questions and make a follow-up appointment if not already schedule and if needed.    Also please address any outstanding health maintenance that may be due: There are no preventive care reminders to display for this patient.  ====================================    My interpretation that was sent to them through [employer]:    Aurelio, I have reviewed your recent blood work.     Your special lipid panel shows stable LDL total cholesterol and HDL.  Triglycerides are slightly elevated above 150.  Inflammatory markers are stable at this time.

## 2020-02-11 ENCOUNTER — OFFICE VISIT (OUTPATIENT)
Dept: RHEUMATOLOGY | Facility: CLINIC | Age: 70
End: 2020-02-11
Payer: MEDICARE

## 2020-02-11 VITALS
WEIGHT: 202.81 LBS | HEART RATE: 64 BPM | BODY MASS INDEX: 27.47 KG/M2 | DIASTOLIC BLOOD PRESSURE: 72 MMHG | HEIGHT: 72 IN | SYSTOLIC BLOOD PRESSURE: 108 MMHG

## 2020-02-11 DIAGNOSIS — Z79.899 IMMUNOCOMPROMISED STATE DUE TO DRUG THERAPY: ICD-10-CM

## 2020-02-11 DIAGNOSIS — M05.9 SEROPOSITIVE RHEUMATOID ARTHRITIS: Primary | ICD-10-CM

## 2020-02-11 DIAGNOSIS — N18.30 CKD (CHRONIC KIDNEY DISEASE) STAGE 3, GFR 30-59 ML/MIN: ICD-10-CM

## 2020-02-11 DIAGNOSIS — D84.821 IMMUNOCOMPROMISED STATE DUE TO DRUG THERAPY: ICD-10-CM

## 2020-02-11 PROCEDURE — 99213 PR OFFICE/OUTPT VISIT, EST, LEVL III, 20-29 MIN: ICD-10-PCS | Mod: S$PBB,,, | Performed by: PHYSICIAN ASSISTANT

## 2020-02-11 PROCEDURE — 99213 OFFICE O/P EST LOW 20 MIN: CPT | Mod: S$PBB,,, | Performed by: PHYSICIAN ASSISTANT

## 2020-02-11 PROCEDURE — 99213 OFFICE O/P EST LOW 20 MIN: CPT | Mod: PBBFAC,PO | Performed by: PHYSICIAN ASSISTANT

## 2020-02-11 PROCEDURE — 99999 PR PBB SHADOW E&M-EST. PATIENT-LVL III: ICD-10-PCS | Mod: PBBFAC,,, | Performed by: PHYSICIAN ASSISTANT

## 2020-02-11 PROCEDURE — 99999 PR PBB SHADOW E&M-EST. PATIENT-LVL III: CPT | Mod: PBBFAC,,, | Performed by: PHYSICIAN ASSISTANT

## 2020-02-11 RX ORDER — CHLORZOXAZONE 500 MG/1
500 TABLET ORAL
COMMUNITY
Start: 2020-01-18 | End: 2022-10-04

## 2020-02-11 RX ORDER — DICLOFENAC SODIUM 75 MG/1
75 TABLET, DELAYED RELEASE ORAL DAILY PRN
Qty: 90 TABLET | Refills: 1 | Status: SHIPPED | OUTPATIENT
Start: 2020-02-11 | End: 2020-05-07 | Stop reason: SDUPTHER

## 2020-02-11 ASSESSMENT — ROUTINE ASSESSMENT OF PATIENT INDEX DATA (RAPID3)
FATIGUE SCORE: 2.2
PSYCHOLOGICAL DISTRESS SCORE: 0
MDHAQ FUNCTION SCORE: .6
TOTAL RAPID3 SCORE: 3.67
PAIN SCORE: 5
PATIENT GLOBAL ASSESSMENT SCORE: 4

## 2020-02-12 ENCOUNTER — PATIENT OUTREACH (OUTPATIENT)
Dept: OTHER | Facility: OTHER | Age: 70
End: 2020-02-12

## 2020-02-14 ENCOUNTER — OFFICE VISIT (OUTPATIENT)
Dept: FAMILY MEDICINE | Facility: CLINIC | Age: 70
End: 2020-02-14
Payer: MEDICARE

## 2020-02-14 VITALS
BODY MASS INDEX: 27.72 KG/M2 | TEMPERATURE: 98 F | DIASTOLIC BLOOD PRESSURE: 77 MMHG | HEIGHT: 72 IN | HEART RATE: 59 BPM | WEIGHT: 204.63 LBS | SYSTOLIC BLOOD PRESSURE: 124 MMHG

## 2020-02-14 DIAGNOSIS — D63.1 ANEMIA OF CHRONIC RENAL FAILURE, STAGE 3 (MODERATE): ICD-10-CM

## 2020-02-14 DIAGNOSIS — E78.2 MIXED HYPERLIPIDEMIA: Primary | ICD-10-CM

## 2020-02-14 DIAGNOSIS — N18.30 ANEMIA OF CHRONIC RENAL FAILURE, STAGE 3 (MODERATE): ICD-10-CM

## 2020-02-14 PROBLEM — Z90.5 ACQUIRED ABSENCE OF KIDNEY: Status: ACTIVE | Noted: 2020-01-28

## 2020-02-14 PROBLEM — N52.31 ERECTILE DYSFUNCTION AFTER RADICAL PROSTATECTOMY: Status: ACTIVE | Noted: 2020-01-28

## 2020-02-14 PROBLEM — E23.0 PANHYPOPITUITARISM: Status: ACTIVE | Noted: 2020-01-28

## 2020-02-14 PROBLEM — E78.5 HYPERLIPIDEMIA: Chronic | Status: ACTIVE | Noted: 2019-07-09

## 2020-02-14 PROCEDURE — 99213 OFFICE O/P EST LOW 20 MIN: CPT | Mod: PBBFAC,PO | Performed by: FAMILY MEDICINE

## 2020-02-14 PROCEDURE — 99999 PR PBB SHADOW E&M-EST. PATIENT-LVL III: CPT | Mod: PBBFAC,,, | Performed by: FAMILY MEDICINE

## 2020-02-14 PROCEDURE — 99214 OFFICE O/P EST MOD 30 MIN: CPT | Mod: S$PBB,,, | Performed by: FAMILY MEDICINE

## 2020-02-14 PROCEDURE — 99999 PR PBB SHADOW E&M-EST. PATIENT-LVL III: ICD-10-PCS | Mod: PBBFAC,,, | Performed by: FAMILY MEDICINE

## 2020-02-14 PROCEDURE — 99214 PR OFFICE/OUTPT VISIT, EST, LEVL IV, 30-39 MIN: ICD-10-PCS | Mod: S$PBB,,, | Performed by: FAMILY MEDICINE

## 2020-02-14 NOTE — PROGRESS NOTES
PLAN:      Problem List Items Addressed This Visit     Hyperlipidemia - Primary (Chronic)     Total cholesterol and LDL improved.  Patient with unfavorable particle size and number.  Continue Repatha.  Repatha device failure form filled out and faxed back to the company.  Start fish oil supplementation over-the-counter.  Repeat lipid panel in six months.Discussed hyperlipidemia disease course, healthy diet and increased need for exercise.  Discussed the risk of cardiovascular disease, increase stroke and heart attack risk.    Patient voiced understanding and understood the treatment plan. All questions were answered.     Patient intolerant to statins.         Relevant Orders    Lipid panel    Anemia of chronic renal failure, stage 3 (moderate) (Chronic)     Continue current medication regimen.  Continue monitoring CBC and renal function routinely.             Future Appointments     Date Provider Specialty Appt Notes    3/6/2020  Radiology MRI [facility]     3/6/2020 Jimmy Segura DO Neurosurgery 6 month Post Op    5/29/2020  Lab per Dr Hammonds    6/4/2020 Yoav Oliveira MD Rheumatology 4 month f/u    6/19/2020 Maria Esther Hammonds MD Endocrinology per Dr Hammonds    8/3/2020 Valente Regalado Jr., MD Cardiology 6 mths    8/5/2020  Lab recheck lipid panel    8/14/2020 Cuauhtemoc Gardner MD Family Medicine 6 month follow up lab review           Medication List with Changes/Refills   Current Medications    ASPIRIN (ECOTRIN) 81 MG EC TABLET    Take 1 tablet (81 mg total) by mouth once daily.    CHLORHEXIDINE (PERIDEX) 0.12 % SOLUTION    USE AS DIRECTED 15 MLS IN THE MOUTH OR THROAT 2 TIMES DAILY. FOR 14 DAYS    CHLORZOXAZONE (PARAFON FORTE) 500 MG TAB        DICLOFENAC (VOLTAREN) 75 MG EC TABLET    Take 1 tablet (75 mg total) by mouth daily as needed.    ESZOPICLONE (LUNESTA) 3 MG TAB    TAKE 1 TABLET BY MOUTH EVERY EVENING.    EVOLOCUMAB (REPATHA PUSHTRONEX) 420 MG/3.5 ML INJT    Inject 3.5 mLs (420 mg total) into the skin every  30 days.    FLUZONE HIGH-DOSE 2019-20, PF, 180 MCG/0.5 ML SYRG    TO BE ADMINISTERED BY PHARMACIST FOR IMMUNIZATION    HYDROCHLOROTHIAZIDE (MICROZIDE) 12.5 MG CAPSULE    Take 1 capsule (12.5 mg total) by mouth once daily.    HYDROCORTISONE (CORTEF) 10 MG TAB    Take Hydrocortisone 15  mg in the morning and 5 mg in the evening (4PM).    METOPROLOL SUCCINATE (TOPROL-XL) 25 MG 24 HR TABLET    Take 1 tablet (25 mg total) by mouth once daily.    NITROGLYCERIN (NITROSTAT) 0.4 MG SL TABLET    Place 0.4 mg under the tongue.    SYNTHROID 112 MCG TABLET    Take 1 tablet (112 mcg total) by mouth once daily.    SYRINGE, DISPOSABLE, 1 ML SYRG    1 Syringe by Misc.(Non-Drug; Combo Route) route once a week.    TESTOSTERONE CYPIONATE (DEPOTESTOTERONE CYPIONATE) 200 MG/ML INJECTION    Inject 100 mg every 10 days    TOFACITINIB (XELJANZ) 5 MG TAB    Take one tablet (5mg) by mouth 2 (two) times daily.    VITAMIN D2 50,000 UNIT CAPSULE    TAKE ONE CAPSULE BY MOUTH ONE TIME PER WEEK       Cuauhtemoc Gardner M.D.     ==========================================================================  Subjective:      Patient ID: Aurelio Perkins is a 69 y.o. male.  has a past medical history of Arthritis, Cancer, Chronic kidney disease, Encounter for long-term (current) use of medications, and Mitral valve prolapse.     Chief Complaint: Follow-up (3 month f/u review lab results)      Problem List Items Addressed This Visit     Hyperlipidemia - Primary (Chronic)    Overview     Initial HPI:  Chronic.  Uncontrolled.  Patient not taking any medication for this.  ===================================  Follow-up HPI:  Patient reports statins contraindicated per Rheumatology while on Xeljanz.  =========================================  Follow-up HPI:This condition is chronic.  Currently uncontrolled.  Patient reports that Rheumatology as instructed him not to take statins while on Xeljanz.  Patient does have a history of coronary artery disease.  Patient takes aspirin.  Lab Results   Component Value Date    CHOL 271 (H) 09/26/2019    CHOL 247 (H) 07/10/2019     Lab Results   Component Value Date    HDL 54 09/26/2019    HDL 46 07/10/2019     Lab Results   Component Value Date    LDLCALC 186.4 (H) 09/26/2019    LDLCALC 167.0 (H) 07/10/2019     Lab Results   Component Value Date    TRIG 153 (H) 09/26/2019    TRIG 170 (H) 07/10/2019     Lab Results   Component Value Date    CHOLHDL 19.9 (L) 09/26/2019    CHOLHDL 18.6 (L) 07/10/2019       Lab Results   Component Value Date    ALT 43 02/05/2020    AST 34 02/05/2020    ALKPHOS 55 02/05/2020    BILITOT 1.1 (H) 02/05/2020   The patient denies any myalgias, chest pain, shortness of breath, abdominal pain, nausea, vomiting, constipation, diarrhea, jaundice, skin changes at this time.  =======================================================  February 2020:  Reviewed special lipids.  Total cholesterol and LDL have reduced tremendously while on Repatha.  Patient did have device failure in the 3rd month December 2019 where the device only pumped for a few seconds and then stopped.  Triglycerides remain elevated.  Patient advised to decrease fatty foods and start fish oil supplementation.  Lab Results   Component Value Date    LIPOA 32 (H) 02/05/2020     Lab Results   Component Value Date    LIPIDHDLCHOL 44 02/05/2020      Lab Results   Component Value Date    LIPIDTRIG 189 (H) 02/05/2020      Lab Results   Component Value Date    LIPIDTOTCHOL 172 02/05/2020      Lab Results   Component Value Date    LIPIDLDLCHOL 90 02/05/2020      Lab Results   Component Value Date    LDLPARTNUMB 1581 (H) 02/05/2020      Lab Results   Component Value Date    HDLPARTNUMB 33.6 02/05/2020      Lab Results   Component Value Date    SMLDLPARNUM 847 (H) 02/05/2020      Lab Results   Component Value Date    LGHLDPARTNUM <2.8 (L) 02/05/2020      Lab Results   Component Value Date    LGVLDLPARNUM 7.8 (H) 02/05/2020      Lab Results   Component  Value Date    VLDLSIZE 51.7 (H) 02/05/2020      Lab Results   Component Value Date    LDLPARTSIZE 20.4 (L) 02/05/2020      Lab Results   Component Value Date    HDLSIZE 8.2 (L) 02/05/2020      Lab Results   Component Value Date    EERLIPOP See Note 02/05/2020      ======================================================         Current Assessment & Plan     Total cholesterol and LDL improved.  Patient with unfavorable particle size and number.  Continue Repatha.  Repatha device failure form filled out and faxed back to the company.  Start fish oil supplementation over-the-counter.  Repeat lipid panel in six months.Discussed hyperlipidemia disease course, healthy diet and increased need for exercise.  Discussed the risk of cardiovascular disease, increase stroke and heart attack risk.    Patient voiced understanding and understood the treatment plan. All questions were answered.     Patient intolerant to statins.         Anemia of chronic renal failure, stage 3 (moderate) (Chronic)    Overview     Chronic.  Stable.  Patient feels well on current regimen.    Reviewed last CBC and renal function.         Current Assessment & Plan     Continue current medication regimen.  Continue monitoring CBC and renal function routinely.                Past Medical History:  Past Medical History:   Diagnosis Date    Arthritis     Cancer     prostate cancer-Removed Prostate    Chronic kidney disease     one kidney    Encounter for long-term (current) use of medications     Mitral valve prolapse      Past Surgical History:   Procedure Laterality Date    CRANIOTOMY Right 5/29/2019    Procedure: CRANIOTOMY  (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)  Co-surgery with Dr Vaibhav Jara - please put in his room  [facility];  Surgeon: Jimmy Segura DO;  Location: [facility] OR [address] [place] [address];  Service: Neurosurgery;  Laterality: Right;    HEMORRHOID SURGERY       NEPHRECTOMY      PROSTATECTOMY      TONSILLECTOMY       Review of patient's allergies indicates:   Allergen Reactions    Antihistamines - alkylamine Other (See Comments)     hallucinations    Benadryl [diphenhydramine hcl] Other (See Comments)     hallucinations    Codeine Itching     Turns red    Lodine [etodolac] Other (See Comments)     fatigue    Methotrexate analogues Other (See Comments)     Sunlight sensitivity    Morphine Itching     Turns red    Statins-hmg-coa reductase inhibitors      Medication List with Changes/Refills   Current Medications    ASPIRIN (ECOTRIN) 81 MG EC TABLET    Take 1 tablet (81 mg total) by mouth once daily.    CHLORHEXIDINE (PERIDEX) 0.12 % SOLUTION    USE AS DIRECTED 15 MLS IN THE MOUTH OR THROAT 2 TIMES DAILY. FOR 14 DAYS    CHLORZOXAZONE (PARAFON FORTE) 500 MG TAB        DICLOFENAC (VOLTAREN) 75 MG EC TABLET    Take 1 tablet (75 mg total) by mouth daily as needed.    ESZOPICLONE (LUNESTA) 3 MG TAB    TAKE 1 TABLET BY MOUTH EVERY EVENING.    EVOLOCUMAB (REPATHA PUSHTRONEX) 420 MG/3.5 ML INJT    Inject 3.5 mLs (420 mg total) into the skin every 30 days.    FLUZONE HIGH-DOSE 2019-20, PF, 180 MCG/0.5 ML SYRG    TO BE ADMINISTERED BY PHARMACIST FOR IMMUNIZATION    HYDROCHLOROTHIAZIDE (MICROZIDE) 12.5 MG CAPSULE    Take 1 capsule (12.5 mg total) by mouth once daily.    HYDROCORTISONE (CORTEF) 10 MG TAB    Take Hydrocortisone 15  mg in the morning and 5 mg in the evening (4PM).    METOPROLOL SUCCINATE (TOPROL-XL) 25 MG 24 HR TABLET    Take 1 tablet (25 mg total) by mouth once daily.    NITROGLYCERIN (NITROSTAT) 0.4 MG SL TABLET    Place 0.4 mg under the tongue.    SYNTHROID 112 MCG TABLET    Take 1 tablet (112 mcg total) by mouth once daily.    SYRINGE, DISPOSABLE, 1 ML SYRG    1 Syringe by Misc.(Non-Drug; Combo Route) route once a week.    TESTOSTERONE CYPIONATE (DEPOTESTOTERONE CYPIONATE) 200 MG/ML INJECTION    Inject 100 mg every 10 days    TOFACITINIB (XELJANZ) 5 MG TAB     Take one tablet (5mg) by mouth 2 (two) times daily.    VITAMIN D2 50,000 UNIT CAPSULE    TAKE ONE CAPSULE BY MOUTH ONE TIME PER WEEK      Social History     Tobacco Use    Smoking status: Never Smoker    Smokeless tobacco: Never Used   Substance Use Topics    Alcohol use: No     Frequency: Monthly or less     Drinks per session: 1 or 2     Binge frequency: Never      Family History   Adopted: Yes   Family history unknown: Yes       I have reviewed the complete PMH, social history, surgical history, allergies and medications.  As well as family history.    Review of Systems   Constitutional: Negative for activity change and fatigue.   HENT: Negative for congestion and sinus pain.    Eyes: Negative for visual disturbance.   Respiratory: Negative for chest tightness and shortness of breath.    Cardiovascular: Negative for palpitations and leg swelling.   Gastrointestinal: Negative for abdominal pain, diarrhea and nausea.   Endocrine: Negative for polyuria.   Genitourinary: Negative for difficulty urinating and frequency.   Musculoskeletal: Negative for arthralgias and joint swelling.   Skin: Negative for rash.   Neurological: Negative for dizziness and headaches.   Psychiatric/Behavioral: Negative for agitation. The patient is not nervous/anxious.      Objective:   /77   Pulse (!) 59   Temp 98 °F (36.7 °C) (Oral)   Ht 6' (1.829 m)   Wt 92.8 kg (204 lb 9.6 oz)   BMI 27.75 kg/m²   Physical Exam   Constitutional: He is oriented to person, place, and time. He appears well-developed and well-nourished. No distress.   HENT:   Head: Normocephalic and atraumatic.   Eyes: Pupils are equal, round, and reactive to light. EOM are normal.   Right eye ptosis chronic   Neck: Normal range of motion. Neck supple.   Cardiovascular: Normal rate, regular rhythm and intact distal pulses.   Murmur heard.   Systolic murmur is present with a grade of 3/6.  Pulmonary/Chest: Effort normal and breath sounds normal. No respiratory  distress. He has no wheezes.   Musculoskeletal: Normal range of motion. He exhibits no edema.   Neurological: He is alert and oriented to person, place, and time. No cranial nerve deficit.   Skin: Skin is warm and dry. Capillary refill takes less than 2 seconds.   Psychiatric: He has a normal mood and affect. His behavior is normal.   Nursing note and vitals reviewed.      Assessment:     1. Mixed hyperlipidemia    2. Anemia of chronic renal failure, stage 3 (moderate)      MDM:   Moderate risk.  Moderate medical complexity.  Multiple chronic conditions.  I have Reviewed and summarized old records.  I have performed thorough medication reconciliation today and discussed risk and benefits of each medication.  I have reviewed labs and discussed with patient.  All questions were answered.  I am requesting old records and will review them once they are available.    I have signed for the following orders AND/OR meds.  Orders Placed This Encounter   Procedures    Lipid panel     Standing Status:   Future     Standing Expiration Date:   4/14/2021           Follow up in about 6 months (around 8/14/2020) for Med refills, LAB RESULTS, HLD.    If no improvement in symptoms or symptoms worsen, advised to call/follow-up at clinic or go to ER. Patient voiced understanding and all questions/concerns were addressed.     DISCLAIMER: This note was compiled by using a speech recognition dictation system and therefore please be aware that typographical / speech recognition errors can and do occur.  Please contact me if you see any errors specifically.    Cuauhtemoc Gardner M.D.       Office: [phone] [address] [place] [address]  FAX: [phone]

## 2020-02-14 NOTE — ASSESSMENT & PLAN NOTE
Total cholesterol and LDL improved.  Patient with unfavorable particle size and number.  Continue Repatha.  Repatha device failure form filled out and faxed back to the company.  Start fish oil supplementation over-the-counter.  Repeat lipid panel in six months.Discussed hyperlipidemia disease course, healthy diet and increased need for exercise.  Discussed the risk of cardiovascular disease, increase stroke and heart attack risk.    Patient voiced understanding and understood the treatment plan. All questions were answered.     Patient intolerant to statins.

## 2020-02-14 NOTE — PATIENT INSTRUCTIONS
Follow up in about 6 months (around 8/14/2020) for Med refills, LAB RESULTS, HLD.     If no improvement in symptoms or symptoms worsen, please be advised to call MD, follow-up at clinic and/or go to ER if becomes severe.    Cuauhtemoc Gardner M.D.        We Offer TELEHEALTH & Same Day Appointments!   Book your Telehealth appointment with me through my nurse or   Clinic appointments on [employer]!    [address] [place] [address]    Office: [phone]   FAX: [phone]    Check out my Facebook Page and Follow Me at: [email or web address]    Check out my website at [employer] [email or web address] clicking on: [email or web address]    To Schedule appointments online, go to [email or web address]: [email or web address]

## 2020-02-15 PROBLEM — Z11.59 NEED FOR HEPATITIS C SCREENING TEST: Status: RESOLVED | Noted: 2019-07-09 | Resolved: 2020-02-15

## 2020-02-15 NOTE — PROGRESS NOTES
Subjective:       Patient ID: Aurelio Perkins is a 69 y.o. male.    Chief Complaint: Disease Management    Mr. Perkins is a 69 year old male who presents to clinic for follow up on seropositive rheumatoid arthritis. He is a new patient to me. He has been doing fairly well since his last visit. Arthritis is stable on xeljanz 5 mg bid. He complains of pain involving his neck, hips, and low back. Pain is typically 5/10, aching, and results in some difficulty of him getting out of bed and bending forward. Renal function has improved to his baseline and he would like to continue diclofenac 75 mg daily.    Current tx:  1. xeljanz 5 mg bid    Review of Systems   Constitutional: Negative for chills, fatigue and fever.   Eyes: Negative for visual disturbance.   Respiratory: Negative for cough, shortness of breath and wheezing.    Cardiovascular: Negative for chest pain, palpitations and leg swelling.   Gastrointestinal: Negative for abdominal pain, constipation, diarrhea, nausea and vomiting.   Musculoskeletal: Positive for arthralgias and back pain. Negative for myalgias.   Neurological: Negative for dizziness, syncope and headaches.   Hematological: Negative for adenopathy.         Objective:     Vitals:    02/11/20 0814   BP: 108/72   Pulse: 64       Past Medical History:   Diagnosis Date    Arthritis     Cancer     prostate cancer-Removed Prostate    Chronic kidney disease     one kidney    Encounter for long-term (current) use of medications     Mitral valve prolapse      Past Surgical History:   Procedure Laterality Date    CRANIOTOMY Right 5/29/2019    Procedure: CRANIOTOMY  (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)  Co-surgery with Dr Vaibhav Jara - please put in his room  Microscope [place];  Surgeon: Jimmy Segura DO;  Location: [facility] OR [address] [place] [address];  Service: Neurosurgery;  Laterality: Right;    HEMORRHOID SURGERY      NEPHRECTOMY       PROSTATECTOMY      TONSILLECTOMY            Physical Exam   Constitutional: He is well-developed, well-nourished, and in no distress.   Eyes: Right conjunctiva is not injected. Left conjunctiva is not injected. Right eye exhibits normal extraocular motion. Left eye exhibits normal extraocular motion.   Neck: No JVD present. No thyromegaly present.   Cardiovascular: Normal rate and regular rhythm.  Exam reveals no decreased pulses.    Pulmonary/Chest: He has no wheezes. He has no rhonchi. He has no rales.       Right Side Rheumatological Exam     Examination finds the shoulder, elbow, knee, 1st MCP, 2nd MCP, 3rd MCP, 4th MCP and 5th MCP normal.    The patient has an enlarged wrist, 1st PIP, 2nd PIP, 3rd PIP, 4th PIP and 5th PIP    Left Side Rheumatological Exam     Examination finds the shoulder, elbow, knee, 1st MCP, 2nd MCP, 3rd MCP, 4th MCP and 5th MCP normal.    The patient has an enlarged wrist, 1st PIP, 2nd PIP, 3rd PIP, 4th PIP and 5th PIP.      Lymphadenopathy:     He has no cervical adenopathy.   Neurological: Gait normal.   Skin: No rash noted.     Psychiatric: Mood and affect normal.       Component      Latest Ref Rng & Units 2/5/2020   WBC      3.90 - 12.70 K/uL 4.35   RBC      4.60 - 6.20 M/uL 4.66   Hemoglobin      14.0 - 18.0 g/dL 13.2 (L)   Hematocrit      40.0 - 54.0 % 42.3   MCV      82 - 98 fL 91   MCH      27.0 - 31.0 pg 28.3   MCHC      32.0 - 36.0 g/dL 31.2 (L)   RDW      11.5 - 14.5 % 12.9   Platelets      150 - 350 K/uL 170   MPV      9.2 - 12.9 fL 10.8   Immature Granulocytes      0.0 - 0.5 % 0.2   Gran # (ANC)      1.8 - 7.7 K/uL 2.5   Immature Grans (Abs)      0.00 - 0.04 K/uL 0.01   Lymph #      1.0 - 4.8 K/uL 1.4   Mono #      0.3 - 1.0 K/uL 0.4   Eos #      0.0 - 0.5 K/uL 0.1   Baso #      0.00 - 0.20 K/uL 0.01   nRBC      0 /100 WBC 0   Gran%      38.0 - 73.0 % 57.5   Lymph%      18.0 - 48.0 % 32.0   Mono%      4.0 - 15.0 % 8.7   Eosinophil%      0.0 - 8.0 % 1.4   Basophil%      0.0 -  1.9 % 0.2   Differential Method       Automated   Sodium      136 - 145 mmol/L 143   Potassium      3.5 - 5.1 mmol/L 3.9   Chloride      95 - 110 mmol/L 106   CO2      23 - 29 mmol/L 28   Glucose      70 - 110 mg/dL 85   BUN, Bld      8 - 23 mg/dL 19   Creatinine      0.5 - 1.4 mg/dL 1.4   Calcium      8.7 - 10.5 mg/dL 8.5 (L)   PROTEIN TOTAL      6.0 - 8.4 g/dL 6.6   Albumin      3.5 - 5.2 g/dL 4.0   BILIRUBIN TOTAL      0.1 - 1.0 mg/dL 1.1 (H)   Alkaline Phosphatase      55 - 135 U/L 55   AST      10 - 40 U/L 34   ALT      10 - 44 U/L 43   Anion Gap      8 - 16 mmol/L 9   eGFR if African American      >60 mL/min/1.73 m:2 59.3 (A)   eGFR if non African American      >60 mL/min/1.73 m:2 51.3 (A)   CRP      0.0 - 8.2 mg/L 1.4   Sed Rate      0 - 10 mm/Hr 5   PTH      9.0 - 77.0 pg/mL 96.0 (H)   Free T4      0.71 - 1.51 ng/dL 1.13        Assessment:       1. Seropositive rheumatoid arthritis    2. Immunocompromised state due to drug therapy    3. CKD (chronic kidney disease) stage 3, GFR 30-59 ml/min            Plan:       Seropositive rheumatoid arthritis  -     diclofenac (VOLTAREN) 75 MG EC tablet; Take 1 tablet (75 mg total) by mouth daily as needed.  Dispense: 90 tablet; Refill: 1  -     CBC auto differential; Future; Expected date: 02/11/2020  -     Comprehensive metabolic panel; Future; Expected date: 02/11/2020  -     C-reactive protein; Future; Expected date: 02/11/2020  -     Sedimentation rate; Future; Expected date: 02/11/2020  -     Hepatitis C antibody; Future; Expected date: 02/11/2020  -     Quantiferon Gold TB; Future; Expected date: 02/11/2020  -     Hepatitis B core antibody, total; Future; Expected date: 02/11/2020  -     Hepatitis B surface antigen; Future; Expected date: 02/11/2020    Immunocompromised state due to drug therapy    CKD (chronic kidney disease) stage 3, GFR 30-59 ml/min        Assessment:  69 year old male with  Seropositive (+CCP) rheumatoid arthritis on xeljanz 5 mg bid  --CKD  stage 3, s/p total nephrectomy, followed by Nephrology  --secondary adrenal insufficiency on cortef 15 mg/ 5 mg  --moderate AS, non-obstructive CAD    Plan:  1. Cont. xeljanz 5 mg bid  2. Cont diclofenac 75 mg daily. Monitor renal function closely    Follow up:  3-4 months Dr. Tee lozoya/labs prior

## 2020-02-19 NOTE — PROGRESS NOTES
"Digital Medicine: Health  Follow-Up    The history is provided by the patient.     Follow Up  Follow-up reason(s): reading review      Readings are missing.   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   He states he just found out about the mobile [facility] and went there late one day on Tuesday but they had already left.   I looked up the upcoming schedule for the mobile [facility] and notified patient he can go this upcoming Tuesday in [place]. Patient states he can do that since he's not working that day. Patient states if he does not make it to the mobile [facility] that day he will definitely go on March 6th since he has an upcoming MRI at [facility].   He states he took his BP last night but did not enter the values, but reports it was 124/79.            INTERVENTION(S)  encouragement/support    PLAN  patient verbalizes understanding, patient amenable to changes and continue monitoring    Will follow up in two weeks to ensure patient got digital cuff      There are no preventive care reminders to display for this patient.    Last 5 Patient Entered Readings                                      Current 30 Day Average: 118/73     Recent Readings 2/3/2020 1/25/2020 1/15/2020 1/5/2020 12/30/2019    SBP (mmHg) 117 118 115 120 125    DBP (mmHg) 73 73 75 70 75                  Screenings    SDOH  "

## 2020-03-05 DIAGNOSIS — M05.9 SEROPOSITIVE RHEUMATOID ARTHRITIS: ICD-10-CM

## 2020-03-06 ENCOUNTER — HOSPITAL ENCOUNTER (OUTPATIENT)
Dept: RADIOLOGY | Facility: HOSPITAL | Age: 70
Discharge: HOME | End: 2020-03-06
Attending: NEUROLOGICAL SURGERY
Payer: MEDICARE

## 2020-03-06 ENCOUNTER — OFFICE VISIT (OUTPATIENT)
Dept: NEUROSURGERY | Facility: CLINIC | Age: 70
End: 2020-03-06
Payer: MEDICARE

## 2020-03-06 VITALS
BODY MASS INDEX: 26.24 KG/M2 | WEIGHT: 198 LBS | DIASTOLIC BLOOD PRESSURE: 74 MMHG | HEART RATE: 54 BPM | RESPIRATION RATE: 16 BRPM | HEIGHT: 73 IN | SYSTOLIC BLOOD PRESSURE: 123 MMHG

## 2020-03-06 DIAGNOSIS — Z98.890 S/P CRANIOTOMY: ICD-10-CM

## 2020-03-06 DIAGNOSIS — D49.89 NEOPLASM OF HEAD: ICD-10-CM

## 2020-03-06 DIAGNOSIS — Z98.890 S/P CRANIOTOMY: Primary | ICD-10-CM

## 2020-03-06 DIAGNOSIS — D32.9 MENINGIOMA OF RIGHT SPHENOID WING INVOLVING CAVERNOUS SINUS: ICD-10-CM

## 2020-03-06 PROCEDURE — 70553 MRI BRAIN W WO CONTRAST: ICD-10-PCS | Mod: 26,,, | Performed by: RADIOLOGY

## 2020-03-06 PROCEDURE — 25500020 PHARM REV CODE 255: Performed by: NEUROLOGICAL SURGERY

## 2020-03-06 PROCEDURE — A9585 GADOBUTROL INJECTION: HCPCS | Performed by: NEUROLOGICAL SURGERY

## 2020-03-06 PROCEDURE — 70553 MRI BRAIN STEM W/O & W/DYE: CPT | Mod: 26,,, | Performed by: RADIOLOGY

## 2020-03-06 PROCEDURE — 99213 OFFICE O/P EST LOW 20 MIN: CPT | Mod: PBBFAC,25 | Performed by: NEUROLOGICAL SURGERY

## 2020-03-06 PROCEDURE — 99214 OFFICE O/P EST MOD 30 MIN: CPT | Mod: S$PBB,,, | Performed by: NEUROLOGICAL SURGERY

## 2020-03-06 PROCEDURE — 99999 PR PBB SHADOW E&M-EST. PATIENT-LVL III: CPT | Mod: PBBFAC,,, | Performed by: NEUROLOGICAL SURGERY

## 2020-03-06 PROCEDURE — 70553 MRI BRAIN STEM W/O & W/DYE: CPT | Mod: TC

## 2020-03-06 PROCEDURE — 99999 PR PBB SHADOW E&M-EST. PATIENT-LVL III: ICD-10-PCS | Mod: PBBFAC,,, | Performed by: NEUROLOGICAL SURGERY

## 2020-03-06 PROCEDURE — 99214 PR OFFICE/OUTPT VISIT, EST, LEVL IV, 30-39 MIN: ICD-10-PCS | Mod: S$PBB,,, | Performed by: NEUROLOGICAL SURGERY

## 2020-03-06 RX ORDER — DICLOFENAC SODIUM 75 MG/1
75 TABLET, DELAYED RELEASE ORAL DAILY PRN
Qty: 90 TABLET | Refills: 1 | OUTPATIENT
Start: 2020-03-06

## 2020-03-06 RX ORDER — GADOBUTROL 604.72 MG/ML
10 INJECTION INTRAVENOUS
Status: COMPLETED | OUTPATIENT
Start: 2020-03-06 | End: 2020-03-06

## 2020-03-06 RX ADMIN — GADOBUTROL 10 ML: 604.72 INJECTION INTRAVENOUS at 10:03

## 2020-03-06 NOTE — PROGRESS NOTES
CHIEF COMPLAINT:  8m postop f/u     INTERVAL HISTORY (3/6/20):  Patient returns for routine postoperative follow-up.  Patient reports that he continues to do well from a neurologic perspective.  His right eyelid ptsosis remained improved and he denies any vision changes except for some difficult focusing when reading.    He denies any headaches, speech or language problems, sensory motor changes, or facial pain.     he reports that he had a hypertensive episode in July which prompted an angiogram and concern for an MI.  His angiogram was negative and it appears that the episode was due to hormone imbalance.      INTERVAL HISTORY (7/19/19):  Continues to do well.  Has returned to work without any difficulties.  R eyelid droop has remain improved but starting notice subtle droop in left eye lid.  Wound well healed.   Energy levels have returned now that hormone replacement stabilized.     HPI:  Aurelio Perkins is a 68 y.o.-year-old male who presents today for post-operative follow-up s/p left crani for subtotal resection on 5/29/19.  He reports that he is doing very well.  He reports that the right ptosis has improved and his vision in his right eye has improved and he thinks it is better than the left since surgery.  He reports some tenderness along the suture line just anterior to his ear which has been difficult to lay on otherwise he has had no other wound issues.  He has resume some driving and wants to know when he can return to work.       Denies any seizure activity, numbness or weakness, and has stopped taking his narcotics.      ROS:  Review of Systems   Constitutional: Negative.    HENT: Negative for congestion, ear discharge, ear pain, hearing loss, nosebleeds, sinus pain and tinnitus.    Eyes: Negative.    Respiratory: Negative.    Cardiovascular: Negative for chest pain, palpitations, claudication and leg swelling.   Gastrointestinal: Negative for abdominal pain, blood in stool, constipation,  diarrhea, melena and vomiting.   Genitourinary: Negative for flank pain, frequency and urgency.   Musculoskeletal: Negative.  Negative for falls.   Skin: Negative.    Neurological: Negative.    Endo/Heme/Allergies: Does not bruise/bleed easily.   Psychiatric/Behavioral: Negative.             PE:  Vitals:    03/06/20 1151   BP: 123/74   Pulse: (!) 54   Resp: 16       AAOX3  NAD  Cranial nerves 2-12 intact, right ptosis resolved, mild left ptosis, disconjugate gaze with horizontal movements (denies diplopia)     Strength:       Deltoids Biceps Triceps Wrist Ext. Wrist Flex. Hand    RUE 5 5 5 5 5 5   LUE 5 5 5 5 5 5     Hip Flex. Knee Flex. Knee Ext. Dorsi Flex Plantar Flex EHL   RLE 5 5 5 5 5 5   LLE 5 5 5 5 5 5      Sensation:  Intact to light touch (All 4 extremities)  Intact to pin prick (All 4 extremities)     Gait:  normal     DTR:  2+ and symmetric Biceps and knees     Cranial incision:  Well healed     IMAGING:  All imaging reviewed by me.      MRI, 3/6/20:  Stable residual meningioma within cavernous sinus.  No obvious growth or progression.    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  All visible tumor outside of cavernous sinus and not involving tentorium was resected.  Path revealed WHO I.      Neuro stable with continued improvement in his right ptosis and right vision.  Possible subtle  left ptosis (per patient), which might be due to contralateral cavernous sinus invasion.  Wound well healed.  Disconjugate gaze that self-corrects and is not causing diplopia.  BMRI shows stable cavernous sinus residual     PLAN:   - RTC in 6 months with BMRI +/- contrast

## 2020-03-09 ENCOUNTER — PATIENT OUTREACH (OUTPATIENT)
Dept: OTHER | Facility: OTHER | Age: 70
End: 2020-03-09

## 2020-03-09 NOTE — PROGRESS NOTES
Digital Medicine: Health  Follow-Up    Followed up with patient to see if he's had a chance to go to the [facility] yet. Patient states he missed it on mardi gras. I looked up the next date for the mobile [facility] closest to him and it's tomorrow 3/10. Patient states he will try to go.   Patient's in office readings are consistent with his manually submitted readings. Will follow up in 4 weeks.     The history is provided by the patient.     Follow Up  Follow-up reason(s): reading review      Readings are missing.   [facility] support needed and cuff exchange.      Intervention/Plan    There are no preventive care reminders to display for this patient.    Last 5 Patient Entered Readings                                      Current 30 Day Average: 124/76     Recent Readings 3/2/2020 2/3/2020 1/25/2020 1/15/2020 1/5/2020    SBP (mmHg) 124 117 118 115 120    DBP (mmHg) 76 73 73 75 70                  Screenings    SDOH  
36.7

## 2020-03-17 ENCOUNTER — TELEPHONE (OUTPATIENT)
Dept: PHARMACY | Facility: CLINIC | Age: 70
End: 2020-03-17

## 2020-03-24 ENCOUNTER — TELEPHONE (OUTPATIENT)
Dept: RHEUMATOLOGY | Facility: CLINIC | Age: 70
End: 2020-03-24

## 2020-03-24 NOTE — TELEPHONE ENCOUNTER
----- Message from Emeli Montano PharmD sent at 3/24/2020 10:46 AM CDT -----  Greetings Dr Oliveira and staff,     Mr. Perkins has decided to hold his Xeljanz during the pandemic.I explained to him he should continue his medication while practicing social distancing, hand hygiene, etc but he refused. We will continue to follow-up with him periodically and assess his willingness to restart. Wanted to make your office aware, please let me know if we can further assist. Thank you.     Sincerely,   Cand[facility]   [phone]

## 2020-03-24 NOTE — TELEPHONE ENCOUNTER
Patient transferred from refill technician Cira Faith for reporting he wants to discontinue Xeljanz due to Covid-19. Reviewed with patient at this time it is not advised for him to stop his medication. Encouraged patient to follow social distancing recommendations,  keep his hands away from his eyes nose and mouth, as well as practice proper hand hygiene. Patient informed [facility] will  inform provider he has decided to discontinue medication. Patient did not wish to discuss his decision any further.

## 2020-03-27 DIAGNOSIS — Z79.899 ENCOUNTER FOR LONG-TERM (CURRENT) USE OF MEDICATIONS: ICD-10-CM

## 2020-03-27 RX ORDER — ERGOCALCIFEROL 1.25 MG/1
CAPSULE ORAL
Qty: 12 CAPSULE | Refills: 3 | Status: SHIPPED | OUTPATIENT
Start: 2020-03-27 | End: 2021-04-18

## 2020-04-14 ENCOUNTER — PATIENT OUTREACH (OUTPATIENT)
Dept: OTHER | Facility: OTHER | Age: 70
End: 2020-04-14

## 2020-04-14 NOTE — PROGRESS NOTES
"Digital Medicine: Health  Follow-Up    The history is provided by the patient.     Follow Up  Follow-up reason(s): reading review    Patient still has not made it to the ob[facility], will place crm ticket for replacement cuff to be mailed to him.   He states he knows he hasn't been taken him bp that often recently but will start taking more readings when the ihealth cuff is delivered.   Patient is pleased with readings, states "meds are working beautifully."  Patient states he's staying active during this time, has a 2 acre yard that he's been walking around and tending to his vegetable garden.         INTERVENTION(S)  encouragement/support and denied questions    PLAN  patient verbalizes understanding and continue monitoring    Will place CRM ticket for patient to be mailed replacement cuff. Plan to follow up in 6-8 weeks.       There are no preventive care reminders to display for this patient.    Last 5 Patient Entered Readings                                      Current 30 Day Average: 120/77     Recent Readings 4/12/2020 3/31/2020 3/22/2020 3/2/2020 2/3/2020    SBP (mmHg) 118 121 122 124 117    DBP (mmHg) 74 86 72 76 73                  Screenings    SDOH  "

## 2020-04-15 NOTE — TELEPHONE ENCOUNTER
Xeljanz refill confirmed. Xeljanz will ship 4/16 for delivery on 4/17 $0.00 -004. Patient reports experiencing some shoulder pain and decided to restart Xeljanz. Patient had no further questions or concerns.

## 2020-05-07 DIAGNOSIS — M05.9 SEROPOSITIVE RHEUMATOID ARTHRITIS: ICD-10-CM

## 2020-05-08 RX ORDER — DICLOFENAC SODIUM 75 MG/1
75 TABLET, DELAYED RELEASE ORAL DAILY PRN
Qty: 90 TABLET | Refills: 1 | Status: SHIPPED | OUTPATIENT
Start: 2020-05-08 | End: 2020-10-06 | Stop reason: SDUPTHER

## 2020-05-13 ENCOUNTER — TELEPHONE (OUTPATIENT)
Dept: PHARMACY | Facility: CLINIC | Age: 70
End: 2020-05-13

## 2020-05-13 NOTE — TELEPHONE ENCOUNTER
Rx call regarding Xeljanz medication from [facility]. Will ship  for 5/15 with patient consent. Co-pay is $0. Patient has not started any new medications, no missed doses/side effects. Patient did not have any questions or concerns for the pharmacist at this time.  and address verified. Pt has about 6-7 tablet on hand. (His next refill should be due .) ELEUTERIO

## 2020-05-19 ENCOUNTER — PATIENT OUTREACH (OUTPATIENT)
Dept: OTHER | Facility: OTHER | Age: 70
End: 2020-05-19

## 2020-05-19 NOTE — PROGRESS NOTES
"Patient called main line to discuss elevated BP readings this morning.  States he attempted to contact health , Brenna, on care team and left a voicemail.  States he wanted to "chat" with someone about his elevated BP readings this morning.  Reports some SOB, but believes it was gas.  Reviewed monitoring technique since first BP this morning was before BP medication.  Messaged care team via Epic chat.  "

## 2020-05-19 NOTE — PROGRESS NOTES
"Digital Medicine: Health  Follow-Up    The history is provided by the patient.     Follow Up  Follow-up reason(s): reading review    Followed up with patient after he contacted the main patient line w/ concerns of elevated BP readings this morning.     He states he "didn't feel 100% but also didn't feel terrible."  He denied any symptoms of hypertension or hypotension. He states there was "maybe 10 seconds when I felt short of breath."    He was also concerned that his pulse dropped to 48. Readings are back to normal and he states he feels better    The first readings he took this morning were before taking his blood pressure medication. Reviewed technique with him and reminded him to ensure he is taking his medication first.     He states he tries to relax before readings but does not wait 5-10 minutes. Encouraged him to set a timer and relax for at least 5 minutes without interruption before taking readings.                INTERVENTION(S)  reviewed monitoring technique, encouragement/support and denied questions    PLAN  patient verbalizes understanding, patient amenable to changes and continue monitoring    Patient was appreciative of my follow up call.    He finally received a new [employer] blood pressure cuff and is not using it instead of manually entering in readings.   Readings are looking slightly higher compared to his previous manually entered readings and in office readings.   Most recent in office reading was 123/74 in march.     Will continue to monitor his readings, plan to follow up in 4-6 weeks or sooner if readings continue to trend up.       There are no preventive care reminders to display for this patient.    Last 5 Patient Entered Readings                                      Current 30 Day Average: 134/79     Recent Readings 5/19/2020 5/19/2020 5/19/2020 5/19/2020 5/19/2020    SBP (mmHg) 138 - 138 147 -    DBP (mmHg) 74 - 74 80 -    Pulse - 59 59 - 55                  Screenings    SDOH  "

## 2020-05-29 ENCOUNTER — LAB VISIT (OUTPATIENT)
Dept: LAB | Facility: HOSPITAL | Age: 70
End: 2020-05-29
Attending: INTERNAL MEDICINE
Payer: MEDICARE

## 2020-05-29 DIAGNOSIS — E03.9 HYPOTHYROIDISM, UNSPECIFIED TYPE: ICD-10-CM

## 2020-05-29 DIAGNOSIS — R79.89 LOW TESTOSTERONE IN MALE: ICD-10-CM

## 2020-05-29 DIAGNOSIS — R73.9 HYPERGLYCEMIA: ICD-10-CM

## 2020-05-29 DIAGNOSIS — M05.9 SEROPOSITIVE RHEUMATOID ARTHRITIS: ICD-10-CM

## 2020-05-29 LAB
ALBUMIN SERPL BCP-MCNC: 4.5 G/DL (ref 3.5–5.2)
ALBUMIN SERPL BCP-MCNC: 4.5 G/DL (ref 3.5–5.2)
ALP SERPL-CCNC: 60 U/L (ref 55–135)
ALP SERPL-CCNC: 60 U/L (ref 55–135)
ALT SERPL W/O P-5'-P-CCNC: 43 U/L (ref 10–44)
ALT SERPL W/O P-5'-P-CCNC: 43 U/L (ref 10–44)
ANION GAP SERPL CALC-SCNC: 8 MMOL/L (ref 8–16)
ANION GAP SERPL CALC-SCNC: 8 MMOL/L (ref 8–16)
AST SERPL-CCNC: 33 U/L (ref 10–40)
AST SERPL-CCNC: 33 U/L (ref 10–40)
BASOPHILS # BLD AUTO: 0.02 K/UL (ref 0–0.2)
BASOPHILS NFR BLD: 0.4 % (ref 0–1.9)
BILIRUB SERPL-MCNC: 1.2 MG/DL (ref 0.1–1)
BILIRUB SERPL-MCNC: 1.2 MG/DL (ref 0.1–1)
BUN SERPL-MCNC: 16 MG/DL (ref 8–23)
BUN SERPL-MCNC: 16 MG/DL (ref 8–23)
CALCIUM SERPL-MCNC: 9.6 MG/DL (ref 8.7–10.5)
CALCIUM SERPL-MCNC: 9.6 MG/DL (ref 8.7–10.5)
CHLORIDE SERPL-SCNC: 104 MMOL/L (ref 95–110)
CHLORIDE SERPL-SCNC: 104 MMOL/L (ref 95–110)
CO2 SERPL-SCNC: 28 MMOL/L (ref 23–29)
CO2 SERPL-SCNC: 28 MMOL/L (ref 23–29)
CREAT SERPL-MCNC: 1.5 MG/DL (ref 0.5–1.4)
CREAT SERPL-MCNC: 1.5 MG/DL (ref 0.5–1.4)
CRP SERPL-MCNC: 0.5 MG/L (ref 0–8.2)
DIFFERENTIAL METHOD: ABNORMAL
EOSINOPHIL # BLD AUTO: 0.1 K/UL (ref 0–0.5)
EOSINOPHIL NFR BLD: 1.2 % (ref 0–8)
ERYTHROCYTE [DISTWIDTH] IN BLOOD BY AUTOMATED COUNT: 13 % (ref 11.5–14.5)
ERYTHROCYTE [SEDIMENTATION RATE] IN BLOOD BY WESTERGREN METHOD: 2 MM/HR (ref 0–10)
EST. GFR  (AFRICAN AMERICAN): 54.5 ML/MIN/1.73 M^2
EST. GFR  (AFRICAN AMERICAN): 54.5 ML/MIN/1.73 M^2
EST. GFR  (NON AFRICAN AMERICAN): 47.2 ML/MIN/1.73 M^2
EST. GFR  (NON AFRICAN AMERICAN): 47.2 ML/MIN/1.73 M^2
ESTIMATED AVG GLUCOSE: 114 MG/DL (ref 68–131)
GLUCOSE SERPL-MCNC: 83 MG/DL (ref 70–110)
GLUCOSE SERPL-MCNC: 83 MG/DL (ref 70–110)
HBA1C MFR BLD HPLC: 5.6 % (ref 4–5.6)
HCT VFR BLD AUTO: 47.2 % (ref 40–54)
HGB BLD-MCNC: 14.5 G/DL (ref 14–18)
IMM GRANULOCYTES # BLD AUTO: 0.02 K/UL (ref 0–0.04)
IMM GRANULOCYTES NFR BLD AUTO: 0.4 % (ref 0–0.5)
LYMPHOCYTES # BLD AUTO: 1.3 K/UL (ref 1–4.8)
LYMPHOCYTES NFR BLD: 24 % (ref 18–48)
MCH RBC QN AUTO: 27.6 PG (ref 27–31)
MCHC RBC AUTO-ENTMCNC: 30.7 G/DL (ref 32–36)
MCV RBC AUTO: 90 FL (ref 82–98)
MONOCYTES # BLD AUTO: 0.5 K/UL (ref 0.3–1)
MONOCYTES NFR BLD: 8.8 % (ref 4–15)
NEUTROPHILS # BLD AUTO: 3.4 K/UL (ref 1.8–7.7)
NEUTROPHILS NFR BLD: 65.2 % (ref 38–73)
NRBC BLD-RTO: 0 /100 WBC
PLATELET # BLD AUTO: 199 K/UL (ref 150–350)
PMV BLD AUTO: 10.9 FL (ref 9.2–12.9)
POTASSIUM SERPL-SCNC: 4.1 MMOL/L (ref 3.5–5.1)
POTASSIUM SERPL-SCNC: 4.1 MMOL/L (ref 3.5–5.1)
PROT SERPL-MCNC: 7.5 G/DL (ref 6–8.4)
PROT SERPL-MCNC: 7.5 G/DL (ref 6–8.4)
RBC # BLD AUTO: 5.25 M/UL (ref 4.6–6.2)
SODIUM SERPL-SCNC: 140 MMOL/L (ref 136–145)
SODIUM SERPL-SCNC: 140 MMOL/L (ref 136–145)
T4 FREE SERPL-MCNC: 1.09 NG/DL (ref 0.71–1.51)
TESTOST SERPL-MCNC: 387 NG/DL (ref 304–1227)
WBC # BLD AUTO: 5.21 K/UL (ref 3.9–12.7)

## 2020-05-29 PROCEDURE — 86704 HEP B CORE ANTIBODY TOTAL: CPT

## 2020-05-29 PROCEDURE — 80053 COMPREHEN METABOLIC PANEL: CPT

## 2020-05-29 PROCEDURE — 36415 COLL VENOUS BLD VENIPUNCTURE: CPT | Mod: PO

## 2020-05-29 PROCEDURE — 85025 COMPLETE CBC W/AUTO DIFF WBC: CPT

## 2020-05-29 PROCEDURE — 87340 HEPATITIS B SURFACE AG IA: CPT

## 2020-05-29 PROCEDURE — 84439 ASSAY OF FREE THYROXINE: CPT

## 2020-05-29 PROCEDURE — 85651 RBC SED RATE NONAUTOMATED: CPT | Mod: PO

## 2020-05-29 PROCEDURE — 86803 HEPATITIS C AB TEST: CPT

## 2020-05-29 PROCEDURE — 83036 HEMOGLOBIN GLYCOSYLATED A1C: CPT

## 2020-05-29 PROCEDURE — 86480 TB TEST CELL IMMUN MEASURE: CPT

## 2020-05-29 PROCEDURE — 86140 C-REACTIVE PROTEIN: CPT

## 2020-05-29 PROCEDURE — 84403 ASSAY OF TOTAL TESTOSTERONE: CPT

## 2020-06-01 LAB
HBV CORE AB SERPL QL IA: NEGATIVE
HBV SURFACE AG SERPL QL IA: NEGATIVE
HCV AB SERPL QL IA: NEGATIVE

## 2020-06-02 ENCOUNTER — PATIENT OUTREACH (OUTPATIENT)
Dept: ADMINISTRATIVE | Facility: OTHER | Age: 70
End: 2020-06-02

## 2020-06-02 LAB
GAMMA INTERFERON BACKGROUND BLD IA-ACNC: 0.01 IU/ML
M TB IFN-G CD4+ BCKGRND COR BLD-ACNC: 0 IU/ML
MITOGEN IGNF BCKGRD COR BLD-ACNC: 5.85 IU/ML
TB GOLD PLUS: NEGATIVE
TB2 - NIL: 0 IU/ML

## 2020-06-04 ENCOUNTER — OFFICE VISIT (OUTPATIENT)
Dept: RHEUMATOLOGY | Facility: CLINIC | Age: 70
End: 2020-06-04
Payer: MEDICARE

## 2020-06-04 ENCOUNTER — TELEPHONE (OUTPATIENT)
Dept: PHARMACY | Facility: CLINIC | Age: 70
End: 2020-06-04

## 2020-06-04 VITALS
DIASTOLIC BLOOD PRESSURE: 84 MMHG | BODY MASS INDEX: 27.05 KG/M2 | HEART RATE: 52 BPM | WEIGHT: 205 LBS | SYSTOLIC BLOOD PRESSURE: 139 MMHG

## 2020-06-04 DIAGNOSIS — G89.4 CHRONIC PAIN SYNDROME: ICD-10-CM

## 2020-06-04 DIAGNOSIS — R53.83 FATIGUE, UNSPECIFIED TYPE: ICD-10-CM

## 2020-06-04 DIAGNOSIS — Z79.899 IMMUNOCOMPROMISED STATE DUE TO DRUG THERAPY: ICD-10-CM

## 2020-06-04 DIAGNOSIS — N18.30 CKD (CHRONIC KIDNEY DISEASE) STAGE 3, GFR 30-59 ML/MIN: ICD-10-CM

## 2020-06-04 DIAGNOSIS — R00.1 BRADYCARDIA: ICD-10-CM

## 2020-06-04 DIAGNOSIS — D84.821 IMMUNOCOMPROMISED STATE DUE TO DRUG THERAPY: ICD-10-CM

## 2020-06-04 DIAGNOSIS — M05.9 SEROPOSITIVE RHEUMATOID ARTHRITIS: Primary | ICD-10-CM

## 2020-06-04 DIAGNOSIS — N28.9 DECREASED RENAL FUNCTION: ICD-10-CM

## 2020-06-04 PROCEDURE — 99999 PR PBB SHADOW E&M-EST. PATIENT-LVL II: ICD-10-PCS | Mod: PBBFAC,,, | Performed by: INTERNAL MEDICINE

## 2020-06-04 PROCEDURE — 99215 OFFICE O/P EST HI 40 MIN: CPT | Mod: 25,S$PBB,, | Performed by: INTERNAL MEDICINE

## 2020-06-04 PROCEDURE — 96372 THER/PROPH/DIAG INJ SC/IM: CPT | Mod: PBBFAC,PO

## 2020-06-04 PROCEDURE — 99999 PR PBB SHADOW E&M-EST. PATIENT-LVL II: CPT | Mod: PBBFAC,,, | Performed by: INTERNAL MEDICINE

## 2020-06-04 PROCEDURE — 99212 OFFICE O/P EST SF 10 MIN: CPT | Mod: PBBFAC,PO,25 | Performed by: INTERNAL MEDICINE

## 2020-06-04 PROCEDURE — 99215 PR OFFICE/OUTPT VISIT, EST, LEVL V, 40-54 MIN: ICD-10-PCS | Mod: 25,S$PBB,, | Performed by: INTERNAL MEDICINE

## 2020-06-04 RX ORDER — ESZOPICLONE 3 MG/1
TABLET, FILM COATED ORAL
Qty: 30 TABLET | Refills: 4 | Status: SHIPPED | OUTPATIENT
Start: 2020-06-04 | End: 2020-10-06 | Stop reason: SDUPTHER

## 2020-06-04 RX ORDER — CYANOCOBALAMIN 1000 UG/ML
1000 INJECTION, SOLUTION INTRAMUSCULAR; SUBCUTANEOUS
Status: COMPLETED | OUTPATIENT
Start: 2020-06-04 | End: 2020-06-04

## 2020-06-04 RX ORDER — DEXAMETHASONE SODIUM PHOSPHATE 4 MG/ML
4 INJECTION, SOLUTION INTRA-ARTICULAR; INTRALESIONAL; INTRAMUSCULAR; INTRAVENOUS; SOFT TISSUE
Status: COMPLETED | OUTPATIENT
Start: 2020-06-04 | End: 2020-06-04

## 2020-06-04 RX ADMIN — DEXAMETHASONE SODIUM PHOSPHATE 4 MG: 4 INJECTION, SOLUTION INTRA-ARTICULAR; INTRALESIONAL; INTRAMUSCULAR; INTRAVENOUS; SOFT TISSUE at 10:06

## 2020-06-04 RX ADMIN — CYANOCOBALAMIN 1000 MCG: 1000 INJECTION, SOLUTION INTRAMUSCULAR at 10:06

## 2020-06-04 NOTE — PROGRESS NOTES
Subjective:       Patient ID: Aurelio Perkins is a 69 y.o. male.    Chief Complaint: Disease Management and Rheumatoid Arthritis    MFollow up: 69 year old male who presents to clinic for follow up on seropositive rheumatoid arthritis. He is a new patient to me. He has been doing fairly well since his last visit. Arthritis is stable on xeljanz 5 mg bid. He complains of pain involving his neck, hips, and low back. Pain is typically 5/10, aching, and results in some difficulty of him getting out of bed and bending forward. Renal function has improved to his baseline and he would like to continue diclofenac 75 mg daily.    Current tx:  1. xeljanz 5 mg bid            Pertinent negatives include no fatigue, fever, myalgias or headaches.         Review of Systems   Constitutional: Negative for chills, fatigue and fever.   Eyes: Negative for visual disturbance.   Respiratory: Negative for cough, shortness of breath and wheezing.    Cardiovascular: Negative for chest pain, palpitations and leg swelling.   Gastrointestinal: Negative for abdominal pain, constipation, diarrhea, nausea and vomiting.   Musculoskeletal: Positive for arthralgias and back pain. Negative for myalgias.   Neurological: Negative for dizziness, syncope and headaches.   Hematological: Negative for adenopathy.         Objective:     Vitals:    06/04/20 0835   BP: 139/84   Pulse: (!) 52       Past Medical History:   Diagnosis Date    Arthritis     Cancer     prostate cancer-Removed Prostate    Chronic kidney disease     one kidney    Encounter for long-term (current) use of medications     Mitral valve prolapse      Past Surgical History:   Procedure Laterality Date    CRANIOTOMY Right 5/29/2019    Procedure: CRANIOTOMY  (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)  Co-surgery with Dr Vaibhav Jara - please put in his room  Microscope [place] [employer] [facility];  Surgeon: Jimmy Segura DO;   Location: [facility] OR [address] [place] [address];  Service: Neurosurgery;  Laterality: Right;    HEMORRHOID SURGERY      NEPHRECTOMY      PROSTATECTOMY      TONSILLECTOMY            Physical Exam   Constitutional: He is well-developed, well-nourished, and in no distress.   Eyes: Right conjunctiva is not injected. Left conjunctiva is not injected. Right eye exhibits normal extraocular motion. Left eye exhibits normal extraocular motion.   Neck: No JVD present. No thyromegaly present.   Cardiovascular: Normal rate and regular rhythm.  Exam reveals no decreased pulses.    Pulmonary/Chest: He has no wheezes. He has no rhonchi. He has no rales.       Right Side Rheumatological Exam     Examination finds the shoulder, elbow, knee, 1st MCP, 2nd MCP, 3rd MCP, 4th MCP and 5th MCP normal.    The patient has an enlarged wrist, 1st PIP, 2nd PIP, 3rd PIP, 4th PIP and 5th PIP    Left Side Rheumatological Exam     Examination finds the shoulder, elbow, knee, 1st MCP, 2nd MCP, 3rd MCP, 4th MCP and 5th MCP normal.    The patient has an enlarged wrist, 1st PIP, 2nd PIP, 3rd PIP, 4th PIP and 5th PIP.      Lymphadenopathy:     He has no cervical adenopathy.   Neurological: Gait normal.   Skin: No rash noted.     Psychiatric: Mood and affect normal.         Results for orders placed or performed in visit on 05/29/20   T4, free   Result Value Ref Range    Free T4 1.09 0.71 - 1.51 ng/dL   Testosterone   Result Value Ref Range    Testosterone, Total 387 304 - 1227 ng/dL   Hemoglobin A1c   Result Value Ref Range    Hemoglobin A1C 5.6 4.0 - 5.6 %    Estimated Avg Glucose 114 68 - 131 mg/dL   Comprehensive metabolic panel   Result Value Ref Range    Sodium 140 136 - 145 mmol/L    Potassium 4.1 3.5 - 5.1 mmol/L    Chloride 104 95 - 110 mmol/L    CO2 28 23 - 29 mmol/L    Glucose 83 70 - 110 mg/dL    BUN, Bld 16 8 - 23 mg/dL    Creatinine 1.5 (H) 0.5 - 1.4 mg/dL    Calcium 9.6 8.7 - 10.5 mg/dL    Total Protein 7.5 6.0 - 8.4 g/dL    Albumin 4.5 3.5 - 5.2 g/dL     Total Bilirubin 1.2 (H) 0.1 - 1.0 mg/dL    Alkaline Phosphatase 60 55 - 135 U/L    AST 33 10 - 40 U/L    ALT 43 10 - 44 U/L    Anion Gap 8 8 - 16 mmol/L    eGFR if African American 54.5 (A) >60 mL/min/1.73 m^2    eGFR if non  47.2 (A) >60 mL/min/1.73 m^2   CBC auto differential   Result Value Ref Range    WBC 5.21 3.90 - 12.70 K/uL    RBC 5.25 4.60 - 6.20 M/uL    Hemoglobin 14.5 14.0 - 18.0 g/dL    Hematocrit 47.2 40.0 - 54.0 %    Mean Corpuscular Volume 90 82 - 98 fL    Mean Corpuscular Hemoglobin 27.6 27.0 - 31.0 pg    Mean Corpuscular Hemoglobin Conc 30.7 (L) 32.0 - 36.0 g/dL    RDW 13.0 11.5 - 14.5 %    Platelets 199 150 - 350 K/uL    MPV 10.9 9.2 - 12.9 fL    Immature Granulocytes 0.4 0.0 - 0.5 %    Gran # (ANC) 3.4 1.8 - 7.7 K/uL    Immature Grans (Abs) 0.02 0.00 - 0.04 K/uL    Lymph # 1.3 1.0 - 4.8 K/uL    Mono # 0.5 0.3 - 1.0 K/uL    Eos # 0.1 0.0 - 0.5 K/uL    Baso # 0.02 0.00 - 0.20 K/uL    nRBC 0 0 /100 WBC    Gran% 65.2 38.0 - 73.0 %    Lymph% 24.0 18.0 - 48.0 %    Mono% 8.8 4.0 - 15.0 %    Eosinophil% 1.2 0.0 - 8.0 %    Basophil% 0.4 0.0 - 1.9 %    Differential Method Automated    Comprehensive metabolic panel   Result Value Ref Range    Sodium 140 136 - 145 mmol/L    Potassium 4.1 3.5 - 5.1 mmol/L    Chloride 104 95 - 110 mmol/L    CO2 28 23 - 29 mmol/L    Glucose 83 70 - 110 mg/dL    BUN, Bld 16 8 - 23 mg/dL    Creatinine 1.5 (H) 0.5 - 1.4 mg/dL    Calcium 9.6 8.7 - 10.5 mg/dL    Total Protein 7.5 6.0 - 8.4 g/dL    Albumin 4.5 3.5 - 5.2 g/dL    Total Bilirubin 1.2 (H) 0.1 - 1.0 mg/dL    Alkaline Phosphatase 60 55 - 135 U/L    AST 33 10 - 40 U/L    ALT 43 10 - 44 U/L    Anion Gap 8 8 - 16 mmol/L    eGFR if African American 54.5 (A) >60 mL/min/1.73 m^2    eGFR if non  47.2 (A) >60 mL/min/1.73 m^2   C-reactive protein   Result Value Ref Range    CRP 0.5 0.0 - 8.2 mg/L   Sedimentation rate   Result Value Ref Range    Sed Rate 2 0 - 10 mm/Hr   Hepatitis C antibody   Result  Value Ref Range    Hepatitis C Ab Negative Negative   Quantiferon Gold TB   Result Value Ref Range    NIL 0.010 See text IU/mL    TB1 - Nil 0.000 See text IU/mL    TB2 - Nil 0.000 See text IU/mL    Mitogen - Nil 5.850 See text IU/mL    TB Gold Plus Negative    Hepatitis B core antibody, total   Result Value Ref Range    Hep B Core Total Ab Negative    Hepatitis B surface antigen   Result Value Ref Range    Hepatitis B Surface Ag Negative Negative                                                                                                                                                                                                 Assessment:       1. Seropositive rheumatoid arthritis    2. Immunocompromised state due to drug therapy    3. CKD (chronic kidney disease) stage 3, GFR 30-59 ml/min    4. Decreased renal function    5. Fatigue, unspecified type    6. Chronic pain syndrome    7. Bradycardia            Plan:       Seropositive rheumatoid arthritis  -     TSH; Future; Expected date: 06/04/2020  -     Comprehensive metabolic panel; Future; Expected date: 06/04/2020  -     CBC auto differential; Future; Expected date: 06/04/2020  -     Sedimentation rate; Future; Expected date: 06/04/2020  -     Rheumatoid factor; Future; Expected date: 06/04/2020  -     C-Reactive Protein; Future; Expected date: 06/04/2020  -     cyanocobalamin injection 1,000 mcg  -     dexamethasone injection 4 mg    Immunocompromised state due to drug therapy  -     TSH; Future; Expected date: 06/04/2020  -     Comprehensive metabolic panel; Future; Expected date: 06/04/2020  -     CBC auto differential; Future; Expected date: 06/04/2020  -     Sedimentation rate; Future; Expected date: 06/04/2020  -     Rheumatoid factor; Future; Expected date: 06/04/2020  -     C-Reactive Protein; Future; Expected date: 06/04/2020  -     cyanocobalamin injection 1,000 mcg  -     dexamethasone injection 4 mg    CKD (chronic kidney disease) stage 3,  GFR 30-59 ml/min  -     TSH; Future; Expected date: 06/04/2020  -     Comprehensive metabolic panel; Future; Expected date: 06/04/2020  -     CBC auto differential; Future; Expected date: 06/04/2020  -     Sedimentation rate; Future; Expected date: 06/04/2020  -     Rheumatoid factor; Future; Expected date: 06/04/2020  -     C-Reactive Protein; Future; Expected date: 06/04/2020  -     cyanocobalamin injection 1,000 mcg  -     dexamethasone injection 4 mg    Decreased renal function  -     TSH; Future; Expected date: 06/04/2020  -     Comprehensive metabolic panel; Future; Expected date: 06/04/2020  -     CBC auto differential; Future; Expected date: 06/04/2020  -     Sedimentation rate; Future; Expected date: 06/04/2020  -     Rheumatoid factor; Future; Expected date: 06/04/2020  -     C-Reactive Protein; Future; Expected date: 06/04/2020  -     cyanocobalamin injection 1,000 mcg  -     dexamethasone injection 4 mg    Fatigue, unspecified type  -     TSH; Future; Expected date: 06/04/2020  -     Comprehensive metabolic panel; Future; Expected date: 06/04/2020  -     CBC auto differential; Future; Expected date: 06/04/2020  -     Sedimentation rate; Future; Expected date: 06/04/2020  -     Rheumatoid factor; Future; Expected date: 06/04/2020  -     C-Reactive Protein; Future; Expected date: 06/04/2020  -     cyanocobalamin injection 1,000 mcg  -     dexamethasone injection 4 mg    Chronic pain syndrome  -     TSH; Future; Expected date: 06/04/2020  -     Comprehensive metabolic panel; Future; Expected date: 06/04/2020  -     CBC auto differential; Future; Expected date: 06/04/2020  -     Sedimentation rate; Future; Expected date: 06/04/2020  -     Rheumatoid factor; Future; Expected date: 06/04/2020  -     C-Reactive Protein; Future; Expected date: 06/04/2020  -     cyanocobalamin injection 1,000 mcg  -     dexamethasone injection 4 mg    Bradycardia  -     TSH; Future; Expected date: 06/04/2020  -     Comprehensive  metabolic panel; Future; Expected date: 06/04/2020  -     CBC auto differential; Future; Expected date: 06/04/2020  -     Sedimentation rate; Future; Expected date: 06/04/2020  -     Rheumatoid factor; Future; Expected date: 06/04/2020  -     C-Reactive Protein; Future; Expected date: 06/04/2020  -     cyanocobalamin injection 1,000 mcg  -     dexamethasone injection 4 mg         he having fatigue and bradycardia we will  Do decadron  No change in treatment.  over 50% of this visit was explain labs and possible disease states and course of treatments  No change in treatment

## 2020-06-04 NOTE — PROGRESS NOTES
Administered 1 cc ( 1000 mcg/ml ) of b12 to the left upper outer gluteal. Informed of s/s to report verbalized understanding. No adverse reactions noted.    Lot # [id number]  Expiration jul 21    Administered 1 cc dexamethasone 4mg/cc  to left upper outer gluteal. Pt tolerated well. No acute reaction noted to site. Pt instructed on S/S to report. Pt verbalized understanding.     Lot: [id number]  Exp: 10/2021

## 2020-06-18 ENCOUNTER — PATIENT OUTREACH (OUTPATIENT)
Dept: ADMINISTRATIVE | Facility: OTHER | Age: 70
End: 2020-06-18

## 2020-06-19 ENCOUNTER — OFFICE VISIT (OUTPATIENT)
Dept: ENDOCRINOLOGY | Facility: CLINIC | Age: 70
End: 2020-06-19
Payer: MEDICARE

## 2020-06-19 VITALS
BODY MASS INDEX: 28.78 KG/M2 | HEIGHT: 71 IN | RESPIRATION RATE: 16 BRPM | DIASTOLIC BLOOD PRESSURE: 80 MMHG | WEIGHT: 205.56 LBS | SYSTOLIC BLOOD PRESSURE: 100 MMHG

## 2020-06-19 DIAGNOSIS — R79.89 LOW TESTOSTERONE IN MALE: Primary | ICD-10-CM

## 2020-06-19 DIAGNOSIS — D32.9 MENINGIOMA OF RIGHT SPHENOID WING INVOLVING CAVERNOUS SINUS: ICD-10-CM

## 2020-06-19 DIAGNOSIS — E03.9 HYPOTHYROIDISM, UNSPECIFIED TYPE: ICD-10-CM

## 2020-06-19 DIAGNOSIS — I10 ESSENTIAL HYPERTENSION: ICD-10-CM

## 2020-06-19 DIAGNOSIS — Z12.5 ENCOUNTER FOR SCREENING FOR MALIGNANT NEOPLASM OF PROSTATE: ICD-10-CM

## 2020-06-19 DIAGNOSIS — R73.9 HYPERGLYCEMIA: ICD-10-CM

## 2020-06-19 DIAGNOSIS — E27.49 SECONDARY ADRENAL INSUFFICIENCY: ICD-10-CM

## 2020-06-19 PROCEDURE — 99214 OFFICE O/P EST MOD 30 MIN: CPT | Mod: S$PBB,,, | Performed by: INTERNAL MEDICINE

## 2020-06-19 PROCEDURE — 99214 OFFICE O/P EST MOD 30 MIN: CPT | Mod: PBBFAC | Performed by: INTERNAL MEDICINE

## 2020-06-19 PROCEDURE — 99999 PR PBB SHADOW E&M-EST. PATIENT-LVL IV: ICD-10-PCS | Mod: PBBFAC,,, | Performed by: INTERNAL MEDICINE

## 2020-06-19 PROCEDURE — 99999 PR PBB SHADOW E&M-EST. PATIENT-LVL IV: CPT | Mod: PBBFAC,,, | Performed by: INTERNAL MEDICINE

## 2020-06-19 PROCEDURE — 99214 PR OFFICE/OUTPT VISIT, EST, LEVL IV, 30-39 MIN: ICD-10-PCS | Mod: S$PBB,,, | Performed by: INTERNAL MEDICINE

## 2020-06-19 RX ORDER — TESTOSTERONE CYPIONATE 200 MG/ML
100 INJECTION, SOLUTION INTRAMUSCULAR
Qty: 5 ML | Refills: 5 | Status: SHIPPED | OUTPATIENT
Start: 2020-06-19 | End: 2020-12-28

## 2020-06-19 NOTE — PATIENT INSTRUCTIONS
Increase hydrocortisone to 20 mg in the morning, 10 mg in the afternoon  We will try this for 2 weeks. If no improvement, go back to 15 mg in the morning and 5 mg in the afternoon    Change testosterone to 100 mg every 7 days    Send me a message in a few weeks to let me know who you are feeling
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
## 2020-06-21 NOTE — ASSESSMENT & PLAN NOTE
Change testosterone 100 mg every seven days as he can notice when doses wearing off  PSA with next labs

## 2020-06-21 NOTE — ASSESSMENT & PLAN NOTE
Recent question of adrenal insufficiency in setting of bradycardia however this is not typically seen in secondary adrenal insufficiency and he is on adequate steroid replacement.  He is also without any other symptoms suggestive of adrenal insufficiency.  However reasonable to try short course of higher dose hydrocortisone 20 mg in the morning and 10 mg in the afternoon  Encouraged him to discuss his antihypertensive medication regimen with the digital hypertension team as well

## 2020-06-21 NOTE — ASSESSMENT & PLAN NOTE
S/p resection with Dr. Segura  Residual tumor on last MRI with plan for repeat in 9/2020  Some recent vision change which he is having evaluated by ophthalmology and will contact Dr. Segura about as well  Hormonal deficiencies as below

## 2020-06-24 ENCOUNTER — PATIENT OUTREACH (OUTPATIENT)
Dept: OTHER | Facility: OTHER | Age: 70
End: 2020-06-24

## 2020-06-25 ENCOUNTER — TELEPHONE (OUTPATIENT)
Dept: PHARMACY | Facility: CLINIC | Age: 70
End: 2020-06-25

## 2020-06-26 DIAGNOSIS — Z12.5 ENCOUNTER FOR SCREENING FOR MALIGNANT NEOPLASM OF PROSTATE: Primary | ICD-10-CM

## 2020-07-02 ENCOUNTER — TELEPHONE (OUTPATIENT)
Dept: PHARMACY | Facility: CLINIC | Age: 70
End: 2020-07-02

## 2020-07-10 ENCOUNTER — TELEPHONE (OUTPATIENT)
Dept: PHARMACY | Facility: CLINIC | Age: 70
End: 2020-07-10

## 2020-07-20 ENCOUNTER — LAB VISIT (OUTPATIENT)
Dept: LAB | Facility: HOSPITAL | Age: 70
End: 2020-07-20
Attending: FAMILY MEDICINE
Payer: MEDICARE

## 2020-07-20 DIAGNOSIS — Z12.5 ENCOUNTER FOR SCREENING FOR MALIGNANT NEOPLASM OF PROSTATE: ICD-10-CM

## 2020-07-20 DIAGNOSIS — E78.2 MIXED HYPERLIPIDEMIA: ICD-10-CM

## 2020-07-20 LAB
CHOLEST SERPL-MCNC: 124 MG/DL (ref 120–199)
CHOLEST/HDLC SERPL: 2.4 {RATIO} (ref 2–5)
COMPLEXED PSA SERPL-MCNC: <0.01 NG/ML (ref 0–4)
HDLC SERPL-MCNC: 52 MG/DL (ref 40–75)
HDLC SERPL: 41.9 % (ref 20–50)
LDLC SERPL CALC-MCNC: 48.4 MG/DL (ref 63–159)
NONHDLC SERPL-MCNC: 72 MG/DL
TRIGL SERPL-MCNC: 118 MG/DL (ref 30–150)

## 2020-07-20 PROCEDURE — 36415 COLL VENOUS BLD VENIPUNCTURE: CPT | Mod: PO

## 2020-07-20 PROCEDURE — 84153 ASSAY OF PSA TOTAL: CPT

## 2020-07-20 PROCEDURE — 80061 LIPID PANEL: CPT

## 2020-07-21 ENCOUNTER — PATIENT OUTREACH (OUTPATIENT)
Dept: ADMINISTRATIVE | Facility: HOSPITAL | Age: 70
End: 2020-07-21

## 2020-07-21 NOTE — PROGRESS NOTES
Please CALL patient with results and Document verification.   [phone]    Drastically improved lipid panel from previous.  Keep up the great work!

## 2020-07-28 ENCOUNTER — OFFICE VISIT (OUTPATIENT)
Dept: FAMILY MEDICINE | Facility: CLINIC | Age: 70
End: 2020-07-28
Payer: MEDICARE

## 2020-07-28 VITALS
BODY MASS INDEX: 29.43 KG/M2 | HEIGHT: 71 IN | DIASTOLIC BLOOD PRESSURE: 74 MMHG | HEART RATE: 60 BPM | TEMPERATURE: 98 F | SYSTOLIC BLOOD PRESSURE: 121 MMHG | WEIGHT: 210.19 LBS

## 2020-07-28 DIAGNOSIS — N18.30 ANEMIA OF CHRONIC RENAL FAILURE, STAGE 3 (MODERATE): Chronic | ICD-10-CM

## 2020-07-28 DIAGNOSIS — Z79.899 ENCOUNTER FOR LONG-TERM (CURRENT) USE OF MEDICATIONS: ICD-10-CM

## 2020-07-28 DIAGNOSIS — D63.1 ANEMIA OF CHRONIC RENAL FAILURE, STAGE 3 (MODERATE): Chronic | ICD-10-CM

## 2020-07-28 DIAGNOSIS — E55.9 VITAMIN D DEFICIENCY: ICD-10-CM

## 2020-07-28 DIAGNOSIS — E78.2 MIXED HYPERLIPIDEMIA: Chronic | ICD-10-CM

## 2020-07-28 DIAGNOSIS — Z00.01 ENCOUNTER FOR GENERAL ADULT MEDICAL EXAMINATION WITH ABNORMAL FINDINGS: Primary | ICD-10-CM

## 2020-07-28 DIAGNOSIS — M06.9 RHEUMATOID ARTHRITIS OF HAND, UNSPECIFIED LATERALITY, UNSPECIFIED RHEUMATOID FACTOR PRESENCE: ICD-10-CM

## 2020-07-28 DIAGNOSIS — I10 ESSENTIAL HYPERTENSION: ICD-10-CM

## 2020-07-28 DIAGNOSIS — Z13.220 ENCOUNTER FOR LIPID SCREENING FOR CARDIOVASCULAR DISEASE: ICD-10-CM

## 2020-07-28 DIAGNOSIS — Z13.6 ENCOUNTER FOR LIPID SCREENING FOR CARDIOVASCULAR DISEASE: ICD-10-CM

## 2020-07-28 DIAGNOSIS — E03.9 HYPOTHYROIDISM, UNSPECIFIED TYPE: ICD-10-CM

## 2020-07-28 PROCEDURE — 99999 PR PBB SHADOW E&M-EST. PATIENT-LVL IV: ICD-10-PCS | Mod: PBBFAC,,, | Performed by: FAMILY MEDICINE

## 2020-07-28 PROCEDURE — 99214 PR OFFICE/OUTPT VISIT, EST, LEVL IV, 30-39 MIN: ICD-10-PCS | Mod: S$PBB,,, | Performed by: FAMILY MEDICINE

## 2020-07-28 PROCEDURE — 99214 OFFICE O/P EST MOD 30 MIN: CPT | Mod: S$PBB,,, | Performed by: FAMILY MEDICINE

## 2020-07-28 PROCEDURE — 99214 OFFICE O/P EST MOD 30 MIN: CPT | Mod: PBBFAC,PO | Performed by: FAMILY MEDICINE

## 2020-07-28 PROCEDURE — 99999 PR PBB SHADOW E&M-EST. PATIENT-LVL IV: CPT | Mod: PBBFAC,,, | Performed by: FAMILY MEDICINE

## 2020-07-28 NOTE — PROGRESS NOTES
This note is specifically for wellness visit performed today.     WELLNESS EXAM      Patient ID: Aurelio Perkins is a 69 y.o. male.  has a past medical history of Arthritis, Cancer, Chronic kidney disease, Encounter for long-term (current) use of medications, and Mitral valve prolapse.     Chief Complaint:  Encounter for wellness exam    Well Adult Physical: Patient here for a comprehensive physical exam.The patient reports NO problems.  Reviewed labs and health maintenance with the patient.  Do you take any herbs or supplements that were not prescribed by a doctor? no   Are you taking calcium supplements? no      History:  History of prostate cancer and hypotestosteronism    Date last PSA: Reviewed  Lab Results   Component Value Date    PSA <0.01 07/20/2020    PSA <0.01 06/21/2019      No history of STDs    Health Maintenance Topics with due status: Not Due       Topic Last Completion Date    Influenza Vaccine 11/05/2019    Colorectal Cancer Screening 01/06/2020    PROSTATE-SPECIFIC ANTIGEN 07/20/2020    Lipid Panel 07/20/2020    Aspirin/Antiplatelet Therapy 07/28/2020        ==============================================  History reviewed.     Health Maintenance Due   Topic Date Due    Pneumococcal Vaccine (65+ High/Highest Risk) (2 of 2 - PPSV23) 05/04/2018    Patient refused today    Past Medical History:  Past Medical History:   Diagnosis Date    Arthritis     Cancer     prostate cancer-Removed Prostate    Chronic kidney disease     one kidney    Encounter for long-term (current) use of medications     Mitral valve prolapse      Past Surgical History:   Procedure Laterality Date    CRANIOTOMY Right 5/29/2019    Procedure: CRANIOTOMY  (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)  Co-surgery with Dr Vaibhav Jara - please put in his room  Microscope Allred Sprout Route Courtney Fierro;  Surgeon: Jimmy Segura DO;  Location: [facility] OR [address] [place] [address];  Service:  Neurosurgery;  Laterality: Right;    HEMORRHOID SURGERY      NEPHRECTOMY      PROSTATECTOMY      TONSILLECTOMY       Review of patient's allergies indicates:   Allergen Reactions    Antihistamines - alkylamine Other (See Comments)     hallucinations    Benadryl [diphenhydramine hcl] Other (See Comments)     hallucinations    Codeine Itching     Turns red    Lodine [etodolac] Other (See Comments)     fatigue    Methotrexate analogues Other (See Comments)     Sunlight sensitivity    Morphine Itching     Turns red    Statins-hmg-coa reductase inhibitors      Current Outpatient Medications on File Prior to Visit   Medication Sig Dispense Refill    aspirin (ECOTRIN) 81 MG EC tablet Take 1 tablet (81 mg total) by mouth once daily. 90 tablet 3    chlorhexidine (PERIDEX) 0.12 % solution USE AS DIRECTED 15 MLS IN THE MOUTH OR THROAT 2 TIMES DAILY. FOR 14 DAYS 473 mL 6    chlorzoxazone (PARAFON FORTE) 500 mg Tab Take 500 mg by mouth as needed (back spasm).       diclofenac (VOLTAREN) 75 MG EC tablet Take 1 tablet (75 mg total) by mouth daily as needed. 90 tablet 1    ergocalciferol (ERGOCALCIFEROL) 50,000 unit Cap TAKE 1 CAPSULE BY MOUTH ONE TIME PER WEEK 12 capsule 3    eszopiclone (LUNESTA) 3 mg Tab TAKE 1 TABLET BY MOUTH EVERY EVENING. 30 tablet 4    evolocumab (REPATHA PUSHTRONEX) 420 mg/3.5 mL Injt Inject 3.5 mLs (420 mg total) into the skin every 30 days. 1 Cartridge 11    hydroCHLOROthiazide (MICROZIDE) 12.5 mg capsule Take 1 capsule (12.5 mg total) by mouth once daily. 90 capsule 3    hydrocortisone (CORTEF) 10 MG Tab TAKE 1&1/2 TABLET BY MOUTH IN THE MORNING AND 1/2 TABLET IN THE EVENING (4PM). (Patient taking differently: Take 25 mg by mouth once daily. Take 15 mg every morning and 10 every evening) 60 tablet 11    metoprolol succinate (TOPROL-XL) 25 MG 24 hr tablet Take 1 tablet (25 mg total) by mouth once daily. 90 tablet 3    nitroGLYCERIN (NITROSTAT) 0.4 MG SL tablet Place 0.4 mg under the  tongue.      SYNTHROID 112 mcg tablet Take 1 tablet (112 mcg total) by mouth once daily. 30 tablet 11    syringe, disposable, 1 mL Syrg 1 Syringe by Misc.(Non-Drug; Combo Route) route once a week. 25 Syringe 3    testosterone cypionate (DEPOTESTOTERONE CYPIONATE) 200 mg/mL injection Inject 0.5 mLs (100 mg total) into the muscle every 7 days. Inject 100 mg every 7 days 5 mL 5    tofacitinib (XELJANZ) 5 mg Tab Take one tablet (5mg) by mouth 2 (two) times daily. 60 tablet 12     No current facility-administered medications on file prior to visit.      Social History     Socioeconomic History    Marital status:      Spouse name: Not on file    Number of children: Not on file    Years of education: Not on file    Highest education level: Not on file   Occupational History    Not on file   Social Needs    Financial resource strain: Not hard at all    Food insecurity     Worry: Never true     Inability: Never true    Transportation needs     Medical: No     Non-medical: No   Tobacco Use    Smoking status: Never Smoker    Smokeless tobacco: Never Used   Substance and Sexual Activity    Alcohol use: No     Frequency: Monthly or less     Drinks per session: 1 or 2     Binge frequency: Never    Drug use: No    Sexual activity: Yes     Partners: Female   Lifestyle    Physical activity     Days per week: 5 days     Minutes per session: 60 min    Stress: Only a little   Relationships    Social connections     Talks on phone: More than three times a week     Gets together: Three times a week     Attends Congregation service: 1 to 4 times per year     Active member of club or organization: No     Attends meetings of clubs or organizations: Never     Relationship status:    Other Topics Concern    Not on file   Social History Narrative    Not on file     Family History   Adopted: Yes   Family history unknown: Yes       Vitals:    07/28/20 0834   BP: 121/74   Pulse: 60   Temp: 97.8 °F (36.6 °C)       Body mass index is 29.32 kg/m².     Review of Systems   Constitutional: Negative for chills, fatigue, fever and unexpected weight change.   HENT: Negative for ear pain and sore throat.    Eyes: Negative for redness and visual disturbance.   Respiratory: Negative for cough and shortness of breath.    Cardiovascular: Negative for chest pain and palpitations.   Gastrointestinal: Negative for nausea and vomiting.   Endocrine: Negative for cold intolerance and heat intolerance.   Genitourinary: Negative for difficulty urinating and hematuria.   Musculoskeletal: Negative for arthralgias and myalgias.   Skin: Negative for rash and wound.   Allergic/Immunologic: Negative for environmental allergies and food allergies.   Neurological: Negative for weakness and headaches.   Hematological: Negative for adenopathy. Does not bruise/bleed easily.   Psychiatric/Behavioral: Negative for sleep disturbance. The patient is not nervous/anxious.           Objective:      Physical Exam  Vitals signs and nursing note reviewed.   Constitutional:       General: He is not in acute distress.     Appearance: He is well-developed.   HENT:      Head: Normocephalic and atraumatic.   Eyes:      Pupils: Pupils are equal, round, and reactive to light.      Comments: Right eye ptosis chronic   Neck:      Musculoskeletal: Normal range of motion and neck supple.   Cardiovascular:      Rate and Rhythm: Normal rate and regular rhythm.      Heart sounds: Murmur present. Systolic murmur present with a grade of 3/6.   Pulmonary:      Effort: Pulmonary effort is normal. No respiratory distress.      Breath sounds: Normal breath sounds. No wheezing.   Musculoskeletal: Normal range of motion.   Skin:     General: Skin is warm and dry.      Capillary Refill: Capillary refill takes less than 2 seconds.   Neurological:      Mental Status: He is alert and oriented to person, place, and time.      Cranial Nerves: No cranial nerve deficit.   Psychiatric:          Behavior: Behavior normal.       Lab Results   Component Value Date    CHOL 124 07/20/2020    CHOL 271 (H) 09/26/2019    CHOL 247 (H) 07/10/2019     Lab Results   Component Value Date    HDL 52 07/20/2020    HDL 54 09/26/2019    HDL 46 07/10/2019     Lab Results   Component Value Date    LDLCALC 48.4 (L) 07/20/2020    LDLCALC 186.4 (H) 09/26/2019    LDLCALC 167.0 (H) 07/10/2019     Lab Results   Component Value Date    TRIG 118 07/20/2020    TRIG 153 (H) 09/26/2019    TRIG 170 (H) 07/10/2019     Lab Results   Component Value Date    CHOLHDL 41.9 07/20/2020    CHOLHDL 19.9 (L) 09/26/2019    CHOLHDL 18.6 (L) 07/10/2019       Lab Results   Component Value Date    WBC 5.21 05/29/2020    HGB 14.5 05/29/2020    HCT 47.2 05/29/2020    MCV 90 05/29/2020     05/29/2020         Chemistry        Component Value Date/Time     05/29/2020 1144     05/29/2020 1144    K 4.1 05/29/2020 1144    K 4.1 05/29/2020 1144     05/29/2020 1144     05/29/2020 1144    CO2 28 05/29/2020 1144    CO2 28 05/29/2020 1144    BUN 16 05/29/2020 1144    BUN 16 05/29/2020 1144    CREATININE 1.5 (H) 05/29/2020 1144    CREATININE 1.5 (H) 05/29/2020 1144    GLU 83 05/29/2020 1144    GLU 83 05/29/2020 1144        Component Value Date/Time    CALCIUM 9.6 05/29/2020 1144    CALCIUM 9.6 05/29/2020 1144    ALKPHOS 60 05/29/2020 1144    ALKPHOS 60 05/29/2020 1144    AST 33 05/29/2020 1144    AST 33 05/29/2020 1144    ALT 43 05/29/2020 1144    ALT 43 05/29/2020 1144    BILITOT 1.2 (H) 05/29/2020 1144    BILITOT 1.2 (H) 05/29/2020 1144    ESTGFRAFRICA 54.5 (A) 05/29/2020 1144    ESTGFRAFRICA 54.5 (A) 05/29/2020 1144    EGFRNONAA 47.2 (A) 05/29/2020 1144    EGFRNONAA 47.2 (A) 05/29/2020 1144          Lab Results   Component Value Date    TSH 0.098 (L) 06/21/2019    C1LFKCL 52 (L) 03/03/2019    W3WLGXT 3.9 (L) 03/03/2019    FREET4 1.09 05/29/2020    T3FREE 2.0 (L) 06/30/2017          Assessment / Plan:      1.  Routine health  exam-patient here for annual wellness exam.  Labs ordered.  Health maintenance was reviewed and ordered.    Complete history and physical was completed today.  Complete and thorough medication reconciliation was performed.  Discussed risks and benefits of medications.  Advised patient on orders and health maintenance.  We discussed old records and old labs if available.  Will request any records not available through epic.  Continue current medications listed on your summary sheet.    All questions were answered. Patient had no further concerns. Advised of diagnoses and plan. Follow up as planned or return sooner if symptoms persist or worsen.     Orders Placed This Encounter   Procedures    CBC Without Differential     Standing Status:   Standing     Number of Occurrences:   99     Standing Expiration Date:   9/26/2021    Hemoglobin A1C     Standing Status:   Standing     Number of Occurrences:   99     Standing Expiration Date:   7/23/2040    Lipid Panel     Standing Status:   Standing     Number of Occurrences:   99     Standing Expiration Date:   7/23/2040    Vitamin D     Standing Status:   Future     Standing Expiration Date:   9/26/2021             Cuauhtemoc Gardner MD

## 2020-07-28 NOTE — PATIENT INSTRUCTIONS
Follow up in about 6 months (around 1/28/2021) for Med refills, LAB RESULTS.     If no improvement in symptoms or symptoms worsen, please be advised to call MD, follow-up at clinic and/or go to ER if becomes severe.    Cuauhtemoc Gardner M.D.        We Offer TELEHEALTH & Same Day Appointments!   Book your Telehealth appointment with me through my nurse or   Clinic appointments on [employer]!    [address] [place] [address]    Office: [phone]   FAX: [phone]    Check out my Facebook Page and Follow Me at: [email or web address]    Check out my website at [facility] by clicking on: [email or web address]    To Schedule appointments online, go to [email or web address]: [email or web address]

## 2020-08-03 ENCOUNTER — OFFICE VISIT (OUTPATIENT)
Dept: CARDIOLOGY | Facility: CLINIC | Age: 70
End: 2020-08-03
Payer: MEDICARE

## 2020-08-03 ENCOUNTER — PATIENT OUTREACH (OUTPATIENT)
Dept: ADMINISTRATIVE | Facility: OTHER | Age: 70
End: 2020-08-03

## 2020-08-03 VITALS
WEIGHT: 208 LBS | HEIGHT: 71 IN | SYSTOLIC BLOOD PRESSURE: 123 MMHG | DIASTOLIC BLOOD PRESSURE: 72 MMHG | BODY MASS INDEX: 29.12 KG/M2 | HEART RATE: 55 BPM

## 2020-08-03 DIAGNOSIS — I25.118 CORONARY ARTERY DISEASE OF NATIVE ARTERY OF NATIVE HEART WITH STABLE ANGINA PECTORIS: ICD-10-CM

## 2020-08-03 DIAGNOSIS — Z78.9 STATIN INTOLERANCE: Chronic | ICD-10-CM

## 2020-08-03 DIAGNOSIS — I35.0 NONRHEUMATIC AORTIC VALVE STENOSIS: ICD-10-CM

## 2020-08-03 DIAGNOSIS — Z79.899 ENCOUNTER FOR LONG-TERM (CURRENT) USE OF MEDICATIONS: ICD-10-CM

## 2020-08-03 DIAGNOSIS — I10 ESSENTIAL HYPERTENSION: Primary | ICD-10-CM

## 2020-08-03 PROCEDURE — 99213 OFFICE O/P EST LOW 20 MIN: CPT | Mod: PBBFAC,PO | Performed by: INTERNAL MEDICINE

## 2020-08-03 PROCEDURE — 99999 PR PBB SHADOW E&M-EST. PATIENT-LVL III: ICD-10-PCS | Mod: PBBFAC,,, | Performed by: INTERNAL MEDICINE

## 2020-08-03 PROCEDURE — 99214 OFFICE O/P EST MOD 30 MIN: CPT | Mod: S$PBB,,, | Performed by: INTERNAL MEDICINE

## 2020-08-03 PROCEDURE — 99999 PR PBB SHADOW E&M-EST. PATIENT-LVL III: CPT | Mod: PBBFAC,,, | Performed by: INTERNAL MEDICINE

## 2020-08-03 PROCEDURE — 99214 PR OFFICE/OUTPT VISIT, EST, LEVL IV, 30-39 MIN: ICD-10-PCS | Mod: S$PBB,,, | Performed by: INTERNAL MEDICINE

## 2020-08-03 RX ORDER — METOPROLOL SUCCINATE 25 MG/1
25 TABLET, EXTENDED RELEASE ORAL DAILY
Qty: 90 TABLET | Refills: 3 | Status: SHIPPED | OUTPATIENT
Start: 2020-08-03 | End: 2021-06-24

## 2020-08-03 NOTE — PROGRESS NOTES
Chart reviewed.   Immunizations: Triggered Imm Registry     Orders placed: n/a  Upcoming appts to satisfy ELYSSA topics: n/a

## 2020-08-03 NOTE — PROGRESS NOTES
Subjective:    Patient ID:  Aurelio Perkins is a 69 y.o. male who presents for follow-up of Follow-up (follow up)      HPI 70 yo WM with HTN, moderate to severe AS by echo and cath in 2019 with non-obstructive disease.Denies chest pain, SOB, or edema . Questioned him carefully and insist no change in symptoms and no family member has noticed a problem    Review of Systems   Constitution: Negative for decreased appetite, fever, malaise/fatigue, weight gain and weight loss.   HENT: Negative for hearing loss and nosebleeds.    Eyes: Negative for visual disturbance.   Cardiovascular: Negative for chest pain, claudication, cyanosis, dyspnea on exertion, irregular heartbeat, leg swelling, near-syncope, orthopnea, palpitations, paroxysmal nocturnal dyspnea and syncope.   Respiratory: Negative for cough, hemoptysis, shortness of breath, sleep disturbances due to breathing, snoring and wheezing.    Endocrine: Negative for cold intolerance, heat intolerance, polydipsia and polyuria.   Hematologic/Lymphatic: Negative for adenopathy and bleeding problem. Does not bruise/bleed easily.   Skin: Negative for color change, itching, poor wound healing, rash and suspicious lesions.   Musculoskeletal: Negative for arthritis, back pain, falls, joint pain, joint swelling, muscle cramps, muscle weakness and myalgias.   Gastrointestinal: Positive for heartburn. Negative for bloating, abdominal pain, change in bowel habit, constipation, flatus, hematemesis, hematochezia, hemorrhoids, jaundice, melena, nausea and vomiting.   Genitourinary: Negative for bladder incontinence, decreased libido, frequency, hematuria, hesitancy and urgency.   Neurological: Negative for brief paralysis, difficulty with concentration, excessive daytime sleepiness, dizziness, focal weakness, headaches, light-headedness, loss of balance, numbness, vertigo and weakness.   Psychiatric/Behavioral: Negative for altered mental status, depression and memory loss. The  "patient does not have insomnia and is not nervous/anxious.    Allergic/Immunologic: Negative for environmental allergies, hives and persistent infections.        Objective:    Physical Exam   Constitutional: He is oriented to person, place, and time. He appears well-developed and well-nourished. No distress.   /72   Pulse (!) 55   Ht 5' 11" (1.803 m)   Wt 94.3 kg (208 lb)   BMI 29.01 kg/m²      HENT:   Head: Normocephalic and atraumatic.   Eyes: Pupils are equal, round, and reactive to light. Conjunctivae and lids are normal. Right eye exhibits no discharge. No scleral icterus.   Neck: Normal range of motion. Neck supple. No JVD present. No tracheal deviation present. No thyromegaly present.   Cardiovascular: Normal rate, regular rhythm, S1 normal, S2 normal and intact distal pulses. Exam reveals no gallop and no friction rub.   Murmur heard.   Harsh midsystolic murmur is present with a grade of 3/6 at the upper right sternal border radiating to the neck.  Pulses:       Carotid pulses are 2+ on the right side and 2+ on the left side.       Radial pulses are 2+ on the right side and 2+ on the left side.        Femoral pulses are 2+ on the right side and 2+ on the left side.       Popliteal pulses are 2+ on the right side and 2+ on the left side.        Dorsalis pedis pulses are 2+ on the right side and 2+ on the left side.        Posterior tibial pulses are 2+ on the right side and 2+ on the left side.   Pulmonary/Chest: Effort normal and breath sounds normal. No respiratory distress. He has no wheezes. He has no rales. He exhibits no tenderness.   Abdominal: Soft. Bowel sounds are normal. He exhibits no distension and no mass. There is no hepatosplenomegaly or hepatomegaly. There is no abdominal tenderness. There is no rebound and no guarding.   Musculoskeletal: Normal range of motion.         General: No tenderness or edema.   Lymphadenopathy:     He has no cervical adenopathy.   Neurological: He is alert " and oriented to person, place, and time. He has normal reflexes. No cranial nerve deficit. Coordination normal.   Skin: Skin is warm and dry. No rash noted. He is not diaphoretic. No erythema. No pallor.   Psychiatric: He has a normal mood and affect. His speech is normal and behavior is normal. Judgment and thought content normal. Cognition and memory are normal.         Assessment:       1. Essential hypertension    2. Nonrheumatic aortic valve stenosis    3. Coronary artery disease of native artery of native heart with stable angina pectoris    4. Statin intolerance    5. Encounter for long-term (current) use of medications         Plan:     Discussed progression of AS and indications for replacement     Continue current meds       Orders Placed This Encounter   Procedures    Echo Color Flow Doppler? Yes in 6 months     Follow up in about 6 months (around 2/3/2021).

## 2020-08-04 ENCOUNTER — PATIENT OUTREACH (OUTPATIENT)
Dept: OTHER | Facility: OTHER | Age: 70
End: 2020-08-04

## 2020-08-04 DIAGNOSIS — I10 ESSENTIAL HYPERTENSION: Primary | ICD-10-CM

## 2020-08-07 ENCOUNTER — TELEPHONE (OUTPATIENT)
Dept: PHARMACY | Facility: CLINIC | Age: 70
End: 2020-08-07

## 2020-09-02 ENCOUNTER — TELEPHONE (OUTPATIENT)
Dept: PHARMACY | Facility: CLINIC | Age: 70
End: 2020-09-02

## 2020-09-21 ENCOUNTER — TELEPHONE (OUTPATIENT)
Dept: RHEUMATOLOGY | Facility: CLINIC | Age: 70
End: 2020-09-21

## 2020-09-21 NOTE — TELEPHONE ENCOUNTER
----- Message from Hemalatha Bashir PharmD sent at 9/2/2020 10:38 AM CDT -----  Regarding: Xeljanz  Good Morning Dr Oliveira and staff,     Mr. Perkins reported to [facility] that he was advised by his nephrologist to take Xeljanz once daily instead of twice daily as prescribed. Could a new prescription for once daily dosing be sent to [facility], if appropriate? Please advise.     Thanks,   Hemalatha Bashir, Sivan  [facility]  [phone]

## 2020-09-22 ENCOUNTER — PATIENT OUTREACH (OUTPATIENT)
Dept: OTHER | Facility: OTHER | Age: 70
End: 2020-09-22

## 2020-09-23 DIAGNOSIS — M25.50 ARTHRALGIA, UNSPECIFIED JOINT: ICD-10-CM

## 2020-09-23 DIAGNOSIS — M06.9 RHEUMATOID ARTHRITIS FLARE: ICD-10-CM

## 2020-09-23 DIAGNOSIS — G89.4 CHRONIC PAIN SYNDROME: ICD-10-CM

## 2020-09-23 DIAGNOSIS — E55.9 VITAMIN D DEFICIENCY, UNSPECIFIED: ICD-10-CM

## 2020-09-23 DIAGNOSIS — R53.83 FATIGUE, UNSPECIFIED TYPE: ICD-10-CM

## 2020-09-23 DIAGNOSIS — F51.01 PRIMARY INSOMNIA: ICD-10-CM

## 2020-09-28 ENCOUNTER — PATIENT OUTREACH (OUTPATIENT)
Dept: OTHER | Facility: OTHER | Age: 70
End: 2020-09-28

## 2020-09-28 RX ORDER — TOFACITINIB 5 MG/1
5 TABLET, FILM COATED ORAL 2 TIMES DAILY
Qty: 60 TABLET | Refills: 6 | Status: SHIPPED | OUTPATIENT
Start: 2020-09-28 | End: 2021-02-09 | Stop reason: SDUPTHER

## 2020-09-28 NOTE — PROGRESS NOTES
Routine outreach. Patient answered but eating lunch and not available to talk at this time.     Requests call this afternoon.     Last 5 Patient Entered Readings                                      Current 30 Day Average: 136/78     Recent Readings 9/20/2020 9/20/2020 9/20/2020 9/16/2020 9/16/2020    SBP (mmHg) - 138 138 133 -    DBP (mmHg) - 79 79 77 -    Pulse 54 - 54 - 59        Minimal readings since prior outreach - available readings have trended down but remain 10 points above in clinic systolic readings. Patient to work on resting before readings. Was not open to full technique education at prior outreach.     Will reach back out.

## 2020-09-28 NOTE — PROGRESS NOTES
Digital Medicine: Clinician Follow-Up    Patient reports doing well. Denies issues or concerns at this time.     Patient endorses resting before readings since last outreach which has improved.     The history is provided by the patient.   Follow-up reason(s): routine follow up.     Hypertension    Readings are trending down due to improved technique.       Additional Follow-up details: Sits in a comfy wingback chair. Proper positioning but not proper support.   Rest 5 minutes since last discussion.   Takes 1 hour post-meds at least.   Does not check cuff tightness. Places tight.           Last 5 Patient Entered Readings                                      Current 30 Day Average: 136/78     Recent Readings 9/20/2020 9/20/2020 9/20/2020 9/16/2020 9/16/2020    SBP (mmHg) - 138 138 133 -    DBP (mmHg) - 79 79 77 -    Pulse 54 - 54 - 59               Screenings    ASSESSMENT(S)  Patients BP average is 136/78 mmHg, which is above goal. Patient's BP goal is less than or equal to 130/80 per 2017 ACC/AHA Hypertension Guidelines.     BP readings at home ar mildly elevated and readings in clinic are well controlled. Working on technique.       Hypertension Plan  Continue current therapy.  Continue current diet/physical activity routine.  Provided patient education.  - Patient to check tightness at each measurement and move to hardback chair.     Will monitor in 4-6 weeks for readings more similar to in clinic readings in 120s/70s. Possible device is reading 5-10 points higher appropriately per FDA regulations      Addressed patient questions and patient has my contact information if needed prior to next outreach. Patient verbalizes understanding.            There are no preventive care reminders to display for this patient.  There are no preventive care reminders to display for this patient.    Hypertension Medications             hydroCHLOROthiazide (MICROZIDE) 12.5 mg capsule Take 1 capsule (12.5 mg total) by mouth once  daily.    metoprolol succinate (TOPROL-XL) 25 MG 24 hr tablet Take 1 tablet (25 mg total) by mouth once daily.    nitroGLYCERIN (NITROSTAT) 0.4 MG SL tablet Place 0.4 mg under the tongue.

## 2020-09-28 NOTE — TELEPHONE ENCOUNTER
Please make sure labs are completed prior to his visit with me next month. Dr. Oliveira ordered labs in June.

## 2020-10-05 ENCOUNTER — PATIENT OUTREACH (OUTPATIENT)
Dept: ADMINISTRATIVE | Facility: OTHER | Age: 70
End: 2020-10-05

## 2020-10-05 NOTE — PROGRESS NOTES
Health Maintenance Due   Topic Date Due    Pneumococcal Vaccine (65+ High/Highest Risk) (2 of 2 - PPSV23) 05/04/2018    Influenza Vaccine (1) 08/01/2020     Updates were requested from care everywhere.  Chart was reviewed for overdue Proactive [facility] ([facility]) topics (CRS, Breast Cancer Screening, Eye exam)  Health Maintenance has been updated.  LINKS immunization registry triggered.  Immunizations were reconciled.

## 2020-10-06 ENCOUNTER — TELEPHONE (OUTPATIENT)
Dept: RHEUMATOLOGY | Facility: CLINIC | Age: 70
End: 2020-10-06

## 2020-10-06 ENCOUNTER — OFFICE VISIT (OUTPATIENT)
Dept: RHEUMATOLOGY | Facility: CLINIC | Age: 70
End: 2020-10-06
Payer: MEDICARE

## 2020-10-06 VITALS
WEIGHT: 211.63 LBS | SYSTOLIC BLOOD PRESSURE: 127 MMHG | BODY MASS INDEX: 29.63 KG/M2 | HEART RATE: 65 BPM | DIASTOLIC BLOOD PRESSURE: 67 MMHG | HEIGHT: 71 IN

## 2020-10-06 DIAGNOSIS — N18.30 CKD (CHRONIC KIDNEY DISEASE) STAGE 3, GFR 30-59 ML/MIN: ICD-10-CM

## 2020-10-06 DIAGNOSIS — Z79.899 IMMUNOCOMPROMISED STATE DUE TO DRUG THERAPY: ICD-10-CM

## 2020-10-06 DIAGNOSIS — N28.9 DECREASED RENAL FUNCTION: ICD-10-CM

## 2020-10-06 DIAGNOSIS — D84.821 IMMUNOCOMPROMISED STATE DUE TO DRUG THERAPY: ICD-10-CM

## 2020-10-06 DIAGNOSIS — R53.83 FATIGUE, UNSPECIFIED TYPE: ICD-10-CM

## 2020-10-06 DIAGNOSIS — M05.9 SEROPOSITIVE RHEUMATOID ARTHRITIS: Primary | ICD-10-CM

## 2020-10-06 DIAGNOSIS — M05.9 SEROPOSITIVE RHEUMATOID ARTHRITIS: ICD-10-CM

## 2020-10-06 DIAGNOSIS — N18.30 STAGE 3 CHRONIC KIDNEY DISEASE, UNSPECIFIED WHETHER STAGE 3A OR 3B CKD: ICD-10-CM

## 2020-10-06 DIAGNOSIS — G89.4 CHRONIC PAIN SYNDROME: ICD-10-CM

## 2020-10-06 DIAGNOSIS — R00.1 BRADYCARDIA: ICD-10-CM

## 2020-10-06 PROCEDURE — 99999 PR PBB SHADOW E&M-EST. PATIENT-LVL III: ICD-10-PCS | Mod: PBBFAC,,, | Performed by: PHYSICIAN ASSISTANT

## 2020-10-06 PROCEDURE — 99214 OFFICE O/P EST MOD 30 MIN: CPT | Mod: S$PBB,,, | Performed by: PHYSICIAN ASSISTANT

## 2020-10-06 PROCEDURE — 96372 THER/PROPH/DIAG INJ SC/IM: CPT | Mod: PBBFAC,PO

## 2020-10-06 PROCEDURE — 99999 PR PBB SHADOW E&M-EST. PATIENT-LVL III: CPT | Mod: PBBFAC,,, | Performed by: PHYSICIAN ASSISTANT

## 2020-10-06 PROCEDURE — 99214 PR OFFICE/OUTPT VISIT, EST, LEVL IV, 30-39 MIN: ICD-10-PCS | Mod: S$PBB,,, | Performed by: PHYSICIAN ASSISTANT

## 2020-10-06 PROCEDURE — 99213 OFFICE O/P EST LOW 20 MIN: CPT | Mod: PBBFAC,PO | Performed by: PHYSICIAN ASSISTANT

## 2020-10-06 RX ORDER — DICLOFENAC SODIUM 75 MG/1
75 TABLET, DELAYED RELEASE ORAL DAILY PRN
Qty: 90 TABLET | Refills: 1 | Status: SHIPPED | OUTPATIENT
Start: 2020-10-06 | End: 2020-10-06

## 2020-10-06 RX ORDER — DICLOFENAC SODIUM AND MISOPROSTOL 75; 200 MG/1; UG/1
1 TABLET, DELAYED RELEASE ORAL DAILY PRN
Qty: 90 TABLET | Refills: 1 | Status: SHIPPED | OUTPATIENT
Start: 2020-10-06 | End: 2020-11-02

## 2020-10-06 RX ORDER — ESZOPICLONE 3 MG/1
TABLET, FILM COATED ORAL
Qty: 30 TABLET | Refills: 4 | Status: SHIPPED | OUTPATIENT
Start: 2020-10-06 | End: 2020-11-04 | Stop reason: SDUPTHER

## 2020-10-06 RX ORDER — CYANOCOBALAMIN 1000 UG/ML
1000 INJECTION, SOLUTION INTRAMUSCULAR; SUBCUTANEOUS
Status: COMPLETED | OUTPATIENT
Start: 2020-10-06 | End: 2020-10-06

## 2020-10-06 RX ADMIN — CYANOCOBALAMIN 1000 MCG: 1000 INJECTION, SOLUTION INTRAMUSCULAR at 10:10

## 2020-10-06 ASSESSMENT — ROUTINE ASSESSMENT OF PATIENT INDEX DATA (RAPID3)
TOTAL RAPID3 SCORE: 6.05
FATIGUE SCORE: 3.3
PSYCHOLOGICAL DISTRESS SCORE: 1.1
MDHAQ FUNCTION SCORE: 1.4
PAIN SCORE: 7.5
PATIENT GLOBAL ASSESSMENT SCORE: 6

## 2020-10-06 NOTE — TELEPHONE ENCOUNTER
----- Message from David Thurston sent at 10/6/2020 10:09 AM CDT -----  Type:  Pharmacy Calling to Clarify an RX    Name of Caller:  Susan  Pharmacy Name:    [id number] [place] - [address] [address]  [address] [place] [address]  [place] [address]  Phone: [phone] Fax: [phone]      Prescription Name:  diclofenac-misoprostol  mg-mcg (ARTHROTEC 75)  mg-mcg per tablet  What do they need to clarify?:  Voltaren tablets were also called in.  Best Call Back Number:   [phone]  Additional Information:

## 2020-10-06 NOTE — PROGRESS NOTES
Subjective:       Patient ID: Aurelio Perkins is a 69 y.o. male.    Chief Complaint: Disease Management and Rheumatoid Arthritis    Mr. Perkins is a 69 year old male who presents to clinic for follow up on seropositive rheumatoid arthritis.  He has been doing fairly well since his last visit. He decreased xeljanz to 5 mg daily due to concerns of immunosuppression during covid pandemic and then he recently increased dose to BID. There is a note in the chart his Nephrologist wanted him to decrease xeljanz 5 mg daily and Dr. Oliveira recommended changing treatment to Rinvoq. He complains of pain involving his neck, hips, and low back. He complains of poor sleep and waking up at 4:00 AM in the morning despite taking lunesta.     Current tx:  1. xeljanz 5 mg bid    Review of Systems   Constitutional: Negative for chills, fatigue and fever.   Eyes: Negative for visual disturbance.   Respiratory: Negative for cough, shortness of breath and wheezing.    Cardiovascular: Negative for chest pain, palpitations and leg swelling.   Gastrointestinal: Negative for abdominal pain, constipation, diarrhea, nausea and vomiting.   Musculoskeletal: Positive for arthralgias and back pain. Negative for myalgias.   Neurological: Negative for dizziness, syncope and headaches.   Hematological: Negative for adenopathy.         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   Eyes: Right conjunctiva is not injected. Left conjunctiva is not injected. Right eye exhibits normal extraocular motion. Left eye exhibits normal extraocular motion.   Neck: No JVD present. No thyromegaly present.   Cardiovascular: Normal rate and regular rhythm.  Exam reveals no decreased pulses.    Pulmonary/Chest: He has no wheezes. He has no rhonchi. He has no rales.       Right Side Rheumatological Exam     Examination finds the shoulder, elbow, knee, 1st MCP, 2nd MCP, 3rd MCP, 4th MCP and 5th MCP normal.    The patient has an enlarged wrist, 1st PIP, 2nd PIP, 3rd PIP, 4th PIP and 5th PIP    Left Side Rheumatological Exam     Examination finds the shoulder, elbow, knee, 1st MCP, 2nd MCP, 3rd MCP, 4th MCP and 5th MCP normal.    The patient has an enlarged wrist, 1st PIP, 2nd PIP, 3rd PIP, 4th PIP and 5th PIP.      Lymphadenopathy:     He has no cervical adenopathy.   Neurological: Gait normal.   Skin: No rash noted.     Psychiatric: Mood and affect normal.       Recent labs reviewed:  Component      Latest Ref Rng & Units 5/29/2020 5/29/2020          11:44 AM 11:44 AM   WBC      3.90 - 12.70 K/uL  5.21   RBC      4.60 - 6.20 M/uL  5.25   Hemoglobin      14.0 - 18.0 g/dL  14.5   Hematocrit      40.0 - 54.0 %  47.2   MCV      82 - 98 fL  90   MCH      27.0 - 31.0 pg  27.6   MCHC      32.0 - 36.0 g/dL  30.7 (L)   RDW      11.5 - 14.5 %  13.0   Platelets      150 - 350 K/uL  199   MPV      9.2 - 12.9 fL  10.9   Immature Granulocytes      0.0 - 0.5 %  0.4   Gran # (ANC)      1.8 - 7.7 K/uL  3.4   Immature Grans (Abs)      0.00 - 0.04 K/uL  0.02   Lymph #      1.0 - 4.8 K/uL  1.3   Mono #      0.3 - 1.0 K/uL  0.5   Eos #      0.0 - 0.5 K/uL  0.1   Baso #       0.00 - 0.20 K/uL  0.02   nRBC      0 /100 WBC  0   Gran%      38.0 - 73.0 %  65.2   Lymph%      18.0 - 48.0 %  24.0   Mono%      4.0 - 15.0 %  8.8   Eosinophil%      0.0 - 8.0 %  1.2   Basophil%      0.0 - 1.9 %  0.4   Differential Method        Automated   Sodium      136 - 145 mmol/L 140 140   Potassium      3.5 - 5.1 mmol/L 4.1 4.1   Chloride      95 - 110 mmol/L 104 104   CO2      23 - 29 mmol/L 28 28   Glucose      70 - 110 mg/dL 83 83   BUN, Bld      8 - 23 mg/dL 16 16   Creatinine      0.5 - 1.4 mg/dL 1.5 (H) 1.5 (H)   Calcium      8.7 - 10.5 mg/dL 9.6 9.6   PROTEIN TOTAL      6.0 - 8.4 g/dL 7.5 7.5   Albumin      3.5 - 5.2 g/dL 4.5 4.5   BILIRUBIN TOTAL      0.1 - 1.0 mg/dL 1.2 (H) 1.2 (H)   Alkaline Phosphatase      55 - 135 U/L 60 60   AST      10 - 40 U/L 33 33   ALT      10 - 44 U/L 43 43   Anion Gap      8 - 16 mmol/L 8 8   eGFR if African American      >60 mL/min/1.73 m:2 54.5 (A) 54.5 (A)   eGFR if non African American      >60 mL/min/1.73 m:2 47.2 (A) 47.2 (A)   NIL      See text IU/mL  0.010   TB1 - Nil      See text IU/mL  0.000   TB2 - Nil      See text IU/mL  0.000   Mitogen - Nil      See text IU/mL  5.850   TB Gold Plus        Negative   Hemoglobin A1C External      4.0 - 5.6 %  5.6   Estimated Avg Glucose      68 - 131 mg/dL  114   Free T4      0.71 - 1.51 ng/dL  1.09   Testosterone, Total      304 - 1227 ng/dL  387   CRP      0.0 - 8.2 mg/L  0.5   Sed Rate      0 - 10 mm/Hr  2   Hepatitis C Ab      Negative  Negative   Hep B Core Total Ab        Negative   Hepatitis B Surface Ag      Negative  Negative     Assessment:       1. Seropositive rheumatoid arthritis    2. Immunocompromised state due to drug therapy    3. Stage 3 chronic kidney disease, unspecified whether stage 3a or 3b CKD            Plan:       Seropositive rheumatoid arthritis  -     Discontinue: diclofenac (VOLTAREN) 75 MG EC tablet; Take 1 tablet (75 mg total) by mouth daily as needed.  Dispense: 90 tablet; Refill: 1  -      diclofenac-misoprostol  mg-mcg (ARTHROTEC 75)  mg-mcg per tablet; Take 1 tablet by mouth daily as needed.  Dispense: 90 tablet; Refill: 1  -     cyanocobalamin injection 1,000 mcg    Immunocompromised state due to drug therapy    Stage 3 chronic kidney disease, unspecified whether stage 3a or 3b CKD        Assessment:  69 year old male with  Seropositive (+CCP) rheumatoid arthritis  --CKD stage 3, s/p total nephrectomy, followed by Nephrology   --secondary adrenal insufficiency on cortef followed by Endocrinology  --moderate AS, non-obstructive CAD  --chronic insomnia on lunesta    Plan:  1. Recommend changing xeljanz to Rinvoq 15 mg daily, pt will let me know if he wants to change tx so I can submit new RX and authorization to [facility]  2. Change diclofenac to arthrotec 75/200 mg once daily. Limit NSAID use if possible  3. Cont. lunesta per Dr. gallardo  4. B12 given today in clinic    Follow up:  3-4 months Dr. Gallardo w/labs prior

## 2020-10-06 NOTE — PROGRESS NOTES
Administered 1 cc ( 1000 mcg/ml ) of b12 to the right upper outer arm. Informed of s/s to report verbalized understanding. No adverse reactions noted.

## 2020-10-26 ENCOUNTER — PATIENT MESSAGE (OUTPATIENT)
Dept: NEUROSURGERY | Facility: CLINIC | Age: 70
End: 2020-10-26

## 2020-10-26 ENCOUNTER — PATIENT MESSAGE (OUTPATIENT)
Dept: RHEUMATOLOGY | Facility: CLINIC | Age: 70
End: 2020-10-26

## 2020-10-27 NOTE — PROGRESS NOTES
"Digital Medicine: Health  Follow-Up    The history is provided by the patient.                 Patient needs assistance troubleshooting: patient reminder needed.      Topics Covered on Call: device use    Additional Follow-up details: Routine follow up with patient. He states he's doing well. Confirmed last reading that was taken was on 9/20/2020. Patient states he has not been taking readings because he's been very busy with work and "running like crazy."    He also states that his last in clinic BP reading was 127/67, so it's "hard to feel motivated" when his BP was at goal. Discussed with patient that a person's BP can fluctuate and it's important to regularly monitor it. Discussed benefits of taking readings and having a care team that can address readings that trend up.     Encouraged patient to take at least 1-2 readings a week if he can. He states he will do better and knows he needs to take his BP more frequently.     Patient has no other concerns at this time. Will monitor for readings, will follow up in 6 weeks, sooner if no BP readings or readings trend up                Diet-Not assessed          Physical Activity-Not assessed    Medication Adherence-Medication Adherence not addressed.      Substance, Sleep, Stress-Not assessed      Additional monitoring needed.       Addressed patient questions and patient has my contact information if needed prior to next outreach. Patient verbalizes understanding.      Explained the importance of self-monitoring and medication adherence. Encouraged the patient to communicate with their health  for lifestyle modifications to help improve or maintain a healthy lifestyle.               There are no preventive care reminders to display for this patient.      Last 5 Patient Entered Readings                                      Current 30 Day Average:      Recent Readings 9/20/2020 9/20/2020 9/20/2020 9/16/2020 9/16/2020    SBP (mmHg) - 138 138 133 -    DBP (mmHg) " - 79 79 77 -    Pulse 54 - 54 - 59

## 2020-10-30 ENCOUNTER — PATIENT MESSAGE (OUTPATIENT)
Dept: RHEUMATOLOGY | Facility: CLINIC | Age: 70
End: 2020-10-30

## 2020-10-30 DIAGNOSIS — M05.9 SEROPOSITIVE RHEUMATOID ARTHRITIS: Primary | ICD-10-CM

## 2020-11-02 RX ORDER — DICLOFENAC SODIUM 75 MG/1
75 TABLET, DELAYED RELEASE ORAL DAILY PRN
Qty: 90 TABLET | Refills: 1 | Status: SHIPPED | OUTPATIENT
Start: 2020-11-02 | End: 2021-08-09 | Stop reason: SDUPTHER

## 2020-11-02 NOTE — PROGRESS NOTES
Patient took first reading since 09/20/20 yesterday. Will defer PharmD outreach for 2 weeks and monitor for additional readings for review.     Last 5 Patient Entered Readings                                      Current 30 Day Average: 136/75     Recent Readings 11/1/2020 11/1/2020 11/1/2020 9/20/2020 9/20/2020    SBP (mmHg) 136 - 136 - 138    DBP (mmHg) 75 - 75 - 79    Pulse - 54 54 54 -

## 2020-11-03 ENCOUNTER — PATIENT MESSAGE (OUTPATIENT)
Dept: FAMILY MEDICINE | Facility: CLINIC | Age: 70
End: 2020-11-03

## 2020-11-04 ENCOUNTER — PATIENT MESSAGE (OUTPATIENT)
Dept: RHEUMATOLOGY | Facility: CLINIC | Age: 70
End: 2020-11-04

## 2020-11-04 DIAGNOSIS — D84.821 IMMUNOCOMPROMISED STATE DUE TO DRUG THERAPY: ICD-10-CM

## 2020-11-04 DIAGNOSIS — M05.9 SEROPOSITIVE RHEUMATOID ARTHRITIS: ICD-10-CM

## 2020-11-04 DIAGNOSIS — N28.9 DECREASED RENAL FUNCTION: ICD-10-CM

## 2020-11-04 DIAGNOSIS — N18.30 CKD (CHRONIC KIDNEY DISEASE) STAGE 3, GFR 30-59 ML/MIN: ICD-10-CM

## 2020-11-04 DIAGNOSIS — Z79.899 IMMUNOCOMPROMISED STATE DUE TO DRUG THERAPY: ICD-10-CM

## 2020-11-04 DIAGNOSIS — R00.1 BRADYCARDIA: ICD-10-CM

## 2020-11-04 DIAGNOSIS — G89.4 CHRONIC PAIN SYNDROME: ICD-10-CM

## 2020-11-04 DIAGNOSIS — R53.83 FATIGUE, UNSPECIFIED TYPE: ICD-10-CM

## 2020-11-06 ENCOUNTER — PATIENT MESSAGE (OUTPATIENT)
Dept: RHEUMATOLOGY | Facility: CLINIC | Age: 70
End: 2020-11-06

## 2020-11-06 DIAGNOSIS — R53.83 FATIGUE, UNSPECIFIED TYPE: ICD-10-CM

## 2020-11-06 DIAGNOSIS — Z79.899 IMMUNOCOMPROMISED STATE DUE TO DRUG THERAPY: ICD-10-CM

## 2020-11-06 DIAGNOSIS — M05.9 SEROPOSITIVE RHEUMATOID ARTHRITIS: ICD-10-CM

## 2020-11-06 DIAGNOSIS — N18.30 CKD (CHRONIC KIDNEY DISEASE) STAGE 3, GFR 30-59 ML/MIN: ICD-10-CM

## 2020-11-06 DIAGNOSIS — G89.4 CHRONIC PAIN SYNDROME: ICD-10-CM

## 2020-11-06 DIAGNOSIS — D84.821 IMMUNOCOMPROMISED STATE DUE TO DRUG THERAPY: ICD-10-CM

## 2020-11-06 DIAGNOSIS — N28.9 DECREASED RENAL FUNCTION: ICD-10-CM

## 2020-11-06 DIAGNOSIS — R00.1 BRADYCARDIA: ICD-10-CM

## 2020-11-08 RX ORDER — ESZOPICLONE 3 MG/1
TABLET, FILM COATED ORAL
Qty: 30 TABLET | Refills: 4 | Status: SHIPPED | OUTPATIENT
Start: 2020-11-08 | End: 2021-02-09 | Stop reason: SDUPTHER

## 2020-11-08 RX ORDER — ESZOPICLONE 3 MG/1
TABLET, FILM COATED ORAL
Qty: 30 TABLET | Refills: 4 | OUTPATIENT
Start: 2020-11-08

## 2020-11-16 ENCOUNTER — PATIENT OUTREACH (OUTPATIENT)
Dept: OTHER | Facility: OTHER | Age: 70
End: 2020-11-16

## 2020-11-16 NOTE — PROGRESS NOTES
Routine outreach. Patient not available at this time. Requests call later today.     Last 5 Patient Entered Readings                                      Current 30 Day Average: 137/79     Recent Readings 11/15/2020 11/15/2020 11/15/2020 11/6/2020 11/6/2020    SBP (mmHg) 139 - 139 126 -    DBP (mmHg) 75 - 75 82 -    Pulse - 63 63 - 53        Patient has recently started monitoring more routinely. Readings are mildly elevated to controlled. Would like to review compliance with moving to hardback chair and checking fit of cuff before monitoring. Will likely monitor additional readings and encourage lifestyle compliance.     Will continue to monitor and reach out.

## 2020-11-16 NOTE — PROGRESS NOTES
Digital Medicine: Clinician Follow-Up    Patient reports doing well. Denies issues or concerns at this time.     Patient confirms charging device once a month. Confirms checking fit of cuff closely since last outreach, but was not able to move to a hardback chair as he does not have one in his home. Continuing to use wingback.     Confident in his exercise. 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     Patient notes that is diet is unlikely to change. He is not adding additional salt at this time.     The history is provided by the patient.   Follow-up reason(s): routine follow up.           Last 5 Patient Entered Readings                                      Current 30 Day Average: 137/79     Recent Readings 11/15/2020 11/15/2020 11/15/2020 11/6/2020 11/6/2020    SBP (mmHg) 139 - 139 126 -    DBP (mmHg) 75 - 75 82 -    Pulse - 63 63 - 53                 Depression Screening  Did not address depression screening.    Sleep Apnea Screening    Did not address sleep apnea screening.     Medication Affordability Screening  Did not address medication affordability screening.     Medication Adherence-Medication adherence was assessed.          ASSESSMENT(S)  Patients BP average is 137/79 mmHg, which is above goal. Patient's BP goal is less than or equal to 130/80.     Readings are routinely appropriate but minimal at this time. Would like to monitor additional readings to determine if dose increase or med change is warranted.       Hypertension Plan  Additional monitoring needed. Monitor at least 1-2 times per week.   Continue current therapy.  Continue current diet/physical activity routine.  Will continue to monitor and reach out in 1-2 months as needed based on readings. Will defer if readings well controlled.      Addressed patient questions and patient has my contact information if needed prior to next outreach. Patient verbalizes  understanding.             There are no preventive care reminders to display for this patient.  There are no preventive care reminders to display for this patient.      Hypertension Medications             hydroCHLOROthiazide (MICROZIDE) 12.5 mg capsule Take 1 capsule (12.5 mg total) by mouth once daily.    metoprolol succinate (TOPROL-XL) 25 MG 24 hr tablet Take 1 tablet (25 mg total) by mouth once daily.    nitroGLYCERIN (NITROSTAT) 0.4 MG SL tablet Place 0.4 mg under the tongue.

## 2020-11-30 ENCOUNTER — PATIENT OUTREACH (OUTPATIENT)
Dept: ADMINISTRATIVE | Facility: OTHER | Age: 70
End: 2020-11-30

## 2020-11-30 NOTE — PROGRESS NOTES
Care Everywhere: updated  Immunization: updated   Health Maintenance: updated  Media Review:   Legacy Review:   Order placed:   Upcoming appts:

## 2020-12-01 ENCOUNTER — OFFICE VISIT (OUTPATIENT)
Dept: NEUROSURGERY | Facility: CLINIC | Age: 70
End: 2020-12-01
Payer: MEDICARE

## 2020-12-01 ENCOUNTER — HOSPITAL ENCOUNTER (OUTPATIENT)
Dept: RADIOLOGY | Facility: HOSPITAL | Age: 70
Discharge: HOME | End: 2020-12-01
Attending: NEUROLOGICAL SURGERY
Payer: MEDICARE

## 2020-12-01 VITALS
SYSTOLIC BLOOD PRESSURE: 114 MMHG | TEMPERATURE: 98 F | WEIGHT: 210.75 LBS | HEART RATE: 89 BPM | DIASTOLIC BLOOD PRESSURE: 79 MMHG | BODY MASS INDEX: 28.54 KG/M2 | OXYGEN SATURATION: 100 % | HEIGHT: 72 IN

## 2020-12-01 DIAGNOSIS — D32.9 MENINGIOMA OF RIGHT SPHENOID WING INVOLVING CAVERNOUS SINUS: ICD-10-CM

## 2020-12-01 DIAGNOSIS — D49.89 NEOPLASM OF HEAD: ICD-10-CM

## 2020-12-01 DIAGNOSIS — Z98.890 S/P CRANIOTOMY: Primary | ICD-10-CM

## 2020-12-01 PROBLEM — G93.89 BRAIN MASS: Status: RESOLVED | Noted: 2019-03-02 | Resolved: 2020-12-01

## 2020-12-01 PROCEDURE — 99999 PR PBB SHADOW E&M-EST. PATIENT-LVL IV: CPT | Mod: PBBFAC,,, | Performed by: NEUROLOGICAL SURGERY

## 2020-12-01 PROCEDURE — A9585 GADOBUTROL INJECTION: HCPCS | Performed by: NEUROLOGICAL SURGERY

## 2020-12-01 PROCEDURE — 25500020 PHARM REV CODE 255: Performed by: NEUROLOGICAL SURGERY

## 2020-12-01 PROCEDURE — 70553 MRI BRAIN W WO CONTRAST: ICD-10-PCS | Mod: 26,,, | Performed by: RADIOLOGY

## 2020-12-01 PROCEDURE — 70553 MRI BRAIN STEM W/O & W/DYE: CPT | Mod: TC

## 2020-12-01 PROCEDURE — 99214 PR OFFICE/OUTPT VISIT, EST, LEVL IV, 30-39 MIN: ICD-10-PCS | Mod: S$PBB,,, | Performed by: NEUROLOGICAL SURGERY

## 2020-12-01 PROCEDURE — 99214 OFFICE O/P EST MOD 30 MIN: CPT | Mod: S$PBB,,, | Performed by: NEUROLOGICAL SURGERY

## 2020-12-01 PROCEDURE — 99999 PR PBB SHADOW E&M-EST. PATIENT-LVL IV: ICD-10-PCS | Mod: PBBFAC,,, | Performed by: NEUROLOGICAL SURGERY

## 2020-12-01 PROCEDURE — 70553 MRI BRAIN STEM W/O & W/DYE: CPT | Mod: 26,,, | Performed by: RADIOLOGY

## 2020-12-01 PROCEDURE — 99214 OFFICE O/P EST MOD 30 MIN: CPT | Mod: PBBFAC,25 | Performed by: NEUROLOGICAL SURGERY

## 2020-12-01 RX ORDER — GADOBUTROL 604.72 MG/ML
10 INJECTION INTRAVENOUS
Status: COMPLETED | OUTPATIENT
Start: 2020-12-01 | End: 2020-12-01

## 2020-12-01 RX ADMIN — GADOBUTROL 10 ML: 604.72 INJECTION INTRAVENOUS at 12:12

## 2020-12-01 NOTE — PROGRESS NOTES
"CHIEF COMPLAINT:  Meningioma f/u     INTERVAL HISTORY (12/1/20):  Routine postop follow up.  No major changes.    "Dizzy spells" that last a few seconds.  Approx 3-4 x over past year.  No LOC, no confusion, he is aware when they are occurring, no falling or shaking.    R eye drooping still improved from preop but starting to worsen.  Worse when tired Ophtho wants to fix.  Also has subtle detached retina that is being watched closely.  Disconjugate gaze but no double vision.    INTERVAL HISTORY (3/6/20):  Patient returns for routine postoperative follow-up.  Patient reports that he continues to do well from a neurologic perspective.  His right eyelid ptsosis remained improved and he denies any vision changes except for some difficult focusing when reading.    He denies any headaches, speech or language problems, sensory motor changes, or facial pain.     he reports that he had a hypertensive episode in July which prompted an angiogram and concern for an MI.  His angiogram was negative and it appears that the episode was due to hormone imbalance.      INTERVAL HISTORY (7/19/19):  Continues to do well.  Has returned to work without any difficulties.  R eyelid droop has remain improved but starting notice subtle droop in left eye lid.  Wound well healed.   Energy levels have returned now that hormone replacement stabilized.     HPI:  Aurelio Perkins is a 68 y.o.-year-old male who presents today for post-operative follow-up s/p left crani for subtotal resection on 5/29/19.  He reports that he is doing very well.  He reports that the right ptosis has improved and his vision in his right eye has improved and he thinks it is better than the left since surgery.  He reports some tenderness along the suture line just anterior to his ear which has been difficult to lay on otherwise he has had no other wound issues.  He has resume some driving and wants to know when he can return to work.       Denies any seizure activity, " numbness or weakness, and has stopped taking his narcotics.      ROS:  Review of Systems   Constitutional: Negative.    HENT: Negative for congestion, ear discharge, ear pain, hearing loss, nosebleeds, sinus pain and tinnitus.    Eyes: Negative.    Respiratory: Negative.    Cardiovascular: Negative for chest pain, palpitations, claudication and leg swelling.   Gastrointestinal: Negative for abdominal pain, blood in stool, constipation, diarrhea, melena and vomiting.   Genitourinary: Negative for flank pain, frequency and urgency.   Musculoskeletal: Negative.  Negative for falls.   Skin: Negative.    Neurological: Negative.    Endo/Heme/Allergies: Does not bruise/bleed easily.   Psychiatric/Behavioral: Negative.             PE:  Vitals:    12/01/20 1353   BP: 114/79   Pulse: 89   Temp: 98 °F (36.7 °C)       AAOX3  NAD  Cranial nerves 2-12 intact, subtle right ptosis resolved, mild left ptosis, disconjugate gaze with horizontal movements (denies diplopia)     Strength:       Deltoids Biceps Triceps Wrist Ext. Wrist Flex. Hand    RUE 5 5 5 5 5 5   LUE 5 5 5 5 5 5     Hip Flex. Knee Flex. Knee Ext. Dorsi Flex Plantar Flex EHL   RLE 5 5 5 5 5 5   LLE 5 5 5 5 5 5      Sensation:  Intact to light touch (All 4 extremities)  Intact to pin prick (All 4 extremities)     Gait:  normal     DTR:  2+ and symmetric Biceps and knees     Cranial incision:  Well healed     IMAGING:  All imaging reviewed by me.    MRI, 12/1/20:  1. Stable residual meningioma within cavernous sinus.  No obvious growth or progression.  2. Mild right temporal lobe FLAIR change    MRI, 3/6/20:  Stable residual meningioma within cavernous sinus.  No obvious growth or progression.    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  All visible tumor outside of cavernous sinus and not involving tentorium was resected.  Path revealed WHO I.      Neuro stable.  Subtle right (slightly worse) and left ptosis (stable), which is due to bilateral cavernous sinus invasion. Known disconjugate gaze that self-corrects and is not causing diplopia.  BMRI shows stable cavernous sinus residual.      New dizziness episodes unclear.  Rec f/u with PCP but could be simple, partial seizures.  Consider EEG if PCP w/u negative.     PLAN:   - RTC in 1 year with BMRI +/- contrast  - Tumor Board  - F/u with endo as scheduled  - Request ophtho records  - F/u PCP re: "dizziness" episodes           "

## 2020-12-02 LAB
CREAT SERPL-MCNC: 1.6 MG/DL (ref 0.5–1.4)
SAMPLE: ABNORMAL

## 2020-12-08 ENCOUNTER — PATIENT OUTREACH (OUTPATIENT)
Dept: OTHER | Facility: OTHER | Age: 70
End: 2020-12-08

## 2020-12-08 NOTE — PROGRESS NOTES
"Digital Medicine: Health  Follow-Up    The history is provided by the patient.             Reason for review: Blood pressure at goal      Additional Follow-up details: Patient reports he's feeling good, about to head to work.     He states he's "pleased with where things have been lately".   He states his pressure has been looking better, attributes the more controlled readings to better resting technique. He states he's been making sure he's sitting for several minutes relaxing prior to taking readings    He states his wife has been in the hospital several times since thanksgiving, which has worried him. She has also been using the ihealth bp cuff, but has created her own ihealth account so her readings do not transfer over to Mr. Kingsley's chart.    He states she plans on joining the [facility] the [facility] later this month at her upcoming appt.     He states he has been keeping the cuff in the living room so he's been taking more readings.    Attributes higher reading of 150/76 to not resting enough prior to taking reading. Denies any htn s/s.                  Diet-Not assessed          Physical Activity-Not assessed    Medication Adherence-Medication Adherence not addressed.        Substance, Sleep, Stress-change  stress-assessed  Details:  Intervention(s):    Sleep-not assessed  Details:  Intervention(s):    Alcohol -not assessed  Details:  Intervention(s):    Tobacco-Not Assessed  Details:  Intervention(s):          Continue current diet/physical activity routine.       Addressed patient questions and patient has my contact information if needed prior to next outreach. Patient verbalizes understanding.      Explained the importance of self-monitoring and medication adherence. Encouraged the patient to communicate with their health  for lifestyle modifications to help improve or maintain a healthy lifestyle.               There are no preventive care reminders to display for this patient.      Last 5 Patient Entered " Readings                                      Current 30 Day Average: 131/72     Recent Readings 12/6/2020 12/6/2020 12/6/2020 12/2/2020 12/2/2020    SBP (mmHg) 120 - 120 133 -    DBP (mmHg) 69 - 69 73 -    Pulse - 59 59 - 51

## 2020-12-17 ENCOUNTER — PATIENT MESSAGE (OUTPATIENT)
Dept: FAMILY MEDICINE | Facility: CLINIC | Age: 70
End: 2020-12-17

## 2020-12-28 RX ORDER — TESTOSTERONE CYPIONATE 200 MG/ML
INJECTION, SOLUTION INTRAMUSCULAR
Qty: 2 ML | Refills: 5 | Status: SHIPPED | OUTPATIENT
Start: 2020-12-28 | End: 2021-06-21 | Stop reason: SDUPTHER

## 2021-01-07 ENCOUNTER — PATIENT MESSAGE (OUTPATIENT)
Dept: FAMILY MEDICINE | Facility: CLINIC | Age: 71
End: 2021-01-07

## 2021-01-08 ENCOUNTER — PATIENT MESSAGE (OUTPATIENT)
Dept: FAMILY MEDICINE | Facility: CLINIC | Age: 71
End: 2021-01-08

## 2021-01-08 ENCOUNTER — TELEPHONE (OUTPATIENT)
Dept: FAMILY MEDICINE | Facility: CLINIC | Age: 71
End: 2021-01-08

## 2021-01-15 ENCOUNTER — LAB VISIT (OUTPATIENT)
Dept: LAB | Facility: HOSPITAL | Age: 71
End: 2021-01-15
Attending: FAMILY MEDICINE
Payer: MEDICARE

## 2021-01-15 DIAGNOSIS — E23.0 PANHYPOPITUITARISM: ICD-10-CM

## 2021-01-15 DIAGNOSIS — Z00.01 ENCOUNTER FOR GENERAL ADULT MEDICAL EXAMINATION WITH ABNORMAL FINDINGS: ICD-10-CM

## 2021-01-15 DIAGNOSIS — Z13.220 ENCOUNTER FOR LIPID SCREENING FOR CARDIOVASCULAR DISEASE: ICD-10-CM

## 2021-01-15 DIAGNOSIS — M06.9 RHEUMATOID ARTHRITIS OF HAND: ICD-10-CM

## 2021-01-15 DIAGNOSIS — Z79.899 ENCOUNTER FOR LONG-TERM (CURRENT) USE OF MEDICATIONS: ICD-10-CM

## 2021-01-15 DIAGNOSIS — E78.2 MIXED HYPERLIPIDEMIA: Chronic | ICD-10-CM

## 2021-01-15 DIAGNOSIS — I10 ESSENTIAL HYPERTENSION, MALIGNANT: Primary | ICD-10-CM

## 2021-01-15 DIAGNOSIS — N17.9 ACUTE KIDNEY FAILURE, UNSPECIFIED: ICD-10-CM

## 2021-01-15 DIAGNOSIS — D63.1 ANEMIA OF CHRONIC RENAL FAILURE, STAGE 3 (MODERATE): Chronic | ICD-10-CM

## 2021-01-15 DIAGNOSIS — R80.9 PROTEINURIA: ICD-10-CM

## 2021-01-15 DIAGNOSIS — I10 ESSENTIAL HYPERTENSION: ICD-10-CM

## 2021-01-15 DIAGNOSIS — Z13.6 ENCOUNTER FOR LIPID SCREENING FOR CARDIOVASCULAR DISEASE: ICD-10-CM

## 2021-01-15 DIAGNOSIS — E03.9 HYPOTHYROIDISM, UNSPECIFIED TYPE: ICD-10-CM

## 2021-01-15 DIAGNOSIS — E55.9 AVITAMINOSIS D: ICD-10-CM

## 2021-01-15 DIAGNOSIS — N18.30 ANEMIA OF CHRONIC RENAL FAILURE, STAGE 3 (MODERATE): Chronic | ICD-10-CM

## 2021-01-15 DIAGNOSIS — E83.39 HYPOPHOSPHATURIA: ICD-10-CM

## 2021-01-15 LAB
BILIRUB UR QL STRIP: NEGATIVE
CLARITY UR REFRACT.AUTO: CLEAR
COLOR UR AUTO: YELLOW
ERYTHROCYTE [DISTWIDTH] IN BLOOD BY AUTOMATED COUNT: 12.4 % (ref 11.5–14.5)
GLUCOSE UR QL STRIP: NEGATIVE
HCT VFR BLD AUTO: 44.1 % (ref 40–54)
HGB BLD-MCNC: 14 G/DL (ref 14–18)
HGB UR QL STRIP: NEGATIVE
KETONES UR QL STRIP: NEGATIVE
LEUKOCYTE ESTERASE UR QL STRIP: NEGATIVE
MCH RBC QN AUTO: 28.5 PG (ref 27–31)
MCHC RBC AUTO-ENTMCNC: 31.7 G/DL (ref 32–36)
MCV RBC AUTO: 90 FL (ref 82–98)
NITRITE UR QL STRIP: NEGATIVE
PH UR STRIP: 6 [PH] (ref 5–8)
PLATELET # BLD AUTO: 174 K/UL (ref 150–350)
PMV BLD AUTO: 10.6 FL (ref 9.2–12.9)
PROT UR QL STRIP: NEGATIVE
RBC # BLD AUTO: 4.92 M/UL (ref 4.6–6.2)
SP GR UR STRIP: 1.02 (ref 1–1.03)
URN SPEC COLLECT METH UR: NORMAL
WBC # BLD AUTO: 5.44 K/UL (ref 3.9–12.7)

## 2021-01-15 PROCEDURE — 81003 URINALYSIS AUTO W/O SCOPE: CPT

## 2021-01-15 PROCEDURE — 84156 ASSAY OF PROTEIN URINE: CPT

## 2021-01-15 PROCEDURE — 36415 COLL VENOUS BLD VENIPUNCTURE: CPT | Mod: PO

## 2021-01-15 PROCEDURE — 84550 ASSAY OF BLOOD/URIC ACID: CPT

## 2021-01-15 PROCEDURE — 84100 ASSAY OF PHOSPHORUS: CPT

## 2021-01-15 PROCEDURE — 83540 ASSAY OF IRON: CPT

## 2021-01-15 PROCEDURE — 83735 ASSAY OF MAGNESIUM: CPT

## 2021-01-15 PROCEDURE — 80053 COMPREHEN METABOLIC PANEL: CPT

## 2021-01-15 PROCEDURE — 83970 ASSAY OF PARATHORMONE: CPT

## 2021-01-15 PROCEDURE — 85027 COMPLETE CBC AUTOMATED: CPT

## 2021-01-15 PROCEDURE — 83036 HEMOGLOBIN GLYCOSYLATED A1C: CPT

## 2021-01-15 PROCEDURE — 80061 LIPID PANEL: CPT

## 2021-01-16 LAB
ALBUMIN SERPL BCP-MCNC: 4.1 G/DL (ref 3.5–5.2)
ALP SERPL-CCNC: 38 U/L (ref 55–135)
ALT SERPL W/O P-5'-P-CCNC: 48 U/L (ref 10–44)
ANION GAP SERPL CALC-SCNC: 11 MMOL/L (ref 8–16)
AST SERPL-CCNC: 31 U/L (ref 10–40)
BILIRUB SERPL-MCNC: 1 MG/DL (ref 0.1–1)
BUN SERPL-MCNC: 25 MG/DL (ref 8–23)
CALCIUM SERPL-MCNC: 8.8 MG/DL (ref 8.7–10.5)
CHLORIDE SERPL-SCNC: 105 MMOL/L (ref 95–110)
CHOLEST SERPL-MCNC: 140 MG/DL (ref 120–199)
CHOLEST/HDLC SERPL: 2.4 {RATIO} (ref 2–5)
CO2 SERPL-SCNC: 25 MMOL/L (ref 23–29)
CREAT SERPL-MCNC: 1.5 MG/DL (ref 0.5–1.4)
CREAT UR-MCNC: 129 MG/DL (ref 23–375)
EST. GFR  (AFRICAN AMERICAN): 54.1 ML/MIN/1.73 M^2
EST. GFR  (NON AFRICAN AMERICAN): 46.8 ML/MIN/1.73 M^2
ESTIMATED AVG GLUCOSE: 120 MG/DL (ref 68–131)
GLUCOSE SERPL-MCNC: 86 MG/DL (ref 70–110)
HBA1C MFR BLD HPLC: 5.8 % (ref 4–5.6)
HDLC SERPL-MCNC: 59 MG/DL (ref 40–75)
HDLC SERPL: 42.1 % (ref 20–50)
IRON SERPL-MCNC: 92 UG/DL (ref 45–160)
LDLC SERPL CALC-MCNC: 53 MG/DL (ref 63–159)
MAGNESIUM SERPL-MCNC: 1.9 MG/DL (ref 1.6–2.6)
NONHDLC SERPL-MCNC: 81 MG/DL
PHOSPHATE SERPL-MCNC: 2.9 MG/DL (ref 2.7–4.5)
POTASSIUM SERPL-SCNC: 4.4 MMOL/L (ref 3.5–5.1)
PROT SERPL-MCNC: 6.6 G/DL (ref 6–8.4)
PROT UR-MCNC: 10 MG/DL (ref 0–15)
PROT/CREAT UR: 0.08 MG/G{CREAT} (ref 0–0.2)
PTH-INTACT SERPL-MCNC: 86 PG/ML (ref 9–77)
SATURATED IRON: 28 % (ref 20–50)
SODIUM SERPL-SCNC: 141 MMOL/L (ref 136–145)
TOTAL IRON BINDING CAPACITY: 323 UG/DL (ref 250–450)
TRANSFERRIN SERPL-MCNC: 218 MG/DL (ref 200–375)
TRIGL SERPL-MCNC: 140 MG/DL (ref 30–150)
URATE SERPL-MCNC: 7.6 MG/DL (ref 3.4–7)

## 2021-01-28 ENCOUNTER — OFFICE VISIT (OUTPATIENT)
Dept: FAMILY MEDICINE | Facility: CLINIC | Age: 71
End: 2021-01-28
Payer: MEDICARE

## 2021-01-28 VITALS
HEIGHT: 72 IN | BODY MASS INDEX: 28.74 KG/M2 | HEART RATE: 60 BPM | DIASTOLIC BLOOD PRESSURE: 62 MMHG | WEIGHT: 212.19 LBS | SYSTOLIC BLOOD PRESSURE: 114 MMHG | TEMPERATURE: 98 F

## 2021-01-28 DIAGNOSIS — D32.9 MENINGIOMA OF RIGHT SPHENOID WING INVOLVING CAVERNOUS SINUS: ICD-10-CM

## 2021-01-28 DIAGNOSIS — M05.9 SEROPOSITIVE RHEUMATOID ARTHRITIS: ICD-10-CM

## 2021-01-28 DIAGNOSIS — I35.0 MODERATE AORTIC STENOSIS: ICD-10-CM

## 2021-01-28 DIAGNOSIS — R32 URINARY INCONTINENCE, UNSPECIFIED TYPE: ICD-10-CM

## 2021-01-28 DIAGNOSIS — I70.0 ABDOMINAL AORTIC ATHEROSCLEROSIS: ICD-10-CM

## 2021-01-28 DIAGNOSIS — Z79.899 ENCOUNTER FOR LONG-TERM (CURRENT) USE OF MEDICATIONS: ICD-10-CM

## 2021-01-28 DIAGNOSIS — E78.2 MIXED HYPERLIPIDEMIA: Chronic | ICD-10-CM

## 2021-01-28 DIAGNOSIS — Z00.00 WELL ADULT EXAM: Primary | ICD-10-CM

## 2021-01-28 DIAGNOSIS — Z13.6 ENCOUNTER FOR LIPID SCREENING FOR CARDIOVASCULAR DISEASE: ICD-10-CM

## 2021-01-28 DIAGNOSIS — I10 ESSENTIAL HYPERTENSION: ICD-10-CM

## 2021-01-28 DIAGNOSIS — Z13.220 ENCOUNTER FOR LIPID SCREENING FOR CARDIOVASCULAR DISEASE: ICD-10-CM

## 2021-01-28 PROCEDURE — 99397 PER PM REEVAL EST PAT 65+ YR: CPT | Mod: S$PBB,,, | Performed by: FAMILY MEDICINE

## 2021-01-28 PROCEDURE — 99214 OFFICE O/P EST MOD 30 MIN: CPT | Mod: PBBFAC,PO | Performed by: FAMILY MEDICINE

## 2021-01-28 PROCEDURE — 99999 PR PBB SHADOW E&M-EST. PATIENT-LVL IV: CPT | Mod: PBBFAC,,, | Performed by: FAMILY MEDICINE

## 2021-01-28 PROCEDURE — 99999 PR PBB SHADOW E&M-EST. PATIENT-LVL IV: ICD-10-PCS | Mod: PBBFAC,,, | Performed by: FAMILY MEDICINE

## 2021-01-28 PROCEDURE — 99397 PR PREVENTIVE VISIT,EST,65 & OVER: ICD-10-PCS | Mod: S$PBB,,, | Performed by: FAMILY MEDICINE

## 2021-02-05 ENCOUNTER — PATIENT MESSAGE (OUTPATIENT)
Dept: FAMILY MEDICINE | Facility: CLINIC | Age: 71
End: 2021-02-05

## 2021-02-09 ENCOUNTER — SPECIALTY PHARMACY (OUTPATIENT)
Dept: PHARMACY | Facility: CLINIC | Age: 71
End: 2021-02-09

## 2021-02-09 ENCOUNTER — OFFICE VISIT (OUTPATIENT)
Dept: RHEUMATOLOGY | Facility: CLINIC | Age: 71
End: 2021-02-09
Payer: MEDICARE

## 2021-02-09 VITALS
HEIGHT: 72 IN | HEART RATE: 63 BPM | SYSTOLIC BLOOD PRESSURE: 111 MMHG | DIASTOLIC BLOOD PRESSURE: 69 MMHG | WEIGHT: 213.88 LBS | BODY MASS INDEX: 28.97 KG/M2

## 2021-02-09 DIAGNOSIS — F51.01 PRIMARY INSOMNIA: ICD-10-CM

## 2021-02-09 DIAGNOSIS — N28.9 DECREASED RENAL FUNCTION: ICD-10-CM

## 2021-02-09 DIAGNOSIS — N18.30 STAGE 3 CHRONIC KIDNEY DISEASE, UNSPECIFIED WHETHER STAGE 3A OR 3B CKD: ICD-10-CM

## 2021-02-09 DIAGNOSIS — R53.83 FATIGUE, UNSPECIFIED TYPE: ICD-10-CM

## 2021-02-09 DIAGNOSIS — M06.9 RHEUMATOID ARTHRITIS FLARE: ICD-10-CM

## 2021-02-09 DIAGNOSIS — M25.50 ARTHRALGIA, UNSPECIFIED JOINT: ICD-10-CM

## 2021-02-09 DIAGNOSIS — G89.4 CHRONIC PAIN SYNDROME: ICD-10-CM

## 2021-02-09 DIAGNOSIS — R00.1 BRADYCARDIA: ICD-10-CM

## 2021-02-09 DIAGNOSIS — Z79.899 IMMUNOCOMPROMISED STATE DUE TO DRUG THERAPY: ICD-10-CM

## 2021-02-09 DIAGNOSIS — M05.9 SEROPOSITIVE RHEUMATOID ARTHRITIS: ICD-10-CM

## 2021-02-09 DIAGNOSIS — E55.9 VITAMIN D DEFICIENCY, UNSPECIFIED: ICD-10-CM

## 2021-02-09 DIAGNOSIS — D84.821 IMMUNOCOMPROMISED STATE DUE TO DRUG THERAPY: ICD-10-CM

## 2021-02-09 PROCEDURE — 96372 THER/PROPH/DIAG INJ SC/IM: CPT | Mod: PBBFAC,PO

## 2021-02-09 PROCEDURE — 99215 PR OFFICE/OUTPT VISIT, EST, LEVL V, 40-54 MIN: ICD-10-PCS | Mod: 25,S$PBB,, | Performed by: INTERNAL MEDICINE

## 2021-02-09 PROCEDURE — 99213 OFFICE O/P EST LOW 20 MIN: CPT | Mod: PBBFAC,PO,25 | Performed by: INTERNAL MEDICINE

## 2021-02-09 PROCEDURE — 99215 OFFICE O/P EST HI 40 MIN: CPT | Mod: 25,S$PBB,, | Performed by: INTERNAL MEDICINE

## 2021-02-09 PROCEDURE — 99999 PR PBB SHADOW E&M-EST. PATIENT-LVL III: ICD-10-PCS | Mod: PBBFAC,,, | Performed by: INTERNAL MEDICINE

## 2021-02-09 PROCEDURE — 99999 PR PBB SHADOW E&M-EST. PATIENT-LVL III: CPT | Mod: PBBFAC,,, | Performed by: INTERNAL MEDICINE

## 2021-02-09 RX ORDER — NEEDLES, SAFETY 22GX1 1/2"
1 NEEDLE, DISPOSABLE MISCELLANEOUS
Qty: 25 EACH | Refills: 0 | Status: SHIPPED | OUTPATIENT
Start: 2021-02-09 | End: 2021-06-09 | Stop reason: SDUPTHER

## 2021-02-09 RX ORDER — CYANOCOBALAMIN 1000 UG/ML
1000 INJECTION, SOLUTION INTRAMUSCULAR; SUBCUTANEOUS
Status: COMPLETED | OUTPATIENT
Start: 2021-02-09 | End: 2021-02-09

## 2021-02-09 RX ORDER — ESZOPICLONE 3 MG/1
TABLET, FILM COATED ORAL
Qty: 30 TABLET | Refills: 4 | Status: SHIPPED | OUTPATIENT
Start: 2021-02-09 | End: 2021-06-09 | Stop reason: SDUPTHER

## 2021-02-09 RX ORDER — TOFACITINIB 5 MG/1
5 TABLET, FILM COATED ORAL 2 TIMES DAILY
Qty: 60 TABLET | Refills: 12 | OUTPATIENT
Start: 2021-02-09 | End: 2021-02-09 | Stop reason: SDUPTHER

## 2021-02-09 RX ORDER — DEXAMETHASONE SODIUM PHOSPHATE 4 MG/ML
4 INJECTION, SOLUTION INTRA-ARTICULAR; INTRALESIONAL; INTRAMUSCULAR; INTRAVENOUS; SOFT TISSUE ONCE
Qty: 1 ML | Refills: 1 | Status: SHIPPED | OUTPATIENT
Start: 2021-02-09 | End: 2021-02-09

## 2021-02-09 RX ADMIN — CYANOCOBALAMIN 1000 MCG: 1000 INJECTION, SOLUTION INTRAMUSCULAR at 10:02

## 2021-02-09 ASSESSMENT — ROUTINE ASSESSMENT OF PATIENT INDEX DATA (RAPID3)
PSYCHOLOGICAL DISTRESS SCORE: 0
PATIENT GLOBAL ASSESSMENT SCORE: 4.5
PAIN SCORE: 3.5
TOTAL RAPID3 SCORE: 3.11
MDHAQ FUNCTION SCORE: 0.4
FATIGUE SCORE: 1.1

## 2021-02-12 ENCOUNTER — TELEPHONE (OUTPATIENT)
Dept: ENDOCRINOLOGY | Facility: CLINIC | Age: 71
End: 2021-02-12

## 2021-02-12 DIAGNOSIS — I25.118 CORONARY ARTERY DISEASE OF NATIVE ARTERY OF NATIVE HEART WITH STABLE ANGINA PECTORIS: Primary | ICD-10-CM

## 2021-02-19 ENCOUNTER — TELEPHONE (OUTPATIENT)
Dept: RHEUMATOLOGY | Facility: CLINIC | Age: 71
End: 2021-02-19

## 2021-02-22 ENCOUNTER — SPECIALTY PHARMACY (OUTPATIENT)
Dept: PHARMACY | Facility: CLINIC | Age: 71
End: 2021-02-22

## 2021-03-02 ENCOUNTER — HOSPITAL ENCOUNTER (OUTPATIENT)
Dept: CARDIOLOGY | Facility: HOSPITAL | Age: 71
Discharge: HOME | End: 2021-03-02
Attending: INTERNAL MEDICINE
Payer: MEDICARE

## 2021-03-02 VITALS
TEMPERATURE: 98 F | DIASTOLIC BLOOD PRESSURE: 69 MMHG | SYSTOLIC BLOOD PRESSURE: 111 MMHG | HEART RATE: 63 BPM | HEIGHT: 72 IN | BODY MASS INDEX: 28.85 KG/M2 | WEIGHT: 213 LBS

## 2021-03-02 DIAGNOSIS — I25.118 CORONARY ARTERY DISEASE OF NATIVE ARTERY OF NATIVE HEART WITH STABLE ANGINA PECTORIS: ICD-10-CM

## 2021-03-02 LAB
AORTIC ROOT ANNULUS: 3.38 CM
ASCENDING AORTA: 2.93 CM
AV INDEX (PROSTH): 0.32
AV MEAN GRADIENT: 39 MMHG
AV PEAK GRADIENT: 64 MMHG
AV VALVE AREA: 1.13 CM2
AV VELOCITY RATIO: 0.33
BSA FOR ECHO PROCEDURE: 2.22 M2
CV ECHO LV RWT: 0.78 CM
DOP CALC AO PEAK VEL: 4.01 M/S
DOP CALC AO VTI: 99.02 CM
DOP CALC LVOT AREA: 3.5 CM2
DOP CALC LVOT DIAMETER: 2.12 CM
DOP CALC LVOT PEAK VEL: 1.33 M/S
DOP CALC LVOT STROKE VOLUME: 112.19 CM3
DOP CALC RVOT PEAK VEL: 0.88 M/S
DOP CALC RVOT VTI: 18.78 CM
DOP CALCLVOT PEAK VEL VTI: 31.8 CM
E WAVE DECELERATION TIME: 231.65 MSEC
E/A RATIO: 1.26
E/E' RATIO: 10.1 M/S
ECHO LV POSTERIOR WALL: 1.63 CM (ref 0.6–1.1)
FRACTIONAL SHORTENING: 39 % (ref 28–44)
INTERVENTRICULAR SEPTUM: 1.31 CM (ref 0.6–1.1)
IVRT: 71.36 MSEC
LA MAJOR: 5.55 CM
LA MINOR: 5.77 CM
LA WIDTH: 3.03 CM
LEFT ATRIUM SIZE: 3.8 CM
LEFT ATRIUM VOLUME INDEX: 25.3 ML/M2
LEFT ATRIUM VOLUME: 55.37 CM3
LEFT INTERNAL DIMENSION IN SYSTOLE: 2.55 CM (ref 2.1–4)
LEFT VENTRICLE DIASTOLIC VOLUME INDEX: 35.43 ML/M2
LEFT VENTRICLE DIASTOLIC VOLUME: 77.6 ML
LEFT VENTRICLE MASS INDEX: 110 G/M2
LEFT VENTRICLE SYSTOLIC VOLUME INDEX: 10.7 ML/M2
LEFT VENTRICLE SYSTOLIC VOLUME: 23.47 ML
LEFT VENTRICULAR INTERNAL DIMENSION IN DIASTOLE: 4.18 CM (ref 3.5–6)
LEFT VENTRICULAR MASS: 240.14 G
LV LATERAL E/E' RATIO: 9.18 M/S
LV SEPTAL E/E' RATIO: 11.22 M/S
MV A" WAVE DURATION": 11.7 MSEC
MV PEAK A VEL: 0.8 M/S
MV PEAK E VEL: 1.01 M/S
MV STENOSIS PRESSURE HALF TIME: 67.18 MS
MV VALVE AREA P 1/2 METHOD: 3.27 CM2
PISA TR MAX VEL: 2.04 M/S
PULM VEIN S/D RATIO: 1.03
PV MEAN GRADIENT: 2 MMHG
PV PEAK D VEL: 0.6 M/S
PV PEAK S VEL: 0.62 M/S
PV PEAK VELOCITY: 1.13 CM/S
RA MAJOR: 4.91 CM
RA PRESSURE: 3 MMHG
RA WIDTH: 3.23 CM
RIGHT VENTRICULAR END-DIASTOLIC DIMENSION: 2.1 CM
SINUS: 2.86 CM
STJ: 2.57 CM
TDI LATERAL: 0.11 M/S
TDI SEPTAL: 0.09 M/S
TDI: 0.1 M/S
TR MAX PG: 17 MMHG
TRICUSPID ANNULAR PLANE SYSTOLIC EXCURSION: 2.5 CM
TV REST PULMONARY ARTERY PRESSURE: 20 MMHG

## 2021-03-02 PROCEDURE — 93306 ECHO (CUPID ONLY): ICD-10-PCS | Mod: 26,,, | Performed by: INTERNAL MEDICINE

## 2021-03-02 PROCEDURE — 93306 TTE W/DOPPLER COMPLETE: CPT | Mod: PO

## 2021-03-02 PROCEDURE — 93306 TTE W/DOPPLER COMPLETE: CPT | Mod: 26,,, | Performed by: INTERNAL MEDICINE

## 2021-03-08 ENCOUNTER — OFFICE VISIT (OUTPATIENT)
Dept: CARDIOLOGY | Facility: CLINIC | Age: 71
End: 2021-03-08
Payer: MEDICARE

## 2021-03-08 VITALS
HEIGHT: 72 IN | DIASTOLIC BLOOD PRESSURE: 75 MMHG | HEART RATE: 49 BPM | WEIGHT: 213 LBS | BODY MASS INDEX: 28.85 KG/M2 | SYSTOLIC BLOOD PRESSURE: 125 MMHG

## 2021-03-08 DIAGNOSIS — E78.2 MIXED HYPERLIPIDEMIA: Chronic | ICD-10-CM

## 2021-03-08 DIAGNOSIS — I10 ESSENTIAL HYPERTENSION: Primary | Chronic | ICD-10-CM

## 2021-03-08 DIAGNOSIS — Z78.9 STATIN INTOLERANCE: Chronic | ICD-10-CM

## 2021-03-08 DIAGNOSIS — I35.0 MODERATE AORTIC STENOSIS: ICD-10-CM

## 2021-03-08 PROCEDURE — 99214 OFFICE O/P EST MOD 30 MIN: CPT | Mod: S$PBB,,, | Performed by: INTERNAL MEDICINE

## 2021-03-08 PROCEDURE — 93010 ELECTROCARDIOGRAM REPORT: CPT | Mod: ,,, | Performed by: INTERNAL MEDICINE

## 2021-03-08 PROCEDURE — 99999 PR PBB SHADOW E&M-EST. PATIENT-LVL III: ICD-10-PCS | Mod: PBBFAC,,, | Performed by: INTERNAL MEDICINE

## 2021-03-08 PROCEDURE — 93010 EKG 12-LEAD: ICD-10-PCS | Mod: ,,, | Performed by: INTERNAL MEDICINE

## 2021-03-08 PROCEDURE — 99213 OFFICE O/P EST LOW 20 MIN: CPT | Mod: PBBFAC,PO,25 | Performed by: INTERNAL MEDICINE

## 2021-03-08 PROCEDURE — 99214 PR OFFICE/OUTPT VISIT, EST, LEVL IV, 30-39 MIN: ICD-10-PCS | Mod: S$PBB,,, | Performed by: INTERNAL MEDICINE

## 2021-03-08 PROCEDURE — 93005 ELECTROCARDIOGRAM TRACING: CPT | Mod: PO

## 2021-03-08 PROCEDURE — 99999 PR PBB SHADOW E&M-EST. PATIENT-LVL III: CPT | Mod: PBBFAC,,, | Performed by: INTERNAL MEDICINE

## 2021-03-16 ENCOUNTER — PATIENT MESSAGE (OUTPATIENT)
Dept: ENDOCRINOLOGY | Facility: CLINIC | Age: 71
End: 2021-03-16

## 2021-03-16 DIAGNOSIS — E29.1 HYPOGONADISM IN MALE: ICD-10-CM

## 2021-03-16 DIAGNOSIS — D32.9 MENINGIOMA: ICD-10-CM

## 2021-03-16 DIAGNOSIS — E03.9 HYPOTHYROIDISM, UNSPECIFIED TYPE: Primary | ICD-10-CM

## 2021-03-17 ENCOUNTER — SPECIALTY PHARMACY (OUTPATIENT)
Dept: PHARMACY | Facility: CLINIC | Age: 71
End: 2021-03-17

## 2021-04-16 ENCOUNTER — SPECIALTY PHARMACY (OUTPATIENT)
Dept: PHARMACY | Facility: CLINIC | Age: 71
End: 2021-04-16

## 2021-04-18 DIAGNOSIS — Z79.899 ENCOUNTER FOR LONG-TERM (CURRENT) USE OF MEDICATIONS: ICD-10-CM

## 2021-04-18 RX ORDER — ERGOCALCIFEROL 1.25 MG/1
CAPSULE ORAL
Qty: 4 CAPSULE | Refills: 12 | Status: SHIPPED | OUTPATIENT
Start: 2021-04-18

## 2021-04-21 DIAGNOSIS — I10 ESSENTIAL HYPERTENSION: ICD-10-CM

## 2021-04-22 ENCOUNTER — DOCUMENTATION ONLY (OUTPATIENT)
Dept: RHEUMATOLOGY | Facility: CLINIC | Age: 71
End: 2021-04-22

## 2021-04-29 ENCOUNTER — PATIENT MESSAGE (OUTPATIENT)
Dept: RESEARCH | Facility: HOSPITAL | Age: 71
End: 2021-04-29

## 2021-05-03 ENCOUNTER — PATIENT MESSAGE (OUTPATIENT)
Dept: ENDOCRINOLOGY | Facility: CLINIC | Age: 71
End: 2021-05-03

## 2021-05-03 RX ORDER — HYDROCORTISONE 10 MG/1
TABLET ORAL
Qty: 75 TABLET | Refills: 11 | Status: SHIPPED | OUTPATIENT
Start: 2021-05-03 | End: 2021-10-04

## 2021-05-05 RX ORDER — HYDROCHLOROTHIAZIDE 12.5 MG/1
CAPSULE ORAL
Qty: 90 CAPSULE | Refills: 4 | Status: SHIPPED | OUTPATIENT
Start: 2021-05-05 | End: 2021-06-21

## 2021-05-17 ENCOUNTER — SPECIALTY PHARMACY (OUTPATIENT)
Dept: PHARMACY | Facility: CLINIC | Age: 71
End: 2021-05-17

## 2021-05-31 ENCOUNTER — LAB VISIT (OUTPATIENT)
Dept: LAB | Facility: HOSPITAL | Age: 71
End: 2021-05-31
Attending: INTERNAL MEDICINE
Payer: MEDICARE

## 2021-05-31 DIAGNOSIS — R00.1 BRADYCARDIA: ICD-10-CM

## 2021-05-31 DIAGNOSIS — E03.9 HYPOTHYROIDISM, UNSPECIFIED TYPE: ICD-10-CM

## 2021-05-31 DIAGNOSIS — I10 ESSENTIAL HYPERTENSION: ICD-10-CM

## 2021-05-31 DIAGNOSIS — D63.1 ANEMIA OF CHRONIC RENAL FAILURE, STAGE 3 (MODERATE): Chronic | ICD-10-CM

## 2021-05-31 DIAGNOSIS — Z79.899 ENCOUNTER FOR LONG-TERM (CURRENT) USE OF MEDICATIONS: ICD-10-CM

## 2021-05-31 DIAGNOSIS — G89.4 CHRONIC PAIN SYNDROME: ICD-10-CM

## 2021-05-31 DIAGNOSIS — M05.9 SEROPOSITIVE RHEUMATOID ARTHRITIS: ICD-10-CM

## 2021-05-31 DIAGNOSIS — I10 ESSENTIAL HYPERTENSION, MALIGNANT: ICD-10-CM

## 2021-05-31 DIAGNOSIS — Z00.01 ENCOUNTER FOR GENERAL ADULT MEDICAL EXAMINATION WITH ABNORMAL FINDINGS: ICD-10-CM

## 2021-05-31 DIAGNOSIS — E55.9 AVITAMINOSIS D: ICD-10-CM

## 2021-05-31 DIAGNOSIS — N17.9 ACUTE KIDNEY FAILURE, UNSPECIFIED: ICD-10-CM

## 2021-05-31 DIAGNOSIS — Z13.220 ENCOUNTER FOR LIPID SCREENING FOR CARDIOVASCULAR DISEASE: ICD-10-CM

## 2021-05-31 DIAGNOSIS — D84.821 IMMUNOCOMPROMISED STATE DUE TO DRUG THERAPY: ICD-10-CM

## 2021-05-31 DIAGNOSIS — Z79.899 IMMUNOCOMPROMISED STATE DUE TO DRUG THERAPY: ICD-10-CM

## 2021-05-31 DIAGNOSIS — R80.9 PROTEINURIA: ICD-10-CM

## 2021-05-31 DIAGNOSIS — N18.30 CKD (CHRONIC KIDNEY DISEASE) STAGE 3, GFR 30-59 ML/MIN: ICD-10-CM

## 2021-05-31 DIAGNOSIS — M06.9 RHEUMATOID ARTHRITIS OF HAND: ICD-10-CM

## 2021-05-31 DIAGNOSIS — D32.9 MENINGIOMA: ICD-10-CM

## 2021-05-31 DIAGNOSIS — E29.1 HYPOGONADISM IN MALE: ICD-10-CM

## 2021-05-31 DIAGNOSIS — Z13.6 ENCOUNTER FOR LIPID SCREENING FOR CARDIOVASCULAR DISEASE: ICD-10-CM

## 2021-05-31 DIAGNOSIS — N18.30 ANEMIA OF CHRONIC RENAL FAILURE, STAGE 3 (MODERATE): Chronic | ICD-10-CM

## 2021-05-31 DIAGNOSIS — E23.0 PANHYPOPITUITARISM: ICD-10-CM

## 2021-05-31 DIAGNOSIS — R53.83 FATIGUE, UNSPECIFIED TYPE: ICD-10-CM

## 2021-05-31 DIAGNOSIS — N28.9 DECREASED RENAL FUNCTION: ICD-10-CM

## 2021-05-31 DIAGNOSIS — E78.2 MIXED HYPERLIPIDEMIA: Chronic | ICD-10-CM

## 2021-05-31 DIAGNOSIS — E83.39 HYPOPHOSPHATURIA: ICD-10-CM

## 2021-05-31 LAB
ALBUMIN SERPL BCP-MCNC: 3.9 G/DL (ref 3.5–5.2)
ALBUMIN SERPL BCP-MCNC: 3.9 G/DL (ref 3.5–5.2)
ALP SERPL-CCNC: 46 U/L (ref 55–135)
ALP SERPL-CCNC: 46 U/L (ref 55–135)
ALT SERPL W/O P-5'-P-CCNC: 45 U/L (ref 10–44)
ALT SERPL W/O P-5'-P-CCNC: 45 U/L (ref 10–44)
ANION GAP SERPL CALC-SCNC: 11 MMOL/L (ref 8–16)
ANION GAP SERPL CALC-SCNC: 11 MMOL/L (ref 8–16)
AST SERPL-CCNC: 26 U/L (ref 10–40)
AST SERPL-CCNC: 26 U/L (ref 10–40)
BASOPHILS # BLD AUTO: 0.02 K/UL (ref 0–0.2)
BASOPHILS NFR BLD: 0.4 % (ref 0–1.9)
BILIRUB SERPL-MCNC: 1 MG/DL (ref 0.1–1)
BILIRUB SERPL-MCNC: 1 MG/DL (ref 0.1–1)
BUN SERPL-MCNC: 18 MG/DL (ref 8–23)
BUN SERPL-MCNC: 18 MG/DL (ref 8–23)
CALCIUM SERPL-MCNC: 8.9 MG/DL (ref 8.7–10.5)
CALCIUM SERPL-MCNC: 8.9 MG/DL (ref 8.7–10.5)
CHLORIDE SERPL-SCNC: 108 MMOL/L (ref 95–110)
CHLORIDE SERPL-SCNC: 108 MMOL/L (ref 95–110)
CHOLEST SERPL-MCNC: 164 MG/DL (ref 120–199)
CHOLEST/HDLC SERPL: 3.2 {RATIO} (ref 2–5)
CO2 SERPL-SCNC: 23 MMOL/L (ref 23–29)
CO2 SERPL-SCNC: 23 MMOL/L (ref 23–29)
COMPLEXED PSA SERPL-MCNC: <0.01 NG/ML (ref 0–4)
CREAT SERPL-MCNC: 1.3 MG/DL (ref 0.5–1.4)
CREAT SERPL-MCNC: 1.3 MG/DL (ref 0.5–1.4)
CRP SERPL-MCNC: 0.5 MG/L (ref 0–8.2)
DIFFERENTIAL METHOD: ABNORMAL
EOSINOPHIL # BLD AUTO: 0.1 K/UL (ref 0–0.5)
EOSINOPHIL NFR BLD: 1.2 % (ref 0–8)
ERYTHROCYTE [DISTWIDTH] IN BLOOD BY AUTOMATED COUNT: 12.4 % (ref 11.5–14.5)
ERYTHROCYTE [SEDIMENTATION RATE] IN BLOOD BY WESTERGREN METHOD: 3 MM/HR (ref 0–10)
EST. GFR  (AFRICAN AMERICAN): >60 ML/MIN/1.73 M^2
EST. GFR  (AFRICAN AMERICAN): >60 ML/MIN/1.73 M^2
EST. GFR  (NON AFRICAN AMERICAN): 55.7 ML/MIN/1.73 M^2
EST. GFR  (NON AFRICAN AMERICAN): 55.7 ML/MIN/1.73 M^2
ESTIMATED AVG GLUCOSE: 117 MG/DL (ref 68–131)
GLUCOSE SERPL-MCNC: 78 MG/DL (ref 70–110)
GLUCOSE SERPL-MCNC: 78 MG/DL (ref 70–110)
HBA1C MFR BLD: 5.7 % (ref 4–5.6)
HCT VFR BLD AUTO: 40.1 % (ref 40–54)
HDLC SERPL-MCNC: 52 MG/DL (ref 40–75)
HDLC SERPL: 31.7 % (ref 20–50)
HGB BLD-MCNC: 13 G/DL (ref 14–18)
IMM GRANULOCYTES # BLD AUTO: 0.02 K/UL (ref 0–0.04)
IMM GRANULOCYTES NFR BLD AUTO: 0.4 % (ref 0–0.5)
LDLC SERPL CALC-MCNC: 87.2 MG/DL (ref 63–159)
LYMPHOCYTES # BLD AUTO: 0.9 K/UL (ref 1–4.8)
LYMPHOCYTES NFR BLD: 18.3 % (ref 18–48)
MCH RBC QN AUTO: 28 PG (ref 27–31)
MCHC RBC AUTO-ENTMCNC: 32.4 G/DL (ref 32–36)
MCV RBC AUTO: 86 FL (ref 82–98)
MONOCYTES # BLD AUTO: 0.4 K/UL (ref 0.3–1)
MONOCYTES NFR BLD: 7.9 % (ref 4–15)
NEUTROPHILS # BLD AUTO: 3.6 K/UL (ref 1.8–7.7)
NEUTROPHILS NFR BLD: 71.8 % (ref 38–73)
NONHDLC SERPL-MCNC: 112 MG/DL
NRBC BLD-RTO: 0 /100 WBC
PLATELET # BLD AUTO: 190 K/UL (ref 150–450)
PMV BLD AUTO: 10.7 FL (ref 9.2–12.9)
POTASSIUM SERPL-SCNC: 4.2 MMOL/L (ref 3.5–5.1)
POTASSIUM SERPL-SCNC: 4.2 MMOL/L (ref 3.5–5.1)
PROT SERPL-MCNC: 6.4 G/DL (ref 6–8.4)
PROT SERPL-MCNC: 6.4 G/DL (ref 6–8.4)
RBC # BLD AUTO: 4.64 M/UL (ref 4.6–6.2)
RHEUMATOID FACT SERPL-ACNC: <10 IU/ML (ref 0–15)
SODIUM SERPL-SCNC: 142 MMOL/L (ref 136–145)
SODIUM SERPL-SCNC: 142 MMOL/L (ref 136–145)
T4 FREE SERPL-MCNC: 1.24 NG/DL (ref 0.71–1.51)
TESTOST SERPL-MCNC: 372 NG/DL (ref 304–1227)
TRIGL SERPL-MCNC: 124 MG/DL (ref 30–150)
TSH SERPL DL<=0.005 MIU/L-ACNC: <0.01 UIU/ML (ref 0.4–4)
WBC # BLD AUTO: 5.07 K/UL (ref 3.9–12.7)

## 2021-05-31 PROCEDURE — 84403 ASSAY OF TOTAL TESTOSTERONE: CPT | Performed by: INTERNAL MEDICINE

## 2021-05-31 PROCEDURE — 80053 COMPREHEN METABOLIC PANEL: CPT | Performed by: INTERNAL MEDICINE

## 2021-05-31 PROCEDURE — 84439 ASSAY OF FREE THYROXINE: CPT | Performed by: INTERNAL MEDICINE

## 2021-05-31 PROCEDURE — 85025 COMPLETE CBC W/AUTO DIFF WBC: CPT | Performed by: INTERNAL MEDICINE

## 2021-05-31 PROCEDURE — 84153 ASSAY OF PSA TOTAL: CPT | Performed by: INTERNAL MEDICINE

## 2021-05-31 PROCEDURE — 80061 LIPID PANEL: CPT | Performed by: FAMILY MEDICINE

## 2021-05-31 PROCEDURE — 84305 ASSAY OF SOMATOMEDIN: CPT | Performed by: INTERNAL MEDICINE

## 2021-05-31 PROCEDURE — 36415 COLL VENOUS BLD VENIPUNCTURE: CPT | Mod: PO | Performed by: FAMILY MEDICINE

## 2021-05-31 PROCEDURE — 85651 RBC SED RATE NONAUTOMATED: CPT | Mod: PO | Performed by: INTERNAL MEDICINE

## 2021-05-31 PROCEDURE — 83036 HEMOGLOBIN GLYCOSYLATED A1C: CPT | Performed by: FAMILY MEDICINE

## 2021-05-31 PROCEDURE — 86140 C-REACTIVE PROTEIN: CPT | Performed by: INTERNAL MEDICINE

## 2021-05-31 PROCEDURE — 84443 ASSAY THYROID STIM HORMONE: CPT | Performed by: INTERNAL MEDICINE

## 2021-05-31 PROCEDURE — 86431 RHEUMATOID FACTOR QUANT: CPT | Performed by: INTERNAL MEDICINE

## 2021-06-01 ENCOUNTER — TELEPHONE (OUTPATIENT)
Dept: FAMILY MEDICINE | Facility: CLINIC | Age: 71
End: 2021-06-01

## 2021-06-04 LAB
IGF-I SERPL-MCNC: 61 NG/ML (ref 32–209)
IGF-I Z-SCORE SERPL: -1.28 SD

## 2021-06-09 ENCOUNTER — OFFICE VISIT (OUTPATIENT)
Dept: RHEUMATOLOGY | Facility: CLINIC | Age: 71
End: 2021-06-09
Payer: MEDICARE

## 2021-06-09 DIAGNOSIS — M05.9 SEROPOSITIVE RHEUMATOID ARTHRITIS: Primary | ICD-10-CM

## 2021-06-09 DIAGNOSIS — E27.49 SECONDARY ADRENAL INSUFFICIENCY: ICD-10-CM

## 2021-06-09 DIAGNOSIS — F51.01 PRIMARY INSOMNIA: ICD-10-CM

## 2021-06-09 DIAGNOSIS — D84.821 IMMUNOCOMPROMISED STATE DUE TO DRUG THERAPY: ICD-10-CM

## 2021-06-09 DIAGNOSIS — Z79.899 IMMUNOCOMPROMISED STATE DUE TO DRUG THERAPY: ICD-10-CM

## 2021-06-09 PROCEDURE — 99214 OFFICE O/P EST MOD 30 MIN: CPT | Mod: 95,,, | Performed by: PHYSICIAN ASSISTANT

## 2021-06-09 PROCEDURE — 99214 PR OFFICE/OUTPT VISIT, EST, LEVL IV, 30-39 MIN: ICD-10-PCS | Mod: 95,,, | Performed by: PHYSICIAN ASSISTANT

## 2021-06-09 RX ORDER — DEXAMETHASONE SODIUM PHOSPHATE 4 MG/ML
4 INJECTION, SOLUTION INTRA-ARTICULAR; INTRALESIONAL; INTRAMUSCULAR; INTRAVENOUS; SOFT TISSUE ONCE
Qty: 1 ML | Refills: 1 | Status: SHIPPED | OUTPATIENT
Start: 2021-06-09 | End: 2021-06-09

## 2021-06-09 RX ORDER — NEEDLES, SAFETY 22GX1 1/2"
1 NEEDLE, DISPOSABLE MISCELLANEOUS
Qty: 25 EACH | Refills: 0 | Status: SHIPPED | OUTPATIENT
Start: 2021-06-09

## 2021-06-14 ENCOUNTER — SPECIALTY PHARMACY (OUTPATIENT)
Dept: PHARMACY | Facility: CLINIC | Age: 71
End: 2021-06-14

## 2021-06-21 ENCOUNTER — OFFICE VISIT (OUTPATIENT)
Dept: ENDOCRINOLOGY | Facility: CLINIC | Age: 71
End: 2021-06-21
Payer: MEDICARE

## 2021-06-21 VITALS
DIASTOLIC BLOOD PRESSURE: 74 MMHG | WEIGHT: 211 LBS | SYSTOLIC BLOOD PRESSURE: 118 MMHG | RESPIRATION RATE: 16 BRPM | BODY MASS INDEX: 28.58 KG/M2 | HEIGHT: 72 IN

## 2021-06-21 DIAGNOSIS — R79.89 LOW TESTOSTERONE IN MALE: ICD-10-CM

## 2021-06-21 DIAGNOSIS — E27.49 SECONDARY ADRENAL INSUFFICIENCY: Primary | ICD-10-CM

## 2021-06-21 DIAGNOSIS — D32.9 MENINGIOMA OF RIGHT SPHENOID WING INVOLVING CAVERNOUS SINUS: Chronic | ICD-10-CM

## 2021-06-21 DIAGNOSIS — I10 ESSENTIAL HYPERTENSION: Chronic | ICD-10-CM

## 2021-06-21 DIAGNOSIS — E03.9 HYPOTHYROIDISM, UNSPECIFIED TYPE: ICD-10-CM

## 2021-06-21 PROCEDURE — 99999 PR PBB SHADOW E&M-EST. PATIENT-LVL IV: CPT | Mod: PBBFAC,,, | Performed by: INTERNAL MEDICINE

## 2021-06-21 PROCEDURE — 99999 PR PBB SHADOW E&M-EST. PATIENT-LVL IV: ICD-10-PCS | Mod: PBBFAC,,, | Performed by: INTERNAL MEDICINE

## 2021-06-21 PROCEDURE — 99214 OFFICE O/P EST MOD 30 MIN: CPT | Mod: PBBFAC | Performed by: INTERNAL MEDICINE

## 2021-06-21 PROCEDURE — 99214 PR OFFICE/OUTPT VISIT, EST, LEVL IV, 30-39 MIN: ICD-10-PCS | Mod: S$GLB,,, | Performed by: INTERNAL MEDICINE

## 2021-06-21 PROCEDURE — 99214 OFFICE O/P EST MOD 30 MIN: CPT | Mod: S$GLB,,, | Performed by: INTERNAL MEDICINE

## 2021-06-21 RX ORDER — TESTOSTERONE CYPIONATE 200 MG/ML
100 INJECTION, SOLUTION INTRAMUSCULAR
Qty: 2 ML | Refills: 5 | Status: SHIPPED | OUTPATIENT
Start: 2021-06-21 | End: 2022-03-10 | Stop reason: SDUPTHER

## 2021-06-24 ENCOUNTER — OFFICE VISIT (OUTPATIENT)
Dept: FAMILY MEDICINE | Facility: CLINIC | Age: 71
End: 2021-06-24
Payer: MEDICARE

## 2021-06-24 VITALS
HEART RATE: 61 BPM | HEIGHT: 72 IN | TEMPERATURE: 98 F | RESPIRATION RATE: 14 BRPM | DIASTOLIC BLOOD PRESSURE: 73 MMHG | WEIGHT: 213.63 LBS | SYSTOLIC BLOOD PRESSURE: 109 MMHG | OXYGEN SATURATION: 95 % | BODY MASS INDEX: 28.93 KG/M2

## 2021-06-24 DIAGNOSIS — R41.3 MEMORY CHANGE: ICD-10-CM

## 2021-06-24 DIAGNOSIS — I10 ESSENTIAL HYPERTENSION: Primary | Chronic | ICD-10-CM

## 2021-06-24 DIAGNOSIS — Z28.21 REFUSED PNEUMOCOCCAL VACCINATION: ICD-10-CM

## 2021-06-24 DIAGNOSIS — E53.8 VITAMIN B12 DEFICIENCY: ICD-10-CM

## 2021-06-24 DIAGNOSIS — Z79.899 ENCOUNTER FOR LONG-TERM (CURRENT) USE OF MEDICATIONS: ICD-10-CM

## 2021-06-24 PROBLEM — Z90.79 HISTORY OF RADICAL PROSTATECTOMY: Status: ACTIVE | Noted: 2021-06-21

## 2021-06-24 PROCEDURE — 99214 OFFICE O/P EST MOD 30 MIN: CPT | Mod: S$GLB,,, | Performed by: FAMILY MEDICINE

## 2021-06-24 PROCEDURE — 99214 PR OFFICE/OUTPT VISIT, EST, LEVL IV, 30-39 MIN: ICD-10-PCS | Mod: S$GLB,,, | Performed by: FAMILY MEDICINE

## 2021-06-24 PROCEDURE — 99999 PR PBB SHADOW E&M-EST. PATIENT-LVL IV: CPT | Mod: PBBFAC,,, | Performed by: FAMILY MEDICINE

## 2021-06-24 PROCEDURE — 99999 PR PBB SHADOW E&M-EST. PATIENT-LVL IV: ICD-10-PCS | Mod: PBBFAC,,, | Performed by: FAMILY MEDICINE

## 2021-06-24 PROCEDURE — 99214 OFFICE O/P EST MOD 30 MIN: CPT | Mod: PBBFAC,PO | Performed by: FAMILY MEDICINE

## 2021-06-24 RX ORDER — DICLOFENAC SODIUM 10 MG/G
GEL TOPICAL
COMMUNITY
Start: 2021-06-21

## 2021-06-24 RX ORDER — METOPROLOL SUCCINATE 25 MG/1
12.5 TABLET, EXTENDED RELEASE ORAL DAILY
Qty: 45 TABLET | Refills: 3 | Status: SHIPPED | OUTPATIENT
Start: 2021-06-24 | End: 2021-10-12

## 2021-07-14 ENCOUNTER — SPECIALTY PHARMACY (OUTPATIENT)
Dept: PHARMACY | Facility: CLINIC | Age: 71
End: 2021-07-14

## 2021-07-14 ENCOUNTER — PATIENT MESSAGE (OUTPATIENT)
Dept: FAMILY MEDICINE | Facility: CLINIC | Age: 71
End: 2021-07-14

## 2021-07-22 ENCOUNTER — TELEPHONE (OUTPATIENT)
Dept: RHEUMATOLOGY | Facility: CLINIC | Age: 71
End: 2021-07-22

## 2021-08-08 ENCOUNTER — PATIENT MESSAGE (OUTPATIENT)
Dept: RHEUMATOLOGY | Facility: CLINIC | Age: 71
End: 2021-08-08

## 2021-08-08 DIAGNOSIS — M05.9 SEROPOSITIVE RHEUMATOID ARTHRITIS: ICD-10-CM

## 2021-08-09 RX ORDER — DICLOFENAC SODIUM 75 MG/1
75 TABLET, DELAYED RELEASE ORAL DAILY PRN
Qty: 90 TABLET | Refills: 1 | Status: SHIPPED | OUTPATIENT
Start: 2021-08-09 | End: 2022-02-02 | Stop reason: SDUPTHER

## 2021-08-13 ENCOUNTER — OFFICE VISIT (OUTPATIENT)
Dept: URGENT CARE | Facility: CLINIC | Age: 71
End: 2021-08-13
Payer: MEDICARE

## 2021-08-13 VITALS
RESPIRATION RATE: 18 BRPM | DIASTOLIC BLOOD PRESSURE: 76 MMHG | WEIGHT: 213 LBS | BODY MASS INDEX: 28.85 KG/M2 | HEART RATE: 75 BPM | SYSTOLIC BLOOD PRESSURE: 126 MMHG | HEIGHT: 72 IN | OXYGEN SATURATION: 96 % | TEMPERATURE: 98 F

## 2021-08-13 DIAGNOSIS — M54.6 MIDLINE THORACIC BACK PAIN, UNSPECIFIED CHRONICITY: ICD-10-CM
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
## 2021-08-13 PROCEDURE — 99214 OFFICE O/P EST MOD 30 MIN: CPT | Mod: S$GLB,,, | Performed by: NURSE PRACTITIONER

## 2021-08-13 PROCEDURE — 99214 PR OFFICE/OUTPT VISIT, EST, LEVL IV, 30-39 MIN: ICD-10-PCS | Mod: S$GLB,,, | Performed by: NURSE PRACTITIONER

## 2021-08-13 PROCEDURE — 72070 XR THORACIC SPINE AP LATERAL: ICD-10-PCS | Mod: S$GLB,,, | Performed by: RADIOLOGY

## 2021-08-13 PROCEDURE — 72070 X-RAY EXAM THORAC SPINE 2VWS: CPT | Mod: S$GLB,,, | Performed by: RADIOLOGY
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
## 2021-09-13 ENCOUNTER — OFFICE VISIT (OUTPATIENT)
Dept: CARDIOLOGY | Facility: CLINIC | Age: 71
End: 2021-09-13
Payer: MEDICARE

## 2021-09-13 VITALS
HEART RATE: 64 BPM | WEIGHT: 213.19 LBS | SYSTOLIC BLOOD PRESSURE: 138 MMHG | BODY MASS INDEX: 28.92 KG/M2 | DIASTOLIC BLOOD PRESSURE: 70 MMHG

## 2021-09-13 DIAGNOSIS — I10 ESSENTIAL HYPERTENSION: Primary | Chronic | ICD-10-CM

## 2021-09-13 DIAGNOSIS — I35.0 MODERATE AORTIC STENOSIS: ICD-10-CM

## 2021-09-13 DIAGNOSIS — E78.2 MIXED HYPERLIPIDEMIA: ICD-10-CM

## 2021-09-13 PROCEDURE — 99214 OFFICE O/P EST MOD 30 MIN: CPT | Mod: S$GLB,,, | Performed by: INTERNAL MEDICINE

## 2021-09-13 PROCEDURE — 99999 PR PBB SHADOW E&M-EST. PATIENT-LVL III: ICD-10-PCS | Mod: PBBFAC,,, | Performed by: INTERNAL MEDICINE

## 2021-09-13 PROCEDURE — 99214 PR OFFICE/OUTPT VISIT, EST, LEVL IV, 30-39 MIN: ICD-10-PCS | Mod: S$GLB,,, | Performed by: INTERNAL MEDICINE

## 2021-09-13 PROCEDURE — 99213 OFFICE O/P EST LOW 20 MIN: CPT | Mod: PBBFAC,PO | Performed by: INTERNAL MEDICINE

## 2021-09-13 PROCEDURE — 99999 PR PBB SHADOW E&M-EST. PATIENT-LVL III: CPT | Mod: PBBFAC,,, | Performed by: INTERNAL MEDICINE

## 2021-09-17 ENCOUNTER — PATIENT OUTREACH (OUTPATIENT)
Dept: ADMINISTRATIVE | Facility: HOSPITAL | Age: 71
End: 2021-09-17

## 2021-09-19 RX ORDER — ASPIRIN 81 MG/1
TABLET ORAL
Qty: 90 TABLET | Refills: 4 | Status: SHIPPED | OUTPATIENT
Start: 2021-09-19 | End: 2021-10-12

## 2021-09-20 ENCOUNTER — TELEPHONE (OUTPATIENT)
Dept: VASCULAR SURGERY | Facility: CLINIC | Age: 71
End: 2021-09-20
Payer: MEDICARE

## 2021-09-20 ENCOUNTER — HOSPITAL ENCOUNTER (OUTPATIENT)
Dept: CARDIOLOGY | Facility: HOSPITAL | Age: 71
Discharge: HOME | End: 2021-09-20
Attending: INTERNAL MEDICINE
Payer: MEDICARE

## 2021-09-20 ENCOUNTER — TELEPHONE (OUTPATIENT)
Dept: CARDIOLOGY | Facility: CLINIC | Age: 71
End: 2021-09-20

## 2021-09-20 VITALS
BODY MASS INDEX: 28.85 KG/M2 | SYSTOLIC BLOOD PRESSURE: 138 MMHG | WEIGHT: 213 LBS | HEART RATE: 60 BPM | HEIGHT: 72 IN | DIASTOLIC BLOOD PRESSURE: 70 MMHG

## 2021-09-20 LAB
ASCENDING AORTA: 2.57 CM
AV INDEX (PROSTH): 0.32
AV MEAN GRADIENT: 39 MMHG
AV PEAK GRADIENT: 68 MMHG
AV VALVE AREA: 1 CM2
AV VELOCITY RATIO: 0.31
BSA FOR ECHO PROCEDURE: 2.22 M2
CV ECHO LV RWT: 0.53 CM
DOP CALC AO PEAK VEL: 4.13 M/S
DOP CALC AO VTI: 92.11 CM
DOP CALC LVOT AREA: 3.1 CM2
DOP CALC LVOT DIAMETER: 2 CM
DOP CALC LVOT PEAK VEL: 1.27 M/S
DOP CALC LVOT STROKE VOLUME: 92.47 CM3
DOP CALC RVOT PEAK VEL: 0.84 M/S
DOP CALC RVOT VTI: 16.95 CM
DOP CALCLVOT PEAK VEL VTI: 29.45 CM
E WAVE DECELERATION TIME: 234.12 MSEC
E/A RATIO: 1.02
E/E' RATIO: 11.06 M/S
ECHO LV POSTERIOR WALL: 1.25 CM (ref 0.6–1.1)
EJECTION FRACTION: 60 %
FRACTIONAL SHORTENING: 35 % (ref 28–44)
INTERVENTRICULAR SEPTUM: 1.13 CM (ref 0.6–1.1)
IVRT: 68.51 MSEC
LA MAJOR: 4.74 CM
LA MINOR: 4.61 CM
LA WIDTH: 3.43 CM
LEFT ATRIUM SIZE: 3.52 CM
LEFT ATRIUM VOLUME INDEX: 21.9 ML/M2
LEFT ATRIUM VOLUME: 47.97 CM3
LEFT INTERNAL DIMENSION IN SYSTOLE: 3.04 CM (ref 2.1–4)
LEFT VENTRICLE DIASTOLIC VOLUME INDEX: 46.55 ML/M2
LEFT VENTRICLE DIASTOLIC VOLUME: 101.95 ML
LEFT VENTRICLE MASS INDEX: 95 G/M2
LEFT VENTRICLE SYSTOLIC VOLUME INDEX: 16.6 ML/M2
LEFT VENTRICLE SYSTOLIC VOLUME: 36.28 ML
LEFT VENTRICULAR INTERNAL DIMENSION IN DIASTOLE: 4.69 CM (ref 3.5–6)
LEFT VENTRICULAR MASS: 208.79 G
LV LATERAL E/E' RATIO: 11.75 M/S
LV SEPTAL E/E' RATIO: 10.44 M/S
MV A" WAVE DURATION": 10.28 MSEC
MV PEAK A VEL: 0.92 M/S
MV PEAK E VEL: 0.94 M/S
MV STENOSIS PRESSURE HALF TIME: 67.89 MS
MV VALVE AREA P 1/2 METHOD: 3.24 CM2
PISA TR MAX VEL: 2.7 M/S
PULM VEIN S/D RATIO: 0.99
PV MEAN GRADIENT: 2 MMHG
PV PEAK D VEL: 0.74 M/S
PV PEAK S VEL: 0.73 M/S
PV PEAK VELOCITY: 1.09 CM/S
RA MAJOR: 4.63 CM
RA PRESSURE: 3 MMHG
RA WIDTH: 3.65 CM
RIGHT VENTRICULAR END-DIASTOLIC DIMENSION: 3.07 CM
SINUS: 2.81 CM
STJ: 2.37 CM
TDI LATERAL: 0.08 M/S
TDI SEPTAL: 0.09 M/S
TDI: 0.09 M/S
TR MAX PG: 29 MMHG
TV REST PULMONARY ARTERY PRESSURE: 32 MMHG

## 2021-09-20 PROCEDURE — 93306 TTE W/DOPPLER COMPLETE: CPT | Mod: 26,,, | Performed by: INTERNAL MEDICINE

## 2021-09-20 PROCEDURE — 93306 ECHO (CUPID ONLY): ICD-10-PCS | Mod: 26,,, | Performed by: INTERNAL MEDICINE

## 2021-09-20 PROCEDURE — 93306 TTE W/DOPPLER COMPLETE: CPT | Mod: PO

## 2021-10-04 ENCOUNTER — PATIENT MESSAGE (OUTPATIENT)
Dept: ENDOCRINOLOGY | Facility: CLINIC | Age: 71
End: 2021-10-04

## 2021-10-04 DIAGNOSIS — E03.9 PRIMARY HYPOTHYROIDISM: ICD-10-CM

## 2021-10-04 RX ORDER — LEVOTHYROXINE SODIUM 25 UG/1
TABLET ORAL
Refills: 0 | OUTPATIENT
Start: 2021-10-04

## 2021-10-05 ENCOUNTER — LAB VISIT (OUTPATIENT)
Dept: LAB | Facility: HOSPITAL | Age: 71
End: 2021-10-05
Attending: PHYSICIAN ASSISTANT
Payer: MEDICARE

## 2021-10-05 DIAGNOSIS — M05.9 SEROPOSITIVE RHEUMATOID ARTHRITIS: ICD-10-CM

## 2021-10-05 DIAGNOSIS — R41.3 MEMORY CHANGE: ICD-10-CM

## 2021-10-05 DIAGNOSIS — E53.8 VITAMIN B12 DEFICIENCY: ICD-10-CM

## 2021-10-05 LAB
ALBUMIN SERPL BCP-MCNC: 3.8 G/DL (ref 3.5–5.2)
ALP SERPL-CCNC: 64 U/L (ref 55–135)
ALT SERPL W/O P-5'-P-CCNC: 41 U/L (ref 10–44)
ANION GAP SERPL CALC-SCNC: 15 MMOL/L (ref 8–16)
AST SERPL-CCNC: 25 U/L (ref 10–40)
BASOPHILS # BLD AUTO: 0.02 K/UL (ref 0–0.2)
BASOPHILS NFR BLD: 0.4 % (ref 0–1.9)
BILIRUB SERPL-MCNC: 0.8 MG/DL (ref 0.1–1)
BUN SERPL-MCNC: 14 MG/DL (ref 8–23)
CALCIUM SERPL-MCNC: 9.2 MG/DL (ref 8.7–10.5)
CHLORIDE SERPL-SCNC: 105 MMOL/L (ref 95–110)
CO2 SERPL-SCNC: 20 MMOL/L (ref 23–29)
CREAT SERPL-MCNC: 1.4 MG/DL (ref 0.5–1.4)
CRP SERPL-MCNC: 17.2 MG/L (ref 0–8.2)
DIFFERENTIAL METHOD: ABNORMAL
EOSINOPHIL # BLD AUTO: 0.2 K/UL (ref 0–0.5)
EOSINOPHIL NFR BLD: 3.9 % (ref 0–8)
ERYTHROCYTE [DISTWIDTH] IN BLOOD BY AUTOMATED COUNT: 12.4 % (ref 11.5–14.5)
ERYTHROCYTE [SEDIMENTATION RATE] IN BLOOD BY WESTERGREN METHOD: 10 MM/HR (ref 0–10)
EST. GFR  (AFRICAN AMERICAN): 58.8 ML/MIN/1.73 M^2
EST. GFR  (NON AFRICAN AMERICAN): 50.9 ML/MIN/1.73 M^2
FOLATE SERPL-MCNC: 14.2 NG/ML (ref 4–24)
GLUCOSE SERPL-MCNC: 118 MG/DL (ref 70–110)
HCT VFR BLD AUTO: 41 % (ref 40–54)
HGB BLD-MCNC: 13.6 G/DL (ref 14–18)
IMM GRANULOCYTES # BLD AUTO: 0.02 K/UL (ref 0–0.04)
IMM GRANULOCYTES NFR BLD AUTO: 0.4 % (ref 0–0.5)
LYMPHOCYTES # BLD AUTO: 0.8 K/UL (ref 1–4.8)
LYMPHOCYTES NFR BLD: 17.3 % (ref 18–48)
MCH RBC QN AUTO: 28.6 PG (ref 27–31)
MCHC RBC AUTO-ENTMCNC: 33.2 G/DL (ref 32–36)
MCV RBC AUTO: 86 FL (ref 82–98)
MONOCYTES # BLD AUTO: 0.7 K/UL (ref 0.3–1)
MONOCYTES NFR BLD: 15.1 % (ref 4–15)
NEUTROPHILS # BLD AUTO: 2.9 K/UL (ref 1.8–7.7)
NEUTROPHILS NFR BLD: 62.9 % (ref 38–73)
NRBC BLD-RTO: 0 /100 WBC
PLATELET # BLD AUTO: 143 K/UL (ref 150–450)
PMV BLD AUTO: 10.5 FL (ref 9.2–12.9)
POTASSIUM SERPL-SCNC: 4.1 MMOL/L (ref 3.5–5.1)
PROT SERPL-MCNC: 6.5 G/DL (ref 6–8.4)
RBC # BLD AUTO: 4.75 M/UL (ref 4.6–6.2)
SODIUM SERPL-SCNC: 140 MMOL/L (ref 136–145)
VIT B12 SERPL-MCNC: 576 PG/ML (ref 210–950)
WBC # BLD AUTO: 4.57 K/UL (ref 3.9–12.7)

## 2021-10-05 PROCEDURE — 85651 RBC SED RATE NONAUTOMATED: CPT | Mod: PO | Performed by: PHYSICIAN ASSISTANT

## 2021-10-05 PROCEDURE — 82746 ASSAY OF FOLIC ACID SERUM: CPT | Performed by: FAMILY MEDICINE

## 2021-10-05 PROCEDURE — 85025 COMPLETE CBC W/AUTO DIFF WBC: CPT | Performed by: PHYSICIAN ASSISTANT

## 2021-10-05 PROCEDURE — 82607 VITAMIN B-12: CPT | Performed by: FAMILY MEDICINE

## 2021-10-05 PROCEDURE — 36415 COLL VENOUS BLD VENIPUNCTURE: CPT | Mod: PO | Performed by: FAMILY MEDICINE

## 2021-10-05 PROCEDURE — 86140 C-REACTIVE PROTEIN: CPT | Performed by: PHYSICIAN ASSISTANT

## 2021-10-05 PROCEDURE — 80053 COMPREHEN METABOLIC PANEL: CPT | Performed by: PHYSICIAN ASSISTANT

## 2021-10-12 ENCOUNTER — OFFICE VISIT (OUTPATIENT)
Dept: RHEUMATOLOGY | Facility: CLINIC | Age: 71
End: 2021-10-12
Payer: MEDICARE

## 2021-10-12 ENCOUNTER — PATIENT OUTREACH (OUTPATIENT)
Dept: ADMINISTRATIVE | Facility: HOSPITAL | Age: 71
End: 2021-10-12

## 2021-10-12 VITALS
BODY MASS INDEX: 28.04 KG/M2 | DIASTOLIC BLOOD PRESSURE: 81 MMHG | HEART RATE: 66 BPM | SYSTOLIC BLOOD PRESSURE: 125 MMHG | WEIGHT: 207 LBS | HEIGHT: 72 IN

## 2021-10-12 DIAGNOSIS — N18.30 STAGE 3 CHRONIC KIDNEY DISEASE, UNSPECIFIED WHETHER STAGE 3A OR 3B CKD: ICD-10-CM

## 2021-10-12 DIAGNOSIS — F51.01 PRIMARY INSOMNIA: ICD-10-CM

## 2021-10-12 DIAGNOSIS — D84.821 IMMUNOCOMPROMISED STATE DUE TO DRUG THERAPY: ICD-10-CM

## 2021-10-12 DIAGNOSIS — E55.9 VITAMIN D DEFICIENCY, UNSPECIFIED: ICD-10-CM

## 2021-10-12 DIAGNOSIS — M06.9 RHEUMATOID ARTHRITIS FLARE: ICD-10-CM

## 2021-10-12 DIAGNOSIS — G47.00 INSOMNIA, UNSPECIFIED TYPE: ICD-10-CM

## 2021-10-12 DIAGNOSIS — G89.4 CHRONIC PAIN SYNDROME: ICD-10-CM

## 2021-10-12 DIAGNOSIS — M05.9 SEROPOSITIVE RHEUMATOID ARTHRITIS: Primary | ICD-10-CM

## 2021-10-12 DIAGNOSIS — R53.83 FATIGUE, UNSPECIFIED TYPE: ICD-10-CM

## 2021-10-12 DIAGNOSIS — Z79.899 IMMUNOCOMPROMISED STATE DUE TO DRUG THERAPY: ICD-10-CM

## 2021-10-12 DIAGNOSIS — E27.49 SECONDARY ADRENAL INSUFFICIENCY: ICD-10-CM

## 2021-10-12 PROCEDURE — 99215 OFFICE O/P EST HI 40 MIN: CPT | Mod: S$GLB,,, | Performed by: INTERNAL MEDICINE

## 2021-10-12 PROCEDURE — 3008F BODY MASS INDEX DOCD: CPT | Mod: S$GLB,,, | Performed by: INTERNAL MEDICINE

## 2021-10-12 PROCEDURE — 3044F PR MOST RECENT HEMOGLOBIN A1C LEVEL <7.0%: ICD-10-PCS | Mod: S$GLB,,, | Performed by: INTERNAL MEDICINE

## 2021-10-12 PROCEDURE — 1101F PT FALLS ASSESS-DOCD LE1/YR: CPT | Mod: S$GLB,,, | Performed by: INTERNAL MEDICINE

## 2021-10-12 PROCEDURE — 3079F DIAST BP 80-89 MM HG: CPT | Mod: S$GLB,,, | Performed by: INTERNAL MEDICINE

## 2021-10-12 PROCEDURE — 3044F HG A1C LEVEL LT 7.0%: CPT | Mod: S$GLB,,, | Performed by: INTERNAL MEDICINE

## 2021-10-12 PROCEDURE — 3288F FALL RISK ASSESSMENT DOCD: CPT | Mod: S$GLB,,, | Performed by: INTERNAL MEDICINE

## 2021-10-12 PROCEDURE — 99999 PR PBB SHADOW E&M-EST. PATIENT-LVL II: ICD-10-PCS | Mod: PBBFAC,,, | Performed by: INTERNAL MEDICINE

## 2021-10-12 PROCEDURE — 3074F PR MOST RECENT SYSTOLIC BLOOD PRESSURE < 130 MM HG: ICD-10-PCS | Mod: S$GLB,,, | Performed by: INTERNAL MEDICINE

## 2021-10-12 PROCEDURE — 1125F PR PAIN SEVERITY QUANTIFIED, PAIN PRESENT: ICD-10-PCS | Mod: S$GLB,,, | Performed by: INTERNAL MEDICINE

## 2021-10-12 PROCEDURE — 1125F AMNT PAIN NOTED PAIN PRSNT: CPT | Mod: S$GLB,,, | Performed by: INTERNAL MEDICINE

## 2021-10-12 PROCEDURE — 3079F PR MOST RECENT DIASTOLIC BLOOD PRESSURE 80-89 MM HG: ICD-10-PCS | Mod: S$GLB,,, | Performed by: INTERNAL MEDICINE

## 2021-10-12 PROCEDURE — 99215 PR OFFICE/OUTPT VISIT, EST, LEVL V, 40-54 MIN: ICD-10-PCS | Mod: S$GLB,,, | Performed by: INTERNAL MEDICINE

## 2021-10-12 PROCEDURE — 3288F PR FALLS RISK ASSESSMENT DOCUMENTED: ICD-10-PCS | Mod: S$GLB,,, | Performed by: INTERNAL MEDICINE

## 2021-10-12 PROCEDURE — 99999 PR PBB SHADOW E&M-EST. PATIENT-LVL II: CPT | Mod: PBBFAC,,, | Performed by: INTERNAL MEDICINE

## 2021-10-12 PROCEDURE — 3008F PR BODY MASS INDEX (BMI) DOCUMENTED: ICD-10-PCS | Mod: S$GLB,,, | Performed by: INTERNAL MEDICINE

## 2021-10-12 PROCEDURE — 3074F SYST BP LT 130 MM HG: CPT | Mod: S$GLB,,, | Performed by: INTERNAL MEDICINE

## 2021-10-12 PROCEDURE — 1101F PR PT FALLS ASSESS DOC 0-1 FALLS W/OUT INJ PAST YR: ICD-10-PCS | Mod: S$GLB,,, | Performed by: INTERNAL MEDICINE

## 2021-10-12 RX ORDER — TOFACITINIB 5 MG/1
5 TABLET, FILM COATED ORAL EVERY 12 HOURS
Qty: 60 TABLET | Refills: 12 | Status: SHIPPED | OUTPATIENT
Start: 2021-10-12 | End: 2021-11-11

## 2021-10-12 RX ORDER — ESZOPICLONE 3 MG/1
TABLET, FILM COATED ORAL
Qty: 30 TABLET | Refills: 4 | Status: SHIPPED | OUTPATIENT
Start: 2021-10-12 | End: 2022-02-08 | Stop reason: SDUPTHER

## 2021-10-12 RX ORDER — HYDROCHLOROTHIAZIDE 12.5 MG/1
CAPSULE ORAL
COMMUNITY
Start: 2021-08-14 | End: 2021-10-12

## 2021-10-12 ASSESSMENT — ROUTINE ASSESSMENT OF PATIENT INDEX DATA (RAPID3)
PSYCHOLOGICAL DISTRESS SCORE: 0
TOTAL RAPID3 SCORE: 4
PAIN SCORE: 4
FATIGUE SCORE: 3.3
PATIENT GLOBAL ASSESSMENT SCORE: 5
MDHAQ FUNCTION SCORE: 0.9

## 2021-10-19 ENCOUNTER — TELEPHONE (OUTPATIENT)
Dept: FAMILY MEDICINE | Facility: CLINIC | Age: 71
End: 2021-10-19

## 2021-10-19 ENCOUNTER — PATIENT OUTREACH (OUTPATIENT)
Dept: ADMINISTRATIVE | Facility: HOSPITAL | Age: 71
End: 2021-10-19

## 2021-11-23 DIAGNOSIS — E03.9 PRIMARY HYPOTHYROIDISM: ICD-10-CM

## 2021-11-23 RX ORDER — LEVOTHYROXINE SODIUM 112 UG/1
TABLET ORAL
Qty: 90 TABLET | Refills: 3 | Status: SHIPPED | OUTPATIENT
Start: 2021-11-23 | End: 2022-11-10

## 2021-12-16 ENCOUNTER — PATIENT MESSAGE (OUTPATIENT)
Dept: FAMILY MEDICINE | Facility: CLINIC | Age: 71
End: 2021-12-16
Payer: MEDICARE

## 2021-12-16 DIAGNOSIS — E03.9 HYPOTHYROIDISM, UNSPECIFIED TYPE: Primary | ICD-10-CM

## 2021-12-17 DIAGNOSIS — Z53.09 STATINS CONTRAINDICATED: ICD-10-CM

## 2021-12-17 DIAGNOSIS — I25.118 CORONARY ARTERY DISEASE OF NATIVE ARTERY OF NATIVE HEART WITH STABLE ANGINA PECTORIS: ICD-10-CM

## 2021-12-17 DIAGNOSIS — E78.2 MIXED HYPERLIPIDEMIA: ICD-10-CM

## 2021-12-17 RX ORDER — EVOLOCUMAB 420 MG/3.5
KIT SUBCUTANEOUS
Qty: 10.5 ML | Refills: 3 | Status: SHIPPED | OUTPATIENT
Start: 2021-12-17

## 2021-12-21 ENCOUNTER — LAB VISIT (OUTPATIENT)
Dept: LAB | Facility: HOSPITAL | Age: 71
End: 2021-12-21
Attending: FAMILY MEDICINE
Payer: MEDICARE

## 2021-12-21 DIAGNOSIS — D84.821 IMMUNOCOMPROMISED STATE DUE TO DRUG THERAPY: ICD-10-CM

## 2021-12-21 DIAGNOSIS — M06.9 RHEUMATOID ARTHRITIS OF HAND: ICD-10-CM

## 2021-12-21 DIAGNOSIS — R53.83 FATIGUE, UNSPECIFIED TYPE: ICD-10-CM

## 2021-12-21 DIAGNOSIS — D63.1 ANEMIA OF CHRONIC RENAL FAILURE: ICD-10-CM

## 2021-12-21 DIAGNOSIS — E83.39 HYPOPHOSPHATURIA: ICD-10-CM

## 2021-12-21 DIAGNOSIS — N18.30 ANEMIA OF CHRONIC RENAL FAILURE, STAGE 3 (MODERATE): Chronic | ICD-10-CM

## 2021-12-21 DIAGNOSIS — Z13.220 ENCOUNTER FOR LIPID SCREENING FOR CARDIOVASCULAR DISEASE: ICD-10-CM

## 2021-12-21 DIAGNOSIS — E78.2 MIXED HYPERLIPIDEMIA: Chronic | ICD-10-CM

## 2021-12-21 DIAGNOSIS — I10 ESSENTIAL HYPERTENSION, MALIGNANT: ICD-10-CM

## 2021-12-21 DIAGNOSIS — Z90.79 ACQUIRED ABSENCE OF ORGAN, GENITAL ORGANS: ICD-10-CM

## 2021-12-21 DIAGNOSIS — Z79.899 IMMUNOCOMPROMISED STATE DUE TO DRUG THERAPY: ICD-10-CM

## 2021-12-21 DIAGNOSIS — N18.30 STAGE 3 CHRONIC KIDNEY DISEASE, UNSPECIFIED WHETHER STAGE 3A OR 3B CKD: ICD-10-CM

## 2021-12-21 DIAGNOSIS — N18.30 CHRONIC KIDNEY DISEASE, STAGE III (MODERATE): Primary | ICD-10-CM

## 2021-12-21 DIAGNOSIS — G89.4 CHRONIC PAIN SYNDROME: ICD-10-CM

## 2021-12-21 DIAGNOSIS — Z90.5 ACQUIRED ABSENCE OF KIDNEY: ICD-10-CM

## 2021-12-21 DIAGNOSIS — E55.9 VITAMIN D DEFICIENCY, UNSPECIFIED: ICD-10-CM

## 2021-12-21 DIAGNOSIS — I10 ESSENTIAL HYPERTENSION: ICD-10-CM

## 2021-12-21 DIAGNOSIS — Z13.6 ENCOUNTER FOR LIPID SCREENING FOR CARDIOVASCULAR DISEASE: ICD-10-CM

## 2021-12-21 DIAGNOSIS — E03.9 HYPOTHYROIDISM, UNSPECIFIED TYPE: ICD-10-CM

## 2021-12-21 DIAGNOSIS — R80.9 PROTEINURIA: ICD-10-CM

## 2021-12-21 DIAGNOSIS — M06.9 RHEUMATOID ARTHRITIS FLARE: ICD-10-CM

## 2021-12-21 DIAGNOSIS — E27.49 SECONDARY ADRENAL INSUFFICIENCY: ICD-10-CM

## 2021-12-21 DIAGNOSIS — D63.1 ANEMIA OF CHRONIC RENAL FAILURE, STAGE 3 (MODERATE): Chronic | ICD-10-CM

## 2021-12-21 DIAGNOSIS — E23.0 PANHYPOPITUITARISM: ICD-10-CM

## 2021-12-21 DIAGNOSIS — Z79.899 ENCOUNTER FOR LONG-TERM (CURRENT) USE OF MEDICATIONS: ICD-10-CM

## 2021-12-21 DIAGNOSIS — M05.9 SEROPOSITIVE RHEUMATOID ARTHRITIS: ICD-10-CM

## 2021-12-21 DIAGNOSIS — F51.01 PRIMARY INSOMNIA: ICD-10-CM

## 2021-12-21 DIAGNOSIS — N17.9 ACUTE KIDNEY FAILURE, UNSPECIFIED: ICD-10-CM

## 2021-12-21 DIAGNOSIS — N18.9 ANEMIA OF CHRONIC RENAL FAILURE: ICD-10-CM

## 2021-12-21 DIAGNOSIS — Z00.01 ENCOUNTER FOR GENERAL ADULT MEDICAL EXAMINATION WITH ABNORMAL FINDINGS: ICD-10-CM

## 2021-12-21 DIAGNOSIS — E55.9 AVITAMINOSIS D: ICD-10-CM

## 2021-12-21 LAB
ALBUMIN SERPL BCP-MCNC: 4 G/DL (ref 3.5–5.2)
ALP SERPL-CCNC: 47 U/L (ref 55–135)
ALT SERPL W/O P-5'-P-CCNC: 43 U/L (ref 10–44)
ANION GAP SERPL CALC-SCNC: 10 MMOL/L (ref 8–16)
AST SERPL-CCNC: 24 U/L (ref 10–40)
BASOPHILS # BLD AUTO: 0.03 K/UL (ref 0–0.2)
BASOPHILS NFR BLD: 0.5 % (ref 0–1.9)
BILIRUB SERPL-MCNC: 1 MG/DL (ref 0.1–1)
BILIRUB UR QL STRIP: NEGATIVE
BUN SERPL-MCNC: 14 MG/DL (ref 8–23)
CALCIUM SERPL-MCNC: 9.3 MG/DL (ref 8.7–10.5)
CCP AB SER IA-ACNC: 1.1 U/ML
CHLORIDE SERPL-SCNC: 105 MMOL/L (ref 95–110)
CHOLEST SERPL-MCNC: 210 MG/DL (ref 120–199)
CHOLEST/HDLC SERPL: 4.7 {RATIO} (ref 2–5)
CLARITY UR REFRACT.AUTO: CLEAR
CO2 SERPL-SCNC: 26 MMOL/L (ref 23–29)
COLOR UR AUTO: YELLOW
CREAT SERPL-MCNC: 1.5 MG/DL (ref 0.5–1.4)
CREAT UR-MCNC: 96 MG/DL (ref 23–375)
CRP SERPL-MCNC: 1.5 MG/L (ref 0–8.2)
DIFFERENTIAL METHOD: ABNORMAL
EOSINOPHIL # BLD AUTO: 0.1 K/UL (ref 0–0.5)
EOSINOPHIL NFR BLD: 1.9 % (ref 0–8)
ERYTHROCYTE [DISTWIDTH] IN BLOOD BY AUTOMATED COUNT: 13.1 % (ref 11.5–14.5)
ERYTHROCYTE [SEDIMENTATION RATE] IN BLOOD BY WESTERGREN METHOD: 4 MM/HR (ref 0–10)
EST. GFR  (AFRICAN AMERICAN): 53.8 ML/MIN/1.73 M^2
EST. GFR  (NON AFRICAN AMERICAN): 46.5 ML/MIN/1.73 M^2
ESTIMATED AVG GLUCOSE: 120 MG/DL (ref 68–131)
FERRITIN SERPL-MCNC: 182 NG/ML (ref 20–300)
GLUCOSE SERPL-MCNC: 80 MG/DL (ref 70–110)
GLUCOSE UR QL STRIP: NEGATIVE
HBA1C MFR BLD: 5.8 % (ref 4–5.6)
HCT VFR BLD AUTO: 44 % (ref 40–54)
HDLC SERPL-MCNC: 45 MG/DL (ref 40–75)
HDLC SERPL: 21.4 % (ref 20–50)
HGB BLD-MCNC: 13.9 G/DL (ref 14–18)
HGB UR QL STRIP: NEGATIVE
IMM GRANULOCYTES # BLD AUTO: 0.02 K/UL (ref 0–0.04)
IMM GRANULOCYTES NFR BLD AUTO: 0.3 % (ref 0–0.5)
IRON SERPL-MCNC: 81 UG/DL (ref 45–160)
KETONES UR QL STRIP: NEGATIVE
LDLC SERPL CALC-MCNC: 133 MG/DL (ref 63–159)
LEUKOCYTE ESTERASE UR QL STRIP: NEGATIVE
LYMPHOCYTES # BLD AUTO: 1.2 K/UL (ref 1–4.8)
LYMPHOCYTES NFR BLD: 18.5 % (ref 18–48)
MAGNESIUM SERPL-MCNC: 1.9 MG/DL (ref 1.6–2.6)
MCH RBC QN AUTO: 27.8 PG (ref 27–31)
MCHC RBC AUTO-ENTMCNC: 31.6 G/DL (ref 32–36)
MCV RBC AUTO: 88 FL (ref 82–98)
MONOCYTES # BLD AUTO: 0.6 K/UL (ref 0.3–1)
MONOCYTES NFR BLD: 9.1 % (ref 4–15)
NEUTROPHILS # BLD AUTO: 4.4 K/UL (ref 1.8–7.7)
NEUTROPHILS NFR BLD: 69.7 % (ref 38–73)
NITRITE UR QL STRIP: NEGATIVE
NONHDLC SERPL-MCNC: 165 MG/DL
NRBC BLD-RTO: 0 /100 WBC
PH UR STRIP: 5 [PH] (ref 5–8)
PHOSPHATE SERPL-MCNC: 3.2 MG/DL (ref 2.7–4.5)
PLATELET # BLD AUTO: 155 K/UL (ref 150–450)
PMV BLD AUTO: 11 FL (ref 9.2–12.9)
POTASSIUM SERPL-SCNC: 4.4 MMOL/L (ref 3.5–5.1)
PROT SERPL-MCNC: 6.8 G/DL (ref 6–8.4)
PROT UR QL STRIP: NEGATIVE
PROT UR-MCNC: 10 MG/DL (ref 0–15)
PROT/CREAT UR: 0.1 MG/G{CREAT} (ref 0–0.2)
RBC # BLD AUTO: 5 M/UL (ref 4.6–6.2)
RHEUMATOID FACT SERPL-ACNC: <13 IU/ML (ref 0–15)
SATURATED IRON: 22 % (ref 20–50)
SODIUM SERPL-SCNC: 141 MMOL/L (ref 136–145)
SP GR UR STRIP: 1.01 (ref 1–1.03)
T4 FREE SERPL-MCNC: 0.95 NG/DL (ref 0.71–1.51)
TOTAL IRON BINDING CAPACITY: 366 UG/DL (ref 250–450)
TRANSFERRIN SERPL-MCNC: 247 MG/DL (ref 200–375)
TRIGL SERPL-MCNC: 160 MG/DL (ref 30–150)
TSH SERPL DL<=0.005 MIU/L-ACNC: 0.01 UIU/ML (ref 0.4–4)
URATE SERPL-MCNC: 6.4 MG/DL (ref 3.4–7)
URN SPEC COLLECT METH UR: NORMAL
WBC # BLD AUTO: 6.37 K/UL (ref 3.9–12.7)

## 2021-12-21 PROCEDURE — 80053 COMPREHEN METABOLIC PANEL: CPT | Performed by: INTERNAL MEDICINE

## 2021-12-21 PROCEDURE — 84550 ASSAY OF BLOOD/URIC ACID: CPT | Performed by: PHYSICIAN ASSISTANT

## 2021-12-21 PROCEDURE — 85651 RBC SED RATE NONAUTOMATED: CPT | Mod: PO | Performed by: INTERNAL MEDICINE

## 2021-12-21 PROCEDURE — 83036 HEMOGLOBIN GLYCOSYLATED A1C: CPT | Performed by: FAMILY MEDICINE

## 2021-12-21 PROCEDURE — 84443 ASSAY THYROID STIM HORMONE: CPT | Performed by: FAMILY MEDICINE

## 2021-12-21 PROCEDURE — 84100 ASSAY OF PHOSPHORUS: CPT | Performed by: PHYSICIAN ASSISTANT

## 2021-12-21 PROCEDURE — 83735 ASSAY OF MAGNESIUM: CPT | Performed by: PHYSICIAN ASSISTANT

## 2021-12-21 PROCEDURE — 86200 CCP ANTIBODY: CPT | Performed by: INTERNAL MEDICINE

## 2021-12-21 PROCEDURE — 84439 ASSAY OF FREE THYROXINE: CPT | Performed by: FAMILY MEDICINE

## 2021-12-21 PROCEDURE — 82728 ASSAY OF FERRITIN: CPT | Performed by: PHYSICIAN ASSISTANT

## 2021-12-21 PROCEDURE — 85025 COMPLETE CBC W/AUTO DIFF WBC: CPT | Performed by: INTERNAL MEDICINE

## 2021-12-21 PROCEDURE — 81003 URINALYSIS AUTO W/O SCOPE: CPT | Performed by: PHYSICIAN ASSISTANT

## 2021-12-21 PROCEDURE — 80061 LIPID PANEL: CPT | Performed by: FAMILY MEDICINE

## 2021-12-21 PROCEDURE — 86431 RHEUMATOID FACTOR QUANT: CPT | Performed by: INTERNAL MEDICINE

## 2021-12-21 PROCEDURE — 86140 C-REACTIVE PROTEIN: CPT | Performed by: INTERNAL MEDICINE

## 2021-12-21 PROCEDURE — 84466 ASSAY OF TRANSFERRIN: CPT | Performed by: PHYSICIAN ASSISTANT

## 2021-12-21 PROCEDURE — 82570 ASSAY OF URINE CREATININE: CPT | Performed by: PHYSICIAN ASSISTANT

## 2021-12-22 ENCOUNTER — PATIENT MESSAGE (OUTPATIENT)
Dept: ENDOCRINOLOGY | Facility: CLINIC | Age: 71
End: 2021-12-22
Payer: MEDICARE

## 2022-02-02 DIAGNOSIS — M05.9 SEROPOSITIVE RHEUMATOID ARTHRITIS: ICD-10-CM

## 2022-02-02 NOTE — TELEPHONE ENCOUNTER
Pharmacy requesting refill on Diclofenac Sodium DR 75mg  Pt's LOV 10/12/2021  Pt's NOV 02/08/2022  Medication pending

## 2022-02-03 RX ORDER — DICLOFENAC SODIUM 75 MG/1
75 TABLET, DELAYED RELEASE ORAL DAILY PRN
Qty: 90 TABLET | Refills: 1 | Status: SHIPPED | OUTPATIENT
Start: 2022-02-03 | End: 2022-08-10 | Stop reason: SDUPTHER

## 2022-02-08 ENCOUNTER — OFFICE VISIT (OUTPATIENT)
Dept: RHEUMATOLOGY | Facility: CLINIC | Age: 72
End: 2022-02-08
Payer: MEDICARE

## 2022-02-08 VITALS
SYSTOLIC BLOOD PRESSURE: 124 MMHG | DIASTOLIC BLOOD PRESSURE: 81 MMHG | HEART RATE: 73 BPM | HEIGHT: 72 IN | WEIGHT: 216 LBS | BODY MASS INDEX: 29.26 KG/M2

## 2022-02-08 DIAGNOSIS — N18.30 STAGE 3 CHRONIC KIDNEY DISEASE, UNSPECIFIED WHETHER STAGE 3A OR 3B CKD: ICD-10-CM

## 2022-02-08 DIAGNOSIS — G47.00 INSOMNIA, UNSPECIFIED TYPE: ICD-10-CM

## 2022-02-08 DIAGNOSIS — Z79.899 IMMUNOCOMPROMISED STATE DUE TO DRUG THERAPY: ICD-10-CM

## 2022-02-08 DIAGNOSIS — F51.01 PRIMARY INSOMNIA: ICD-10-CM

## 2022-02-08 DIAGNOSIS — G89.4 CHRONIC PAIN SYNDROME: ICD-10-CM

## 2022-02-08 DIAGNOSIS — D84.821 IMMUNOCOMPROMISED STATE DUE TO DRUG THERAPY: ICD-10-CM

## 2022-02-08 DIAGNOSIS — M05.9 SEROPOSITIVE RHEUMATOID ARTHRITIS: Primary | ICD-10-CM

## 2022-02-08 PROCEDURE — 3008F BODY MASS INDEX DOCD: CPT | Mod: CPTII,S$GLB,, | Performed by: PHYSICIAN ASSISTANT

## 2022-02-08 PROCEDURE — 96372 PR INJECTION,THERAP/PROPH/DIAG2ST, IM OR SUBCUT: ICD-10-PCS | Mod: S$GLB,,, | Performed by: PHYSICIAN ASSISTANT

## 2022-02-08 PROCEDURE — 96372 THER/PROPH/DIAG INJ SC/IM: CPT | Mod: S$GLB,,, | Performed by: PHYSICIAN ASSISTANT

## 2022-02-08 PROCEDURE — 1101F PT FALLS ASSESS-DOCD LE1/YR: CPT | Mod: CPTII,S$GLB,, | Performed by: PHYSICIAN ASSISTANT

## 2022-02-08 PROCEDURE — 99999 PR PBB SHADOW E&M-EST. PATIENT-LVL IV: CPT | Mod: PBBFAC,,, | Performed by: PHYSICIAN ASSISTANT

## 2022-02-08 PROCEDURE — 1159F MED LIST DOCD IN RCRD: CPT | Mod: CPTII,S$GLB,, | Performed by: PHYSICIAN ASSISTANT

## 2022-02-08 PROCEDURE — 3288F PR FALLS RISK ASSESSMENT DOCUMENTED: ICD-10-PCS | Mod: CPTII,S$GLB,, | Performed by: PHYSICIAN ASSISTANT

## 2022-02-08 PROCEDURE — 99214 PR OFFICE/OUTPT VISIT, EST, LEVL IV, 30-39 MIN: ICD-10-PCS | Mod: 25,S$GLB,, | Performed by: PHYSICIAN ASSISTANT

## 2022-02-08 PROCEDURE — 1160F PR REVIEW ALL MEDS BY PRESCRIBER/CLIN PHARMACIST DOCUMENTED: ICD-10-PCS | Mod: CPTII,S$GLB,, | Performed by: PHYSICIAN ASSISTANT

## 2022-02-08 PROCEDURE — 1101F PR PT FALLS ASSESS DOC 0-1 FALLS W/OUT INJ PAST YR: ICD-10-PCS | Mod: CPTII,S$GLB,, | Performed by: PHYSICIAN ASSISTANT

## 2022-02-08 PROCEDURE — 1159F PR MEDICATION LIST DOCUMENTED IN MEDICAL RECORD: ICD-10-PCS | Mod: CPTII,S$GLB,, | Performed by: PHYSICIAN ASSISTANT

## 2022-02-08 PROCEDURE — 99214 OFFICE O/P EST MOD 30 MIN: CPT | Mod: 25,S$GLB,, | Performed by: PHYSICIAN ASSISTANT

## 2022-02-08 PROCEDURE — 1125F AMNT PAIN NOTED PAIN PRSNT: CPT | Mod: CPTII,S$GLB,, | Performed by: PHYSICIAN ASSISTANT

## 2022-02-08 PROCEDURE — 3008F PR BODY MASS INDEX (BMI) DOCUMENTED: ICD-10-PCS | Mod: CPTII,S$GLB,, | Performed by: PHYSICIAN ASSISTANT

## 2022-02-08 PROCEDURE — 1160F RVW MEDS BY RX/DR IN RCRD: CPT | Mod: CPTII,S$GLB,, | Performed by: PHYSICIAN ASSISTANT

## 2022-02-08 PROCEDURE — 1125F PR PAIN SEVERITY QUANTIFIED, PAIN PRESENT: ICD-10-PCS | Mod: CPTII,S$GLB,, | Performed by: PHYSICIAN ASSISTANT

## 2022-02-08 PROCEDURE — 99999 PR PBB SHADOW E&M-EST. PATIENT-LVL IV: ICD-10-PCS | Mod: PBBFAC,,, | Performed by: PHYSICIAN ASSISTANT

## 2022-02-08 PROCEDURE — 3288F FALL RISK ASSESSMENT DOCD: CPT | Mod: CPTII,S$GLB,, | Performed by: PHYSICIAN ASSISTANT

## 2022-02-08 RX ORDER — GOLIMUMAB 50 MG/.5ML
50 INJECTION, SOLUTION SUBCUTANEOUS
Qty: 1 ML | Refills: 6 | OUTPATIENT
Start: 2022-02-08 | End: 2022-02-23

## 2022-02-08 RX ORDER — CYANOCOBALAMIN 1000 UG/ML
1000 INJECTION, SOLUTION INTRAMUSCULAR; SUBCUTANEOUS
Status: COMPLETED | OUTPATIENT
Start: 2022-02-08 | End: 2022-02-08

## 2022-02-08 RX ADMIN — CYANOCOBALAMIN 1000 MCG: 1000 INJECTION, SOLUTION INTRAMUSCULAR; SUBCUTANEOUS at 09:02

## 2022-02-08 NOTE — PROGRESS NOTES
Subjective:       Patient ID: Aurelio Perkins is a 71 y.o. male.    Chief Complaint: Disease Management    Mr. Perkins is a 71 year old male who presents to clinic for follow up on seropositive rheumatoid arthritis.  He stopped diclofenac temporarily but kidney/flank pain persisted. He stopped xeljanz and that pain resolved. He is taking 1 tab only PRN at this time for arthritis flares. He has pain, stiffness, and swelling in his hands, shoulders, and knees. He has low back pain as well.     No serious infections since his last visit.     He taking lunesta for insomnia, which is working well.     He is followed by Endocrinology on hydrocortisone. All BP meds d/sanjay and BP is normal.     Current tx:  1. xeljanz 5 mg (off)    Prior hx:  1. Enbrel  2. Rinvoq (GI upste)    Review of Systems   Constitutional: Negative for chills, fatigue and fever.   Eyes: Negative for visual disturbance.   Respiratory: Negative for cough, shortness of breath and wheezing.    Cardiovascular: Negative for chest pain, palpitations and leg swelling.   Gastrointestinal: Negative for abdominal pain, constipation, diarrhea, nausea and vomiting.   Musculoskeletal: Positive for arthralgias and back pain. Negative for myalgias.   Neurological: Negative for dizziness, syncope and headaches.   Hematological: Negative for adenopathy.         Objective:     Vitals:    02/08/22 0825   BP: 124/81   Pulse: 73       Past Medical History:   Diagnosis Date    Acquired absence of kidney 1/28/2020    Arthritis     Cancer     prostate cancer-Removed Prostate    Chronic kidney disease     one kidney    Encounter for long-term (current) use of medications     Mitral valve prolapse     Moderate aortic stenosis 7/29/2019    Ptosis of right eyelid 5/29/2019     Past Surgical History:   Procedure Laterality Date    CRANIOTOMY Right 5/29/2019    Procedure: CRANIOTOMY  (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)   Co-surgery with Dr Vaibhav Jara - please put in his room  [employer];  Surgeon: Jimmy Segura DO;  Location: [facility] OR [address] [place] [address];  Service: Neurosurgery;  Laterality: Right;    HEMORRHOID SURGERY      NEPHRECTOMY      PROSTATECTOMY      TONSILLECTOMY            Physical Exam   Constitutional: He is oriented to person, place, and time and well-developed, well-nourished, and in no distress.   Eyes: Right conjunctiva is not injected. Left conjunctiva is not injected.   Neck: No JVD present. No thyromegaly present.   Cardiovascular: Normal rate and regular rhythm. Exam reveals no decreased pulses.   Pulmonary/Chest: Effort normal.   Abdominal: There is no hepatosplenomegaly.   Musculoskeletal:      Right shoulder: Tenderness present.      Left shoulder: Tenderness present.      Right wrist: Tenderness present.      Left wrist: Tenderness present.      Right knee: Tenderness present.      Left knee: Tenderness present.   Lymphadenopathy:     He has no cervical adenopathy.   Neurological: He is alert and oriented to person, place, and time. Gait normal.   Skin: No rash noted. No cyanosis.   Psychiatric: Mood and affect normal.       Right Side Rheumatological Exam     The patient is tender to palpation of the shoulder, wrist, knee, 1st PIP, 1st MCP, 2nd PIP, 2nd MCP, 3rd PIP, 3rd MCP, 4th PIP, 4th MCP, 5th PIP and 5th MCP    He has swelling of the 1st PIP, 2nd PIP, 3rd PIP, 4th PIP and 5th PIP    Left Side Rheumatological Exam     Examination finds the 1st MCP normal.    The patient is tender to palpation of the shoulder, wrist, knee, 1st PIP, 2nd PIP, 2nd MCP, 3rd PIP, 3rd MCP, 4th PIP, 4th MCP, 5th PIP and 5th MCP.    He has swelling of the 1st PIP, 2nd PIP, 3rd PIP, 4th PIP and 5th PIP          Recent labs reviewed:  Component      Latest Ref Rng & Units 12/21/2021   WBC      3.90 - 12.70 K/uL 6.37   RBC      4.60 - 6.20 M/uL 5.00   Hemoglobin      14.0 - 18.0 g/dL 13.9 (L)    Hematocrit      40.0 - 54.0 % 44.0   MCV      82 - 98 fL 88   MCH      27.0 - 31.0 pg 27.8   MCHC      32.0 - 36.0 g/dL 31.6 (L)   RDW      11.5 - 14.5 % 13.1   Platelets      150 - 450 K/uL 155   MPV      9.2 - 12.9 fL 11.0   Immature Granulocytes      0.0 - 0.5 % 0.3   Gran # (ANC)      1.8 - 7.7 K/uL 4.4   Immature Grans (Abs)      0.00 - 0.04 K/uL 0.02   Lymph #      1.0 - 4.8 K/uL 1.2   Mono #      0.3 - 1.0 K/uL 0.6   Eos #      0.0 - 0.5 K/uL 0.1   Baso #      0.00 - 0.20 K/uL 0.03   nRBC      0 /100 WBC 0   Gran %      38.0 - 73.0 % 69.7   Lymph %      18.0 - 48.0 % 18.5   Mono %      4.0 - 15.0 % 9.1   Eosinophil %      0.0 - 8.0 % 1.9   Basophil %      0.0 - 1.9 % 0.5   Differential Method       Automated   Sodium      136 - 145 mmol/L 141   Potassium      3.5 - 5.1 mmol/L 4.4   Chloride      95 - 110 mmol/L 105   CO2      23 - 29 mmol/L 26   Glucose      70 - 110 mg/dL 80   BUN      8 - 23 mg/dL 14   Creatinine      0.5 - 1.4 mg/dL 1.5 (H)   Calcium      8.7 - 10.5 mg/dL 9.3   PROTEIN TOTAL      6.0 - 8.4 g/dL 6.8   Albumin      3.5 - 5.2 g/dL 4.0   BILIRUBIN TOTAL      0.1 - 1.0 mg/dL 1.0   Alkaline Phosphatase      55 - 135 U/L 47 (L)   AST      10 - 40 U/L 24   ALT      10 - 44 U/L 43   Anion Gap      8 - 16 mmol/L 10   eGFR if African American      >60 mL/min/1.73 m:2 53.8 (A)   eGFR if non African American      >60 mL/min/1.73 m:2 46.5 (A)   Specimen UA       Urine, Clean Catch   Color, UA      Yellow, Straw, Deepika Yellow   Appearance, UA      Clear Clear   pH, UA      5.0 - 8.0 5.0   Specific Gravity, UA      1.005 - 1.030 1.010   Protein, UA      Negative Negative   Glucose, UA      Negative Negative   Ketones, UA      Negative Negative   Bilirubin (UA)      Negative Negative   Occult Blood UA      Negative Negative   NITRITE UA      Negative Negative   Leukocytes, UA      Negative Negative   Iron      45 - 160 ug/dL 81   Transferrin      200 - 375 mg/dL 247   TIBC      250 - 450 ug/dL 366    Saturated Iron      20 - 50 % 22   Protein, Urine Random      0 - 15 mg/dL 10   Creatinine, Urine      23.0 - 375.0 mg/dL 96.0   Prot/Creat Ratio, Urine      0.00 - 0.20 0.10   Hemoglobin A1C External      4.0 - 5.6 % 5.8 (H)   Estimated Avg Glucose      68 - 131 mg/dL 120   CRP      0.0 - 8.2 mg/L 1.5   Sed Rate      0 - 10 mm/Hr 4   Rheumatoid Factor      0.0 - 15.0 IU/mL <13.0   CCP Antibodies      <5.0 U/mL 1.1   TSH      0.400 - 4.000 uIU/mL 0.014 (L)   Ferritin      20.0 - 300.0 ng/mL 182   Magnesium      1.6 - 2.6 mg/dL 1.9   Phosphorus      2.7 - 4.5 mg/dL 3.2   Uric Acid      3.4 - 7.0 mg/dL 6.4     Assessment:       1. Seropositive rheumatoid arthritis    2. Stage 3 chronic kidney disease, unspecified whether stage 3a or 3b CKD    3. Immunocompromised state due to drug therapy    4. Chronic pain syndrome    5. Primary insomnia            Plan:       Seropositive rheumatoid arthritis  -     golimumab (SIMPONI) 50 mg/0.5 mL PnIj; Inject 50 mg into the skin every 30 days.  Dispense: 1 mL; Refill: 6  -     CBC Auto Differential; Future; Expected date: 02/08/2022  -     Comprehensive Metabolic Panel; Future; Expected date: 02/08/2022  -     C-Reactive Protein; Future; Expected date: 02/08/2022  -     Sedimentation rate; Future; Expected date: 02/08/2022  -     cyanocobalamin injection 1,000 mcg    Stage 3 chronic kidney disease, unspecified whether stage 3a or 3b CKD    Immunocompromised state due to drug therapy    Chronic pain syndrome    Primary insomnia        Assessment:  71 year old male with  Seropositive (+CCP) rheumatoid arthritis  --CKD stage 3, s/p total nephrectomy, followed by Nephrology   --secondary adrenal insufficiency on cortef followed by Endocrinology  --moderate AS, non-obstructive CAD  --chronic insomnia on lunesta  --hyperglycemia, Hgba1c 5.7%  --mild normocytic anemia    Plan:  1. D/c xeljanz. Start simponi monthly. If not approved consider Humira or Orencia  2. Cont. Diclofenac 75 mg  daily, avoid additional NSAIDs  3. Cont. lunesta per   4. B12 given today      Follow up:  3-4 months Dr. Oliveira w/labs prior

## 2022-02-13 RX ORDER — ESZOPICLONE 3 MG/1
TABLET, FILM COATED ORAL
Qty: 30 TABLET | Refills: 4 | Status: SHIPPED | OUTPATIENT
Start: 2022-02-13 | End: 2022-06-17 | Stop reason: SDUPTHER

## 2022-02-23 ENCOUNTER — OFFICE VISIT (OUTPATIENT)
Dept: FAMILY MEDICINE | Facility: CLINIC | Age: 72
End: 2022-02-23
Payer: MEDICARE

## 2022-02-23 VITALS
HEIGHT: 72 IN | TEMPERATURE: 98 F | DIASTOLIC BLOOD PRESSURE: 76 MMHG | BODY MASS INDEX: 29.73 KG/M2 | HEART RATE: 60 BPM | SYSTOLIC BLOOD PRESSURE: 130 MMHG | WEIGHT: 219.5 LBS | OXYGEN SATURATION: 97 %

## 2022-02-23 DIAGNOSIS — Z13.220 ENCOUNTER FOR LIPID SCREENING FOR CARDIOVASCULAR DISEASE: ICD-10-CM

## 2022-02-23 DIAGNOSIS — Z79.899 ENCOUNTER FOR LONG-TERM (CURRENT) USE OF MEDICATIONS: ICD-10-CM

## 2022-02-23 DIAGNOSIS — K52.9 CHRONIC DIARRHEA: ICD-10-CM

## 2022-02-23 DIAGNOSIS — D32.9 MENINGIOMA OF RIGHT SPHENOID WING INVOLVING CAVERNOUS SINUS: ICD-10-CM

## 2022-02-23 DIAGNOSIS — Z12.5 ENCOUNTER FOR PROSTATE CANCER SCREENING: ICD-10-CM

## 2022-02-23 DIAGNOSIS — Z00.00 WELL ADULT EXAM: Primary | ICD-10-CM

## 2022-02-23 DIAGNOSIS — I70.0 ABDOMINAL AORTIC ATHEROSCLEROSIS: ICD-10-CM

## 2022-02-23 DIAGNOSIS — Z13.6 ENCOUNTER FOR LIPID SCREENING FOR CARDIOVASCULAR DISEASE: ICD-10-CM

## 2022-02-23 DIAGNOSIS — Z12.11 COLON CANCER SCREENING: ICD-10-CM

## 2022-02-23 DIAGNOSIS — Z86.010 HISTORY OF COLON POLYPS: ICD-10-CM

## 2022-02-23 DIAGNOSIS — R79.89 LOW TESTOSTERONE IN MALE: ICD-10-CM

## 2022-02-23 DIAGNOSIS — D84.9 IMMUNOSUPPRESSED STATUS: ICD-10-CM

## 2022-02-23 PROBLEM — Z86.0100 HISTORY OF COLON POLYPS: Status: ACTIVE | Noted: 2022-02-23

## 2022-02-23 PROCEDURE — 1126F AMNT PAIN NOTED NONE PRSNT: CPT | Mod: CPTII,S$GLB,, | Performed by: FAMILY MEDICINE

## 2022-02-23 PROCEDURE — 99397 PR PREVENTIVE VISIT,EST,65 & OVER: ICD-10-PCS | Mod: S$GLB,,, | Performed by: FAMILY MEDICINE

## 2022-02-23 PROCEDURE — 3008F BODY MASS INDEX DOCD: CPT | Mod: CPTII,S$GLB,, | Performed by: FAMILY MEDICINE

## 2022-02-23 PROCEDURE — 1160F RVW MEDS BY RX/DR IN RCRD: CPT | Mod: CPTII,S$GLB,, | Performed by: FAMILY MEDICINE

## 2022-02-23 PROCEDURE — 3288F PR FALLS RISK ASSESSMENT DOCUMENTED: ICD-10-PCS | Mod: CPTII,S$GLB,, | Performed by: FAMILY MEDICINE

## 2022-02-23 PROCEDURE — 3078F DIAST BP <80 MM HG: CPT | Mod: CPTII,S$GLB,, | Performed by: FAMILY MEDICINE

## 2022-02-23 PROCEDURE — 1101F PT FALLS ASSESS-DOCD LE1/YR: CPT | Mod: CPTII,S$GLB,, | Performed by: FAMILY MEDICINE

## 2022-02-23 PROCEDURE — 3075F SYST BP GE 130 - 139MM HG: CPT | Mod: CPTII,S$GLB,, | Performed by: FAMILY MEDICINE

## 2022-02-23 PROCEDURE — 99999 PR PBB SHADOW E&M-EST. PATIENT-LVL V: CPT | Mod: PBBFAC,,, | Performed by: FAMILY MEDICINE

## 2022-02-23 PROCEDURE — 1159F MED LIST DOCD IN RCRD: CPT | Mod: CPTII,S$GLB,, | Performed by: FAMILY MEDICINE

## 2022-02-23 PROCEDURE — 3008F PR BODY MASS INDEX (BMI) DOCUMENTED: ICD-10-PCS | Mod: CPTII,S$GLB,, | Performed by: FAMILY MEDICINE

## 2022-02-23 PROCEDURE — 1160F PR REVIEW ALL MEDS BY PRESCRIBER/CLIN PHARMACIST DOCUMENTED: ICD-10-PCS | Mod: CPTII,S$GLB,, | Performed by: FAMILY MEDICINE

## 2022-02-23 PROCEDURE — 3078F PR MOST RECENT DIASTOLIC BLOOD PRESSURE < 80 MM HG: ICD-10-PCS | Mod: CPTII,S$GLB,, | Performed by: FAMILY MEDICINE

## 2022-02-23 PROCEDURE — 1126F PR PAIN SEVERITY QUANTIFIED, NO PAIN PRESENT: ICD-10-PCS | Mod: CPTII,S$GLB,, | Performed by: FAMILY MEDICINE

## 2022-02-23 PROCEDURE — 99397 PER PM REEVAL EST PAT 65+ YR: CPT | Mod: S$GLB,,, | Performed by: FAMILY MEDICINE

## 2022-02-23 PROCEDURE — 1159F PR MEDICATION LIST DOCUMENTED IN MEDICAL RECORD: ICD-10-PCS | Mod: CPTII,S$GLB,, | Performed by: FAMILY MEDICINE

## 2022-02-23 PROCEDURE — 3288F FALL RISK ASSESSMENT DOCD: CPT | Mod: CPTII,S$GLB,, | Performed by: FAMILY MEDICINE

## 2022-02-23 PROCEDURE — 1101F PR PT FALLS ASSESS DOC 0-1 FALLS W/OUT INJ PAST YR: ICD-10-PCS | Mod: CPTII,S$GLB,, | Performed by: FAMILY MEDICINE

## 2022-02-23 PROCEDURE — 3075F PR MOST RECENT SYSTOLIC BLOOD PRESS GE 130-139MM HG: ICD-10-PCS | Mod: CPTII,S$GLB,, | Performed by: FAMILY MEDICINE

## 2022-02-23 PROCEDURE — 99999 PR PBB SHADOW E&M-EST. PATIENT-LVL V: ICD-10-PCS | Mod: PBBFAC,,, | Performed by: FAMILY MEDICINE

## 2022-02-23 RX ORDER — ASPIRIN 81 MG/1
81 TABLET ORAL DAILY
Qty: 90 TABLET | Refills: 3
Start: 2022-02-23 | End: 2024-03-01

## 2022-02-23 NOTE — PATIENT INSTRUCTIONS
Follow up in about 6 months (around 8/23/2022), or if symptoms worsen or fail to improve, for Med refills, LAB RESULTS.     Dear patient,   As a result of recent federal legislation (The Federal Cures Act), you may receive lab or pathology results from your visit in your [facility] account before your physician is able to contact you. Your physician or their representative will relay the results to you with their recommendations at their soonest availability.     If no improvement in symptoms or symptoms worsen, please be advised to call MD, follow-up at clinic and/or go to ER if becomes severe.    Cuauhtemoc Gardner M.D.        We Offer TELEHEALTH & Same Day Appointments!   Book your Telehealth appointment with me through my nurse or   Clinic appointments on [employer]!    [address] [place] [address]    Office: [phone]   FAX: [phone]    Check out my Facebook Page and Follow Me at: [email or web address]    Check out my website at [employer] by clicking on: [email or web address]    To Schedule appointments online, go to [employer]: [email or web address]

## 2022-02-23 NOTE — PROGRESS NOTES
This note is specifically for wellness visit performed today.   WELLNESS EXAM    Patient ID: Aurelio Perkins is a 71 y.o. male.  has a past medical history of Acquired absence of kidney (1/28/2020), Arthritis, Cancer, Chronic kidney disease, Encounter for long-term (current) use of medications, Mitral valve prolapse, Moderate aortic stenosis (7/29/2019), and Ptosis of right eyelid (5/29/2019).   Chief Complaint:  Encounter for wellness exam    Well Adult Physical: Patient here for a comprehensive physical exam.The patient reports chronic problems.   The patient's last visit with me was on 6/24/2021.    He reports that he is having some intermittent chronic diarrhea that is on and off.  Resolve with Pepto.    Patient states that he has been getting testosterone from Endocrinology.  He is due for a visit.  Patient is concerned about his testosterone dosage and difficulty urinating.  Patient requesting referral to Urology.    He reports issues with Repatha but is currently back on it.  Hyperlipidemia:  CHRONIC. STABLE. Lab analysis reviewed.   (-) CP, SOB, abdominal pain, N/V/D, constipation, jaundice, skin changes.  (-) Myalgias  Lab Results   Component Value Date    CHOL 210 (H) 12/21/2021    CHOL 164 05/31/2021    CHOL 140 01/15/2021     Lab Results   Component Value Date    HDL 45 12/21/2021    HDL 52 05/31/2021    HDL 59 01/15/2021     Lab Results   Component Value Date    LDLCALC 133.0 12/21/2021    LDLCALC 87.2 05/31/2021    LDLCALC 53.0 (L) 01/15/2021     Lab Results   Component Value Date    TRIG 160 (H) 12/21/2021    TRIG 124 05/31/2021    TRIG 140 01/15/2021     Lab Results   Component Value Date    CHOLHDL 21.4 12/21/2021    CHOLHDL 31.7 05/31/2021    CHOLHDL 42.1 01/15/2021     Lab Results   Component Value Date    TOTALCHOLEST 4.7 12/21/2021    TOTALCHOLEST 3.2 05/31/2021    TOTALCHOLEST 2.4 01/15/2021     Lab Results   Component Value Date    ALT 43 12/21/2021    AST 24 12/21/2021    ALKPHOS 47 (L)  12/21/2021    BILITOT 1.0 12/21/2021     ======================================================  Blood pressures been well controlled.  Hypertension:  CHRONIC. STABLE. BP Reviewed.  Compliant with BP medications. No SE reported.   (-) CP, SOB, palpitations, dizziness, lightheadedness, HA, arm numbness, tingling or weakness, syncope.  Creatinine   Date Value Ref Range Status   12/21/2021 1.5 (H) 0.5 - 1.4 mg/dL Final         Do you take any herbs or supplements that were not prescribed by a doctor? no   Are you taking calcium supplements? no    History: UROLOGIST:  Erectile dysfunction, hypotestosteronism, urinary incontinence status post radical prostatectomy.  Patient cannot wear condom catheter due to retracted anatomy.  Date last PSA:   Lab Results   Component Value Date    PSA <0.01 05/31/2021    PSA <0.01 07/20/2020    PSA <0.01 06/21/2019      No results found for: TESTOSTERONE   Testosterone, Total (ng/dL)   Date Value   05/31/2021 372      Colon cancer screening:  Patient had colonoscopy in 2016.  He is currently due for colonoscopy.  Patient had polyps, hemorrhoids and diverticulosis.  Health Maintenance Topics with due status: Not Due       Topic Last Completion Date    Colorectal Cancer Screening 01/06/2020    PROSTATE-SPECIFIC ANTIGEN 05/31/2021    Lipid Panel 12/21/2021    Aspirin/Antiplatelet Therapy 02/23/2022      ==============================================  History reviewed.   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)  Co-surgery with Dr Vaibhav Jara - please put in his room  Microscope [facility];  Surgeon: Jimmy Segura DO;  Location: [facility] OR [address] [place] [address];  Service: Neurosurgery;  Laterality: Right;    HEMORRHOID SURGERY      NEPHRECTOMY      PROSTATECTOMY      TONSILLECTOMY       Review of patient's allergies indicates:   Allergen Reactions    Antihistamines - alkylamine Other (See Comments)     hallucinations    Benadryl [diphenhydramine hcl] Other (See Comments)     hallucinations    Codeine Itching     Turns red    Lodine [etodolac] Other (See Comments)     fatigue    Methotrexate analogues Other (See Comments)     Sunlight sensitivity    Morphine Itching     Turns red    Statins-hmg-coa reductase inhibitors      Current Outpatient Medications on File Prior to Visit   Medication Sig Dispense Refill    diclofenac (VOLTAREN) 75 MG EC tablet Take 1 tablet (75 mg total) by mouth daily as needed. 90 tablet 1    ergocalciferol (ERGOCALCIFEROL) 50,000 unit Cap TAKE 1 CAPSULE BY MOUTH ONE TIME PER WEEK 4 capsule 12    eszopiclone (LUNESTA) 3 mg Tab TAKE 1 TABLET BY MOUTH EVERY EVENING. 30 tablet 4    hydrocortisone (CORTEF) 10 MG Tab TAKE 1&1/2 TABLET BY MOUTH IN THE MORNING AND 1/2 TABLET IN THE EVENING AT 4PM 60 tablet 11    REPATHA PUSHTRONEX 420 mg/3.5 mL Injt INJECT 3.5 MLS INTO THE SKIN EVERY 30 DAYS. 10.5 mL 3    safety needles (BD SAFETYGLIDE NEEDLE) 25 gauge x 1" Ndle 1 each by Misc.(Non-Drug; Combo Route) route every 7 days. 25 each 0    SYNTHROID 112 mcg tablet TAKE 1 TABLET BY MOUTH ONCE DAILY. 90 tablet 3    syringe, disposable, 1 mL Syrg 1 Syringe by Misc.(Non-Drug; Combo Route) route once a week. 25 Syringe 3    syringe, disposable, 1 mL Syrg 1 Syringe by Misc.(Non-Drug; Combo Route) route once a week. 25 Syringe 0    testosterone cypionate (DEPOTESTOTERONE " CYPIONATE) 200 mg/mL injection Inject 0.5 mLs (100 mg total) into the muscle every 7 days. 2 mL 5    chlorzoxazone (PARAFON FORTE) 500 mg Tab Take 500 mg by mouth as needed (back spasm).       diclofenac sodium (VOLTAREN) 1 % Gel Apply topically.      diclofenac sodium/misoprostol (DICLOFENAC-MISOPROSTOL ORAL) diclofenac-misoprostol Take No date recorded No form recorded No frequency recorded No route recorded No set duration recorded No set duration amount recorded active No dosage strength recorded No dosage strength units of measure recorded      nitroGLYCERIN (NITROSTAT) 0.4 MG SL tablet Place 0.4 mg under the tongue.      [DISCONTINUED] chlorhexidine (PERIDEX) 0.12 % solution USE AS DIRECTED 15 MLS IN THE MOUTH OR THROAT 2 TIMES DAILY. FOR 14 DAYS (Patient not taking: Reported on 2/23/2022) 473 mL 6    [DISCONTINUED] golimumab (SIMPONI) 50 mg/0.5 mL PnIj Inject 50 mg into the skin every 30 days. (Patient not taking: Reported on 2/23/2022) 1 mL 6     No current facility-administered medications on file prior to visit.     Social History     Socioeconomic History    Marital status:    Tobacco Use    Smoking status: Never Smoker    Smokeless tobacco: Never Used   Substance and Sexual Activity    Alcohol use: No    Drug use: No    Sexual activity: Yes     Partners: Female     Family History   Adopted: Yes   Family history unknown: Yes       Review of Systems   Constitutional: Negative for chills, fatigue, fever and unexpected weight change.   HENT: Negative for ear pain and sore throat.    Eyes: Negative for redness and visual disturbance.   Respiratory: Negative for cough and shortness of breath.    Cardiovascular: Negative for chest pain and palpitations.   Gastrointestinal: Positive for diarrhea (Intermittent/chronic not current.). Negative for nausea and vomiting.   Endocrine: Negative for cold intolerance and heat intolerance.   Genitourinary: Positive for urgency. Negative for difficulty  urinating and hematuria.   Musculoskeletal: Negative for arthralgias and myalgias.   Skin: Negative for rash and wound.   Allergic/Immunologic: Negative for environmental allergies and food allergies.   Neurological: Negative for weakness and headaches.   Hematological: Negative for adenopathy. Does not bruise/bleed easily.   Psychiatric/Behavioral: Negative for sleep disturbance. The patient is not nervous/anxious.         Objective:     Vitals:    02/23/22 1016   BP: 130/76   Pulse: 60   Temp: 98.2 °F (36.8 °C)    Body mass index is 29.77 kg/m².  Physical Exam  Vitals and nursing note reviewed.   Constitutional:       General: He is not in acute distress.     Appearance: He is well-developed. He is not diaphoretic.   HENT:      Head: Normocephalic and atraumatic.   Eyes:      Pupils: Pupils are equal, round, and reactive to light.      Comments: Right eye ptosis chronic   Cardiovascular:      Rate and Rhythm: Normal rate and regular rhythm.      Heart sounds: Murmur heard.    Systolic murmur is present with a grade of 3/6.  Pulmonary:      Effort: Pulmonary effort is normal. No respiratory distress.      Breath sounds: Normal breath sounds. No wheezing.   Abdominal:      General: Bowel sounds are normal.      Palpations: Abdomen is soft.   Musculoskeletal:         General: Normal range of motion.      Cervical back: Normal range of motion and neck supple.   Skin:     General: Skin is warm and dry.      Capillary Refill: Capillary refill takes less than 2 seconds.      Findings: No rash.   Neurological:      Mental Status: He is alert and oriented to person, place, and time.      Cranial Nerves: No cranial nerve deficit.      Motor: No weakness.      Gait: Gait normal.   Psychiatric:         Attention and Perception: He is attentive.         Mood and Affect: Mood is not anxious or depressed. Affect is not labile, blunt, angry or inappropriate.         Speech: He is communicative. Speech is not rapid and pressured,  delayed, slurred or tangential.         Behavior: Behavior normal. Behavior is not agitated, slowed, aggressive, withdrawn, hyperactive or combative.         Thought Content: Thought content normal. Thought content is not paranoid or delusional. Thought content does not include homicidal or suicidal ideation. Thought content does not include homicidal or suicidal plan.         Cognition and Memory: Memory is not impaired.         Judgment: Judgment normal. Judgment is not impulsive or inappropriate.      deferred     Lab Visit on 12/21/2021   Component Date Value Ref Range Status    Hemoglobin A1C 12/21/2021 5.8 (A) 4.0 - 5.6 % Final    Comment: ADA Screening Guidelines:  5.7-6.4%  Consistent with prediabetes  >or=6.5%  Consistent with diabetes    High levels of fetal hemoglobin interfere with the HbA1C  assay. Heterozygous hemoglobin variants (HbS, HgC, etc)do  not significantly interfere with this assay.   However, presence of multiple variants may affect accuracy.      Estimated Avg Glucose 12/21/2021 120  68 - 131 mg/dL Final    Cholesterol 12/21/2021 210 (A) 120 - 199 mg/dL Final    Comment: The National Cholesterol Education Program (NCEP) has set the  following guidelines (reference ranges) for Cholesterol:  Optimal.....................<200 mg/dL  Borderline High.............200-239 mg/dL  High........................> or = 240 mg/dL      Triglycerides 12/21/2021 160 (A) 30 - 150 mg/dL Final    Comment: The National Cholesterol Education Program (NCEP) has set the  following guidelines (reference values) for triglycerides:  Normal......................<150 mg/dL  Borderline High.............150-199 mg/dL  High........................200-499 mg/dL      HDL 12/21/2021 45  40 - 75 mg/dL Final    Comment: The National Cholesterol Education Program (NCEP) has set the  following guidelines (reference values) for HDL Cholesterol:  Low...............<40 mg/dL  Optimal...........>60 mg/dL      LDL Cholesterol  12/21/2021 133.0  63.0 - 159.0 mg/dL Final    Comment: The National Cholesterol Education Program (NCEP) has set the  following guidelines (reference values) for LDL Cholesterol:  Optimal.......................<130 mg/dL  Borderline High...............130-159 mg/dL  High..........................160-189 mg/dL  Very High.....................>190 mg/dL      HDL/Cholesterol Ratio 12/21/2021 21.4  20.0 - 50.0 % Final    Total Cholesterol/HDL Ratio 12/21/2021 4.7  2.0 - 5.0 Final    Non-HDL Cholesterol 12/21/2021 165  mg/dL Final    Comment: Risk category and Non-HDL cholesterol goals:  Coronary heart disease (CHD)or equivalent (10-year risk of CHD >20%):  Non-HDL cholesterol goal     <130 mg/dL  Two or more CHD risk factors and 10-year risk of CHD <= 20%:  Non-HDL cholesterol goal     <160 mg/dL  0 to 1 CHD risk factor:  Non-HDL cholesterol goal     <190 mg/dL      WBC 12/21/2021 6.37  3.90 - 12.70 K/uL Final    RBC 12/21/2021 5.00  4.60 - 6.20 M/uL Final    Hemoglobin 12/21/2021 13.9 (A) 14.0 - 18.0 g/dL Final    Hematocrit 12/21/2021 44.0  40.0 - 54.0 % Final    MCV 12/21/2021 88  82 - 98 fL Final    MCH 12/21/2021 27.8  27.0 - 31.0 pg Final    MCHC 12/21/2021 31.6 (A) 32.0 - 36.0 g/dL Final    RDW 12/21/2021 13.1  11.5 - 14.5 % Final    Platelets 12/21/2021 155  150 - 450 K/uL Final    MPV 12/21/2021 11.0  9.2 - 12.9 fL Final    Immature Granulocytes 12/21/2021 0.3  0.0 - 0.5 % Final    Gran # (ANC) 12/21/2021 4.4  1.8 - 7.7 K/uL Final    Immature Grans (Abs) 12/21/2021 0.02  0.00 - 0.04 K/uL Final    Comment: Mild elevation in immature granulocytes is non specific and   can be seen in a variety of conditions including stress response,   acute inflammation, trauma and pregnancy. Correlation with other   laboratory and clinical findings is essential.      Lymph # 12/21/2021 1.2  1.0 - 4.8 K/uL Final    Mono # 12/21/2021 0.6  0.3 - 1.0 K/uL Final    Eos # 12/21/2021 0.1  0.0 - 0.5 K/uL Final    Baso  # 12/21/2021 0.03  0.00 - 0.20 K/uL Final    nRBC 12/21/2021 0  0 /100 WBC Final    Gran % 12/21/2021 69.7  38.0 - 73.0 % Final    Lymph % 12/21/2021 18.5  18.0 - 48.0 % Final    Mono % 12/21/2021 9.1  4.0 - 15.0 % Final    Eosinophil % 12/21/2021 1.9  0.0 - 8.0 % Final    Basophil % 12/21/2021 0.5  0.0 - 1.9 % Final    Differential Method 12/21/2021 Automated   Final    Sodium 12/21/2021 141  136 - 145 mmol/L Final    Potassium 12/21/2021 4.4  3.5 - 5.1 mmol/L Final    Chloride 12/21/2021 105  95 - 110 mmol/L Final    CO2 12/21/2021 26  23 - 29 mmol/L Final    Glucose 12/21/2021 80  70 - 110 mg/dL Final    BUN 12/21/2021 14  8 - 23 mg/dL Final    Creatinine 12/21/2021 1.5 (A) 0.5 - 1.4 mg/dL Final    Calcium 12/21/2021 9.3  8.7 - 10.5 mg/dL Final    Total Protein 12/21/2021 6.8  6.0 - 8.4 g/dL Final    Albumin 12/21/2021 4.0  3.5 - 5.2 g/dL Final    Total Bilirubin 12/21/2021 1.0  0.1 - 1.0 mg/dL Final    Comment: For infants and newborns, interpretation of results should be based  on gestational age, weight and in agreement with clinical  observations.    Premature Infant recommended reference ranges:  Up to 24 hours.............<8.0 mg/dL  Up to 48 hours............<12.0 mg/dL  3-5 days..................<15.0 mg/dL  6-29 days.................<15.0 mg/dL      Alkaline Phosphatase 12/21/2021 47 (A) 55 - 135 U/L Final    AST 12/21/2021 24  10 - 40 U/L Final    ALT 12/21/2021 43  10 - 44 U/L Final    Anion Gap 12/21/2021 10  8 - 16 mmol/L Final    eGFR if  12/21/2021 53.8 (A) >60 mL/min/1.73 m^2 Final    eGFR if non  12/21/2021 46.5 (A) >60 mL/min/1.73 m^2 Final    Comment: Calculation used to obtain the estimated glomerular filtration  rate (eGFR) is the CKD-EPI equation.       CRP 12/21/2021 1.5  0.0 - 8.2 mg/L Final    Sed Rate 12/21/2021 4  0 - 10 mm/Hr Final    Rheumatoid Factor 12/21/2021 <13.0  0.0 - 15.0 IU/mL Final    CCP Antibodies 12/21/2021 1.1   <5.0 U/mL Final    TSH 12/21/2021 0.014 (A) 0.400 - 4.000 uIU/mL Final    Specimen UA 12/21/2021 Urine, Clean Catch   Final    Color, UA 12/21/2021 Yellow  Yellow, Straw, Deepika Final    Appearance, UA 12/21/2021 Clear  Clear Final    pH, UA 12/21/2021 5.0  5.0 - 8.0 Final    Specific Gravity, UA 12/21/2021 1.010  1.005 - 1.030 Final    Protein, UA 12/21/2021 Negative  Negative Final    Comment: Recommend a 24 hour urine protein or a urine   protein/creatinine ratio if globulin induced proteinuria is  clinically suspected.      Glucose, UA 12/21/2021 Negative  Negative Final    Ketones, UA 12/21/2021 Negative  Negative Final    Bilirubin (UA) 12/21/2021 Negative  Negative Final    Occult Blood UA 12/21/2021 Negative  Negative Final    Nitrite, UA 12/21/2021 Negative  Negative Final    Leukocytes, UA 12/21/2021 Negative  Negative Final    Protein, Urine Random 12/21/2021 10  0 - 15 mg/dL Final    Creatinine, Urine 12/21/2021 96.0  23.0 - 375.0 mg/dL Final    Prot/Creat Ratio, Urine 12/21/2021 0.10  0.00 - 0.20 Final    Iron 12/21/2021 81  45 - 160 ug/dL Final    Transferrin 12/21/2021 247  200 - 375 mg/dL Final    TIBC 12/21/2021 366  250 - 450 ug/dL Final    Saturated Iron 12/21/2021 22  20 - 50 % Final    Ferritin 12/21/2021 182  20.0 - 300.0 ng/mL Final    Magnesium 12/21/2021 1.9  1.6 - 2.6 mg/dL Final    Phosphorus 12/21/2021 3.2  2.7 - 4.5 mg/dL Final    Uric Acid 12/21/2021 6.4  3.4 - 7.0 mg/dL Final    Free T4 12/21/2021 0.95  0.71 - 1.51 ng/dL Final        Assessment / Plan:    1.  Routine health exam-patient here for annual wellness exam.  Labs ordered.  Health maintenance was reviewed and ordered.  Anticipatory guidance: Don't smoke.  Healthy diet and regular exercise recommended. Vaccine recommendations discussed.  See orders.  Reviewed Anticipatory guidance, risk factor reduction interventions or counseling as needed, Complete history , physical was completed today.  Complete and  thorough medication reconciliation was performed.  Discussed risks and benefits of medications.  Advised patient on orders and health maintenance.  We discussed old records and old labs if available.  Will request any records not available through epic.  Continue current medications listed on your summary sheet.  All questions were answered. Patient had no further concerns. Advised of diagnoses and plan. Follow up as planned or return sooner if symptoms persist or worsen.   Hyperlipidemia:  Continue Repatha.Counseled on hyperlipidemia disease course, healthy diet and increased need for exercise.  Please be advised of the risk of cardiovascular disease, increase stroke and heart attack risk with uncontrolled/untreated hyperlipidemia.     Patient voiced understanding and understood the treatment plan. All questions were answered.     Low testosterone/urinary incontinence:  Referral to Urology for further evaluation treatment. The natural history of prostate cancer and ongoing controversy regarding screening and potential treatment outcomes of prostate cancer has been discussed with the patient. The meaning of a false positive PSA and a false negative PSA has been discussed. He indicates understanding of the limitations of this screening test and wishes  to proceed with screening PSA testing.        Patient is due for screening colonoscopy.  He is also having some chronic diarrhea that is intermittent.  I have recommended probiotics.  If no improvement recommend referral to GI.  Orders Placed This Encounter   Procedures    PSA, Screening     Standing Status:   Future     Standing Expiration Date:   4/24/2023    Ambulatory referral/consult to Urology     Standing Status:   Future     Standing Expiration Date:   3/23/2023     Referral Priority:   Routine     Referral Type:   Consultation     Referral Reason:   Specialty Services Required     Referred to Provider:   Larry Contreras MD     Requested Specialty:   Urology      Number of Visits Requested:   1    Case Request Endoscopy: COLONOSCOPY     Order Specific Question:   Pre-op Diagnosis     Answer:   History of colon polyps [134849]     Order Specific Question:   CPT Code:     Answer:   [place] INTERVAL >=3 YRS PATIENT'S LAST COLONOSCOPY DOCUMENTED [0529F]     Order Specific Question:   Case Referring Provider     Answer:   ROBINSON GARDNER [[id number]]     Order Specific Question:   CPT Code:     Answer:   [place] COLORECTAL SCRN; HI RISK IND []     Order Specific Question:   Post-Procedure Disposition:     Answer:   Amb Surgery/DOSC [2]     Order Specific Question:   Medical Necessity:     Answer:   Medically Non-Urgent [100]     Order Specific Question:   Is an on-site pathologist required for this procedure?     Answer:   N/A       Medications Ordered This Encounter   Medications    aspirin (ECOTRIN) 81 MG EC tablet     Sig: Take 1 tablet (81 mg total) by mouth once daily.     Dispense:  90 tablet     Refill:  3      Future Appointments     Date Provider Specialty Appt Notes    3/28/2022 Valente Regalado Jr., MD Cardiology 6 month f/u    5/23/2022  Lab     6/3/2022 Yoav Oliveira MD Rheumatology 4 month f/u    8/30/2022 Robinson Gardner MD Family Medicine 6 month f/u    10/4/2022 Brea Godoy PA-C Rheumatology 4 month f/u         Robinson Gardner MD

## 2022-02-24 ENCOUNTER — PATIENT MESSAGE (OUTPATIENT)
Dept: ENDOSCOPY | Facility: HOSPITAL | Age: 72
End: 2022-02-24
Payer: MEDICARE

## 2022-02-24 DIAGNOSIS — Z01.818 PRE-OP TESTING: Primary | ICD-10-CM

## 2022-02-24 RX ORDER — SOD SULF/POT CHLORIDE/MAG SULF 1.479 G
12 TABLET ORAL DAILY
Qty: 24 TABLET | Refills: 0 | Status: SHIPPED | OUTPATIENT
Start: 2022-02-24 | End: 2022-09-02

## 2022-03-03 ENCOUNTER — PATIENT MESSAGE (OUTPATIENT)
Dept: ENDOCRINOLOGY | Facility: CLINIC | Age: 72
End: 2022-03-03
Payer: MEDICARE

## 2022-03-03 RX ORDER — HYDROCORTISONE 10 MG/1
TABLET ORAL
Qty: 75 TABLET | Refills: 11 | Status: SHIPPED | OUTPATIENT
Start: 2022-03-03 | End: 2022-09-02 | Stop reason: SDUPTHER

## 2022-03-08 ENCOUNTER — TELEPHONE (OUTPATIENT)
Dept: RHEUMATOLOGY | Facility: CLINIC | Age: 72
End: 2022-03-08
Payer: MEDICARE

## 2022-03-08 ENCOUNTER — SPECIALTY PHARMACY (OUTPATIENT)
Dept: PHARMACY | Facility: CLINIC | Age: 72
End: 2022-03-08

## 2022-03-08 DIAGNOSIS — M05.9 SEROPOSITIVE RHEUMATOID ARTHRITIS: Primary | ICD-10-CM

## 2022-03-08 RX ORDER — GOLIMUMAB 50 MG/.5ML
50 INJECTION, SOLUTION SUBCUTANEOUS
Qty: 0.5 ML | Refills: 6 | OUTPATIENT
Start: 2022-03-08 | End: 2022-06-17 | Stop reason: SDUPTHER

## 2022-03-08 NOTE — TELEPHONE ENCOUNTER
PA Submitted via CMM: Key: [id number]    PA Approved:  CaseId:[id number];Status:Approved;Review Type:Prior Auth;Coverage Start Date:02/06/2022;Coverage End Date:03/08/2023    BI Complete; pt is in gap phase of medicare part D coverage; copay $370.99, forwarding to FA for review

## 2022-03-08 NOTE — TELEPHONE ENCOUNTER
Incoming call from [facility] inquiring about status of Simponi prescription. Appears Rx was discontinued by another provider prior to [facility] working on it. MD will resend Rx today and requests that [facility] work up as urgent. Will alert assigned [facility] team.

## 2022-03-08 NOTE — TELEPHONE ENCOUNTER
Please let him know I called [facility] and it looks like the new medication (simponi) prescription was canclled by another doctor he saw in his chart so they did not work on the approval at all. I resent the prescription today. Thanks for following up with us.

## 2022-03-09 NOTE — TELEPHONE ENCOUNTER
I have received both patient portion of PAP application and provider portion. I am submitted application to [employer] for review. I will follow up with  on 3/16/22 for status check of application

## 2022-03-09 NOTE — TELEPHONE ENCOUNTER
Incoming call from patient to check status on Simponi Rx. Informed PA is approved, but he has a $370.99 copay. FA team was unavailable to speak with him at the time. Advised someone will be reaching back out to get the PAP process started. Patient verbalized understanding.

## 2022-03-09 NOTE — TELEPHONE ENCOUNTER
Outgoing call to pt regarding Simponi rx we received. Informed pt medication has been approved through insurance with a high copay, no grants currently available only  assistance. Pt provided HH size and income to move forward with application. Sent pt portion to email address on file per pt's request. Sent a staff message to [facility] regarding Simponi assistance application and faxed application prescription to [phone] for provider review and signature. Blanca Bills will continue to follow up.

## 2022-03-10 ENCOUNTER — PATIENT MESSAGE (OUTPATIENT)
Dept: RHEUMATOLOGY | Facility: CLINIC | Age: 72
End: 2022-03-10
Payer: MEDICARE

## 2022-03-10 ENCOUNTER — PATIENT MESSAGE (OUTPATIENT)
Dept: ENDOCRINOLOGY | Facility: CLINIC | Age: 72
End: 2022-03-10
Payer: MEDICARE

## 2022-03-10 ENCOUNTER — TELEPHONE (OUTPATIENT)
Dept: NEUROSURGERY | Facility: CLINIC | Age: 72
End: 2022-03-10
Payer: MEDICARE

## 2022-03-10 ENCOUNTER — PATIENT MESSAGE (OUTPATIENT)
Dept: NEUROSURGERY | Facility: CLINIC | Age: 72
End: 2022-03-10
Payer: MEDICARE

## 2022-03-10 DIAGNOSIS — Z98.890 S/P CRANIOTOMY: Primary | ICD-10-CM

## 2022-03-10 DIAGNOSIS — D32.9 MENINGIOMA OF RIGHT SPHENOID WING INVOLVING CAVERNOUS SINUS: ICD-10-CM

## 2022-03-10 RX ORDER — TESTOSTERONE CYPIONATE 200 MG/ML
100 INJECTION, SOLUTION INTRAMUSCULAR
Qty: 2 ML | Refills: 2 | Status: SHIPPED | OUTPATIENT
Start: 2022-03-10 | End: 2022-08-16 | Stop reason: SDUPTHER

## 2022-03-16 ENCOUNTER — TELEPHONE (OUTPATIENT)
Dept: FAMILY MEDICINE | Facility: CLINIC | Age: 72
End: 2022-03-16
Payer: MEDICARE

## 2022-03-21 ENCOUNTER — TELEPHONE (OUTPATIENT)
Dept: RHEUMATOLOGY | Facility: CLINIC | Age: 72
End: 2022-03-21
Payer: MEDICARE

## 2022-03-21 NOTE — TELEPHONE ENCOUNTER
Office received fax paperwork signed and faxed back 3/9/22   ----- Message from Michaelle Cole sent at 3/9/2022  9:54 AM CST -----  Regarding: Assistance application  Hi Dr Godoy and StaffEdwin is approved by the patient's insurance with a high copay. We will be assisting the patient in applying to the  patient assistance program. I am faxing the application prescription to [phone] for your review and signature. Please fax signed paperwork back to my attention @ [phone]. If you would like the paperwork faxed to an alternate number please let me know.        Thank you,  Courtney Cole  Patient Care Advocate  [facility]  Ph: [phone]

## 2022-03-22 ENCOUNTER — TELEPHONE (OUTPATIENT)
Dept: NEUROSURGERY | Facility: CLINIC | Age: 72
End: 2022-03-22
Payer: MEDICARE

## 2022-03-22 NOTE — TELEPHONE ENCOUNTER
SUMEET pt, offered to move pt's appt to next week due to weather concerns this afternoon. Pt chose to keep MRI for today, but head home directly after. Will call and/or reschedule appt with Dr. Segura as appropriate based on results.

## 2022-03-22 NOTE — TELEPHONE ENCOUNTER
[facility] stated Patient address was missing which was verbally updated    Also, she stated that patient did not provide Federal Tax information; however, patient selected the box Applicant Financial Verification Authorization so that it could be reviewed.     Will follow up on 3/29    Also faxed the front and back of insurance cards

## 2022-03-29 ENCOUNTER — HOSPITAL ENCOUNTER (OUTPATIENT)
Facility: HOSPITAL | Age: 72
Discharge: HOME | End: 2022-03-29
Attending: FAMILY MEDICINE | Admitting: FAMILY MEDICINE
Payer: MEDICARE

## 2022-03-29 ENCOUNTER — ANESTHESIA (OUTPATIENT)
Dept: ENDOSCOPY | Facility: HOSPITAL | Age: 72
End: 2022-03-29
Payer: MEDICARE

## 2022-03-29 ENCOUNTER — ANESTHESIA EVENT (OUTPATIENT)
Dept: ENDOSCOPY | Facility: HOSPITAL | Age: 72
End: 2022-03-29
Payer: MEDICARE

## 2022-03-29 DIAGNOSIS — Z12.11 COLON CANCER SCREENING: Primary | ICD-10-CM

## 2022-03-29 PROCEDURE — 45380 PR COLONOSCOPY,BIOPSY: ICD-10-PCS | Mod: PT,,, | Performed by: INTERNAL MEDICINE

## 2022-03-29 PROCEDURE — 88305 TISSUE EXAM BY PATHOLOGIST: CPT | Performed by: PATHOLOGY

## 2022-03-29 PROCEDURE — 88305 TISSUE EXAM BY PATHOLOGIST: ICD-10-PCS | Mod: 26,,, | Performed by: PATHOLOGY

## 2022-03-29 PROCEDURE — 27201012 HC FORCEPS, HOT/COLD, DISP: Performed by: INTERNAL MEDICINE

## 2022-03-29 PROCEDURE — 63600175 PHARM REV CODE 636 W HCPCS: Performed by: NURSE ANESTHETIST, CERTIFIED REGISTERED

## 2022-03-29 PROCEDURE — 25000003 PHARM REV CODE 250: Performed by: NURSE ANESTHETIST, CERTIFIED REGISTERED

## 2022-03-29 PROCEDURE — 37000008 HC ANESTHESIA 1ST 15 MINUTES: Performed by: INTERNAL MEDICINE

## 2022-03-29 PROCEDURE — 45380 COLONOSCOPY AND BIOPSY: CPT | Performed by: INTERNAL MEDICINE

## 2022-03-29 PROCEDURE — 45380 COLONOSCOPY AND BIOPSY: CPT | Mod: PT,,, | Performed by: INTERNAL MEDICINE

## 2022-03-29 PROCEDURE — 88305 TISSUE EXAM BY PATHOLOGIST: CPT | Mod: 26,,, | Performed by: PATHOLOGY

## 2022-03-29 PROCEDURE — 37000009 HC ANESTHESIA EA ADD 15 MINS: Performed by: INTERNAL MEDICINE

## 2022-03-29 RX ORDER — PROPOFOL 10 MG/ML
VIAL (ML) INTRAVENOUS
Status: DISCONTINUED | OUTPATIENT
Start: 2022-03-29 | End: 2022-03-29

## 2022-03-29 RX ORDER — SODIUM CHLORIDE, SODIUM LACTATE, POTASSIUM CHLORIDE, CALCIUM CHLORIDE 600; 310; 30; 20 MG/100ML; MG/100ML; MG/100ML; MG/100ML
INJECTION, SOLUTION INTRAVENOUS CONTINUOUS PRN
Status: DISCONTINUED | OUTPATIENT
Start: 2022-03-29 | End: 2022-03-29

## 2022-03-29 RX ORDER — LIDOCAINE HYDROCHLORIDE 10 MG/ML
INJECTION, SOLUTION EPIDURAL; INFILTRATION; INTRACAUDAL; PERINEURAL
Status: DISCONTINUED | OUTPATIENT
Start: 2022-03-29 | End: 2022-03-29

## 2022-03-29 RX ORDER — SODIUM CHLORIDE 9 MG/ML
INJECTION, SOLUTION INTRAVENOUS CONTINUOUS
Status: DISCONTINUED | OUTPATIENT
Start: 2022-03-29 | End: 2022-03-29 | Stop reason: HOSPADM

## 2022-03-29 RX ADMIN — SODIUM CHLORIDE, SODIUM LACTATE, POTASSIUM CHLORIDE, AND CALCIUM CHLORIDE: .6; .31; .03; .02 INJECTION, SOLUTION INTRAVENOUS at 09:03

## 2022-03-29 RX ADMIN — PROPOFOL 20 MG: 10 INJECTION, EMULSION INTRAVENOUS at 10:03

## 2022-03-29 RX ADMIN — LIDOCAINE HYDROCHLORIDE 50 MG: 10 INJECTION, SOLUTION EPIDURAL; INFILTRATION; INTRACAUDAL; PERINEURAL at 09:03

## 2022-03-29 RX ADMIN — PROPOFOL 100 MG: 10 INJECTION, EMULSION INTRAVENOUS at 09:03

## 2022-03-29 RX ADMIN — PROPOFOL 20 MG: 10 INJECTION, EMULSION INTRAVENOUS at 09:03

## 2022-03-29 NOTE — ANESTHESIA POSTPROCEDURE EVALUATION
Anesthesia Post Evaluation    Patient: Aurelio Perkins    Procedure(s) Performed: Procedure(s) (LRB):  COLONOSCOPY (N/A)    Final Anesthesia Type: MAC      Patient location during evaluation: PACU  Patient participation: No - Unable to Participate, Sedation  Level of consciousness: sedated  Post-procedure vital signs: reviewed and stable  Pain management: adequate  Airway patency: patent    PONV status at discharge: No PONV  Anesthetic complications: no      Cardiovascular status: blood pressure returned to baseline and hemodynamically stable  Respiratory status: unassisted and spontaneous ventilation  Hydration status: euvolemic  Follow-up not needed.              No case tracking events are documented in the log.      Pain/Justin Score: No data recorded

## 2022-03-29 NOTE — ANESTHESIA PREPROCEDURE EVALUATION
03/29/2022  Aurelio Perkins is a 71 y.o., male.      Pre-op Assessment    I have reviewed the Patient Summary Reports.     I have reviewed the Nursing Notes. I have reviewed the NPO Status.   I have reviewed the Medications.     Review of Systems  Anesthesia Hx:  No problems with previous Anesthesia  Denies Family Hx of Anesthesia complications.   Denies Personal Hx of Anesthesia complications.   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   Denies CABG/stent.      hyperlipidemia ECG has been reviewed. ekg 3/2021:  Sinus rhythm with marked sinus arrythmia 60   Nonspecific T wave abnormality   Abnormal ECG   When compared with ECG of 03-MAR-2019 20:39,   No significant change was found    Echo 1/2020:   1 - Normal left ventricular systolic function (EF 65-70%).     2 - Concentric hypertrophy.     3 - Normal left ventricular diastolic function.     4 - Normal right ventricular systolic function .     5 - Trivial to mild aortic regurgitation.     6 - Moderate aortic stenosis, MELVIN = 1.17 cm2, AVAi = 0.55 cm2/m2, peak velocity = 3.42 m/s, mean gradient = 28 mmHg.     7 - Mild tricuspid regurgitation.     8 - Trivial pericardial effusion.     9 - The estimated PA systolic pressure is 23 mmHg.   Pulmonary:   Denies COPD.  Denies Asthma.  Denies Sleep Apnea.    Renal/:   Chronic Renal Disease, CRI    Hepatic/GI:   Bowel Prep. Denies GERD. Denies Liver Disease.  Denies Hepatitis.    Musculoskeletal:   Arthritis     Neurological:   Denies CVA. Denies Seizures. Chronic fatigue  Memory change   Endocrine:   Denies Diabetes. Hypothyroidism Secondary adrenal insufficiency  Panhypopituitarism           Physical  Exam    Airway:  Mallampati: II   Mouth Opening: Normal    Dental:  Intact    Chest/Lungs:  Clear to auscultation, Normal Respiratory Rate    Heart:  Rate: Normal  Rhythm: Regular Rhythm        Anesthesia Plan  Type of Anesthesia, risks & benefits discussed:    Anesthesia Type: MAC  Intra-op Monitoring Plan: Standard ASA Monitors  Induction:  IV  Informed Consent: Informed consent signed with the Patient and all parties understand the risks and agree with anesthesia plan.  All questions answered.   ASA Score: 2  Day of Surgery Review of History & Physical: H&P Update referred to the surgeon/provider.    Ready For Surgery From Anesthesia Perspective.     .

## 2022-03-29 NOTE — TRANSFER OF CARE
"Anesthesia Transfer of Care Note    Patient: Aurelio Perkins    Procedure(s) Performed: Procedure(s) (LRB):  COLONOSCOPY (N/A)    Patient location: PACU    Anesthesia Type: MAC    Transport from OR: Transported from OR on room air with adequate spontaneous ventilation    Post pain: adequate analgesia    Post assessment: no apparent anesthetic complications and tolerated procedure well    Post vital signs: stable    Level of consciousness: sedated    Nausea/Vomiting: no nausea/vomiting    Complications: none    Transfer of care protocol was followed      Last vitals:   Visit Vitals  BP (!) 144/87 (BP Location: Left arm, Patient Position: Lying)   Pulse 63   Resp 17   Ht 5' 11" (1.803 m)   Wt 95.3 kg (210 lb)   SpO2 97%   BMI 29.29 kg/m²     "

## 2022-03-29 NOTE — PROVATION PATIENT INSTRUCTIONS
Discharge Summary/Instructions after an Endoscopic Procedure  Patient Name: Aurelio Perkins  Patient MRN: [id number]  Patient YOB: 1950 Tuesday, March 29, 2022 Cayla Sherman MD  Dear patient,  As a result of recent federal legislation (The Federal Cures Act), you may   receive lab or pathology results from your procedure in your [facility]   account before your physician is able to contact you. Your physician or   their representative will relay the results to you with their   recommendations at their soonest availability.  Thank you,  RESTRICTIONS:  During your procedure today, you received medications for sedation.  These   medications may affect your judgment, balance and coordination.  Therefore,   for 24 hours, you have the following restrictions:   - DO NOT drive a car, operate machinery, make legal/financial decisions,   sign important papers or drink alcohol.    ACTIVITY:  Today: no heavy lifting, straining or running due to procedural   sedation/anesthesia.  The following day: return to full activity including work.  DIET:  Eat and drink normally unless instructed otherwise.     TREATMENT FOR COMMON SIDE EFFECTS:  - Mild abdominal pain, nausea, belching, bloating or excessive gas:  rest,   eat lightly and use a heating pad.  - Sore Throat: treat with throat lozenges and/or gargle with warm salt   water.  - Because air was used during the procedure, expelling large amounts of air   from your rectum or belching is normal.  - If a bowel prep was taken, you may not have a bowel movement for 1-3 days.    This is normal.  SYMPTOMS TO WATCH FOR AND REPORT TO YOUR PHYSICIAN:  1. Abdominal pain or bloating, other than gas cramps.  2. Chest pain.  3. Back pain.  4. Signs of infection such as: chills or fever occurring within 24 hours   after the procedure.  5. Rectal bleeding, which would show as bright red, maroon, or black stools.   (A tablespoon of blood from the rectum is not serious, especially if    hemorrhoids are present.)  6. Vomiting.  7. Weakness or dizziness.  GO DIRECTLY TO THE NEAREST EMERGENCY ROOM IF YOU HAVE ANY OF THE FOLLOWING:      Difficulty breathing              Chills and/or fever over 101 F   Persistent vomiting and/or vomiting blood   Severe abdominal pain   Severe chest pain   Black, tarry stools   Bleeding- more than one tablespoon   Any other symptom or condition that you feel may need urgent attention  Your doctor recommends these additional instructions:  If any biopsies were taken, your doctors clinic will contact you in 1 to 2   weeks with any results.  - Patient has a contact number available for emergencies.  The signs and   symptoms of potential delayed complications were discussed with the   patient.  Return to normal activities tomorrow.  Written discharge   instructions were provided to the patient.   - Discharge patient to home (via wheelchair).   - Resume previous diet today.   - Continue present medications.   - Await pathology results.   - Repeat colonoscopy in 5 years for surveillance.  For questions, problems or results please call your physician Cayla Sherman MD at Work:  [phone]  If you have any questions about the above instructions, call the GI   department at [phone] or call the endoscopy unit at [phone]   from 7am until 3 pm.  [facility] [place], EMERGENCY ROOM PHONE NUMBER:   [phone]  IF A COMPLICATION OR EMERGENCY SITUATION ARISES AND YOU ARE UNABLE TO REACH   YOUR PHYSICIAN - GO DIRECTLY TO THE EMERGENCY ROOM.  I have read or have had read to me these discharge instructions for my   procedure and have received a written copy.  I understand these   instructions and will follow-up with my physician if I have any questions.     __________________________________       _____________________________________  Nurse Signature                                          Patient/Designated   Responsible Party Signature  Cayla  MD Cayla Sherman MD  3/29/2022 10:07:34 AM  This report has been verified and signed electronically.  Dear patient,  As a result of recent federal legislation (The Federal Cures Act), you may   receive lab or pathology results from your procedure in your [facility]   account before your physician is able to contact you. Your physician or   their representative will relay the results to you with their   recommendations at their soonest availability.  Thank you,  PROVATION

## 2022-03-29 NOTE — PLAN OF CARE
DR HUTCHINSON AT BEDSIDE TO SPEAK TO PT. REGARDING RESULTS.  VSS, NO GI BLEEDING, NO ABD. PAIN, NO N/V. PT. DISCHARGED FROM UNIT.

## 2022-03-29 NOTE — TELEPHONE ENCOUNTER
Stat Check- [facility] - rep Graeme    Temporary Approval on 3/24. Patient will be receiving a letter of attestation and to provider OOP drug cost from pharmacy.     Outpatient call - no answer/left message    Calling patient to inform him of temporary approval and notes from call.     Routing to Pharmacist to close referral as medication will come from r

## 2022-03-29 NOTE — TELEPHONE ENCOUNTER
Patient return call to [facility]- Infrom of approval of PAP of [employer] per previous note. Need to clarify if patient need any additional document from being temporary approved.Patient can be reach back at any later time.

## 2022-03-29 NOTE — H&P
PRE PROCEDURE H&P    Patient Name: Aurelio Perkins  MRN: [id number]  : 1950  Date of Procedure:  3/29/2022  Referring Physician: Cuauhtemoc Gardner MD  Primary Physician: Cuauhtemoc Gardner MD  Procedure Physician: Cayla Sherman MD       Planned Procedure: Colonoscopy  Diagnosis: screening for colon cancer  Chief Complaint: Same as above    HPI: Patient is an 71 y.o. male is here for the above.     Last colonoscopy:   Family history: negative   Anticoagulation: none     Past Medical History:   Past Medical History:   Diagnosis Date    Acquired absence of kidney 2020    Arthritis     Cancer     prostate cancer-Removed Prostate    Chronic kidney disease     one kidney    Encounter for long-term (current) use of medications     Mitral valve prolapse     Moderate aortic stenosis 2019    Ptosis of right eyelid 2019        Past Surgical History:  Past Surgical History:   Procedure Laterality Date    CRANIOTOMY Right 2019    Procedure: CRANIOTOMY  (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)  Co-surgery with Dr Vaibhav Jara - please put in his room  Microscope Harrison Violet Grey Sonopet Stegerald;  Surgeon: Jimmy Segura DO;  Location: [facility] OR [address] [place] [address];  Service: Neurosurgery;  Laterality: Right;    HEMORRHOID SURGERY      NEPHRECTOMY      PROSTATECTOMY      TONSILLECTOMY          Home Medications:  Prior to Admission medications    Medication Sig Start Date End Date Taking? Authorizing Provider   aspirin (ECOTRIN) 81 MG EC tablet Take 1 tablet (81 mg total) by mouth once daily. 22 Yes Cuauhtemoc Gardner MD   diclofenac (VOLTAREN) 75 MG EC tablet Take 1 tablet (75 mg total) by mouth daily as needed. 2/3/22  Yes Brea Godoy PA-C   ergocalciferol (ERGOCALCIFEROL) 50,000 unit Cap TAKE 1 CAPSULE BY MOUTH ONE TIME PER WEEK 21  Yes Cuauhtemoc Gardner MD   eszopiclone (LUNESTA) 3 mg Tab TAKE 1 TABLET BY MOUTH EVERY EVENING.  "2/13/22  Yes Yoav Oliveira MD   hydrocortisone (CORTEF) 10 MG Tab TAKE 1 AND 1/2 TABLETS (15 mg) BY MOUTH IN THE MORNING AND 1 TABLET (10 mg) BY MOUTH IN THE EVENING AT 4PM 3/3/22  Yes Maria Esther Hammonds MD   SYNTHROID 112 mcg tablet TAKE 1 TABLET BY MOUTH ONCE DAILY. 11/23/21  Yes Maria Esther Hammonds MD   chlorzoxazone (PARAFON FORTE) 500 mg Tab Take 500 mg by mouth as needed (back spasm).  1/18/20   Historical Provider   diclofenac sodium (VOLTAREN) 1 % Gel Apply topically. 6/21/21   Historical Provider   diclofenac sodium/misoprostol (DICLOFENAC-MISOPROSTOL ORAL) diclofenac-misoprostol Take No date recorded No form recorded No frequency recorded No route recorded No set duration recorded No set duration amount recorded active No dosage strength recorded No dosage strength units of measure recorded    Historical Provider   golimumab (SIMPONI) 50 mg/0.5 mL PnIj Inject 50 mg into the skin every 30 days. 3/8/22 3/8/23  Brea Godoy PA-C   nitroGLYCERIN (NITROSTAT) 0.4 MG SL tablet Place 0.4 mg under the tongue. 7/24/19   Historical Provider   REPATHA PUSHTRONEX 420 mg/3.5 mL Injt INJECT 3.5 MLS INTO THE SKIN EVERY 30 DAYS. 12/17/21   Cuauhtemoc Gardner MD   safety needles (BD SAFETYGLIDE NEEDLE) 25 gauge x 1" Ndle 1 each by Misc.(Non-Drug; Combo Route) route every 7 days. 6/9/21   Brea Godoy PA-C   sod sulf-pot chloride-mag sulf (SUTAB) 1.479-0.188- 0.225 gram tablet Take 12 tablets by mouth once daily. Take according to package instructions with indicated amount of water. 2/24/22   Rodrigo Hernandez PA-C   syringe, disposable, 1 mL Syrg 1 Syringe by Misc.(Non-Drug; Combo Route) route once a week. 5/27/16   Yoav Oliveira MD   syringe, disposable, 1 mL Syrg 1 Syringe by Misc.(Non-Drug; Combo Route) route once a week. 6/9/21   Brea Godoy PA-C   testosterone cypionate (DEPOTESTOTERONE CYPIONATE) 200 mg/mL injection Inject 0.5 mLs (100 mg total) into the muscle every 7 days. 3/10/22   Maria Esther Hammonds MD    " "    Allergies:  Review of patient's allergies indicates:   Allergen Reactions    Antihistamines - alkylamine Other (See Comments)     hallucinations    Benadryl [diphenhydramine hcl] Other (See Comments)     hallucinations    Codeine Itching     Turns red    Lodine [etodolac] Other (See Comments)     fatigue    Methotrexate analogues Other (See Comments)     Sunlight sensitivity    Morphine Itching     Turns red    Statins-hmg-coa reductase inhibitors         Social History:   Social History     Socioeconomic History    Marital status:    Tobacco Use    Smoking status: Never Smoker    Smokeless tobacco: Never Used   Substance and Sexual Activity    Alcohol use: No    Drug use: No    Sexual activity: Yes     Partners: Female       Family History:  Family History   Adopted: Yes   Family history unknown: Yes       ROS: No acute cardiac events, no acute respiratory complaints.     Physical Exam (all patients):    BP (!) 144/87 (BP Location: Left arm, Patient Position: Lying)   Pulse 63   Resp 17   Ht 5' 11" (1.803 m)   Wt 95.3 kg (210 lb)   SpO2 97%   BMI 29.29 kg/m²   Lungs: Clear to auscultation bilaterally, respirations unlabored  Heart: Regular rate and rhythm, S1 and S2 normal, no obvious murmurs  Abdomen:         Soft, non-tender, bowel sounds normal, no masses, no organomegaly    Lab Results   Component Value Date    WBC 6.37 12/21/2021    MCV 88 12/21/2021    RDW 13.1 12/21/2021     12/21/2021    INR 1.0 05/29/2019    GLU 80 12/21/2021    HGBA1C 5.8 (H) 12/21/2021    BUN 14 12/21/2021     12/21/2021    K 4.4 12/21/2021     12/21/2021        SEDATION PLAN: per anesthesia      History reviewed, vital signs satisfactory, cardiopulmonary status satisfactory, sedation options, risks and plans have been discussed with the patient  All their questions were answered and the patient agrees to the sedation procedures as planned and the patient is deemed an appropriate candidate " for the sedation as planned.    Procedure explained to patient, informed consent obtained and placed in chart.    Cayla Sherman  3/29/2022  9:45 AM

## 2022-03-30 VITALS
DIASTOLIC BLOOD PRESSURE: 86 MMHG | WEIGHT: 210 LBS | RESPIRATION RATE: 18 BRPM | HEART RATE: 51 BPM | HEIGHT: 71 IN | BODY MASS INDEX: 29.4 KG/M2 | SYSTOLIC BLOOD PRESSURE: 157 MMHG | OXYGEN SATURATION: 99 % | TEMPERATURE: 98 F

## 2022-04-04 ENCOUNTER — TELEPHONE (OUTPATIENT)
Dept: NEUROSURGERY | Facility: CLINIC | Age: 72
End: 2022-04-04
Payer: MEDICARE

## 2022-04-04 NOTE — TELEPHONE ENCOUNTER
SUMEET pt, who requested to reschedule MRI. MRI scheduled 4/19/22 with creatinine lab work the morning of in [place] per pt request. Pt happy with date and time, he did not have any further questions at this time. Letter mailed.     ----- Message from Tremontana Chevalier sent at 4/4/2022  9:58 AM CDT -----  Regarding: Pt Advise  Contact: Pt @ [phone]  Pt request information on the brain scan (MRI) that is scheduled for today. Has questions, please call.

## 2022-04-05 LAB
FINAL PATHOLOGIC DIAGNOSIS: NORMAL
GROSS: NORMAL
Lab: NORMAL

## 2022-04-12 ENCOUNTER — LAB VISIT (OUTPATIENT)
Dept: LAB | Facility: HOSPITAL | Age: 72
End: 2022-04-12
Payer: MEDICARE

## 2022-04-12 DIAGNOSIS — D63.1 ANEMIA OF CHRONIC RENAL FAILURE: ICD-10-CM

## 2022-04-12 DIAGNOSIS — Z90.79 ACQUIRED ABSENCE OF ORGAN, GENITAL ORGANS: ICD-10-CM

## 2022-04-12 DIAGNOSIS — E55.9 VITAMIN D DEFICIENCY: ICD-10-CM

## 2022-04-12 DIAGNOSIS — Z90.5 ACQUIRED ABSENCE OF KIDNEY: ICD-10-CM

## 2022-04-12 DIAGNOSIS — N18.31 STAGE 3A CHRONIC KIDNEY DISEASE: Primary | ICD-10-CM

## 2022-04-12 DIAGNOSIS — N18.9 ANEMIA OF CHRONIC RENAL FAILURE: ICD-10-CM

## 2022-04-12 DIAGNOSIS — I10 ESSENTIAL HYPERTENSION, MALIGNANT: ICD-10-CM

## 2022-04-12 LAB
ALBUMIN SERPL BCP-MCNC: 3.7 G/DL (ref 3.5–5.2)
ALP SERPL-CCNC: 46 U/L (ref 55–135)
ALT SERPL W/O P-5'-P-CCNC: 29 U/L (ref 10–44)
ANION GAP SERPL CALC-SCNC: 8 MMOL/L (ref 8–16)
AST SERPL-CCNC: 26 U/L (ref 10–40)
BACTERIA #/AREA URNS HPF: ABNORMAL /HPF
BASOPHILS # BLD AUTO: 0.03 K/UL (ref 0–0.2)
BASOPHILS NFR BLD: 0.6 % (ref 0–1.9)
BILIRUB SERPL-MCNC: 0.9 MG/DL (ref 0.1–1)
BILIRUB UR QL STRIP: NEGATIVE
BUN SERPL-MCNC: 18 MG/DL (ref 8–23)
CALCIUM SERPL-MCNC: 9 MG/DL (ref 8.7–10.5)
CHLORIDE SERPL-SCNC: 105 MMOL/L (ref 95–110)
CLARITY UR: CLEAR
CO2 SERPL-SCNC: 25 MMOL/L (ref 23–29)
COLOR UR: YELLOW
CREAT SERPL-MCNC: 1.2 MG/DL (ref 0.5–1.4)
CREAT UR-MCNC: 72 MG/DL (ref 23–375)
DIFFERENTIAL METHOD: ABNORMAL
EOSINOPHIL # BLD AUTO: 0.1 K/UL (ref 0–0.5)
EOSINOPHIL NFR BLD: 2 % (ref 0–8)
ERYTHROCYTE [DISTWIDTH] IN BLOOD BY AUTOMATED COUNT: 12.6 % (ref 11.5–14.5)
EST. GFR  (AFRICAN AMERICAN): >60 ML/MIN/1.73 M^2
EST. GFR  (NON AFRICAN AMERICAN): >60 ML/MIN/1.73 M^2
GLUCOSE SERPL-MCNC: 96 MG/DL (ref 70–110)
GLUCOSE UR QL STRIP: NEGATIVE
HCT VFR BLD AUTO: 41.1 % (ref 40–54)
HGB BLD-MCNC: 13.1 G/DL (ref 14–18)
HGB UR QL STRIP: ABNORMAL
IMM GRANULOCYTES # BLD AUTO: 0.02 K/UL (ref 0–0.04)
IMM GRANULOCYTES NFR BLD AUTO: 0.4 % (ref 0–0.5)
IRON SERPL-MCNC: 77 UG/DL (ref 45–160)
KETONES UR QL STRIP: NEGATIVE
LEUKOCYTE ESTERASE UR QL STRIP: ABNORMAL
LYMPHOCYTES # BLD AUTO: 1.1 K/UL (ref 1–4.8)
LYMPHOCYTES NFR BLD: 19.5 % (ref 18–48)
MAGNESIUM SERPL-MCNC: 1.9 MG/DL (ref 1.6–2.6)
MCH RBC QN AUTO: 27.6 PG (ref 27–31)
MCHC RBC AUTO-ENTMCNC: 31.9 G/DL (ref 32–36)
MCV RBC AUTO: 87 FL (ref 82–98)
MICROSCOPIC COMMENT: ABNORMAL
MONOCYTES # BLD AUTO: 0.6 K/UL (ref 0.3–1)
MONOCYTES NFR BLD: 10.6 % (ref 4–15)
NEUTROPHILS # BLD AUTO: 3.6 K/UL (ref 1.8–7.7)
NEUTROPHILS NFR BLD: 66.9 % (ref 38–73)
NITRITE UR QL STRIP: POSITIVE
NRBC BLD-RTO: 0 /100 WBC
PH UR STRIP: 6 [PH] (ref 5–8)
PHOSPHATE SERPL-MCNC: 2.1 MG/DL (ref 2.7–4.5)
PLATELET # BLD AUTO: 178 K/UL (ref 150–450)
PMV BLD AUTO: 10.6 FL (ref 9.2–12.9)
POTASSIUM SERPL-SCNC: 4.1 MMOL/L (ref 3.5–5.1)
PROT SERPL-MCNC: 6.1 G/DL (ref 6–8.4)
PROT UR QL STRIP: NEGATIVE
PROT UR-MCNC: <7 MG/DL (ref 0–15)
PROT/CREAT UR: NORMAL MG/G{CREAT} (ref 0–0.2)
PTH-INTACT SERPL-MCNC: 43.1 PG/ML (ref 9–77)
RBC # BLD AUTO: 4.74 M/UL (ref 4.6–6.2)
RBC #/AREA URNS HPF: 1 /HPF (ref 0–4)
SATURATED IRON: 25 % (ref 20–50)
SODIUM SERPL-SCNC: 138 MMOL/L (ref 136–145)
SP GR UR STRIP: 1.02 (ref 1–1.03)
SQUAMOUS #/AREA URNS HPF: 1 /HPF
TOTAL IRON BINDING CAPACITY: 305 UG/DL (ref 250–450)
TRANSFERRIN SERPL-MCNC: 206 MG/DL (ref 200–375)
URATE SERPL-MCNC: 7 MG/DL (ref 3.4–7)
URN SPEC COLLECT METH UR: ABNORMAL
WBC # BLD AUTO: 5.38 K/UL (ref 3.9–12.7)
WBC #/AREA URNS HPF: 12 /HPF (ref 0–5)

## 2022-04-12 PROCEDURE — 87077 CULTURE AEROBIC IDENTIFY: CPT | Performed by: INTERNAL MEDICINE

## 2022-04-12 PROCEDURE — 84156 ASSAY OF PROTEIN URINE: CPT | Performed by: INTERNAL MEDICINE

## 2022-04-12 PROCEDURE — 85025 COMPLETE CBC W/AUTO DIFF WBC: CPT | Performed by: INTERNAL MEDICINE

## 2022-04-12 PROCEDURE — 81000 URINALYSIS NONAUTO W/SCOPE: CPT | Mod: PO | Performed by: INTERNAL MEDICINE

## 2022-04-12 PROCEDURE — 80053 COMPREHEN METABOLIC PANEL: CPT | Performed by: INTERNAL MEDICINE

## 2022-04-12 PROCEDURE — 83970 ASSAY OF PARATHORMONE: CPT | Performed by: INTERNAL MEDICINE

## 2022-04-12 PROCEDURE — 84466 ASSAY OF TRANSFERRIN: CPT | Performed by: INTERNAL MEDICINE

## 2022-04-12 PROCEDURE — 84550 ASSAY OF BLOOD/URIC ACID: CPT | Performed by: INTERNAL MEDICINE

## 2022-04-12 PROCEDURE — 83735 ASSAY OF MAGNESIUM: CPT | Performed by: INTERNAL MEDICINE

## 2022-04-12 PROCEDURE — 36415 COLL VENOUS BLD VENIPUNCTURE: CPT | Mod: PO | Performed by: INTERNAL MEDICINE

## 2022-04-12 PROCEDURE — 84100 ASSAY OF PHOSPHORUS: CPT | Performed by: INTERNAL MEDICINE

## 2022-04-12 PROCEDURE — 87186 SC STD MICRODIL/AGAR DIL: CPT | Performed by: INTERNAL MEDICINE

## 2022-04-12 PROCEDURE — 87088 URINE BACTERIA CULTURE: CPT | Performed by: INTERNAL MEDICINE

## 2022-04-12 PROCEDURE — 87086 URINE CULTURE/COLONY COUNT: CPT | Performed by: INTERNAL MEDICINE

## 2022-04-13 NOTE — PROGRESS NOTES
Digital Medicine: Clinician Follow-Up    Routine outreach.     Patient reports doing well. Denies Issues or concerns at this time.     Chart review shows controlled readings at last two appts on 7/28 and 8/3 respectively, 121/74 and 123/72.     Patient denies recent changes to diet or exercise but feels the readings are elevated due to not resting long enough before readings.     The history is provided by the patient.   Follow-up reason(s): routine follow up.     Readings are trending up   due to inaccurate technique.        Last 5 Patient Entered Readings                                      Current 30 Day Average: 140/82     Recent Readings 7/16/2020 7/16/2020 7/16/2020 7/16/2020 7/16/2020    SBP (mmHg) - 142 142 - 151    DBP (mmHg) - 79 79 - 90    Pulse 56 - 56 58 -             Screenings    ASSESSMENT(S)  Patients BP average is 140/82 mmHg, which is above goal. Patient's BP goal is less than or equal to 130/80 per 2017 ACC/AHA Hypertension Guidelines.   BPs at home are uncontrolled but inconsistent with very recent in clinic readings showing controlled BP. Attempted to review technique but patient declined noting he was certain it was related to not resting long enough. Noted in another note patient had taken a few readings before his meds - reports adjusting this consistently after that visit.     PLAN  Additional monitoring needed: Will; monitor for improvement with technique adjustment.   Continue current therapy:  Continue current diet/physical activity routine:    Patient verbalizes understanding. Patient did not express questions or concerns and patient has contact information if needed.          There are no preventive care reminders to display for this patient.      Hypertension Medications             hydroCHLOROthiazide (MICROZIDE) 12.5 mg capsule Take 1 capsule (12.5 mg total) by mouth once daily.    metoprolol succinate (TOPROL-XL) 25 MG 24 hr tablet Take 1 tablet (25 mg total) by mouth once daily.  Monitor for increased risk corneal edema.    nitroGLYCERIN (NITROSTAT) 0.4 MG SL tablet Place 0.4 mg under the tongue.

## 2022-04-14 LAB
BACTERIA UR CULT: ABNORMAL
BACTERIA UR CULT: ABNORMAL

## 2022-04-14 NOTE — TELEPHONE ENCOUNTER
Patient incoming informing he did not get Simponi through PAP.   Call [employer] rep Jennifer - who confirm that he is temporarily approve through a processing card until 5/23/22   -pending full approval for 12/31/22 with attestation form that was sent to patient to sign/date to attest that patient will try their best to meet 4% OOP [place] ($1,864 total at end of year).-Resend fax to [facility] per rep.    Once attest form submitted back to [employer] they are fully approval to 12/31/22    Rep provided PAP processing info to add to [facility] but adjudication reject but possibly need to go to regular retail location and not specialty    Request Rep can discuss with patient and if needed to resend form.

## 2022-04-18 ENCOUNTER — PATIENT MESSAGE (OUTPATIENT)
Dept: ADMINISTRATIVE | Facility: OTHER | Age: 72
End: 2022-04-18
Payer: MEDICARE

## 2022-04-19 ENCOUNTER — HOSPITAL ENCOUNTER (OUTPATIENT)
Dept: RADIOLOGY | Facility: HOSPITAL | Age: 72
Discharge: HOME | End: 2022-04-19
Attending: NEUROLOGICAL SURGERY
Payer: MEDICARE

## 2022-04-19 ENCOUNTER — LAB VISIT (OUTPATIENT)
Dept: LAB | Facility: HOSPITAL | Age: 72
End: 2022-04-19
Attending: NEUROLOGICAL SURGERY
Payer: MEDICARE

## 2022-04-19 DIAGNOSIS — Z98.890 S/P CRANIOTOMY: ICD-10-CM

## 2022-04-19 DIAGNOSIS — D32.9 MENINGIOMA OF RIGHT SPHENOID WING INVOLVING CAVERNOUS SINUS: ICD-10-CM

## 2022-04-19 LAB
CREAT SERPL-MCNC: 1.4 MG/DL (ref 0.5–1.4)
EST. GFR  (AFRICAN AMERICAN): 58.4 ML/MIN/1.73 M^2
EST. GFR  (NON AFRICAN AMERICAN): 50.5 ML/MIN/1.73 M^2

## 2022-04-19 PROCEDURE — A9585 GADOBUTROL INJECTION: HCPCS | Performed by: NEUROLOGICAL SURGERY

## 2022-04-19 PROCEDURE — 25500020 PHARM REV CODE 255: Performed by: NEUROLOGICAL SURGERY

## 2022-04-19 PROCEDURE — 82565 ASSAY OF CREATININE: CPT | Performed by: NEUROLOGICAL SURGERY

## 2022-04-19 PROCEDURE — 70553 MRI BRAIN STEM W/O & W/DYE: CPT | Mod: 26,,, | Performed by: RADIOLOGY

## 2022-04-19 PROCEDURE — 36415 COLL VENOUS BLD VENIPUNCTURE: CPT | Mod: PO | Performed by: NEUROLOGICAL SURGERY

## 2022-04-19 PROCEDURE — 70553 MRI BRAIN W WO CONTRAST: ICD-10-PCS | Mod: 26,,, | Performed by: RADIOLOGY

## 2022-04-19 PROCEDURE — 70553 MRI BRAIN STEM W/O & W/DYE: CPT | Mod: TC

## 2022-04-19 RX ORDER — GADOBUTROL 604.72 MG/ML
10 INJECTION INTRAVENOUS
Status: COMPLETED | OUTPATIENT
Start: 2022-04-19 | End: 2022-04-19

## 2022-04-19 RX ADMIN — GADOBUTROL 10 ML: 604.72 INJECTION INTRAVENOUS at 03:04

## 2022-04-22 ENCOUNTER — PATIENT MESSAGE (OUTPATIENT)
Dept: NEUROSURGERY | Facility: CLINIC | Age: 72
End: 2022-04-22
Payer: MEDICARE

## 2022-04-25 ENCOUNTER — PATIENT MESSAGE (OUTPATIENT)
Dept: NEUROSURGERY | Facility: CLINIC | Age: 72
End: 2022-04-25
Payer: MEDICARE

## 2022-04-25 ENCOUNTER — TELEPHONE (OUTPATIENT)
Dept: NEUROSURGERY | Facility: HOSPITAL | Age: 72
End: 2022-04-25
Payer: MEDICARE

## 2022-04-25 NOTE — TELEPHONE ENCOUNTER
I left a message for the patient explaining that his MRI appears stable from the last.  I reminded him that he is due for his annual follow-up visit and that we would be reaching out to schedule that.  If he has any further questions he knows to contact the clinic.

## 2022-05-26 ENCOUNTER — TELEPHONE (OUTPATIENT)
Dept: RHEUMATOLOGY | Facility: CLINIC | Age: 72
End: 2022-05-26
Payer: MEDICARE

## 2022-05-26 DIAGNOSIS — M05.9 SEROPOSITIVE RHEUMATOID ARTHRITIS: Primary | ICD-10-CM

## 2022-05-26 NOTE — TELEPHONE ENCOUNTER
----- Message from Valarie Rice sent at 5/26/2022 10:25 AM CDT -----  Regarding: lab opders  Pt. Came in Monday for labs and was unsuccessful, please put orders back in for the following  CBC Auto Differential [[id number]]  Comprehensive Metabolic Panel [LAB17]  C-Reactive Protein [ZXG946]  Sedimentation rate [ASB728] pt will come at a later date.

## 2022-05-31 ENCOUNTER — PATIENT MESSAGE (OUTPATIENT)
Dept: FAMILY MEDICINE | Facility: CLINIC | Age: 72
End: 2022-05-31
Payer: MEDICARE

## 2022-06-07 ENCOUNTER — OFFICE VISIT (OUTPATIENT)
Dept: NEUROSURGERY | Facility: CLINIC | Age: 72
End: 2022-06-07
Payer: MEDICARE

## 2022-06-07 VITALS — WEIGHT: 215.38 LBS | HEIGHT: 71 IN | BODY MASS INDEX: 30.15 KG/M2

## 2022-06-07 DIAGNOSIS — D32.9 MENINGIOMA OF RIGHT SPHENOID WING INVOLVING CAVERNOUS SINUS: Primary | ICD-10-CM

## 2022-06-07 DIAGNOSIS — Z98.890 S/P CRANIOTOMY: ICD-10-CM

## 2022-06-07 PROCEDURE — 1159F PR MEDICATION LIST DOCUMENTED IN MEDICAL RECORD: ICD-10-PCS | Mod: CPTII,S$GLB,, | Performed by: NEUROLOGICAL SURGERY

## 2022-06-07 PROCEDURE — 3008F PR BODY MASS INDEX (BMI) DOCUMENTED: ICD-10-PCS | Mod: CPTII,S$GLB,, | Performed by: NEUROLOGICAL SURGERY

## 2022-06-07 PROCEDURE — 3288F FALL RISK ASSESSMENT DOCD: CPT | Mod: CPTII,S$GLB,, | Performed by: NEUROLOGICAL SURGERY

## 2022-06-07 PROCEDURE — 99215 OFFICE O/P EST HI 40 MIN: CPT | Mod: S$GLB,,, | Performed by: NEUROLOGICAL SURGERY

## 2022-06-07 PROCEDURE — 99999 PR PBB SHADOW E&M-EST. PATIENT-LVL III: ICD-10-PCS | Mod: PBBFAC,,, | Performed by: NEUROLOGICAL SURGERY

## 2022-06-07 PROCEDURE — 3288F PR FALLS RISK ASSESSMENT DOCUMENTED: ICD-10-PCS | Mod: CPTII,S$GLB,, | Performed by: NEUROLOGICAL SURGERY

## 2022-06-07 PROCEDURE — 1159F MED LIST DOCD IN RCRD: CPT | Mod: CPTII,S$GLB,, | Performed by: NEUROLOGICAL SURGERY

## 2022-06-07 PROCEDURE — 1101F PT FALLS ASSESS-DOCD LE1/YR: CPT | Mod: CPTII,S$GLB,, | Performed by: NEUROLOGICAL SURGERY

## 2022-06-07 PROCEDURE — 3008F BODY MASS INDEX DOCD: CPT | Mod: CPTII,S$GLB,, | Performed by: NEUROLOGICAL SURGERY

## 2022-06-07 PROCEDURE — 1126F PR PAIN SEVERITY QUANTIFIED, NO PAIN PRESENT: ICD-10-PCS | Mod: CPTII,S$GLB,, | Performed by: NEUROLOGICAL SURGERY

## 2022-06-07 PROCEDURE — 1101F PR PT FALLS ASSESS DOC 0-1 FALLS W/OUT INJ PAST YR: ICD-10-PCS | Mod: CPTII,S$GLB,, | Performed by: NEUROLOGICAL SURGERY

## 2022-06-07 PROCEDURE — 1160F RVW MEDS BY RX/DR IN RCRD: CPT | Mod: CPTII,S$GLB,, | Performed by: NEUROLOGICAL SURGERY

## 2022-06-07 PROCEDURE — 99999 PR PBB SHADOW E&M-EST. PATIENT-LVL III: CPT | Mod: PBBFAC,,, | Performed by: NEUROLOGICAL SURGERY

## 2022-06-07 PROCEDURE — 1126F AMNT PAIN NOTED NONE PRSNT: CPT | Mod: CPTII,S$GLB,, | Performed by: NEUROLOGICAL SURGERY

## 2022-06-07 PROCEDURE — 99215 PR OFFICE/OUTPT VISIT, EST, LEVL V, 40-54 MIN: ICD-10-PCS | Mod: S$GLB,,, | Performed by: NEUROLOGICAL SURGERY

## 2022-06-07 PROCEDURE — 1160F PR REVIEW ALL MEDS BY PRESCRIBER/CLIN PHARMACIST DOCUMENTED: ICD-10-PCS | Mod: CPTII,S$GLB,, | Performed by: NEUROLOGICAL SURGERY

## 2022-06-10 ENCOUNTER — LAB VISIT (OUTPATIENT)
Dept: LAB | Facility: HOSPITAL | Age: 72
End: 2022-06-10
Attending: PHYSICIAN ASSISTANT
Payer: MEDICARE

## 2022-06-10 DIAGNOSIS — M05.9 SEROPOSITIVE RHEUMATOID ARTHRITIS: ICD-10-CM

## 2022-06-10 LAB
ALBUMIN SERPL BCP-MCNC: 3.8 G/DL (ref 3.5–5.2)
ALP SERPL-CCNC: 45 U/L (ref 55–135)
ALT SERPL W/O P-5'-P-CCNC: 32 U/L (ref 10–44)
ANION GAP SERPL CALC-SCNC: 9 MMOL/L (ref 8–16)
AST SERPL-CCNC: 20 U/L (ref 10–40)
BASOPHILS # BLD AUTO: 0.03 K/UL (ref 0–0.2)
BASOPHILS NFR BLD: 0.4 % (ref 0–1.9)
BILIRUB SERPL-MCNC: 1 MG/DL (ref 0.1–1)
BUN SERPL-MCNC: 15 MG/DL (ref 8–23)
CALCIUM SERPL-MCNC: 9.1 MG/DL (ref 8.7–10.5)
CHLORIDE SERPL-SCNC: 106 MMOL/L (ref 95–110)
CO2 SERPL-SCNC: 25 MMOL/L (ref 23–29)
CREAT SERPL-MCNC: 1.4 MG/DL (ref 0.5–1.4)
CRP SERPL-MCNC: 1.6 MG/L (ref 0–8.2)
DIFFERENTIAL METHOD: ABNORMAL
EOSINOPHIL # BLD AUTO: 0.1 K/UL (ref 0–0.5)
EOSINOPHIL NFR BLD: 1.7 % (ref 0–8)
ERYTHROCYTE [DISTWIDTH] IN BLOOD BY AUTOMATED COUNT: 12.4 % (ref 11.5–14.5)
ERYTHROCYTE [SEDIMENTATION RATE] IN BLOOD BY WESTERGREN METHOD: 4 MM/HR (ref 0–10)
EST. GFR  (AFRICAN AMERICAN): 58.4 ML/MIN/1.73 M^2
EST. GFR  (NON AFRICAN AMERICAN): 50.5 ML/MIN/1.73 M^2
GLUCOSE SERPL-MCNC: 96 MG/DL (ref 70–110)
HCT VFR BLD AUTO: 44.3 % (ref 40–54)
HGB BLD-MCNC: 14.2 G/DL (ref 14–18)
IMM GRANULOCYTES # BLD AUTO: 0.02 K/UL (ref 0–0.04)
IMM GRANULOCYTES NFR BLD AUTO: 0.3 % (ref 0–0.5)
LYMPHOCYTES # BLD AUTO: 1.1 K/UL (ref 1–4.8)
LYMPHOCYTES NFR BLD: 14.6 % (ref 18–48)
MCH RBC QN AUTO: 28 PG (ref 27–31)
MCHC RBC AUTO-ENTMCNC: 32.1 G/DL (ref 32–36)
MCV RBC AUTO: 87 FL (ref 82–98)
MONOCYTES # BLD AUTO: 0.7 K/UL (ref 0.3–1)
MONOCYTES NFR BLD: 8.6 % (ref 4–15)
NEUTROPHILS # BLD AUTO: 5.8 K/UL (ref 1.8–7.7)
NEUTROPHILS NFR BLD: 74.4 % (ref 38–73)
NRBC BLD-RTO: 0 /100 WBC
PLATELET # BLD AUTO: 168 K/UL (ref 150–450)
PMV BLD AUTO: 10.6 FL (ref 9.2–12.9)
POTASSIUM SERPL-SCNC: 4.1 MMOL/L (ref 3.5–5.1)
PROT SERPL-MCNC: 6.2 G/DL (ref 6–8.4)
RBC # BLD AUTO: 5.07 M/UL (ref 4.6–6.2)
SODIUM SERPL-SCNC: 140 MMOL/L (ref 136–145)
WBC # BLD AUTO: 7.82 K/UL (ref 3.9–12.7)

## 2022-06-10 PROCEDURE — 80053 COMPREHEN METABOLIC PANEL: CPT | Performed by: PHYSICIAN ASSISTANT

## 2022-06-10 PROCEDURE — 85025 COMPLETE CBC W/AUTO DIFF WBC: CPT | Performed by: PHYSICIAN ASSISTANT

## 2022-06-10 PROCEDURE — 86140 C-REACTIVE PROTEIN: CPT | Performed by: PHYSICIAN ASSISTANT

## 2022-06-10 PROCEDURE — 85651 RBC SED RATE NONAUTOMATED: CPT | Mod: PO | Performed by: PHYSICIAN ASSISTANT

## 2022-06-10 PROCEDURE — 36415 COLL VENOUS BLD VENIPUNCTURE: CPT | Mod: PO | Performed by: PHYSICIAN ASSISTANT
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
## 2022-06-17 ENCOUNTER — OFFICE VISIT (OUTPATIENT)
Dept: RHEUMATOLOGY | Facility: CLINIC | Age: 72
End: 2022-06-17
Payer: MEDICARE

## 2022-06-17 VITALS
SYSTOLIC BLOOD PRESSURE: 126 MMHG | WEIGHT: 218 LBS | DIASTOLIC BLOOD PRESSURE: 70 MMHG | BODY MASS INDEX: 30.52 KG/M2 | HEART RATE: 64 BPM | HEIGHT: 71 IN

## 2022-06-17 DIAGNOSIS — N18.30 STAGE 3 CHRONIC KIDNEY DISEASE, UNSPECIFIED WHETHER STAGE 3A OR 3B CKD: Primary | ICD-10-CM

## 2022-06-17 DIAGNOSIS — G89.4 CHRONIC PAIN SYNDROME: ICD-10-CM

## 2022-06-17 DIAGNOSIS — F51.01 PRIMARY INSOMNIA: ICD-10-CM

## 2022-06-17 DIAGNOSIS — D84.821 IMMUNOCOMPROMISED STATE DUE TO DRUG THERAPY: ICD-10-CM

## 2022-06-17 DIAGNOSIS — Z79.899 IMMUNOCOMPROMISED STATE DUE TO DRUG THERAPY: ICD-10-CM

## 2022-06-17 DIAGNOSIS — G47.00 INSOMNIA, UNSPECIFIED TYPE: ICD-10-CM

## 2022-06-17 DIAGNOSIS — M06.9 RHEUMATOID ARTHRITIS FLARE: ICD-10-CM

## 2022-06-17 DIAGNOSIS — E27.40 ADRENAL CORTEX INSUFFICIENCY: ICD-10-CM

## 2022-06-17 DIAGNOSIS — M05.9 SEROPOSITIVE RHEUMATOID ARTHRITIS: ICD-10-CM

## 2022-06-17 PROCEDURE — 3074F PR MOST RECENT SYSTOLIC BLOOD PRESSURE < 130 MM HG: ICD-10-PCS | Mod: CPTII,S$GLB,, | Performed by: INTERNAL MEDICINE

## 2022-06-17 PROCEDURE — 3288F PR FALLS RISK ASSESSMENT DOCUMENTED: ICD-10-PCS | Mod: CPTII,S$GLB,, | Performed by: INTERNAL MEDICINE

## 2022-06-17 PROCEDURE — 1101F PR PT FALLS ASSESS DOC 0-1 FALLS W/OUT INJ PAST YR: ICD-10-PCS | Mod: CPTII,S$GLB,, | Performed by: INTERNAL MEDICINE

## 2022-06-17 PROCEDURE — 3078F PR MOST RECENT DIASTOLIC BLOOD PRESSURE < 80 MM HG: ICD-10-PCS | Mod: CPTII,S$GLB,, | Performed by: INTERNAL MEDICINE

## 2022-06-17 PROCEDURE — 99215 PR OFFICE/OUTPT VISIT, EST, LEVL V, 40-54 MIN: ICD-10-PCS | Mod: S$GLB,,, | Performed by: INTERNAL MEDICINE

## 2022-06-17 PROCEDURE — 3008F PR BODY MASS INDEX (BMI) DOCUMENTED: ICD-10-PCS | Mod: CPTII,S$GLB,, | Performed by: INTERNAL MEDICINE

## 2022-06-17 PROCEDURE — 3074F SYST BP LT 130 MM HG: CPT | Mod: CPTII,S$GLB,, | Performed by: INTERNAL MEDICINE

## 2022-06-17 PROCEDURE — 99999 PR PBB SHADOW E&M-EST. PATIENT-LVL II: CPT | Mod: PBBFAC,,, | Performed by: INTERNAL MEDICINE

## 2022-06-17 PROCEDURE — 99215 OFFICE O/P EST HI 40 MIN: CPT | Mod: S$GLB,,, | Performed by: INTERNAL MEDICINE

## 2022-06-17 PROCEDURE — 3288F FALL RISK ASSESSMENT DOCD: CPT | Mod: CPTII,S$GLB,, | Performed by: INTERNAL MEDICINE

## 2022-06-17 PROCEDURE — 3078F DIAST BP <80 MM HG: CPT | Mod: CPTII,S$GLB,, | Performed by: INTERNAL MEDICINE

## 2022-06-17 PROCEDURE — 1101F PT FALLS ASSESS-DOCD LE1/YR: CPT | Mod: CPTII,S$GLB,, | Performed by: INTERNAL MEDICINE

## 2022-06-17 PROCEDURE — 99999 PR PBB SHADOW E&M-EST. PATIENT-LVL II: ICD-10-PCS | Mod: PBBFAC,,, | Performed by: INTERNAL MEDICINE

## 2022-06-17 PROCEDURE — 3008F BODY MASS INDEX DOCD: CPT | Mod: CPTII,S$GLB,, | Performed by: INTERNAL MEDICINE

## 2022-06-17 RX ORDER — ESZOPICLONE 3 MG/1
TABLET, FILM COATED ORAL
Qty: 30 TABLET | Refills: 4 | Status: SHIPPED | OUTPATIENT
Start: 2022-06-17 | End: 2022-12-11

## 2022-06-17 RX ORDER — GOLIMUMAB 50 MG/.5ML
50 INJECTION, SOLUTION SUBCUTANEOUS
Qty: 0.5 ML | Refills: 12 | Status: SHIPPED | OUTPATIENT
Start: 2022-06-17 | End: 2024-02-28 | Stop reason: SDUPTHER

## 2022-06-17 RX ORDER — HYDROCORTISONE SODIUM SUCCINATE 100 MG/2ML
100 INJECTION, POWDER, FOR SOLUTION INTRAMUSCULAR; INTRAVENOUS ONCE
Qty: 1 EACH | Refills: 3 | Status: SHIPPED | OUTPATIENT
Start: 2022-06-17 | End: 2022-06-17

## 2022-06-17 NOTE — PROGRESS NOTES
Subjective:       Patient ID: Aurelio Perkins is a 71 y.o. male.    Chief Complaint: Disease Management    Follow up: 71 year old male who presents to clinic for follow up on seropositive rheumatoid arthritis. He stopped his xeljanz and her   He stopped diclofenac temporarily but kidney/flank pain persisted. He stopped xeljanz and that pain resolved. He is taking 1 tab only PRN at this time for arthritis flares. He has pain, stiffness, and swelling in his hands, shoulders, and knees. He has low back pain as well. He was suppose to start simponi    No serious infections since his last visit.     He taking lunesta for insomnia, which is working well.     He is followed by Endocrinology on hydrocortisone. All BP meds d/sanjay and BP is normal.     Current tx:  1. No present tx    Prior hx:  1. Enbrel  2. Rinvoq (GI upste)  3. xeljanz            Pertinent negatives include no fatigue, fever, myalgias or headaches.         Review of Systems   Constitutional: Negative for chills, fatigue and fever.   Eyes: Negative for visual disturbance.   Respiratory: Negative for cough, shortness of breath and wheezing.    Cardiovascular: Negative for chest pain, palpitations and leg swelling.   Gastrointestinal: Negative for abdominal pain, constipation, diarrhea, nausea and vomiting.   Musculoskeletal: Positive for arthralgias, back pain and neck pain. Negative for myalgias.   Neurological: Negative for dizziness, syncope and headaches.   Hematological: Negative for adenopathy.         Objective:     Vitals:    06/17/22 1003   BP: 126/70   Pulse: 64       Past Medical History:   Diagnosis Date    Acquired absence of kidney 1/28/2020    Arthritis     Cancer     prostate cancer-Removed Prostate    Chronic kidney disease     one kidney    Encounter for long-term (current) use of medications     Mitral valve prolapse     Moderate aortic stenosis 7/29/2019    Ptosis of right eyelid 5/29/2019     Past Surgical History:    Procedure Laterality Date    COLONOSCOPY N/A 3/29/2022    Procedure: COLONOSCOPY;  Surgeon: Cayla Sherman MD;  Location: [facility];  Service: Endoscopy;  Laterality: N/A;    CRANIOTOMY Right 5/29/2019    Procedure: CRANIOTOMY  (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)  Co-surgery with Dr Vaibhav Jara - please put in his room  [employer];  Surgeon: Jimmy Segura DO;  Location: [address];  Service: Neurosurgery;  Laterality: Right;    HEMORRHOID SURGERY      NEPHRECTOMY      PROSTATECTOMY      TONSILLECTOMY            Physical Exam   Constitutional: He is oriented to person, place, and time.   Eyes: Right conjunctiva is not injected. Left conjunctiva is not injected.   Neck: No JVD present. No thyromegaly present.   Cardiovascular: Normal rate and regular rhythm. Exam reveals no decreased pulses.   Pulmonary/Chest: Effort normal.   Musculoskeletal:         General: Tenderness and deformity present.      Right shoulder: Tenderness present.      Left shoulder: Tenderness present.      Right wrist: Tenderness present.      Left wrist: Tenderness present.      Right knee: Tenderness present.      Left knee: Tenderness present.   Lymphadenopathy:     He has no cervical adenopathy.   Neurological: He is alert and oriented to person, place, and time. Gait normal.   Skin: No rash noted.   Psychiatric: Mood and affect normal.       Right Side Rheumatological Exam     The patient is tender to palpation of the shoulder, wrist, knee, 1st PIP, 1st MCP, 2nd PIP, 2nd MCP, 3rd PIP, 3rd MCP, 4th PIP, 4th MCP, 5th PIP and 5th MCP    He has swelling of the 1st PIP, 2nd PIP, 3rd PIP, 4th PIP and 5th PIP    Left Side Rheumatological Exam     Examination finds the 1st MCP normal.    The patient is tender to palpation of the shoulder, wrist, knee, 1st PIP, 2nd PIP, 2nd MCP, 3rd PIP, 3rd MCP, 4th PIP, 4th MCP, 5th PIP and 5th MCP.    He has  swelling of the 1st PIP, 2nd PIP, 3rd PIP, 4th PIP and 5th PIP          Results for orders placed or performed in visit on 06/10/22   CBC Auto Differential   Result Value Ref Range    WBC 7.82 3.90 - 12.70 K/uL    RBC 5.07 4.60 - 6.20 M/uL    Hemoglobin 14.2 14.0 - 18.0 g/dL    Hematocrit 44.3 40.0 - 54.0 %    MCV 87 82 - 98 fL    MCH 28.0 27.0 - 31.0 pg    MCHC 32.1 32.0 - 36.0 g/dL    RDW 12.4 11.5 - 14.5 %    Platelets 168 150 - 450 K/uL    MPV 10.6 9.2 - 12.9 fL    Immature Granulocytes 0.3 0.0 - 0.5 %    Gran # (ANC) 5.8 1.8 - 7.7 K/uL    Immature Grans (Abs) 0.02 0.00 - 0.04 K/uL    Lymph # 1.1 1.0 - 4.8 K/uL    Mono # 0.7 0.3 - 1.0 K/uL    Eos # 0.1 0.0 - 0.5 K/uL    Baso # 0.03 0.00 - 0.20 K/uL    nRBC 0 0 /100 WBC    Gran % 74.4 (H) 38.0 - 73.0 %    Lymph % 14.6 (L) 18.0 - 48.0 %    Mono % 8.6 4.0 - 15.0 %    Eosinophil % 1.7 0.0 - 8.0 %    Basophil % 0.4 0.0 - 1.9 %    Differential Method Automated    Comprehensive Metabolic Panel   Result Value Ref Range    Sodium 140 136 - 145 mmol/L    Potassium 4.1 3.5 - 5.1 mmol/L    Chloride 106 95 - 110 mmol/L    CO2 25 23 - 29 mmol/L    Glucose 96 70 - 110 mg/dL    BUN 15 8 - 23 mg/dL    Creatinine 1.4 0.5 - 1.4 mg/dL    Calcium 9.1 8.7 - 10.5 mg/dL    Total Protein 6.2 6.0 - 8.4 g/dL    Albumin 3.8 3.5 - 5.2 g/dL    Total Bilirubin 1.0 0.1 - 1.0 mg/dL    Alkaline Phosphatase 45 (L) 55 - 135 U/L    AST 20 10 - 40 U/L    ALT 32 10 - 44 U/L    Anion Gap 9 8 - 16 mmol/L    eGFR if African American 58.4 (A) >60 mL/min/1.73 m^2    eGFR if non African American 50.5 (A) >60 mL/min/1.73 m^2   C-Reactive Protein   Result Value Ref Range    CRP 1.6 0.0 - 8.2 mg/L   Sedimentation rate   Result Value Ref Range    Sed Rate 4 0 - 10 mm/Hr         Assessment:       1. Stage 3 chronic kidney disease, unspecified whether stage 3a or 3b CKD    2. Seropositive rheumatoid arthritis    3. Immunocompromised state due to drug therapy    4. Chronic pain syndrome    5. Primary insomnia     6. Rheumatoid arthritis flare    7. Insomnia, unspecified type    8. Adrenal cortex insufficiency            Plan:       Stage 3 chronic kidney disease, unspecified whether stage 3a or 3b CKD  -     CBC Auto Differential; Future; Expected date: 06/17/2022  -     Comprehensive Metabolic Panel; Future; Expected date: 06/17/2022  -     Sedimentation rate; Future; Expected date: 06/17/2022  -     C-Reactive Protein; Future; Expected date: 06/17/2022    Seropositive rheumatoid arthritis  -     golimumab (SIMPONI) 50 mg/0.5 mL PnIj; Inject 50 mg into the skin every 30 days.  Dispense: 0.5 mL; Refill: 12  -     CBC Auto Differential; Future; Expected date: 06/17/2022  -     Comprehensive Metabolic Panel; Future; Expected date: 06/17/2022  -     Sedimentation rate; Future; Expected date: 06/17/2022  -     C-Reactive Protein; Future; Expected date: 06/17/2022    Immunocompromised state due to drug therapy  -     CBC Auto Differential; Future; Expected date: 06/17/2022  -     Comprehensive Metabolic Panel; Future; Expected date: 06/17/2022  -     Sedimentation rate; Future; Expected date: 06/17/2022  -     C-Reactive Protein; Future; Expected date: 06/17/2022    Chronic pain syndrome  -     CBC Auto Differential; Future; Expected date: 06/17/2022  -     Comprehensive Metabolic Panel; Future; Expected date: 06/17/2022  -     Sedimentation rate; Future; Expected date: 06/17/2022  -     C-Reactive Protein; Future; Expected date: 06/17/2022    Primary insomnia  -     CBC Auto Differential; Future; Expected date: 06/17/2022  -     Comprehensive Metabolic Panel; Future; Expected date: 06/17/2022  -     Sedimentation rate; Future; Expected date: 06/17/2022  -     C-Reactive Protein; Future; Expected date: 06/17/2022    Rheumatoid arthritis flare  -     CBC Auto Differential; Future; Expected date: 06/17/2022  -     Comprehensive Metabolic Panel; Future; Expected date: 06/17/2022  -     Sedimentation rate; Future; Expected date:  06/17/2022  -     C-Reactive Protein; Future; Expected date: 06/17/2022    Insomnia, unspecified type  -     eszopiclone (LUNESTA) 3 mg Tab; TAKE 1 TABLET BY MOUTH EVERY EVENING.  Dispense: 30 tablet; Refill: 4  -     CBC Auto Differential; Future; Expected date: 06/17/2022  -     Comprehensive Metabolic Panel; Future; Expected date: 06/17/2022  -     Sedimentation rate; Future; Expected date: 06/17/2022  -     C-Reactive Protein; Future; Expected date: 06/17/2022    Adrenal cortex insufficiency  -     hydrocortisone sod succ, PF, (SOLU-CORTEF ACT-O-VIAL, PF,) 100 mg/2 mL SolR; Inject 100 mg into the muscle once. for 1 dose  Dispense: 1 each; Refill: 3        Assessment:  71 year old male with  Seropositive (+CCP) rheumatoid arthritis  --CKD stage 3, s/p total nephrectomy, followed by Nephrology   --secondary adrenal insufficiency on cortef followed by Endocrinology  --moderate AS, non-obstructive CAD  --chronic insomnia on lunesta  --hyperglycemia, Hgba1c 5.7%  --mild normocytic anemia    Plan:  1. First injection today simponi monthly. If not approved consider Humira or Orencia  2. Cont. Diclofenac 75 mg daily, avoid additional NSAIDs  3. Cont. lunesta per

## 2022-07-21 ENCOUNTER — PATIENT MESSAGE (OUTPATIENT)
Dept: FAMILY MEDICINE | Facility: CLINIC | Age: 72
End: 2022-07-21
Payer: MEDICARE

## 2022-07-21 NOTE — TELEPHONE ENCOUNTER
Confirmed patient's medicare part d attestation form was received and has been approved until 12/31/2022. Test claim rejection was refill too soon, pat filling @ other pharmacy.

## 2022-08-10 DIAGNOSIS — M05.9 SEROPOSITIVE RHEUMATOID ARTHRITIS: ICD-10-CM

## 2022-08-10 NOTE — TELEPHONE ENCOUNTER
Pharmacy requesting refill on Diclofenac DR 75mg tab  Pt's LOV 06/17/2022  Pt's NOV 10/04/2022  Medication pending

## 2022-08-11 ENCOUNTER — TELEPHONE (OUTPATIENT)
Dept: FAMILY MEDICINE | Facility: CLINIC | Age: 72
End: 2022-08-11
Payer: MEDICARE

## 2022-08-11 RX ORDER — DICLOFENAC SODIUM 75 MG/1
75 TABLET, DELAYED RELEASE ORAL DAILY PRN
Qty: 90 TABLET | Refills: 1 | Status: SHIPPED | OUTPATIENT
Start: 2022-08-11 | End: 2023-02-15 | Stop reason: SDUPTHER

## 2022-08-11 NOTE — TELEPHONE ENCOUNTER
----- Message from Lyndsey Mitchell sent at 8/11/2022  3:07 PM CDT -----  January with [employer] called regarding the status request for clinical notes. Call back number is [phone] and fax number is [phone]. Thx. EL

## 2022-08-16 ENCOUNTER — PATIENT MESSAGE (OUTPATIENT)
Dept: ENDOCRINOLOGY | Facility: CLINIC | Age: 72
End: 2022-08-16
Payer: MEDICARE

## 2022-08-16 DIAGNOSIS — R79.89 LOW TESTOSTERONE IN MALE: Primary | ICD-10-CM

## 2022-08-16 RX ORDER — TESTOSTERONE CYPIONATE 200 MG/ML
100 INJECTION, SOLUTION INTRAMUSCULAR
Qty: 2 ML | Refills: 5 | Status: SHIPPED | OUTPATIENT
Start: 2022-08-16 | End: 2023-05-25

## 2022-08-16 NOTE — TELEPHONE ENCOUNTER
lov 6/2021  Pt has no upcoming appt scheduled.  Pt replied to through portal that med is denied until he schedules appt.

## 2022-08-30 ENCOUNTER — OFFICE VISIT (OUTPATIENT)
Dept: FAMILY MEDICINE | Facility: CLINIC | Age: 72
End: 2022-08-30
Payer: MEDICARE

## 2022-08-30 VITALS
HEART RATE: 66 BPM | HEIGHT: 72 IN | WEIGHT: 218.06 LBS | TEMPERATURE: 98 F | BODY MASS INDEX: 29.54 KG/M2 | SYSTOLIC BLOOD PRESSURE: 132 MMHG | DIASTOLIC BLOOD PRESSURE: 81 MMHG

## 2022-08-30 DIAGNOSIS — Z79.899 ENCOUNTER FOR LONG-TERM (CURRENT) USE OF MEDICATIONS: ICD-10-CM

## 2022-08-30 DIAGNOSIS — E03.9 HYPOTHYROIDISM, UNSPECIFIED TYPE: ICD-10-CM

## 2022-08-30 DIAGNOSIS — I10 ESSENTIAL HYPERTENSION: Primary | ICD-10-CM

## 2022-08-30 DIAGNOSIS — E53.8 VITAMIN B12 DEFICIENCY: ICD-10-CM

## 2022-08-30 PROCEDURE — 3008F PR BODY MASS INDEX (BMI) DOCUMENTED: ICD-10-PCS | Mod: CPTII,S$GLB,, | Performed by: FAMILY MEDICINE

## 2022-08-30 PROCEDURE — 1160F RVW MEDS BY RX/DR IN RCRD: CPT | Mod: CPTII,S$GLB,, | Performed by: FAMILY MEDICINE

## 2022-08-30 PROCEDURE — 99214 PR OFFICE/OUTPT VISIT, EST, LEVL IV, 30-39 MIN: ICD-10-PCS | Mod: S$GLB,,, | Performed by: FAMILY MEDICINE

## 2022-08-30 PROCEDURE — 3079F DIAST BP 80-89 MM HG: CPT | Mod: CPTII,S$GLB,, | Performed by: FAMILY MEDICINE

## 2022-08-30 PROCEDURE — 1159F MED LIST DOCD IN RCRD: CPT | Mod: CPTII,S$GLB,, | Performed by: FAMILY MEDICINE

## 2022-08-30 PROCEDURE — 1101F PR PT FALLS ASSESS DOC 0-1 FALLS W/OUT INJ PAST YR: ICD-10-PCS | Mod: CPTII,S$GLB,, | Performed by: FAMILY MEDICINE

## 2022-08-30 PROCEDURE — 1159F PR MEDICATION LIST DOCUMENTED IN MEDICAL RECORD: ICD-10-PCS | Mod: CPTII,S$GLB,, | Performed by: FAMILY MEDICINE

## 2022-08-30 PROCEDURE — 1101F PT FALLS ASSESS-DOCD LE1/YR: CPT | Mod: CPTII,S$GLB,, | Performed by: FAMILY MEDICINE

## 2022-08-30 PROCEDURE — 99999 PR PBB SHADOW E&M-EST. PATIENT-LVL V: ICD-10-PCS | Mod: PBBFAC,,, | Performed by: FAMILY MEDICINE

## 2022-08-30 PROCEDURE — 99999 PR PBB SHADOW E&M-EST. PATIENT-LVL V: CPT | Mod: PBBFAC,,, | Performed by: FAMILY MEDICINE

## 2022-08-30 PROCEDURE — 3288F PR FALLS RISK ASSESSMENT DOCUMENTED: ICD-10-PCS | Mod: CPTII,S$GLB,, | Performed by: FAMILY MEDICINE

## 2022-08-30 PROCEDURE — 3288F FALL RISK ASSESSMENT DOCD: CPT | Mod: CPTII,S$GLB,, | Performed by: FAMILY MEDICINE

## 2022-08-30 PROCEDURE — 1126F PR PAIN SEVERITY QUANTIFIED, NO PAIN PRESENT: ICD-10-PCS | Mod: CPTII,S$GLB,, | Performed by: FAMILY MEDICINE

## 2022-08-30 PROCEDURE — 1160F PR REVIEW ALL MEDS BY PRESCRIBER/CLIN PHARMACIST DOCUMENTED: ICD-10-PCS | Mod: CPTII,S$GLB,, | Performed by: FAMILY MEDICINE

## 2022-08-30 PROCEDURE — 3075F PR MOST RECENT SYSTOLIC BLOOD PRESS GE 130-139MM HG: ICD-10-PCS | Mod: CPTII,S$GLB,, | Performed by: FAMILY MEDICINE

## 2022-08-30 PROCEDURE — 99214 OFFICE O/P EST MOD 30 MIN: CPT | Mod: S$GLB,,, | Performed by: FAMILY MEDICINE

## 2022-08-30 PROCEDURE — 1126F AMNT PAIN NOTED NONE PRSNT: CPT | Mod: CPTII,S$GLB,, | Performed by: FAMILY MEDICINE

## 2022-08-30 PROCEDURE — 3008F BODY MASS INDEX DOCD: CPT | Mod: CPTII,S$GLB,, | Performed by: FAMILY MEDICINE

## 2022-08-30 PROCEDURE — 3075F SYST BP GE 130 - 139MM HG: CPT | Mod: CPTII,S$GLB,, | Performed by: FAMILY MEDICINE

## 2022-08-30 PROCEDURE — 3079F PR MOST RECENT DIASTOLIC BLOOD PRESSURE 80-89 MM HG: ICD-10-PCS | Mod: CPTII,S$GLB,, | Performed by: FAMILY MEDICINE

## 2022-08-30 NOTE — ASSESSMENT & PLAN NOTE
Continue to monitor free T4.  Follow-up with Endocrinology as scheduled.  Continuing on levothyroxine.  Please be advised of hypothyroidism disease course.  We will plan to monitor thyroid labs at routine intervals.  Please be advised of risks and benefits of medication use, see medication insert for complete details.  ER precautions.

## 2022-08-30 NOTE — PROGRESS NOTES
PLAN:      Problem List Items Addressed This Visit       Hypothyroidism (Chronic)     Continue to monitor free T4.  Follow-up with Endocrinology as scheduled.  Continuing on levothyroxine.  Please be advised of hypothyroidism disease course.  We will plan to monitor thyroid labs at routine intervals.  Please be advised of risks and benefits of medication use, see medication insert for complete details.  ER precautions.           Relevant Orders    T4, FREE    Essential hypertension - Primary (Chronic)     Consider adding back low dose medication if blood pressure remains above 140/90 at home.  Continue digital medicine.  Counseled on importance of hypertension disease course, I recommend ongoing Education for DASH-diet and exercise.  Counseled on medication regimen importance of treating high blood pressure.  Please be advised of risk of untreated blood pressure as discussed.  Please advised of ER precautions were given for symptoms of hypertensive urgency and emergency.           Encounter for long-term (current) use of medications (Chronic)     Update labs as scheduled.Complete history and physical was completed today.  Complete and thorough medication reconciliation was performed.  Discussed risks and benefits of medications.  Advised patient on orders and health maintenance.  We discussed old records and old labs if available.  Will request any records not available through epic.  Continue current medications listed on your summary sheet.           Vitamin B12 deficiency     Check B12 level.         Relevant Orders    Vitamin B12     Future Appointments       Date Provider Specialty Appt Notes    9/2/2022 Maria Esther Hammonds MD Endocrinology checkup    9/26/2022  Lab rheum    10/4/2022 Brea Godoy PA-C Rheumatology 4 month     2/24/2023 Yoav Oliveira MD Rheumatology 4 month follow up    3/2/2023 Cuauhtemoc Gardner MD Family Medicine 6 month f/u           Medication Management for assessment above:   Medication  "List with Changes/Refills   Current Medications    ASPIRIN (ECOTRIN) 81 MG EC TABLET    Take 1 tablet (81 mg total) by mouth once daily.    CHLORZOXAZONE (PARAFON FORTE) 500 MG TAB    Take 500 mg by mouth as needed (back spasm).     DICLOFENAC (VOLTAREN) 75 MG EC TABLET    Take 1 tablet (75 mg total) by mouth daily as needed.    DICLOFENAC SODIUM (VOLTAREN) 1 % GEL    Apply topically.    DICLOFENAC SODIUM/MISOPROSTOL (DICLOFENAC-MISOPROSTOL ORAL)    diclofenac-misoprostol Take No date recorded No form recorded No frequency recorded No route recorded No set duration recorded No set duration amount recorded active No dosage strength recorded No dosage strength units of measure recorded    ERGOCALCIFEROL (ERGOCALCIFEROL) 50,000 UNIT CAP    TAKE 1 CAPSULE BY MOUTH ONE TIME PER WEEK    ESZOPICLONE (LUNESTA) 3 MG TAB    TAKE 1 TABLET BY MOUTH EVERY EVENING.    GOLIMUMAB (SIMPONI) 50 MG/0.5 ML PNIJ    Inject 50 mg into the skin every 30 days.    HYDROCORTISONE (CORTEF) 10 MG TAB    TAKE 1 AND 1/2 TABLETS (15 mg) BY MOUTH IN THE MORNING AND 1 TABLET (10 mg) BY MOUTH IN THE EVENING AT 4PM    NITROGLYCERIN (NITROSTAT) 0.4 MG SL TABLET    Place 0.4 mg under the tongue.    REPATHA PUSHTRONEX 420 MG/3.5 ML INJT    INJECT 3.5 MLS INTO THE SKIN EVERY 30 DAYS.    SAFETY NEEDLES (BD SAFETYGLIDE NEEDLE) 25 GAUGE X 1" NDLE    1 each by Misc.(Non-Drug; Combo Route) route every 7 days.    SOD SULF-POT CHLORIDE-MAG SULF (SUTAB) 1.479-0.188- 0.225 GRAM TABLET    Take 12 tablets by mouth once daily. Take according to package instructions with indicated amount of water.    SYNTHROID 112 MCG TABLET    TAKE 1 TABLET BY MOUTH ONCE DAILY.    SYRINGE, DISPOSABLE, 1 ML SYRG    1 Syringe by Misc.(Non-Drug; Combo Route) route once a week.    SYRINGE, DISPOSABLE, 1 ML SYRG    1 Syringe by Misc.(Non-Drug; Combo Route) route once a week.    TESTOSTERONE CYPIONATE (DEPOTESTOTERONE CYPIONATE) 200 MG/ML INJECTION    Inject 0.5 mLs (100 mg total) into the " muscle every 7 days.       Cuauhtemoc Gardner M.D.  ==========================================================================  Subjective:   Patient ID: Aurelio Perkins is a 71 y.o. male.  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   Chief Complaint: Follow-up (6 month follow up)      Problem List Items Addressed This Visit       Hypothyroidism (Chronic)    Overview     Chronic.  Patient follows with Endocrinology Dr. Maria Esther Hammonds.   Lab Results   Component Value Date    TSH 0.014 (L) 12/21/2021    N8WAMIN 52 (L) 03/03/2019    H2ZXGFY 3.9 (L) 03/03/2019    FREET4 0.95 12/21/2021              Current Assessment & Plan     Continue to monitor free T4.  Follow-up with Endocrinology as scheduled.  Continuing on levothyroxine.  Please be advised of hypothyroidism disease course.  We will plan to monitor thyroid labs at routine intervals.  Please be advised of risks and benefits of medication use, see medication insert for complete details.  ER precautions.           Essential hypertension - Primary (Chronic)    Overview     August 2022:  Patient continues to be on no medications for blood pressure.  June 2021:  Blood pressure has been again well controlled and at lower limits of normal.  Patient now on 12.5 milligrams of metoprolol only.  He is enrolled in digital medicine hypertension program.  January 2021:  Patient's blood pressure has been well below normal limits.  Patient has been having actually low blood pressure on hydrochlorothiazide.  Patient has change diet and is now having well controlled blood pressure readings.  Will give him a trial off of the medication.  Patient may not even be  hypertensive anymore.  Chronic.  Controlled today.  Patient taking metoprolol.  Blood pressure is controlled today.  Patient reports compliance.  No side effects reported.  Patient is not currently in digital medicine hypertension program.  Will sign him up today.The patient denies any chest pain, shortness of breath, palpitations, dizziness, lightheadedness, headache, arm numbness tingling or weakness, syncope.  November 2019:This condition is chronic.  Currently stable.  Blood pressure is below goal.  Patient reports compliance with blood pressure medications as listed.  No side effects are reported at this time.  However patient reports blood pressures been elevated at home.  Patient did take one of his wife's hydrochlorothiazide which brought his blood pressure down.  Patient has check with his nephrologist Dr. SAUCEDA and he is okay with starting hydrochlorothiazide with his solitary kidney and decreased kidney function.  The patient denies any chest pain, shortness of breath, palpitations, dizziness, lightheadedness, headache, arm numbness tingling or weakness, syncope.  Creatinine Date Value Ref Range Status 09/26/2019 1.6 (H) 0.5 - 1.4 mg/dL Final   Last Assessment & Plan:   He is concerned about rapid development of HTN with severe elevation at diagnosis. No cortisol from before high dose steroids available but suspect he may have had some component of AI masking HTN and now that adequately replaced have seen rise in BP  BP at goal today. Continue to follow with cardiology and digital HTN program         Current Assessment & Plan     Consider adding back low dose medication if blood pressure remains above 140/90 at home.  Continue digital medicine.  Counseled on importance of hypertension disease course, I recommend ongoing Education for DASH-diet and exercise.  Counseled on medication regimen importance of treating high blood pressure.  Please be advised of risk of untreated blood pressure as  discussed.  Please advised of ER precautions were given for symptoms of hypertensive urgency and emergency.           Encounter for long-term (current) use of medications (Chronic)    Overview     August 2022: Reviewed labs.  07/11/2019 CHRONIC long-term drug therapy for managed conditions. See medication list. Reports compliance.  No side effects reported.  Routine lab work is being monitored.  Patient does not  need refills today. CHRONIC long-term drug therapy for managed conditions. See medication list. Reports compliance.  No side effects reported.  Routine lab work is being monitored.  Patient does need refills today. January 2021:CHRONIC. Stable. Compliant with medications for managed conditions. See medication list. No SE reported.   Routine lab analysis is being monitored. Refills were addressed.  June 2021:  Reviewed labs.  Lab Results   Component Value Date    WBC 7.82 06/10/2022    HGB 14.2 06/10/2022    HCT 44.3 06/10/2022    MCV 87 06/10/2022     06/10/2022       Chemistry        Component Value Date/Time     06/10/2022 1000    K 4.1 06/10/2022 1000     06/10/2022 1000    CO2 25 06/10/2022 1000    BUN 15 06/10/2022 1000    CREATININE 1.4 06/10/2022 1000    GLU 96 06/10/2022 1000        Component Value Date/Time    CALCIUM 9.1 06/10/2022 1000    ALKPHOS 45 (L) 06/10/2022 1000    AST 20 06/10/2022 1000    ALT 32 06/10/2022 1000    BILITOT 1.0 06/10/2022 1000    ESTGFRAFRICA 58.4 (A) 06/10/2022 1000    EGFRNONAA 50.5 (A) 06/10/2022 1000          Lab Results   Component Value Date    TSH 0.014 (L) 12/21/2021    P7ZBFIX 52 (L) 03/03/2019    I0UNFYS 3.9 (L) 03/03/2019    FREET4 0.95 12/21/2021    T3FREE 2.0 (L) 06/30/2017     =================================         Current Assessment & Plan     Update labs as scheduled.Complete history and physical was completed today.  Complete and thorough medication reconciliation was performed.  Discussed risks and benefits of medications.  Advised  patient on orders and health maintenance.  We discussed old records and old labs if available.  Will request any records not available through epic.  Continue current medications listed on your summary sheet.           Vitamin B12 deficiency    Overview     Patient takes vitamin B12 injections twice per year.  Also reporting fatigue.  Lab Results   Component Value Date    [id number] 576 10/05/2021              Current Assessment & Plan     Check B12 level.             Review of patient's allergies indicates:   Allergen Reactions    Antihistamines - alkylamine Other (See Comments)     hallucinations    Benadryl [diphenhydramine hcl] Other (See Comments)     hallucinations    Codeine Itching     Turns red    Lodine [etodolac] Other (See Comments)     fatigue    Methotrexate analogues Other (See Comments)     Sunlight sensitivity    Morphine Itching     Turns red    Statins-hmg-coa reductase inhibitors      Current Outpatient Medications   Medication Instructions    aspirin (ECOTRIN) 81 mg, Oral, Daily    chlorzoxazone (PARAFON FORTE) 500 mg, Oral, As needed (PRN)    diclofenac (VOLTAREN) 75 mg, Oral, Daily PRN    diclofenac sodium (VOLTAREN) 1 % Gel Topical (Top)    diclofenac sodium/misoprostol (DICLOFENAC-MISOPROSTOL ORAL) diclofenac-misoprostol Take No date recorded No form recorded No frequency recorded No route recorded No set duration recorded No set duration amount recorded active No dosage strength recorded No dosage strength units of measure recorded    ergocalciferol (ERGOCALCIFEROL) 50,000 unit Cap TAKE 1 CAPSULE BY MOUTH ONE TIME PER WEEK    eszopiclone (LUNESTA) 3 mg Tab TAKE 1 TABLET BY MOUTH EVERY EVENING.    hydrocortisone (CORTEF) 10 MG Tab TAKE 1 AND 1/2 TABLETS (15 mg) BY MOUTH IN THE MORNING AND 1 TABLET (10 mg) BY MOUTH IN THE EVENING AT 4PM    nitroGLYCERIN (NITROSTAT) 0.4 mg, Sublingual    REPATHA PUSHTRONEX 420 mg/3.5 mL Injt INJECT 3.5 MLS INTO THE SKIN EVERY 30 DAYS.    safety needles (BD  "SAFETYGLIDE NEEDLE) 25 gauge x 1" Ndle 1 each, Misc.(Non-Drug; Combo Route), Every 7 days    SIMPONI 50 mg, Subcutaneous, Every 30 days    sod sulf-pot chloride-mag sulf (SUTAB) 1.479-0.188- 0.225 gram tablet 12 tablets, Oral, Daily, Take according to package instructions with indicated amount of water.    SYNTHROID 112 mcg tablet TAKE 1 TABLET BY MOUTH ONCE DAILY.    syringe, disposable, 1 mL Syrg 1 Syringe, Misc.(Non-Drug; Combo Route), Weekly    syringe, disposable, 1 mL Syrg 1 Syringe, Misc.(Non-Drug; Combo Route), Weekly    testosterone cypionate (DEPOTESTOTERONE CYPIONATE) 100 mg, Intramuscular, Every 7 days      I have reviewed the PMH, social history, FamilyHx, surgical history, allergies and medications documented / confirmed by the patient at the time of this visit.  Review of Systems   Constitutional:  Positive for fatigue. Negative for chills, fever and unexpected weight change.   HENT:  Negative for ear pain and sore throat.    Eyes:  Positive for visual disturbance. Negative for redness.   Respiratory:  Negative for cough and shortness of breath.    Cardiovascular:  Negative for chest pain and palpitations.   Gastrointestinal:  Negative for nausea and vomiting.   Endocrine: Negative for cold intolerance and heat intolerance.   Genitourinary:  Positive for urgency. Negative for difficulty urinating and hematuria.   Musculoskeletal:  Negative for arthralgias and myalgias.   Skin:  Negative for rash and wound.   Allergic/Immunologic: Negative for environmental allergies and food allergies.   Neurological:  Negative for weakness and headaches.   Hematological:  Negative for adenopathy. Does not bruise/bleed easily.   Psychiatric/Behavioral:  Negative for sleep disturbance. The patient is not nervous/anxious.    Objective:   /81   Pulse 66   Temp 97.6 °F (36.4 °C) (Temporal)   Ht 6' (1.829 m)   Wt 98.9 kg (218 lb 0.6 oz)   BMI 29.57 kg/m²   Physical Exam  Vitals and nursing note reviewed. "   Constitutional:       General: He is not in acute distress.     Appearance: He is well-developed. He is not diaphoretic.   HENT:      Head: Normocephalic and atraumatic.   Eyes:      Pupils: Pupils are equal, round, and reactive to light.      Comments: Right eye ptosis chronic   Cardiovascular:      Rate and Rhythm: Normal rate and regular rhythm.      Heart sounds: Murmur heard.   Systolic murmur is present with a grade of 3/6.   Pulmonary:      Effort: Pulmonary effort is normal. No respiratory distress.      Breath sounds: Normal breath sounds. No wheezing.   Abdominal:      General: Bowel sounds are normal.      Palpations: Abdomen is soft.   Musculoskeletal:         General: Normal range of motion.      Cervical back: Normal range of motion and neck supple.   Skin:     General: Skin is warm and dry.      Capillary Refill: Capillary refill takes less than 2 seconds.      Findings: No rash.   Neurological:      Mental Status: He is alert and oriented to person, place, and time.      Cranial Nerves: No cranial nerve deficit.   Psychiatric:         Attention and Perception: He is attentive.         Mood and Affect: Mood is not anxious or depressed. Affect is not labile, blunt, angry or inappropriate.         Speech: He is communicative. Speech is not rapid and pressured, delayed, slurred or tangential.         Behavior: Behavior normal. Behavior is not agitated, slowed, aggressive, withdrawn, hyperactive or combative.         Thought Content: Thought content normal. Thought content is not paranoid or delusional. Thought content does not include homicidal or suicidal ideation. Thought content does not include homicidal or suicidal plan.         Cognition and Memory: Memory is not impaired.         Judgment: Judgment normal. Judgment is not impulsive or inappropriate.      Patient is wearing a mask which limits physical exam due to COVID restrictions.   Assessment:     1. Essential hypertension    2.  Hypothyroidism, unspecified type    3. Encounter for long-term (current) use of medications    4. Vitamin B12 deficiency      MDM:   Moderate complexity.  Moderate risk.  Total time: 31 minutes.  This includes total time spent on the encounter, which includes face to face time and non-face to face time preparing to see the patient (eg, review of previous medical records, tests), Obtaining and/or reviewing separately obtained history, documenting clinical information in the electronic or other health record, independently interpreting results (not separately reported)/communicating results to the patient/family/caregiver, and/or care coordination (not separately reported).    I have Reviewed and summarized old records.  I have performed thorough medication reconciliation today and discussed risk and benefits of medications.  I have reviewed labs and discussed with patient.  All questions were answered.  I am requesting old records and will review them once they are available.  Rheumatology, Endocrinology    I have signed for the following orders AND/OR meds.  Orders Placed This Encounter   Procedures    T4, FREE     Standing Status:   Future     Standing Expiration Date:   10/29/2023    Vitamin B12     Standing Status:   Future     Standing Expiration Date:   10/29/2023             Follow up in about 6 months (around 2/28/2023), or if symptoms worsen or fail to improve, for Annual Wellness Exam.    If no improvement in symptoms or symptoms worsen, advised to call/follow-up at clinic or go to ER. Patient voiced understanding and all questions/concerns were addressed.   DISCLAIMER: This note was compiled by using a speech recognition dictation system and therefore please be aware that typographical / speech recognition errors can and do occur.  Please contact me if you see any errors specifically.    Cuauhtemoc Gardner M.D.       Office: [phone] [address] [place] [address]  FAX: [phone]

## 2022-08-30 NOTE — PATIENT INSTRUCTIONS
Follow up in about 6 months (around 2/28/2023), or if symptoms worsen or fail to improve, for Annual Wellness Exam.     Dear patient,   As a result of recent federal legislation (The Federal Cures Act), you may receive lab or pathology results from your visit in your [facility] account before your physician is able to contact you. Your physician or their representative will relay the results to you with their recommendations at their soonest availability.     If no improvement in symptoms or symptoms worsen, please be advised to call MD, follow-up at clinic and/or go to ER if becomes severe.    Cuauhtemoc Gardner M.D.        We Offer TELEHEALTH & Same Day Appointments!   Book your Telehealth appointment with me through my nurse or   Clinic appointments on [facility]!    [address] [place] [address]    Office: [phone]   FAX: [phone]    Check out my Facebook Page and Follow Me at: [email or web address]    Check out my website at [employer] [email or web address] clicking on: [email or web address]    To Schedule appointments online, go to [facility]: [email or web address]

## 2022-08-30 NOTE — ASSESSMENT & PLAN NOTE
Consider adding back low dose medication if blood pressure remains above 140/90 at home.  Continue digital medicine.  Counseled on importance of hypertension disease course, I recommend ongoing Education for DASH-diet and exercise.  Counseled on medication regimen importance of treating high blood pressure.  Please be advised of risk of untreated blood pressure as discussed.  Please advised of ER precautions were given for symptoms of hypertensive urgency and emergency.

## 2022-08-30 NOTE — ASSESSMENT & PLAN NOTE
Update labs as scheduled.Complete history and physical was completed today.  Complete and thorough medication reconciliation was performed.  Discussed risks and benefits of medications.  Advised patient on orders and health maintenance.  We discussed old records and old labs if available.  Will request any records not available through epic.  Continue current medications listed on your summary sheet.

## 2022-09-02 ENCOUNTER — OFFICE VISIT (OUTPATIENT)
Dept: ENDOCRINOLOGY | Facility: CLINIC | Age: 72
End: 2022-09-02
Payer: MEDICARE

## 2022-09-02 DIAGNOSIS — M85.89 OTHER SPECIFIED DISORDERS OF BONE DENSITY AND STRUCTURE, MULTIPLE SITES: ICD-10-CM

## 2022-09-02 DIAGNOSIS — D32.9 MENINGIOMA OF RIGHT SPHENOID WING INVOLVING CAVERNOUS SINUS: Chronic | ICD-10-CM

## 2022-09-02 DIAGNOSIS — E03.9 HYPOTHYROIDISM, UNSPECIFIED TYPE: ICD-10-CM

## 2022-09-02 DIAGNOSIS — E27.49 SECONDARY ADRENAL INSUFFICIENCY: ICD-10-CM

## 2022-09-02 DIAGNOSIS — Z12.5 SCREENING FOR PROSTATE CANCER: ICD-10-CM

## 2022-09-02 DIAGNOSIS — R79.89 LOW TESTOSTERONE IN MALE: Primary | ICD-10-CM

## 2022-09-02 PROCEDURE — 1159F PR MEDICATION LIST DOCUMENTED IN MEDICAL RECORD: ICD-10-PCS | Mod: CPTII,95,, | Performed by: INTERNAL MEDICINE

## 2022-09-02 PROCEDURE — 1160F RVW MEDS BY RX/DR IN RCRD: CPT | Mod: CPTII,95,, | Performed by: INTERNAL MEDICINE

## 2022-09-02 PROCEDURE — 99214 PR OFFICE/OUTPT VISIT, EST, LEVL IV, 30-39 MIN: ICD-10-PCS | Mod: 95,,, | Performed by: INTERNAL MEDICINE

## 2022-09-02 PROCEDURE — 99214 OFFICE O/P EST MOD 30 MIN: CPT | Mod: 95,,, | Performed by: INTERNAL MEDICINE

## 2022-09-02 PROCEDURE — 1159F MED LIST DOCD IN RCRD: CPT | Mod: CPTII,95,, | Performed by: INTERNAL MEDICINE

## 2022-09-02 PROCEDURE — 1160F PR REVIEW ALL MEDS BY PRESCRIBER/CLIN PHARMACIST DOCUMENTED: ICD-10-PCS | Mod: CPTII,95,, | Performed by: INTERNAL MEDICINE

## 2022-09-02 RX ORDER — HYDROCORTISONE 10 MG/1
TABLET ORAL
Qty: 75 TABLET | Refills: 11 | Status: SHIPPED | OUTPATIENT
Start: 2022-09-02 | End: 2023-07-07 | Stop reason: SDUPTHER
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
## 2022-09-02 NOTE — ASSESSMENT & PLAN NOTE
Update FT4 with next labs  Would not check TSH as not accurate given previous surgery and hypopit  Cont Synthroid 112 mcg daily

## 2022-09-02 NOTE — Clinical Note
Add to labs on 9/26 Dxa in coming weeks, would like it done Dean if possible or when coming for another visit

## 2022-09-02 NOTE — ASSESSMENT & PLAN NOTE
Continue HC 15/10 mg  I do not think current symptoms related to insufficient GC replacement as very brief. Will assess for response after heart valve replacement  Reviewed sick day instructions and written information provided  Will need stress dosing of steroids once has surgery    AI Surgery HC dosing  The day of surgery: Hydrocortisone IV every 8hr, first dose given on the way to OR. Can change to oral dosing once tolerating PO    Day 1 after surgery: Hydrocortisone, oral, 25 mg every 8hr     Day 2 after surgery: Hydrocortisone, oral, 25 mg twice daily     Day 3 after surgery: Hydrocortisone, oral, 20 mg morning and 10 mg afternoon     Day 4 after surgery: resume usual dose

## 2022-09-02 NOTE — ASSESSMENT & PLAN NOTE
Cont testosterone 100 mg every 7 days. encouraged to resume consistent dosing or can change to 200 mg every 2 weeks but he would prefer weekly dose  PSA with next labs. Has history of prostate cancer but urology aware he if on testosteone and ok with it  CBC next labs

## 2022-09-23 ENCOUNTER — HOSPITAL ENCOUNTER (OUTPATIENT)
Dept: RADIOLOGY | Facility: HOSPITAL | Age: 72
Discharge: HOME | End: 2022-09-23
Attending: INTERNAL MEDICINE
Payer: MEDICARE

## 2022-09-23 DIAGNOSIS — M85.89 OTHER SPECIFIED DISORDERS OF BONE DENSITY AND STRUCTURE, MULTIPLE SITES: ICD-10-CM

## 2022-09-23 DIAGNOSIS — R79.89 LOW TESTOSTERONE IN MALE: ICD-10-CM

## 2022-09-23 PROCEDURE — 77080 DXA BONE DENSITY AXIAL: CPT | Mod: TC,PO

## 2022-09-23 PROCEDURE — 77080 DEXA BONE DENSITY SPINE HIP: ICD-10-PCS | Mod: 26,,, | Performed by: RADIOLOGY

## 2022-09-23 PROCEDURE — 77080 DXA BONE DENSITY AXIAL: CPT | Mod: 26,,, | Performed by: RADIOLOGY

## 2022-09-26 ENCOUNTER — LAB VISIT (OUTPATIENT)
Dept: LAB | Facility: HOSPITAL | Age: 72
End: 2022-09-26
Attending: FAMILY MEDICINE
Payer: MEDICARE

## 2022-09-26 DIAGNOSIS — D84.821 IMMUNOCOMPROMISED STATE DUE TO DRUG THERAPY: ICD-10-CM

## 2022-09-26 DIAGNOSIS — E23.0 PANHYPOPITUITARISM: ICD-10-CM

## 2022-09-26 DIAGNOSIS — M06.9 RHEUMATOID ARTHRITIS OF HAND: ICD-10-CM

## 2022-09-26 DIAGNOSIS — Z00.01 ENCOUNTER FOR GENERAL ADULT MEDICAL EXAMINATION WITH ABNORMAL FINDINGS: ICD-10-CM

## 2022-09-26 DIAGNOSIS — E53.8 VITAMIN B12 DEFICIENCY: ICD-10-CM

## 2022-09-26 DIAGNOSIS — Z12.5 ENCOUNTER FOR PROSTATE CANCER SCREENING: ICD-10-CM

## 2022-09-26 DIAGNOSIS — G47.00 INSOMNIA, UNSPECIFIED TYPE: ICD-10-CM

## 2022-09-26 DIAGNOSIS — I10 ESSENTIAL HYPERTENSION: ICD-10-CM

## 2022-09-26 DIAGNOSIS — Z90.79 ACQUIRED ABSENCE OF ORGAN, GENITAL ORGANS: ICD-10-CM

## 2022-09-26 DIAGNOSIS — N18.9 ANEMIA OF CHRONIC RENAL FAILURE: ICD-10-CM

## 2022-09-26 DIAGNOSIS — E03.9 HYPOTHYROIDISM, UNSPECIFIED TYPE: ICD-10-CM

## 2022-09-26 DIAGNOSIS — N18.30 STAGE 3 CHRONIC KIDNEY DISEASE, UNSPECIFIED WHETHER STAGE 3A OR 3B CKD: ICD-10-CM

## 2022-09-26 DIAGNOSIS — E55.9 AVITAMINOSIS D: ICD-10-CM

## 2022-09-26 DIAGNOSIS — Z79.899 IMMUNOCOMPROMISED STATE DUE TO DRUG THERAPY: ICD-10-CM

## 2022-09-26 DIAGNOSIS — E78.2 MIXED HYPERLIPIDEMIA: Chronic | ICD-10-CM

## 2022-09-26 DIAGNOSIS — M05.9 SEROPOSITIVE RHEUMATOID ARTHRITIS: ICD-10-CM

## 2022-09-26 DIAGNOSIS — F51.01 PRIMARY INSOMNIA: ICD-10-CM

## 2022-09-26 DIAGNOSIS — I10 ESSENTIAL HYPERTENSION, MALIGNANT: ICD-10-CM

## 2022-09-26 DIAGNOSIS — R80.9 PROTEINURIA: ICD-10-CM

## 2022-09-26 DIAGNOSIS — N18.30 ANEMIA OF CHRONIC RENAL FAILURE, STAGE 3 (MODERATE): Chronic | ICD-10-CM

## 2022-09-26 DIAGNOSIS — D63.1 ANEMIA OF CHRONIC RENAL FAILURE, STAGE 3 (MODERATE): Chronic | ICD-10-CM

## 2022-09-26 DIAGNOSIS — D63.1 ANEMIA OF CHRONIC RENAL FAILURE: ICD-10-CM

## 2022-09-26 DIAGNOSIS — Z13.6 ENCOUNTER FOR LIPID SCREENING FOR CARDIOVASCULAR DISEASE: ICD-10-CM

## 2022-09-26 DIAGNOSIS — G89.4 CHRONIC PAIN SYNDROME: ICD-10-CM

## 2022-09-26 DIAGNOSIS — E27.49 SECONDARY ADRENAL INSUFFICIENCY: ICD-10-CM

## 2022-09-26 DIAGNOSIS — Z13.220 ENCOUNTER FOR LIPID SCREENING FOR CARDIOVASCULAR DISEASE: ICD-10-CM

## 2022-09-26 DIAGNOSIS — Z90.5 ACQUIRED ABSENCE OF KIDNEY: ICD-10-CM

## 2022-09-26 DIAGNOSIS — R79.89 LOW TESTOSTERONE IN MALE: ICD-10-CM

## 2022-09-26 DIAGNOSIS — M06.9 RHEUMATOID ARTHRITIS FLARE: ICD-10-CM

## 2022-09-26 DIAGNOSIS — Z79.899 ENCOUNTER FOR LONG-TERM (CURRENT) USE OF MEDICATIONS: ICD-10-CM

## 2022-09-26 DIAGNOSIS — E55.9 VITAMIN D DEFICIENCY, UNSPECIFIED: ICD-10-CM

## 2022-09-26 DIAGNOSIS — N17.9 ACUTE KIDNEY FAILURE, UNSPECIFIED: ICD-10-CM

## 2022-09-26 DIAGNOSIS — N18.30 CHRONIC KIDNEY DISEASE, STAGE III (MODERATE): ICD-10-CM

## 2022-09-26 DIAGNOSIS — R53.83 FATIGUE, UNSPECIFIED TYPE: ICD-10-CM

## 2022-09-26 DIAGNOSIS — E83.39 HYPOPHOSPHATURIA: ICD-10-CM

## 2022-09-26 LAB
ALBUMIN SERPL BCP-MCNC: 3.9 G/DL (ref 3.5–5.2)
ALP SERPL-CCNC: 44 U/L (ref 55–135)
ALT SERPL W/O P-5'-P-CCNC: 43 U/L (ref 10–44)
ANION GAP SERPL CALC-SCNC: 8 MMOL/L (ref 8–16)
AST SERPL-CCNC: 26 U/L (ref 10–40)
BASOPHILS # BLD AUTO: 0.01 K/UL (ref 0–0.2)
BASOPHILS NFR BLD: 0.1 % (ref 0–1.9)
BILIRUB SERPL-MCNC: 1.2 MG/DL (ref 0.1–1)
BUN SERPL-MCNC: 15 MG/DL (ref 8–23)
CALCIUM SERPL-MCNC: 9.1 MG/DL (ref 8.7–10.5)
CHLORIDE SERPL-SCNC: 107 MMOL/L (ref 95–110)
CHOLEST SERPL-MCNC: 139 MG/DL (ref 120–199)
CHOLEST/HDLC SERPL: 3.3 {RATIO} (ref 2–5)
CO2 SERPL-SCNC: 25 MMOL/L (ref 23–29)
COMPLEXED PSA SERPL-MCNC: <0.01 NG/ML (ref 0–4)
CREAT SERPL-MCNC: 1.4 MG/DL (ref 0.5–1.4)
CRP SERPL-MCNC: 11.4 MG/L (ref 0–8.2)
DIFFERENTIAL METHOD: ABNORMAL
EOSINOPHIL # BLD AUTO: 0.2 K/UL (ref 0–0.5)
EOSINOPHIL NFR BLD: 2 % (ref 0–8)
ERYTHROCYTE [DISTWIDTH] IN BLOOD BY AUTOMATED COUNT: 12.8 % (ref 11.5–14.5)
ERYTHROCYTE [DISTWIDTH] IN BLOOD BY AUTOMATED COUNT: 12.8 % (ref 11.5–14.5)
ERYTHROCYTE [SEDIMENTATION RATE] IN BLOOD BY WESTERGREN METHOD: 4 MM/HR (ref 0–10)
EST. GFR  (NO RACE VARIABLE): 54.1 ML/MIN/1.73 M^2
ESTIMATED AVG GLUCOSE: 117 MG/DL (ref 68–131)
GLUCOSE SERPL-MCNC: 83 MG/DL (ref 70–110)
HBA1C MFR BLD: 5.7 % (ref 4–5.6)
HCT VFR BLD AUTO: 41.3 % (ref 40–54)
HCT VFR BLD AUTO: 41.3 % (ref 40–54)
HDLC SERPL-MCNC: 42 MG/DL (ref 40–75)
HDLC SERPL: 30.2 % (ref 20–50)
HGB BLD-MCNC: 13.6 G/DL (ref 14–18)
HGB BLD-MCNC: 13.6 G/DL (ref 14–18)
IMM GRANULOCYTES # BLD AUTO: 0.02 K/UL (ref 0–0.04)
IMM GRANULOCYTES NFR BLD AUTO: 0.3 % (ref 0–0.5)
LDLC SERPL CALC-MCNC: 68.4 MG/DL (ref 63–159)
LYMPHOCYTES # BLD AUTO: 1.4 K/UL (ref 1–4.8)
LYMPHOCYTES NFR BLD: 19.3 % (ref 18–48)
MCH RBC QN AUTO: 29.2 PG (ref 27–31)
MCH RBC QN AUTO: 29.2 PG (ref 27–31)
MCHC RBC AUTO-ENTMCNC: 32.9 G/DL (ref 32–36)
MCHC RBC AUTO-ENTMCNC: 32.9 G/DL (ref 32–36)
MCV RBC AUTO: 89 FL (ref 82–98)
MCV RBC AUTO: 89 FL (ref 82–98)
MONOCYTES # BLD AUTO: 0.6 K/UL (ref 0.3–1)
MONOCYTES NFR BLD: 8.2 % (ref 4–15)
NEUTROPHILS # BLD AUTO: 5.2 K/UL (ref 1.8–7.7)
NEUTROPHILS NFR BLD: 70.1 % (ref 38–73)
NONHDLC SERPL-MCNC: 97 MG/DL
NRBC BLD-RTO: 0 /100 WBC
PLATELET # BLD AUTO: 196 K/UL (ref 150–450)
PLATELET # BLD AUTO: 196 K/UL (ref 150–450)
PMV BLD AUTO: 10.1 FL (ref 9.2–12.9)
PMV BLD AUTO: 10.1 FL (ref 9.2–12.9)
POTASSIUM SERPL-SCNC: 4.2 MMOL/L (ref 3.5–5.1)
PROT SERPL-MCNC: 6.1 G/DL (ref 6–8.4)
RBC # BLD AUTO: 4.66 M/UL (ref 4.6–6.2)
RBC # BLD AUTO: 4.66 M/UL (ref 4.6–6.2)
SODIUM SERPL-SCNC: 140 MMOL/L (ref 136–145)
T4 FREE SERPL-MCNC: 1.12 NG/DL (ref 0.71–1.51)
TESTOST SERPL-MCNC: 504 NG/DL (ref 304–1227)
TRIGL SERPL-MCNC: 143 MG/DL (ref 30–150)
VIT B12 SERPL-MCNC: 358 PG/ML (ref 210–950)
WBC # BLD AUTO: 7.41 K/UL (ref 3.9–12.7)
WBC # BLD AUTO: 7.41 K/UL (ref 3.9–12.7)

## 2022-09-26 PROCEDURE — 85651 RBC SED RATE NONAUTOMATED: CPT | Mod: PO | Performed by: INTERNAL MEDICINE

## 2022-09-26 PROCEDURE — 80061 LIPID PANEL: CPT | Performed by: FAMILY MEDICINE

## 2022-09-26 PROCEDURE — 83036 HEMOGLOBIN GLYCOSYLATED A1C: CPT | Performed by: FAMILY MEDICINE

## 2022-09-26 PROCEDURE — 80053 COMPREHEN METABOLIC PANEL: CPT | Performed by: INTERNAL MEDICINE

## 2022-09-26 PROCEDURE — 84153 ASSAY OF PSA TOTAL: CPT | Performed by: FAMILY MEDICINE

## 2022-09-26 PROCEDURE — 36415 COLL VENOUS BLD VENIPUNCTURE: CPT | Mod: PO | Performed by: FAMILY MEDICINE

## 2022-09-26 PROCEDURE — 84403 ASSAY OF TOTAL TESTOSTERONE: CPT | Performed by: INTERNAL MEDICINE

## 2022-09-26 PROCEDURE — 82607 VITAMIN B-12: CPT | Performed by: FAMILY MEDICINE

## 2022-09-26 PROCEDURE — 84439 ASSAY OF FREE THYROXINE: CPT | Performed by: FAMILY MEDICINE

## 2022-09-26 PROCEDURE — 85025 COMPLETE CBC W/AUTO DIFF WBC: CPT | Performed by: INTERNAL MEDICINE

## 2022-09-26 PROCEDURE — 86140 C-REACTIVE PROTEIN: CPT | Performed by: INTERNAL MEDICINE

## 2022-09-27 ENCOUNTER — TELEPHONE (OUTPATIENT)
Dept: FAMILY MEDICINE | Facility: CLINIC | Age: 72
End: 2022-09-27

## 2022-09-27 NOTE — TELEPHONE ENCOUNTER
Spoke to pt. Advised I do not see documentation of a call. Pt. States micky called me 20 min ago have her call back.

## 2022-09-27 NOTE — TELEPHONE ENCOUNTER
----- Message from Rosalba Hall sent at 9/27/2022 10:57 AM CDT -----  Pt is returning nurse call. Pt can be reached at [phone] (zyqs)

## 2022-09-27 NOTE — PROGRESS NOTES
Make follow-up lab appointment per recommendation below.  Check to see if patient has seen the results through my chart.  If not then,  #CALL THE PATIENT# to discuss results/see if they have questions and document verification of contact. Make F/U appt if needed. [phone]    #My interpretation that was sent to them through [employer]:  Aurelio, I have reviewed your recent blood work.     PSA not detectable.  B12 level remains low.  Increase B12 supplementation.  Thyroid study is normal.   Your cholesterol is improved from previous.  Keep up the great work!  Your hemoglobin A1c is stable.  This test is gold standard screening test for diabetes.  It is a measures 3 months of your average blood sugar.  =========================  Also please address any outstanding health maintenance that may be due: COVID-19 Vaccine(4 - Booster for Moderna series) due on 11/27/2021  Influenza Vaccine(1) due on 09/01/2022

## 2022-09-30 ENCOUNTER — PATIENT MESSAGE (OUTPATIENT)
Dept: ENDOCRINOLOGY | Facility: CLINIC | Age: 72
End: 2022-09-30
Payer: MEDICARE

## 2022-10-04 ENCOUNTER — OFFICE VISIT (OUTPATIENT)
Dept: RHEUMATOLOGY | Facility: CLINIC | Age: 72
End: 2022-10-04
Payer: MEDICARE

## 2022-10-04 VITALS
HEIGHT: 72 IN | BODY MASS INDEX: 29.23 KG/M2 | DIASTOLIC BLOOD PRESSURE: 83 MMHG | SYSTOLIC BLOOD PRESSURE: 128 MMHG | HEART RATE: 65 BPM | WEIGHT: 215.81 LBS

## 2022-10-04 DIAGNOSIS — D84.821 IMMUNOCOMPROMISED STATE DUE TO DRUG THERAPY: ICD-10-CM

## 2022-10-04 DIAGNOSIS — G47.00 INSOMNIA, UNSPECIFIED TYPE: ICD-10-CM

## 2022-10-04 DIAGNOSIS — M05.9 SEROPOSITIVE RHEUMATOID ARTHRITIS: Primary | ICD-10-CM

## 2022-10-04 DIAGNOSIS — Z79.899 IMMUNOCOMPROMISED STATE DUE TO DRUG THERAPY: ICD-10-CM

## 2022-10-04 DIAGNOSIS — E53.8 VITAMIN B 12 DEFICIENCY: ICD-10-CM

## 2022-10-04 PROCEDURE — 1101F PR PT FALLS ASSESS DOC 0-1 FALLS W/OUT INJ PAST YR: ICD-10-PCS | Mod: CPTII,S$GLB,, | Performed by: PHYSICIAN ASSISTANT

## 2022-10-04 PROCEDURE — 1160F RVW MEDS BY RX/DR IN RCRD: CPT | Mod: CPTII,S$GLB,, | Performed by: PHYSICIAN ASSISTANT

## 2022-10-04 PROCEDURE — 99213 PR OFFICE/OUTPT VISIT, EST, LEVL III, 20-29 MIN: ICD-10-PCS | Mod: 25,S$GLB,, | Performed by: PHYSICIAN ASSISTANT

## 2022-10-04 PROCEDURE — 96372 THER/PROPH/DIAG INJ SC/IM: CPT | Mod: S$GLB,,, | Performed by: PHYSICIAN ASSISTANT

## 2022-10-04 PROCEDURE — 3044F PR MOST RECENT HEMOGLOBIN A1C LEVEL <7.0%: ICD-10-PCS | Mod: CPTII,S$GLB,, | Performed by: PHYSICIAN ASSISTANT

## 2022-10-04 PROCEDURE — 1125F AMNT PAIN NOTED PAIN PRSNT: CPT | Mod: CPTII,S$GLB,, | Performed by: PHYSICIAN ASSISTANT

## 2022-10-04 PROCEDURE — 96372 PR INJECTION,THERAP/PROPH/DIAG2ST, IM OR SUBCUT: ICD-10-PCS | Mod: S$GLB,,, | Performed by: PHYSICIAN ASSISTANT

## 2022-10-04 PROCEDURE — 1159F PR MEDICATION LIST DOCUMENTED IN MEDICAL RECORD: ICD-10-PCS | Mod: CPTII,S$GLB,, | Performed by: PHYSICIAN ASSISTANT

## 2022-10-04 PROCEDURE — 99999 PR PBB SHADOW E&M-EST. PATIENT-LVL III: ICD-10-PCS | Mod: PBBFAC,,, | Performed by: PHYSICIAN ASSISTANT

## 2022-10-04 PROCEDURE — 1159F MED LIST DOCD IN RCRD: CPT | Mod: CPTII,S$GLB,, | Performed by: PHYSICIAN ASSISTANT

## 2022-10-04 PROCEDURE — 1160F PR REVIEW ALL MEDS BY PRESCRIBER/CLIN PHARMACIST DOCUMENTED: ICD-10-PCS | Mod: CPTII,S$GLB,, | Performed by: PHYSICIAN ASSISTANT

## 2022-10-04 PROCEDURE — 3008F PR BODY MASS INDEX (BMI) DOCUMENTED: ICD-10-PCS | Mod: CPTII,S$GLB,, | Performed by: PHYSICIAN ASSISTANT

## 2022-10-04 PROCEDURE — 99213 OFFICE O/P EST LOW 20 MIN: CPT | Mod: 25,S$GLB,, | Performed by: PHYSICIAN ASSISTANT

## 2022-10-04 PROCEDURE — 3008F BODY MASS INDEX DOCD: CPT | Mod: CPTII,S$GLB,, | Performed by: PHYSICIAN ASSISTANT

## 2022-10-04 PROCEDURE — 1125F PR PAIN SEVERITY QUANTIFIED, PAIN PRESENT: ICD-10-PCS | Mod: CPTII,S$GLB,, | Performed by: PHYSICIAN ASSISTANT

## 2022-10-04 PROCEDURE — 3288F FALL RISK ASSESSMENT DOCD: CPT | Mod: CPTII,S$GLB,, | Performed by: PHYSICIAN ASSISTANT

## 2022-10-04 PROCEDURE — 3288F PR FALLS RISK ASSESSMENT DOCUMENTED: ICD-10-PCS | Mod: CPTII,S$GLB,, | Performed by: PHYSICIAN ASSISTANT

## 2022-10-04 PROCEDURE — 1101F PT FALLS ASSESS-DOCD LE1/YR: CPT | Mod: CPTII,S$GLB,, | Performed by: PHYSICIAN ASSISTANT

## 2022-10-04 PROCEDURE — 3044F HG A1C LEVEL LT 7.0%: CPT | Mod: CPTII,S$GLB,, | Performed by: PHYSICIAN ASSISTANT

## 2022-10-04 PROCEDURE — 99999 PR PBB SHADOW E&M-EST. PATIENT-LVL III: CPT | Mod: PBBFAC,,, | Performed by: PHYSICIAN ASSISTANT

## 2022-10-04 RX ORDER — CYANOCOBALAMIN 1000 UG/ML
1000 INJECTION, SOLUTION INTRAMUSCULAR; SUBCUTANEOUS
Status: COMPLETED | OUTPATIENT
Start: 2022-10-04 | End: 2022-10-04

## 2022-10-04 RX ADMIN — CYANOCOBALAMIN 1000 MCG: 1000 INJECTION, SOLUTION INTRAMUSCULAR; SUBCUTANEOUS at 09:10

## 2022-10-04 ASSESSMENT — ROUTINE ASSESSMENT OF PATIENT INDEX DATA (RAPID3)
PATIENT GLOBAL ASSESSMENT SCORE: 2.5
PSYCHOLOGICAL DISTRESS SCORE: 0
MDHAQ FUNCTION SCORE: 1.3
TOTAL RAPID3 SCORE: 3.61
FATIGUE SCORE: 2.2
PAIN SCORE: 4

## 2022-10-04 NOTE — PROGRESS NOTES
Subjective:       Patient ID: Aurelio Perkins is a 71 y.o. male.    Chief Complaint: Disease Management    Mr. Perkins is a 71 year old male who presents to clinic for follow up on seropositive rheumatoid arthritis.  Treatment was changed to Simponi at his last visit and he is tolerating this well. He has noticed an improvement in his joint pain and swelling. He also reports improvement with GI symptoms since starting Simponi. He is taking diclofenac nightly as needed.      No serious infections since his last visit.     He taking lunesta for insomnia, which is working well.     He is followed by Endocrinology on hydrocortisone.     Current tx:  1. Simponi    Prior hx:  1. Enbrel  2. Rinvoq (GI upset:  3. Xeljanz    Review of Systems   Constitutional:  Negative for chills, fatigue and fever.   Eyes:  Negative for visual disturbance.   Respiratory:  Negative for cough, shortness of breath and wheezing.    Cardiovascular:  Negative for chest pain, palpitations and leg swelling.   Gastrointestinal:  Negative for abdominal pain, constipation, diarrhea, nausea and vomiting.   Musculoskeletal:  Positive for arthralgias and back pain. Negative for myalgias.   Neurological:  Negative for dizziness, syncope and headaches.   Hematological:  Negative for adenopathy.       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    Ptosis of right eyelid 5/29/2019    S/P craniotomy 6/17/2019     Past Surgical History:   Procedure Laterality Date    COLONOSCOPY N/A 3/29/2022    Procedure: COLONOSCOPY;  Surgeon: Cayla Sherman MD;  Location:  [facility] ENDO;  Service: Endoscopy;  Laterality: N/A;    CRANIOTOMY Right 5/29/2019    Procedure: CRANIOTOMY  (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)  Co-surgery with Dr Vaibhav Jara - please put in his room  Microscope [employer] Courtney Fierro;  Surgeon: Jimmy Segura DO;  Location: [facility] OR [address] [place] [address];  Service: Neurosurgery;  Laterality: Right;    HEMORRHOID SURGERY      NEPHRECTOMY      PROSTATECTOMY      TONSILLECTOMY            Physical Exam   Constitutional: He is oriented to person, place, and time.   Eyes: Right conjunctiva is not injected. Left conjunctiva is not injected.   Neck: No JVD present. No thyromegaly present.   Cardiovascular: Normal rate and regular rhythm. Exam reveals no decreased pulses.   Pulmonary/Chest: Effort normal.   Musculoskeletal:      Right shoulder: Tenderness present.      Left shoulder: Tenderness present.      Right wrist: Tenderness present.      Left wrist: Tenderness present.      Right knee: Tenderness present.      Left knee: Tenderness present.   Lymphadenopathy:     He has no cervical adenopathy.   Neurological: He is alert and oriented to person, place, and time. Gait normal.   Skin: No rash noted.   Psychiatric: Mood and affect normal.       Right Side Rheumatological Exam     The patient is tender to palpation of the shoulder, wrist, knee, 1st PIP, 1st MCP, 2nd PIP, 2nd MCP, 3rd PIP, 3rd MCP, 4th PIP, 4th MCP, 5th PIP and 5th MCP    Left Side Rheumatological Exam     The patient is tender to palpation of the shoulder, wrist, knee, 1st PIP, 1st MCP, 2nd PIP, 2nd MCP, 3rd PIP, 3rd MCP, 4th PIP, 4th MCP, 5th PIP and 5th MCP.        Recent labs reviewed:  Component      Latest Ref Rng & Units 9/26/2022           7:55 AM   WBC      3.90 - 12.70 K/uL 7.41   RBC      4.60 - 6.20 M/uL 4.66   Hemoglobin      14.0 - 18.0 g/dL 13.6 (L)   Hematocrit      40.0 - 54.0 % 41.3   MCV      82 - 98 fL 89   MCH      27.0 - 31.0 pg 29.2    MCHC      32.0 - 36.0 g/dL 32.9   RDW      11.5 - 14.5 % 12.8   Platelets      150 - 450 K/uL 196   MPV      9.2 - 12.9 fL 10.1   Immature Granulocytes      0.0 - 0.5 % 0.3   Gran # (ANC)      1.8 - 7.7 K/uL 5.2   Immature Grans (Abs)      0.00 - 0.04 K/uL 0.02   Lymph #      1.0 - 4.8 K/uL 1.4   Mono #      0.3 - 1.0 K/uL 0.6   Eos #      0.0 - 0.5 K/uL 0.2   Baso #      0.00 - 0.20 K/uL 0.01   nRBC      0 /100 WBC 0   Gran %      38.0 - 73.0 % 70.1   Lymph %      18.0 - 48.0 % 19.3   Mono %      4.0 - 15.0 % 8.2   Eosinophil %      0.0 - 8.0 % 2.0   Basophil %      0.0 - 1.9 % 0.1   Differential Method       Automated   Sodium      136 - 145 mmol/L 140   Potassium      3.5 - 5.1 mmol/L 4.2   Chloride      95 - 110 mmol/L 107   CO2      23 - 29 mmol/L 25   Glucose      70 - 110 mg/dL 83   BUN      8 - 23 mg/dL 15   Creatinine      0.5 - 1.4 mg/dL 1.4   Calcium      8.7 - 10.5 mg/dL 9.1   PROTEIN TOTAL      6.0 - 8.4 g/dL 6.1   Albumin      3.5 - 5.2 g/dL 3.9   BILIRUBIN TOTAL      0.1 - 1.0 mg/dL 1.2 (H)   Alkaline Phosphatase      55 - 135 U/L 44 (L)   AST      10 - 40 U/L 26   ALT      10 - 44 U/L 43   Anion Gap      8 - 16 mmol/L 8   eGFR      >60 mL/min/1.73 m:2 54.1 (A)   Cholesterol      120 - 199 mg/dL 139   Triglycerides      30 - 150 mg/dL 143   HDL      40 - 75 mg/dL 42   LDL Cholesterol External      63.0 - 159.0 mg/dL 68.4   HDL/Cholesterol Ratio      20.0 - 50.0 % 30.2   Total Cholesterol/HDL Ratio      2.0 - 5.0 3.3   Non-HDL Cholesterol      mg/dL 97   Hemoglobin A1C External      4.0 - 5.6 % 5.7 (H)   Estimated Avg Glucose      68 - 131 mg/dL 117   PSA, Screen      0.00 - 4.00 ng/mL <0.01   Sed Rate      0 - 10 mm/Hr 4   CRP      0.0 - 8.2 mg/L 11.4 (H)   Free T4      0.71 - 1.51 ng/dL 1.12   Vitamin B-12      210 - 950 pg/mL 358   Testosterone, Total      304 - 1227 ng/dL 504       Assessment:       1. Seropositive rheumatoid arthritis    2. Vitamin B 12 deficiency    3. Immunocompromised state  due to drug therapy    4. Insomnia, unspecified type              Plan:       Seropositive rheumatoid arthritis  -     cyanocobalamin injection 1,000 mcg  -     CBC Auto Differential; Future; Expected date: 10/04/2022  -     Comprehensive Metabolic Panel; Future; Expected date: 10/04/2022  -     C-Reactive Protein; Future; Expected date: 10/04/2022  -     Sedimentation rate; Future; Expected date: 10/04/2022  -     Quantiferon Gold TB; Future; Expected date: 10/04/2022  -     Hepatitis C Antibody; Future; Expected date: 10/04/2022  -     Hepatitis B Surface Antigen; Future; Expected date: 10/04/2022  -     Hepatitis B Core Antibody, Total; Future; Expected date: 10/04/2022  -     HEPATITIS B SURFACE ANTIBODY; Future; Expected date: 10/04/2022    Vitamin B 12 deficiency  -     cyanocobalamin injection 1,000 mcg    Immunocompromised state due to drug therapy    Insomnia, unspecified type        Assessment:  71 year old male with  Seropositive (+CCP) rheumatoid arthritis, elevated CRP  --CKD stage 3, s/p total nephrectomy, followed by Nephrology   --secondary adrenal insufficiency on cortef followed by Endocrinology  --moderate AS, non-obstructive CAD  --chronic insomnia on lunesta  --hyperglycemia, Hgba1c 5.7%  --mild normocytic anemia  --history or prostate cancer    Plan:  1. Cont Simponi monthly  2. Cont. Diclofenac 75 mg daily, avoid additional NSAIDs  3. Cont. lunesta per   4. B12 given today      Follow up:  3-4 months Dr. Oliveira w/labs prior

## 2022-10-04 NOTE — PROGRESS NOTES
2 Patient ID's verified and allergies reviewed.   Administered 1 cc ( 1000 mcg/ml ) of B 12  to the right upper outer gluteal. Patient tolerated injections. Informed of s/s to report verbalized understanding. No adverse reactions noted. Left facility in stable condition.

## 2022-11-05 ENCOUNTER — PATIENT MESSAGE (OUTPATIENT)
Dept: RHEUMATOLOGY | Facility: CLINIC | Age: 72
End: 2022-11-05
Payer: MEDICARE

## 2022-11-08 NOTE — TELEPHONE ENCOUNTER
I called pharmacy, ok to fill lunesta early. Pharmacy states it will not be covered by insurance. Pharmacy will work on finding coupon card and if not they will transfer prescription. Pt informed.

## 2022-12-09 DIAGNOSIS — G47.00 INSOMNIA, UNSPECIFIED TYPE: ICD-10-CM

## 2022-12-11 RX ORDER — ESZOPICLONE 3 MG/1
TABLET, FILM COATED ORAL
Qty: 30 TABLET | Refills: 4 | Status: SHIPPED | OUTPATIENT
Start: 2022-12-11 | End: 2023-02-24 | Stop reason: SDUPTHER

## 2023-01-17 ENCOUNTER — PATIENT MESSAGE (OUTPATIENT)
Dept: RHEUMATOLOGY | Facility: CLINIC | Age: 73
End: 2023-01-17
Payer: MEDICARE

## 2023-01-19 ENCOUNTER — PATIENT MESSAGE (OUTPATIENT)
Dept: RHEUMATOLOGY | Facility: CLINIC | Age: 73
End: 2023-01-19
Payer: MEDICARE

## 2023-02-15 DIAGNOSIS — M05.9 SEROPOSITIVE RHEUMATOID ARTHRITIS: ICD-10-CM

## 2023-02-16 RX ORDER — DICLOFENAC SODIUM 75 MG/1
75 TABLET, DELAYED RELEASE ORAL DAILY PRN
Qty: 90 TABLET | Refills: 1 | Status: SHIPPED | OUTPATIENT
Start: 2023-02-16 | End: 2023-08-11 | Stop reason: SDUPTHER

## 2023-02-22 ENCOUNTER — TELEPHONE (OUTPATIENT)
Dept: ADMINISTRATIVE | Facility: CLINIC | Age: 73
End: 2023-02-22
Payer: MEDICARE

## 2023-02-22 NOTE — TELEPHONE ENCOUNTER
Called pt, informed pt I was calling to remind pt of his in office EAWV on 2/24/23; pt stated he wanted to cancel the appt; offered to reschedule the appt to a later date and time but pt stated he declines to reschedule and does not need the awv appt; pt stated to nestor in the system he declines awv and will see his pcp when needed; appt canceled per pt request

## 2023-02-24 ENCOUNTER — OFFICE VISIT (OUTPATIENT)
Dept: RHEUMATOLOGY | Facility: CLINIC | Age: 73
End: 2023-02-24
Payer: MEDICARE

## 2023-02-24 VITALS
WEIGHT: 218 LBS | SYSTOLIC BLOOD PRESSURE: 135 MMHG | HEIGHT: 72 IN | HEART RATE: 61 BPM | DIASTOLIC BLOOD PRESSURE: 77 MMHG | BODY MASS INDEX: 29.53 KG/M2

## 2023-02-24 DIAGNOSIS — E53.8 VITAMIN B 12 DEFICIENCY: Primary | ICD-10-CM

## 2023-02-24 DIAGNOSIS — E55.9 VITAMIN D DEFICIENCY, UNSPECIFIED: ICD-10-CM

## 2023-02-24 DIAGNOSIS — D84.821 IMMUNOCOMPROMISED STATE DUE TO DRUG THERAPY: ICD-10-CM

## 2023-02-24 DIAGNOSIS — R53.83 FATIGUE, UNSPECIFIED TYPE: ICD-10-CM

## 2023-02-24 DIAGNOSIS — M05.9 SEROPOSITIVE RHEUMATOID ARTHRITIS: ICD-10-CM

## 2023-02-24 DIAGNOSIS — N18.30 STAGE 3 CHRONIC KIDNEY DISEASE, UNSPECIFIED WHETHER STAGE 3A OR 3B CKD: ICD-10-CM

## 2023-02-24 DIAGNOSIS — G47.00 INSOMNIA, UNSPECIFIED TYPE: ICD-10-CM

## 2023-02-24 DIAGNOSIS — Z79.899 IMMUNOCOMPROMISED STATE DUE TO DRUG THERAPY: ICD-10-CM

## 2023-02-24 PROCEDURE — 99999 PR PBB SHADOW E&M-EST. PATIENT-LVL III: CPT | Mod: PBBFAC,,, | Performed by: INTERNAL MEDICINE

## 2023-02-24 PROCEDURE — 3078F PR MOST RECENT DIASTOLIC BLOOD PRESSURE < 80 MM HG: ICD-10-PCS | Mod: CPTII,S$GLB,, | Performed by: INTERNAL MEDICINE

## 2023-02-24 PROCEDURE — 99999 PR PBB SHADOW E&M-EST. PATIENT-LVL III: ICD-10-PCS | Mod: PBBFAC,,, | Performed by: INTERNAL MEDICINE

## 2023-02-24 PROCEDURE — 3075F SYST BP GE 130 - 139MM HG: CPT | Mod: CPTII,S$GLB,, | Performed by: INTERNAL MEDICINE

## 2023-02-24 PROCEDURE — 96372 THER/PROPH/DIAG INJ SC/IM: CPT | Mod: S$GLB,,, | Performed by: INTERNAL MEDICINE

## 2023-02-24 PROCEDURE — 3008F PR BODY MASS INDEX (BMI) DOCUMENTED: ICD-10-PCS | Mod: CPTII,S$GLB,, | Performed by: INTERNAL MEDICINE

## 2023-02-24 PROCEDURE — 99214 OFFICE O/P EST MOD 30 MIN: CPT | Mod: 25,S$GLB,, | Performed by: INTERNAL MEDICINE

## 2023-02-24 PROCEDURE — 1160F RVW MEDS BY RX/DR IN RCRD: CPT | Mod: CPTII,S$GLB,, | Performed by: INTERNAL MEDICINE

## 2023-02-24 PROCEDURE — 1159F MED LIST DOCD IN RCRD: CPT | Mod: CPTII,S$GLB,, | Performed by: INTERNAL MEDICINE

## 2023-02-24 PROCEDURE — 99214 PR OFFICE/OUTPT VISIT, EST, LEVL IV, 30-39 MIN: ICD-10-PCS | Mod: 25,S$GLB,, | Performed by: INTERNAL MEDICINE

## 2023-02-24 PROCEDURE — 1159F PR MEDICATION LIST DOCUMENTED IN MEDICAL RECORD: ICD-10-PCS | Mod: CPTII,S$GLB,, | Performed by: INTERNAL MEDICINE

## 2023-02-24 PROCEDURE — 1125F PR PAIN SEVERITY QUANTIFIED, PAIN PRESENT: ICD-10-PCS | Mod: CPTII,S$GLB,, | Performed by: INTERNAL MEDICINE

## 2023-02-24 PROCEDURE — 1125F AMNT PAIN NOTED PAIN PRSNT: CPT | Mod: CPTII,S$GLB,, | Performed by: INTERNAL MEDICINE

## 2023-02-24 PROCEDURE — 3008F BODY MASS INDEX DOCD: CPT | Mod: CPTII,S$GLB,, | Performed by: INTERNAL MEDICINE

## 2023-02-24 PROCEDURE — 3078F DIAST BP <80 MM HG: CPT | Mod: CPTII,S$GLB,, | Performed by: INTERNAL MEDICINE

## 2023-02-24 PROCEDURE — 1160F PR REVIEW ALL MEDS BY PRESCRIBER/CLIN PHARMACIST DOCUMENTED: ICD-10-PCS | Mod: CPTII,S$GLB,, | Performed by: INTERNAL MEDICINE

## 2023-02-24 PROCEDURE — 3075F PR MOST RECENT SYSTOLIC BLOOD PRESS GE 130-139MM HG: ICD-10-PCS | Mod: CPTII,S$GLB,, | Performed by: INTERNAL MEDICINE

## 2023-02-24 PROCEDURE — 96372 PR INJECTION,THERAP/PROPH/DIAG2ST, IM OR SUBCUT: ICD-10-PCS | Mod: S$GLB,,, | Performed by: INTERNAL MEDICINE

## 2023-02-24 RX ORDER — CYANOCOBALAMIN 1000 UG/ML
1000 INJECTION, SOLUTION INTRAMUSCULAR; SUBCUTANEOUS
Status: COMPLETED | OUTPATIENT
Start: 2023-02-24 | End: 2023-02-24

## 2023-02-24 RX ORDER — ESZOPICLONE 3 MG/1
TABLET, FILM COATED ORAL
Qty: 30 TABLET | Refills: 4 | Status: SHIPPED | OUTPATIENT
Start: 2023-02-24 | End: 2023-09-05

## 2023-02-24 RX ADMIN — CYANOCOBALAMIN 1000 MCG: 1000 INJECTION, SOLUTION INTRAMUSCULAR; SUBCUTANEOUS at 11:02

## 2023-02-24 NOTE — PROGRESS NOTES
Administered 1 cc ( 1000 mcg/ml ) of b12 to the right deltoid. Informed of s/s to report verbalized understanding. No adverse reactions noted.

## 2023-02-24 NOTE — PROGRESS NOTES
Subjective:       Patient ID: Aurelio Perkins is a 72 y.o. male.    Chief Complaint: Disease Management      Follow up: 72 year old male who presents to clinic for follow up on seropositive rheumatoid arthritis.  Treatment was changed to Simponi at his last visit and he is tolerating this well. He ran out simponi. He has noticed an improvement in his joint pain and swelling. He also reports improvement with GI symptoms since starting Simponi. He is taking diclofenac nightly as needed.      No serious infections since his last visit.     He taking lunesta for insomnia, which is working well.     He is followed by Endocrinology on hydrocortisone.     Current tx:  1. Simponi    Prior hx:  1. Enbrel  2. Rinvoq (GI upset:  3. Xeljanz    Review of Systems   Constitutional:  Negative for chills.   Eyes:  Negative for visual disturbance.   Respiratory:  Negative for cough, shortness of breath and wheezing.    Cardiovascular:  Negative for chest pain, palpitations and leg swelling.   Gastrointestinal:  Negative for abdominal pain, constipation, diarrhea, nausea and vomiting.   Musculoskeletal:  Positive for arthralgias and back pain.   Neurological:  Negative for dizziness and syncope.   Hematological:  Negative for adenopathy.       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    Ptosis of right eyelid 5/29/2019    S/P craniotomy 6/17/2019     Past Surgical History:   Procedure Laterality Date    COLONOSCOPY N/A 3/29/2022    Procedure: COLONOSCOPY;  Surgeon: Cayla Sherman MD;  Location: [facility];  Service:  Endoscopy;  Laterality: N/A;    CRANIOTOMY Right 5/29/2019    Procedure: CRANIOTOMY  (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)  Co-surgery with Dr Vaibhav Jara - please put in his room  Microscope [facility];  Surgeon: Jimmy Segura DO;  Location: [address];  Service: Neurosurgery;  Laterality: Right;    HEMORRHOID SURGERY      NEPHRECTOMY      PROSTATECTOMY      TONSILLECTOMY            Physical Exam   Constitutional: He is oriented to person, place, and time.   Eyes: Right conjunctiva is not injected. Left conjunctiva is not injected.   Neck: No JVD present. No thyromegaly present.   Cardiovascular: Normal rate and regular rhythm. Exam reveals no decreased pulses.   Pulmonary/Chest: Effort normal.   Musculoskeletal:      Right shoulder: Tenderness present.      Left shoulder: Tenderness present.      Right wrist: Tenderness present.      Left wrist: Tenderness present.      Right knee: Tenderness present.      Left knee: Tenderness present.   Lymphadenopathy:     He has no cervical adenopathy.   Neurological: He is alert and oriented to person, place, and time. Gait normal.   Skin: No rash noted.   Psychiatric: Mood and affect normal.       Right Side Rheumatological Exam     The patient is tender to palpation of the shoulder, wrist, knee, 1st PIP, 1st MCP, 2nd PIP, 2nd MCP, 3rd PIP, 3rd MCP, 4th PIP, 4th MCP, 5th PIP and 5th MCP    Left Side Rheumatological Exam     The patient is tender to palpation of the shoulder, wrist, knee, 1st PIP, 1st MCP, 2nd PIP, 2nd MCP, 3rd PIP, 3rd MCP, 4th PIP, 4th MCP, 5th PIP and 5th MCP.          Results for orders placed or performed in visit on 09/26/22   Hemoglobin A1C   Result Value Ref Range    Hemoglobin A1C 5.7 (H) 4.0 - 5.6 %    Estimated Avg Glucose 117 68 - 131 mg/dL   Lipid Panel   Result Value Ref Range    Cholesterol 139 120 - 199 mg/dL    Triglycerides 143 30 - 150 mg/dL    HDL 42 40 - 75  mg/dL    LDL Cholesterol 68.4 63.0 - 159.0 mg/dL    HDL/Cholesterol Ratio 30.2 20.0 - 50.0 %    Total Cholesterol/HDL Ratio 3.3 2.0 - 5.0    Non-HDL Cholesterol 97 mg/dL   PSA, Screening   Result Value Ref Range    PSA, Screen <0.01 0.00 - 4.00 ng/mL   CBC Auto Differential   Result Value Ref Range    WBC 7.41 3.90 - 12.70 K/uL    RBC 4.66 4.60 - 6.20 M/uL    Hemoglobin 13.6 (L) 14.0 - 18.0 g/dL    Hematocrit 41.3 40.0 - 54.0 %    MCV 89 82 - 98 fL    MCH 29.2 27.0 - 31.0 pg    MCHC 32.9 32.0 - 36.0 g/dL    RDW 12.8 11.5 - 14.5 %    Platelets 196 150 - 450 K/uL    MPV 10.1 9.2 - 12.9 fL    Immature Granulocytes 0.3 0.0 - 0.5 %    Gran # (ANC) 5.2 1.8 - 7.7 K/uL    Immature Grans (Abs) 0.02 0.00 - 0.04 K/uL    Lymph # 1.4 1.0 - 4.8 K/uL    Mono # 0.6 0.3 - 1.0 K/uL    Eos # 0.2 0.0 - 0.5 K/uL    Baso # 0.01 0.00 - 0.20 K/uL    nRBC 0 0 /100 WBC    Gran % 70.1 38.0 - 73.0 %    Lymph % 19.3 18.0 - 48.0 %    Mono % 8.2 4.0 - 15.0 %    Eosinophil % 2.0 0.0 - 8.0 %    Basophil % 0.1 0.0 - 1.9 %    Differential Method Automated    Comprehensive Metabolic Panel   Result Value Ref Range    Sodium 140 136 - 145 mmol/L    Potassium 4.2 3.5 - 5.1 mmol/L    Chloride 107 95 - 110 mmol/L    CO2 25 23 - 29 mmol/L    Glucose 83 70 - 110 mg/dL    BUN 15 8 - 23 mg/dL    Creatinine 1.4 0.5 - 1.4 mg/dL    Calcium 9.1 8.7 - 10.5 mg/dL    Total Protein 6.1 6.0 - 8.4 g/dL    Albumin 3.9 3.5 - 5.2 g/dL    Total Bilirubin 1.2 (H) 0.1 - 1.0 mg/dL    Alkaline Phosphatase 44 (L) 55 - 135 U/L    AST 26 10 - 40 U/L    ALT 43 10 - 44 U/L    Anion Gap 8 8 - 16 mmol/L    eGFR 54.1 (A) >60 mL/min/1.73 m^2   Sedimentation rate   Result Value Ref Range    Sed Rate 4 0 - 10 mm/Hr   C-Reactive Protein   Result Value Ref Range    CRP 11.4 (H) 0.0 - 8.2 mg/L   T4, FREE   Result Value Ref Range    Free T4 1.12 0.71 - 1.51 ng/dL   Vitamin B12   Result Value Ref Range    Vitamin B-12 358 210 - 950 pg/mL   Testosterone   Result Value Ref Range    Testosterone,  Total 504 304 - 1227 ng/dL   CBC Without Differential   Result Value Ref Range    WBC 7.41 3.90 - 12.70 K/uL    RBC 4.66 4.60 - 6.20 M/uL    Hemoglobin 13.6 (L) 14.0 - 18.0 g/dL    Hematocrit 41.3 40.0 - 54.0 %    MCV 89 82 - 98 fL    MCH 29.2 27.0 - 31.0 pg    MCHC 32.9 32.0 - 36.0 g/dL    RDW 12.8 11.5 - 14.5 %    Platelets 196 150 - 450 K/uL    MPV 10.1 9.2 - 12.9 fL     reviewed labs with patient during this visit       Assessment:       1. Vitamin B 12 deficiency    2. Seropositive rheumatoid arthritis    3. Stage 3 chronic kidney disease, unspecified whether stage 3a or 3b CKD    4. Immunocompromised state due to drug therapy    5. Fatigue, unspecified type    6. Vitamin D deficiency, unspecified     7. Insomnia, unspecified type                Plan:       Vitamin B 12 deficiency  -     cyanocobalamin injection 1,000 mcg  -     CBC Auto Differential; Future; Expected date: 02/24/2023  -     Comprehensive Metabolic Panel; Future; Expected date: 02/24/2023  -     Sedimentation rate; Future; Expected date: 02/24/2023  -     C-Reactive Protein; Future; Expected date: 02/24/2023  -     TSH; Future; Expected date: 02/24/2023  -     T4, Free; Future; Expected date: 02/24/2023  -     Vitamin D; Future; Expected date: 02/24/2023    Seropositive rheumatoid arthritis  -     CBC Auto Differential; Future; Expected date: 02/24/2023  -     Comprehensive Metabolic Panel; Future; Expected date: 02/24/2023  -     Sedimentation rate; Future; Expected date: 02/24/2023  -     C-Reactive Protein; Future; Expected date: 02/24/2023  -     TSH; Future; Expected date: 02/24/2023  -     T4, Free; Future; Expected date: 02/24/2023  -     Vitamin D; Future; Expected date: 02/24/2023    Stage 3 chronic kidney disease, unspecified whether stage 3a or 3b CKD  -     CBC Auto Differential; Future; Expected date: 02/24/2023  -     Comprehensive Metabolic Panel; Future; Expected date: 02/24/2023  -     Sedimentation rate; Future; Expected date:  02/24/2023  -     C-Reactive Protein; Future; Expected date: 02/24/2023  -     TSH; Future; Expected date: 02/24/2023  -     T4, Free; Future; Expected date: 02/24/2023  -     Vitamin D; Future; Expected date: 02/24/2023    Immunocompromised state due to drug therapy  -     CBC Auto Differential; Future; Expected date: 02/24/2023  -     Comprehensive Metabolic Panel; Future; Expected date: 02/24/2023  -     Sedimentation rate; Future; Expected date: 02/24/2023  -     C-Reactive Protein; Future; Expected date: 02/24/2023  -     TSH; Future; Expected date: 02/24/2023  -     T4, Free; Future; Expected date: 02/24/2023  -     Vitamin D; Future; Expected date: 02/24/2023    Fatigue, unspecified type  -     CBC Auto Differential; Future; Expected date: 02/24/2023  -     Comprehensive Metabolic Panel; Future; Expected date: 02/24/2023  -     Sedimentation rate; Future; Expected date: 02/24/2023  -     C-Reactive Protein; Future; Expected date: 02/24/2023  -     TSH; Future; Expected date: 02/24/2023  -     T4, Free; Future; Expected date: 02/24/2023  -     Vitamin D; Future; Expected date: 02/24/2023    Vitamin D deficiency, unspecified   -     CBC Auto Differential; Future; Expected date: 02/24/2023  -     Comprehensive Metabolic Panel; Future; Expected date: 02/24/2023  -     Sedimentation rate; Future; Expected date: 02/24/2023  -     C-Reactive Protein; Future; Expected date: 02/24/2023  -     TSH; Future; Expected date: 02/24/2023  -     T4, Free; Future; Expected date: 02/24/2023  -     Vitamin D; Future; Expected date: 02/24/2023    Insomnia, unspecified type  -     eszopiclone (LUNESTA) 3 mg Tab; TAKE 1 TABLET BY MOUTH EVERY DAY IN THE EVENING  Dispense: 30 tablet; Refill: 4  -     CBC Auto Differential; Future; Expected date: 02/24/2023  -     Comprehensive Metabolic Panel; Future; Expected date: 02/24/2023  -     Sedimentation rate; Future; Expected date: 02/24/2023  -     C-Reactive Protein; Future; Expected  date: 02/24/2023  -     TSH; Future; Expected date: 02/24/2023  -     T4, Free; Future; Expected date: 02/24/2023  -     Vitamin D; Future; Expected date: 02/24/2023            Assessment:  72 year old male with  Seropositive (+CCP) rheumatoid arthritis, elevated CRP  --CKD stage 3, s/p total nephrectomy, followed by Nephrology   --secondary adrenal insufficiency on cortef followed by Endocrinology  --moderate AS, non-obstructive CAD  --chronic insomnia on lunesta  --hyperglycemia, Hgba1c 5.7%  --mild normocytic anemia  --history or prostate cancer    Plan:  1. Cont Simponi monthly  2. Cont. Diclofenac 75 mg daily, avoid additional NSAIDs  3. Cont. lunesta per   4. B12 given today

## 2023-03-02 ENCOUNTER — OFFICE VISIT (OUTPATIENT)
Dept: FAMILY MEDICINE | Facility: CLINIC | Age: 73
End: 2023-03-02
Payer: MEDICARE

## 2023-03-02 VITALS
DIASTOLIC BLOOD PRESSURE: 63 MMHG | WEIGHT: 220 LBS | HEART RATE: 60 BPM | SYSTOLIC BLOOD PRESSURE: 116 MMHG | HEIGHT: 72 IN | BODY MASS INDEX: 29.8 KG/M2 | TEMPERATURE: 98 F

## 2023-03-02 DIAGNOSIS — Z79.899 ENCOUNTER FOR LONG-TERM (CURRENT) USE OF MEDICATIONS: ICD-10-CM

## 2023-03-02 DIAGNOSIS — R32 URINARY INCONTINENCE, UNSPECIFIED TYPE: ICD-10-CM

## 2023-03-02 DIAGNOSIS — D32.9 MENINGIOMA OF RIGHT SPHENOID WING INVOLVING CAVERNOUS SINUS: Primary | ICD-10-CM

## 2023-03-02 DIAGNOSIS — R73.03 PREDIABETES: ICD-10-CM

## 2023-03-02 DIAGNOSIS — E27.49 SECONDARY ADRENAL INSUFFICIENCY: ICD-10-CM

## 2023-03-02 DIAGNOSIS — I70.0 ABDOMINAL AORTIC ATHEROSCLEROSIS: ICD-10-CM

## 2023-03-02 DIAGNOSIS — I10 ESSENTIAL HYPERTENSION: ICD-10-CM

## 2023-03-02 PROCEDURE — 1160F RVW MEDS BY RX/DR IN RCRD: CPT | Mod: CPTII,S$GLB,, | Performed by: FAMILY MEDICINE

## 2023-03-02 PROCEDURE — 3288F PR FALLS RISK ASSESSMENT DOCUMENTED: ICD-10-PCS | Mod: CPTII,S$GLB,, | Performed by: FAMILY MEDICINE

## 2023-03-02 PROCEDURE — 99999 PR PBB SHADOW E&M-EST. PATIENT-LVL IV: ICD-10-PCS | Mod: PBBFAC,,, | Performed by: FAMILY MEDICINE

## 2023-03-02 PROCEDURE — 1160F PR REVIEW ALL MEDS BY PRESCRIBER/CLIN PHARMACIST DOCUMENTED: ICD-10-PCS | Mod: CPTII,S$GLB,, | Performed by: FAMILY MEDICINE

## 2023-03-02 PROCEDURE — 3074F SYST BP LT 130 MM HG: CPT | Mod: CPTII,S$GLB,, | Performed by: FAMILY MEDICINE

## 2023-03-02 PROCEDURE — 1101F PR PT FALLS ASSESS DOC 0-1 FALLS W/OUT INJ PAST YR: ICD-10-PCS | Mod: CPTII,S$GLB,, | Performed by: FAMILY MEDICINE

## 2023-03-02 PROCEDURE — 3074F PR MOST RECENT SYSTOLIC BLOOD PRESSURE < 130 MM HG: ICD-10-PCS | Mod: CPTII,S$GLB,, | Performed by: FAMILY MEDICINE

## 2023-03-02 PROCEDURE — 99999 PR PBB SHADOW E&M-EST. PATIENT-LVL IV: CPT | Mod: PBBFAC,,, | Performed by: FAMILY MEDICINE

## 2023-03-02 PROCEDURE — 3078F PR MOST RECENT DIASTOLIC BLOOD PRESSURE < 80 MM HG: ICD-10-PCS | Mod: CPTII,S$GLB,, | Performed by: FAMILY MEDICINE

## 2023-03-02 PROCEDURE — 99214 OFFICE O/P EST MOD 30 MIN: CPT | Mod: S$GLB,,, | Performed by: FAMILY MEDICINE

## 2023-03-02 PROCEDURE — 1159F PR MEDICATION LIST DOCUMENTED IN MEDICAL RECORD: ICD-10-PCS | Mod: CPTII,S$GLB,, | Performed by: FAMILY MEDICINE

## 2023-03-02 PROCEDURE — 99214 PR OFFICE/OUTPT VISIT, EST, LEVL IV, 30-39 MIN: ICD-10-PCS | Mod: S$GLB,,, | Performed by: FAMILY MEDICINE

## 2023-03-02 PROCEDURE — 3008F PR BODY MASS INDEX (BMI) DOCUMENTED: ICD-10-PCS | Mod: CPTII,S$GLB,, | Performed by: FAMILY MEDICINE

## 2023-03-02 PROCEDURE — 3008F BODY MASS INDEX DOCD: CPT | Mod: CPTII,S$GLB,, | Performed by: FAMILY MEDICINE

## 2023-03-02 PROCEDURE — 1101F PT FALLS ASSESS-DOCD LE1/YR: CPT | Mod: CPTII,S$GLB,, | Performed by: FAMILY MEDICINE

## 2023-03-02 PROCEDURE — 3288F FALL RISK ASSESSMENT DOCD: CPT | Mod: CPTII,S$GLB,, | Performed by: FAMILY MEDICINE

## 2023-03-02 PROCEDURE — 1126F PR PAIN SEVERITY QUANTIFIED, NO PAIN PRESENT: ICD-10-PCS | Mod: CPTII,S$GLB,, | Performed by: FAMILY MEDICINE

## 2023-03-02 PROCEDURE — 1126F AMNT PAIN NOTED NONE PRSNT: CPT | Mod: CPTII,S$GLB,, | Performed by: FAMILY MEDICINE

## 2023-03-02 PROCEDURE — 1159F MED LIST DOCD IN RCRD: CPT | Mod: CPTII,S$GLB,, | Performed by: FAMILY MEDICINE

## 2023-03-02 PROCEDURE — 3078F DIAST BP <80 MM HG: CPT | Mod: CPTII,S$GLB,, | Performed by: FAMILY MEDICINE

## 2023-03-02 NOTE — PATIENT INSTRUCTIONS
Follow up in about 1 year (around 3/2/2024), or if symptoms worsen or fail to improve, for Annual Wellness Exam.     Dear patient,   As a result of recent federal legislation (The Federal Cures Act), you may receive lab or pathology results from your visit in your [facility] account before your physician is able to contact you. Your physician or their representative will relay the results to you with their recommendations at their soonest availability.     If no improvement in symptoms or symptoms worsen, please be advised to call MD, follow-up at clinic and/or go to ER if becomes severe.    Cuauhtemoc Gardner M.D.        We Offer TELEHEALTH & Same Day Appointments!   Book your Telehealth appointment with me through my nurse or   Clinic appointments on [employer]!    [address] [place] [address]    Office: [phone]   FAX: [phone]    Check out my Facebook Page and Follow Me at: [email or web address]    Check [email or web address] my website at [email or web address] by clicking on: [email or web address]    To Schedule appointments online, go to [employer]: [email or web address]

## 2023-03-02 NOTE — Clinical Note
Note addended for patient to get urinary catheters. [facility] [phone]

## 2023-03-02 NOTE — PROGRESS NOTES
PLAN:      Problem List Items Addressed This Visit       Encounter for long-term (current) use of medications (Chronic)     Complete history and physical was completed today.  Complete and thorough medication reconciliation was performed.  Discussed risks and benefits of medications.  Advised patient on orders and health maintenance.  We discussed old records and old labs if available.  Will request any records not available through epic.  Continue current medications listed on your summary sheet.           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         Essential hypertension (Chronic)     Continuing to monitor.  Patient doing well off of blood pressure medication.Counseled on importance of hypertension disease course, I recommend ongoing Education for DASH-diet and exercise.  Counseled on medication regimen importance of treating high blood pressure.  Please be advised of risk of untreated blood pressure as discussed.  Please advised of ER precautions were given for symptoms of hypertensive urgency and emergency.           Secondary adrenal insufficiency (Chronic)     Defer treatment of this condition to Endocrinology.  Continue follow-up with Endocrinology.         Abdominal aortic atherosclerosis (Chronic)     Continue current medication regimen.  Controlling all risk factors.  Patient with normal risk.         Prediabetes (Chronic)     Continue diet control with low-carbohydrate diet.We will plan to monitor hemoglobin A1c at designated intervals 3 to 6 months.  I recommend ongoing Education for diabetic diet and exercise protocol.  We will continue to monitor for side effects.    Please be advised of symptoms to monitor for and to notify me immediately if persistent or worsening.  Follow up with Ophthalmology/Optometry and Podiatry at least annually.           Urinary incontinence     Update supplies as needed.  Patient is  "reliant on catheter supplies for quality of life.         Relevant Orders    Ambulatory referral/consult to Urology     Future Appointments       Date Provider Specialty Appt Notes    9/13/2023  MED/SURG [facility]    10/19/2023  Lab [facility]    10/26/2023 Yoav Oliveira MD Rheumatology 4 month f/u progress notes in brown folder           Medication Management for assessment above:   Medication List with Changes/Refills   Current Medications    ASPIRIN (ECOTRIN) 81 MG EC TABLET    Take 1 tablet (81 mg total) by mouth once daily.    DICLOFENAC (VOLTAREN) 75 MG EC TABLET    Take 1 tablet (75 mg total) by mouth daily as needed.    DICLOFENAC SODIUM (VOLTAREN) 1 % GEL    Apply topically.    DICLOFENAC SODIUM/MISOPROSTOL (DICLOFENAC-MISOPROSTOL ORAL)    diclofenac-misoprostol Take No date recorded No form recorded No frequency recorded No route recorded No set duration recorded No set duration amount recorded active No dosage strength recorded No dosage strength units of measure recorded    ERGOCALCIFEROL (ERGOCALCIFEROL) 50,000 UNIT CAP    TAKE 1 CAPSULE BY MOUTH ONE TIME PER WEEK    ESZOPICLONE (LUNESTA) 3 MG TAB    TAKE 1 TABLET BY MOUTH EVERY DAY IN THE EVENING    GOLIMUMAB (SIMPONI) 50 MG/0.5 ML PNIJ    Inject 50 mg into the skin every 30 days.    HYDROCORTISONE (CORTEF) 10 MG TAB    TAKE 1 AND 1/2 TABLETS (15 mg) BY MOUTH IN THE MORNING AND 1 TABLET (10 mg) BY MOUTH IN THE EVENING AT 4PM    NITROGLYCERIN (NITROSTAT) 0.4 MG SL TABLET    Place 0.4 mg under the tongue.    REPATHA PUSHTRONEX 420 MG/3.5 ML INJT    INJECT 3.5 MLS INTO THE SKIN EVERY 30 DAYS.    SAFETY NEEDLES (BD SAFETYGLIDE NEEDLE) 25 GAUGE X 1" NDLE    1 each by Misc.(Non-Drug; Combo Route) route every 7 days.    SYNTHROID 112 MCG TABLET    TAKE 1 TABLET BY MOUTH EVERY DAY    TESTOSTERONE CYPIONATE (DEPOTESTOTERONE CYPIONATE) 200 MG/ML INJECTION    Inject 0.5 mLs (100 mg total) into the muscle every 7 days.       Cuauhtemoc Gardner, " M.D.  ==========================================================================  Subjective:   Patient ID: Aurelio Perkins is a 72 y.o. male.  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   Chief Complaint: Annual Exam        Problem List Items Addressed This Visit       Encounter for long-term (current) use of medications (Chronic)    Overview     March 2023: Reviewed labs.  August 2022: Reviewed labs.  07/11/2019 CHRONIC long-term drug therapy for managed conditions. See medication list. Reports compliance.  No side effects reported.  Routine lab work is being monitored.  Patient does not  need refills today. CHRONIC long-term drug therapy for managed conditions. See medication list. Reports compliance.  No side effects reported.  Routine lab work is being monitored.  Patient does need refills today. January 2021:CHRONIC. Stable. Compliant with medications for managed conditions. See medication list. No SE reported.   Routine lab analysis is being monitored. Refills were addressed.  June 2021:  Reviewed labs.  Lab Results   Component Value Date    WBC 7.41 09/26/2022    WBC 7.41 09/26/2022    HGB 13.6 (L) 09/26/2022    HGB 13.6 (L) 09/26/2022    HCT 41.3 09/26/2022    HCT 41.3 09/26/2022    MCV 89 09/26/2022    MCV 89 09/26/2022     09/26/2022     09/26/2022       Chemistry        Component Value Date/Time     09/26/2022 0755    K 4.2 09/26/2022 0755     09/26/2022 0755    CO2 25 09/26/2022 0755    BUN 15 09/26/2022 0755    CREATININE 1.4 09/26/2022 0755    GLU 83 09/26/2022 0755        Component Value Date/Time    CALCIUM 9.1 09/26/2022 0755    ALKPHOS 44 (L)  09/26/2022 0755    AST 26 09/26/2022 0755    ALT 43 09/26/2022 0755    BILITOT 1.2 (H) 09/26/2022 0755    ESTGFRAFRICA 58.4 (A) 06/10/2022 1000    EGFRNONAA 50.5 (A) 06/10/2022 1000          Lab Results   Component Value Date    TSH 0.014 (L) 12/21/2021    Z9CTUDF 52 (L) 03/03/2019    K9BMELT 3.9 (L) 03/03/2019    FREET4 1.12 09/26/2022    T3FREE 2.0 (L) 06/30/2017     =================================         Current Assessment & Plan     Complete history and physical was completed today.  Complete and thorough medication reconciliation was performed.  Discussed risks and benefits of medications.  Advised patient on orders and health maintenance.  We discussed old records and old labs if available.  Will request any records not available through epic.  Continue current medications listed on your summary sheet.           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  Stable.  WHO grade I.  Monitored by neuro surgery.         Current Assessment & Plan     Continue follow-up with neuro surgery.         Essential hypertension (Chronic)    Overview     March 2023:  Blood pressure remains well controlled off of any medication.  August 2022:  Patient continues to be on no medications for blood pressure.  June 2021:  Blood pressure has been again well controlled and at lower limits of normal.  Patient now on 12.5 milligrams of metoprolol only.  He is enrolled in digital medicine hypertension program.  January 2021:  Patient's blood pressure has been well below normal limits.  Patient has been having actually low blood pressure on hydrochlorothiazide.  Patient has change diet and is now having well controlled blood pressure readings.  Will give him a trial off of the medication.  Patient may not even be hypertensive anymore.  Chronic.  Controlled today.  Patient taking metoprolol.  Blood pressure is controlled today.  Patient reports compliance.  No side effects  reported.  Patient is not currently in digital medicine hypertension program.  Will sign him up today.The patient denies any chest pain, shortness of breath, palpitations, dizziness, lightheadedness, headache, arm numbness tingling or weakness, syncope.  November 2019:This condition is chronic.  Currently stable.  Blood pressure is below goal.  Patient reports compliance with blood pressure medications as listed.  No side effects are reported at this time.  However patient reports blood pressures been elevated at home.  Patient did take one of his wife's hydrochlorothiazide which brought his blood pressure down.  Patient has check with his nephrologist Dr. SAUCEDA and he is okay with starting hydrochlorothiazide with his solitary kidney and decreased kidney function.  The patient denies any chest pain, shortness of breath, palpitations, dizziness, lightheadedness, headache, arm numbness tingling or weakness, syncope.  Creatinine Date Value Ref Range Status 09/26/2019 1.6 (H) 0.5 - 1.4 mg/dL Final   Last Assessment & Plan:   He is concerned about rapid development of HTN with severe elevation at diagnosis. No cortisol from before high dose steroids available but suspect he may have had some component of AI masking HTN and now that adequately replaced have seen rise in BP  BP at goal today. Continue to follow with cardiology and digital HTN program         Current Assessment & Plan     Continuing to monitor.  Patient doing well off of blood pressure medication.Counseled on importance of hypertension disease course, I recommend ongoing Education for DASH-diet and exercise.  Counseled on medication regimen importance of treating high blood pressure.  Please be advised of risk of untreated blood pressure as discussed.  Please advised of ER precautions were given for symptoms of hypertensive urgency and emergency.           Secondary adrenal insufficiency (Chronic)    Overview     Chronic.  Patient following with  Endocrinology.  Patient due for a visit and updated labs with them.         Current Assessment & Plan     Defer treatment of this condition to Endocrinology.  Continue follow-up with Endocrinology.         Abdominal aortic atherosclerosis (Chronic)    Overview     Chronic.  Stable.  No issues.         Current Assessment & Plan     Continue current medication regimen.  Controlling all risk factors.  Patient with normal risk.         Prediabetes (Chronic)    Overview     Patient denies any history of thyroid cancer, pancreatitis, MEN syndrome.  Reviewed prediabetes  Management Status    Statin: Not taking  ACE/ARB: Not taking    Screening or Prevention Patient's value Goal Complete/Controlled?   HgA1C Testing and Control   Lab Results   Component Value Date    HGBA1C 5.7 (H) 09/26/2022      Annually/Less than 8% Yes   Lipid profile : 09/26/2022 Annually Yes   LDL control Lab Results   Component Value Date    LDLCALC 68.4 09/26/2022    Annually/Less than 100 mg/dl  Yes   Nephropathy screening Lab Results   Component Value Date    LABMICR 12.0 12/22/2014     Lab Results   Component Value Date    PROTEINUA Negative 04/12/2022     Lab Results   Component Value Date    UTPCR Unable to calculate 04/12/2022      Annually Yes   Blood pressure BP Readings from Last 1 Encounters:   03/02/23 116/63    Less than 140/90 Yes   Dilated retinal exam Most Recent Eye Exam Date: Not Found Annually No   Foot exam   Most Recent Foot Exam Date: Not Found Annually No            Current Assessment & Plan     Continue diet control with low-carbohydrate diet.We will plan to monitor hemoglobin A1c at designated intervals 3 to 6 months.  I recommend ongoing Education for diabetic diet and exercise protocol.  We will continue to monitor for side effects.    Please be advised of symptoms to monitor for and to notify me immediately if persistent or worsening.  Follow up with Ophthalmology/Optometry and Podiatry at least annually.           Urinary  "incontinence    Overview     Chronic.  Uncontrolled.  Patient uses condom catheter. he is out of supplies and is completely incontinent,  pt is completely incontinent and dependent on supplies needed. Supplies include condom cath and legs bag 35 caths per month with 2 leg bags.     Patient did express that he would like to get 4 leg bags her month if possible.     Previous History:urinary incontinence status post radical prostatectomy.           Current Assessment & Plan     Update supplies as needed.  Patient is reliant on catheter supplies for quality of life.             Review of patient's allergies indicates:   Allergen Reactions    Antihistamines - alkylamine Other (See Comments)     hallucinations    Benadryl [diphenhydramine hcl] Other (See Comments)     hallucinations    Codeine Itching     Turns red    Lodine [etodolac] Other (See Comments)     fatigue    Methotrexate analogues Other (See Comments)     Sunlight sensitivity    Morphine Itching     Turns red    Statins-hmg-coa reductase inhibitors      Current Outpatient Medications   Medication Instructions    aspirin (ECOTRIN) 81 mg, Oral, Daily    diclofenac (VOLTAREN) 75 mg, Oral, Daily PRN    diclofenac sodium (VOLTAREN) 1 % Gel Topical (Top)    ergocalciferol (ERGOCALCIFEROL) 50,000 unit Cap TAKE 1 CAPSULE BY MOUTH ONE TIME PER WEEK    eszopiclone (LUNESTA) 3 mg Tab TAKE 1 TABLET BY MOUTH EVERY DAY IN THE EVENING    hydrocortisone (CORTEF) 10 MG Tab TAKE 1 AND 1/2 TABLETS (15 mg) BY MOUTH IN THE MORNING AND 1 TABLET (10 mg) BY MOUTH IN THE EVENING AT 4PM    nitroGLYCERIN (NITROSTAT) 0.4 mg, Sublingual    REPATHA PUSHTRONEX 420 mg/3.5 mL Injt INJECT 3.5 MLS INTO THE SKIN EVERY 30 DAYS.    safety needles (BD SAFETYGLIDE NEEDLE) 25 gauge x 1" Ndle 1 each, Misc.(Non-Drug; Combo Route), Every 7 days    SIMPONI 50 mg, Subcutaneous, Every 30 days    SYNTHROID 112 mcg, Oral, Daily    testosterone cypionate (DEPOTESTOTERONE CYPIONATE) 76 mg, Intramuscular, " Every 7 days      I have reviewed the PMH, social history, FamilyHx, surgical history, allergies and medications documented / confirmed by the patient at the time of this visit.  Review of Systems   Constitutional:  Positive for fatigue (chronic). Negative for chills, fever and unexpected weight change.   HENT:  Negative for ear pain and sore throat.    Eyes:  Positive for visual disturbance. Negative for redness.   Respiratory:  Negative for cough and shortness of breath.    Cardiovascular:  Negative for chest pain and palpitations.   Gastrointestinal:  Negative for nausea and vomiting.   Endocrine: Negative for cold intolerance and heat intolerance.   Genitourinary:  Positive for urgency (patient is getting in with Urology soon). Negative for difficulty urinating and hematuria.   Musculoskeletal:  Negative for arthralgias and myalgias.   Skin:  Negative for rash and wound.   Allergic/Immunologic: Negative for environmental allergies and food allergies.   Neurological:  Negative for weakness and headaches.   Hematological:  Negative for adenopathy. Does not bruise/bleed easily.   Psychiatric/Behavioral:  Negative for sleep disturbance. The patient is not nervous/anxious.      Objective:   /63   Pulse 60   Temp 98.4 °F (36.9 °C)   Ht 6' (1.829 m)   Wt 99.8 kg (220 lb)   BMI 29.84 kg/m²   Physical Exam  Vitals and nursing note reviewed.   Constitutional:       General: He is not in acute distress.     Appearance: He is well-developed. He is not diaphoretic.      Comments: Here with wife   HENT:      Head: Normocephalic and atraumatic.   Eyes:      Extraocular Movements: Extraocular movements intact.      Pupils: Pupils are equal, round, and reactive to light.      Comments: Right eye ptosis chronic   Neck:      Vascular: Carotid bruit (likely radiation of murmur) present.   Cardiovascular:      Rate and Rhythm: Normal rate and regular rhythm.      Heart sounds: Murmur (Stable, no change) heard.       Systolic murmur is present with a grade of 3/6.   Pulmonary:      Effort: Pulmonary effort is normal. No respiratory distress.      Breath sounds: Normal breath sounds. No wheezing.   Abdominal:      General: Bowel sounds are normal.      Palpations: Abdomen is soft.   Genitourinary:     Comments: Wearing catheter  Musculoskeletal:         General: Normal range of motion.      Cervical back: Normal range of motion and neck supple.   Skin:     General: Skin is warm and dry.      Capillary Refill: Capillary refill takes less than 2 seconds.      Findings: No rash.   Neurological:      General: No focal deficit present.      Mental Status: He is alert and oriented to person, place, and time. Mental status is at baseline.      Cranial Nerves: No cranial nerve deficit.      Motor: No weakness.      Gait: Gait normal.   Psychiatric:         Attention and Perception: He is attentive.         Mood and Affect: Mood normal. Mood is not anxious or depressed. Affect is not labile, blunt, angry or inappropriate.         Speech: He is communicative. Speech is not rapid and pressured, delayed, slurred or tangential.         Behavior: Behavior normal. Behavior is not agitated, slowed, aggressive, withdrawn, hyperactive or combative.         Thought Content: Thought content normal. Thought content is not paranoid or delusional. Thought content does not include homicidal or suicidal ideation. Thought content does not include homicidal or suicidal plan.         Cognition and Memory: Memory is not impaired.         Judgment: Judgment normal. Judgment is not impulsive or inappropriate.       Assessment:     1. 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 Secondary adrenal insufficiency    3. Abdominal aortic atherosclerosis    4. Encounter for long-term (current) use of medications    5. Essential hypertension    6. Prediabetes    7. Urinary incontinence, unspecified type      MDM:   Moderate complexity.  Moderate  risk.  Total time: 31 minutes.  This includes total time spent on the encounter, which includes face to face time and non-face to face time preparing to see the patient (eg, review of previous medical records, tests), Obtaining and/or reviewing separately obtained history, documenting clinical information in the electronic or other health record, independently interpreting results (not separately reported)/communicating results to the patient/family/caregiver, and/or care coordination (not separately reported).    I have Reviewed and summarized old records.  I have performed thorough medication reconciliation today and discussed risk and benefits of medications.  I have reviewed labs and discussed with patient.  All questions were answered.  I am requesting old records and will review them once they are available.  Rheumatology, Endocrinology    I have signed for the following orders AND/OR meds.  Orders Placed This Encounter   Procedures    Ambulatory referral/consult to Urology     Standing Status:   Future     Standing Expiration Date:   10/12/2024     Referral Priority:   Routine     Referral Type:   Consultation     Referral Reason:   Specialty Services Required     Requested Specialty:   Urology     Number of Visits Requested:   1             Follow up in about 1 year (around 3/2/2024), or if symptoms worsen or fail to improve, for Annual Wellness Exam.    If no improvement in symptoms or symptoms worsen, advised to call/follow-up at clinic or go to ER. Patient voiced understanding and all questions/concerns were addressed.   DISCLAIMER: This note was compiled by using a speech recognition dictation system and therefore please be aware that typographical / speech recognition errors can and do occur.  Please contact me if you see any errors specifically.    Cuauhtemoc Gardner M.D.       Office: [phone] [id number] [place] [address]  FAX: [phone]

## 2023-03-02 NOTE — ASSESSMENT & PLAN NOTE
Continue diet control with low-carbohydrate diet.We will plan to monitor hemoglobin A1c at designated intervals 3 to 6 months.  I recommend ongoing Education for diabetic diet and exercise protocol.  We will continue to monitor for side effects.    Please be advised of symptoms to monitor for and to notify me immediately if persistent or worsening.  Follow up with Ophthalmology/Optometry and Podiatry at least annually.

## 2023-03-02 NOTE — ASSESSMENT & PLAN NOTE
Continuing to monitor.  Patient doing well off of blood pressure medication.Counseled on importance of hypertension disease course, I recommend ongoing Education for DASH-diet and exercise.  Counseled on medication regimen importance of treating high blood pressure.  Please be advised of risk of untreated blood pressure as discussed.  Please advised of ER precautions were given for symptoms of hypertensive urgency and emergency.

## 2023-03-06 ENCOUNTER — TELEPHONE (OUTPATIENT)
Dept: ENDOCRINOLOGY | Facility: CLINIC | Age: 73
End: 2023-03-06
Payer: MEDICARE

## 2023-03-06 ENCOUNTER — PATIENT MESSAGE (OUTPATIENT)
Dept: NEUROSURGERY | Facility: CLINIC | Age: 73
End: 2023-03-06
Payer: MEDICARE

## 2023-03-06 DIAGNOSIS — E29.1 HYPOGONADISM IN MALE: Primary | ICD-10-CM

## 2023-05-25 DIAGNOSIS — R79.89 LOW TESTOSTERONE IN MALE: ICD-10-CM

## 2023-05-25 RX ORDER — TESTOSTERONE CYPIONATE 200 MG/ML
100 INJECTION, SOLUTION INTRAMUSCULAR
Qty: 2 ML | Refills: 3 | Status: SHIPPED | OUTPATIENT
Start: 2023-05-25 | End: 2023-07-07 | Stop reason: SDUPTHER

## 2023-05-29 ENCOUNTER — LAB VISIT (OUTPATIENT)
Dept: LAB | Facility: HOSPITAL | Age: 73
End: 2023-05-29
Attending: NEUROLOGICAL SURGERY
Payer: MEDICARE

## 2023-05-29 DIAGNOSIS — D32.9 MENINGIOMA OF RIGHT SPHENOID WING INVOLVING CAVERNOUS SINUS: ICD-10-CM

## 2023-05-29 LAB
CREAT SERPL-MCNC: 1.3 MG/DL (ref 0.5–1.4)
EST. GFR  (NO RACE VARIABLE): 58.7 ML/MIN/1.73 M^2

## 2023-05-29 PROCEDURE — 36415 COLL VENOUS BLD VENIPUNCTURE: CPT | Mod: PO | Performed by: NEUROLOGICAL SURGERY

## 2023-05-29 PROCEDURE — 82565 ASSAY OF CREATININE: CPT | Performed by: NEUROLOGICAL SURGERY

## 2023-05-30 ENCOUNTER — HOSPITAL ENCOUNTER (OUTPATIENT)
Dept: RADIOLOGY | Facility: HOSPITAL | Age: 73
Discharge: HOME | End: 2023-05-30
Attending: NEUROLOGICAL SURGERY
Payer: MEDICARE

## 2023-05-30 ENCOUNTER — PES CALL (OUTPATIENT)
Dept: ADMINISTRATIVE | Facility: CLINIC | Age: 73
End: 2023-05-30
Payer: MEDICARE

## 2023-05-30 DIAGNOSIS — D32.9 MENINGIOMA OF RIGHT SPHENOID WING INVOLVING CAVERNOUS SINUS: ICD-10-CM

## 2023-05-30 PROCEDURE — 70553 MRI BRAIN W WO CONTRAST: ICD-10-PCS | Mod: 26,,, | Performed by: RADIOLOGY

## 2023-05-30 PROCEDURE — A9585 GADOBUTROL INJECTION: HCPCS | Performed by: NEUROLOGICAL SURGERY

## 2023-05-30 PROCEDURE — 70553 MRI BRAIN STEM W/O & W/DYE: CPT | Mod: TC

## 2023-05-30 PROCEDURE — 70553 MRI BRAIN STEM W/O & W/DYE: CPT | Mod: 26,,, | Performed by: RADIOLOGY

## 2023-05-30 PROCEDURE — 25500020 PHARM REV CODE 255: Performed by: NEUROLOGICAL SURGERY

## 2023-05-30 RX ORDER — GADOBUTROL 604.72 MG/ML
10 INJECTION INTRAVENOUS
Status: COMPLETED | OUTPATIENT
Start: 2023-05-30 | End: 2023-05-30

## 2023-05-30 RX ADMIN — GADOBUTROL 10 ML: 604.72 INJECTION INTRAVENOUS at 09:05

## 2023-06-29 ENCOUNTER — OFFICE VISIT (OUTPATIENT)
Dept: RHEUMATOLOGY | Facility: CLINIC | Age: 73
End: 2023-06-29
Payer: MEDICARE

## 2023-06-29 VITALS
DIASTOLIC BLOOD PRESSURE: 75 MMHG | HEIGHT: 72 IN | HEART RATE: 69 BPM | SYSTOLIC BLOOD PRESSURE: 117 MMHG | BODY MASS INDEX: 29.53 KG/M2 | WEIGHT: 218 LBS

## 2023-06-29 DIAGNOSIS — Z79.1 ENCOUNTER FOR LONG-TERM (CURRENT) USE OF NSAIDS: ICD-10-CM

## 2023-06-29 DIAGNOSIS — Z79.899 IMMUNOCOMPROMISED STATE DUE TO DRUG THERAPY: ICD-10-CM

## 2023-06-29 DIAGNOSIS — M05.9 SEROPOSITIVE RHEUMATOID ARTHRITIS: Primary | ICD-10-CM

## 2023-06-29 DIAGNOSIS — G47.00 INSOMNIA, UNSPECIFIED TYPE: ICD-10-CM

## 2023-06-29 DIAGNOSIS — D84.821 IMMUNOCOMPROMISED STATE DUE TO DRUG THERAPY: ICD-10-CM

## 2023-06-29 PROCEDURE — 96372 PR INJECTION,THERAP/PROPH/DIAG2ST, IM OR SUBCUT: ICD-10-PCS | Mod: S$GLB,,, | Performed by: PHYSICIAN ASSISTANT

## 2023-06-29 PROCEDURE — 96372 THER/PROPH/DIAG INJ SC/IM: CPT | Mod: S$GLB,,, | Performed by: PHYSICIAN ASSISTANT

## 2023-06-29 PROCEDURE — 1159F MED LIST DOCD IN RCRD: CPT | Mod: CPTII,S$GLB,, | Performed by: PHYSICIAN ASSISTANT

## 2023-06-29 PROCEDURE — 1125F AMNT PAIN NOTED PAIN PRSNT: CPT | Mod: CPTII,S$GLB,, | Performed by: PHYSICIAN ASSISTANT

## 2023-06-29 PROCEDURE — 99213 PR OFFICE/OUTPT VISIT, EST, LEVL III, 20-29 MIN: ICD-10-PCS | Mod: 25,S$GLB,, | Performed by: PHYSICIAN ASSISTANT

## 2023-06-29 PROCEDURE — 99213 OFFICE O/P EST LOW 20 MIN: CPT | Mod: 25,S$GLB,, | Performed by: PHYSICIAN ASSISTANT

## 2023-06-29 PROCEDURE — 99999 PR PBB SHADOW E&M-EST. PATIENT-LVL IV: ICD-10-PCS | Mod: PBBFAC,,, | Performed by: PHYSICIAN ASSISTANT

## 2023-06-29 PROCEDURE — 3078F PR MOST RECENT DIASTOLIC BLOOD PRESSURE < 80 MM HG: ICD-10-PCS | Mod: CPTII,S$GLB,, | Performed by: PHYSICIAN ASSISTANT

## 2023-06-29 PROCEDURE — 3008F BODY MASS INDEX DOCD: CPT | Mod: CPTII,S$GLB,, | Performed by: PHYSICIAN ASSISTANT

## 2023-06-29 PROCEDURE — 99999 PR PBB SHADOW E&M-EST. PATIENT-LVL IV: CPT | Mod: PBBFAC,,, | Performed by: PHYSICIAN ASSISTANT

## 2023-06-29 PROCEDURE — 1160F PR REVIEW ALL MEDS BY PRESCRIBER/CLIN PHARMACIST DOCUMENTED: ICD-10-PCS | Mod: CPTII,S$GLB,, | Performed by: PHYSICIAN ASSISTANT

## 2023-06-29 PROCEDURE — 1160F RVW MEDS BY RX/DR IN RCRD: CPT | Mod: CPTII,S$GLB,, | Performed by: PHYSICIAN ASSISTANT

## 2023-06-29 PROCEDURE — 3078F DIAST BP <80 MM HG: CPT | Mod: CPTII,S$GLB,, | Performed by: PHYSICIAN ASSISTANT

## 2023-06-29 PROCEDURE — 1159F PR MEDICATION LIST DOCUMENTED IN MEDICAL RECORD: ICD-10-PCS | Mod: CPTII,S$GLB,, | Performed by: PHYSICIAN ASSISTANT

## 2023-06-29 PROCEDURE — 3074F PR MOST RECENT SYSTOLIC BLOOD PRESSURE < 130 MM HG: ICD-10-PCS | Mod: CPTII,S$GLB,, | Performed by: PHYSICIAN ASSISTANT

## 2023-06-29 PROCEDURE — 3008F PR BODY MASS INDEX (BMI) DOCUMENTED: ICD-10-PCS | Mod: CPTII,S$GLB,, | Performed by: PHYSICIAN ASSISTANT

## 2023-06-29 PROCEDURE — 3074F SYST BP LT 130 MM HG: CPT | Mod: CPTII,S$GLB,, | Performed by: PHYSICIAN ASSISTANT

## 2023-06-29 PROCEDURE — 1125F PR PAIN SEVERITY QUANTIFIED, PAIN PRESENT: ICD-10-PCS | Mod: CPTII,S$GLB,, | Performed by: PHYSICIAN ASSISTANT

## 2023-06-29 RX ORDER — CYANOCOBALAMIN 1000 UG/ML
1000 INJECTION, SOLUTION INTRAMUSCULAR; SUBCUTANEOUS
Status: COMPLETED | OUTPATIENT
Start: 2023-06-29 | End: 2023-06-29

## 2023-06-29 RX ADMIN — CYANOCOBALAMIN 1000 MCG: 1000 INJECTION, SOLUTION INTRAMUSCULAR; SUBCUTANEOUS at 10:06

## 2023-06-29 ASSESSMENT — ROUTINE ASSESSMENT OF PATIENT INDEX DATA (RAPID3)
PATIENT GLOBAL ASSESSMENT SCORE: 4
FATIGUE SCORE: 2.2
TOTAL RAPID3 SCORE: 3.78
PSYCHOLOGICAL DISTRESS SCORE: 0
PAIN SCORE: 4
MDHAQ FUNCTION SCORE: 1

## 2023-06-29 NOTE — PROGRESS NOTES
Subjective:       Patient ID: Aurelio Perkins is a 72 y.o. male.    Chief Complaint: Disease Management      Mr. Perkins is a 72 year old male who presents to clinic for follow up on seropositive rheumatoid arthritis.  He was within Simponi for a month due to delays with changing assistance programs with [employer]. He reports arthritis flare off of Simponi, but he has resumed treatment. He reports joints pain in his hands has improved significantly.  He is taking diclofenac 75 mg daily and tylenol nightly prn for pain.    No serious infections since his last visit.     He taking lunesta for insomnia, which is working well.     He is followed by Endocrinology on hydrocortisone.     Current tx:  1. Simponi    Prior hx:  1. Enbrel  2. Rinvoq (GI upset:  3. Xeljanz    Review of Systems   Constitutional:  Negative for chills, fatigue and fever.   Eyes:  Negative for visual disturbance.   Respiratory:  Negative for cough, shortness of breath and wheezing.    Cardiovascular:  Negative for chest pain, palpitations and leg swelling.   Gastrointestinal:  Negative for abdominal pain, constipation, diarrhea, nausea and vomiting.   Musculoskeletal:  Positive for arthralgias and back pain. Negative for myalgias.   Neurological:  Negative for dizziness, syncope and headaches.   Hematological:  Negative for adenopathy.       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    Ptosis of right eyelid 5/29/2019    S/P craniotomy 6/17/2019     Past Surgical History:   Procedure Laterality Date    COLONOSCOPY N/A 3/29/2022    Procedure:  COLONOSCOPY;  Surgeon: Cayla Sherman MD;  Location: [facility];  Service: Endoscopy;  Laterality: N/A;    CRANIOTOMY Right 5/29/2019    Procedure: CRANIOTOMY  (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)  Co-surgery with Dr Vaibhav Jara - please put in his room  Microscope [employer];  Surgeon: Jimmy Segura DO;  Location: [facility] OR [address] [place] [address];  Service: Neurosurgery;  Laterality: Right;    HEMORRHOID SURGERY      NEPHRECTOMY      PROSTATECTOMY      TONSILLECTOMY            Physical Exam   Constitutional: He is oriented to person, place, and time.   Eyes: Right conjunctiva is not injected. Left conjunctiva is not injected.   Neck: No JVD present. No thyromegaly present.   Cardiovascular: Normal rate and regular rhythm. Exam reveals no decreased pulses.   Pulmonary/Chest: Effort normal.   Musculoskeletal:      Right wrist: Tenderness present.      Left wrist: Tenderness present.   Lymphadenopathy:     He has no cervical adenopathy.   Neurological: He is alert and oriented to person, place, and time. Gait normal.   Skin: No rash noted.   Psychiatric: Mood and affect normal.       Right Side Rheumatological Exam     The patient is tender to palpation of the wrist, 1st PIP, 1st MCP, 2nd PIP, 2nd MCP, 3rd PIP, 3rd MCP, 4th PIP, 4th MCP, 5th PIP and 5th MCP    Left Side Rheumatological Exam     The patient is tender to palpation of the wrist, 1st PIP, 1st MCP, 2nd PIP, 2nd MCP, 3rd PIP, 3rd MCP, 4th PIP, 4th MCP, 5th PIP and 5th MCP.        Recent labs reviewed:  Component      Latest Ref Rng & Units 5/29/2023   Creatinine      0.5 - 1.4 mg/dL 1.3   eGFR      >60 mL/min/1.73 m:2 58.7 (A)     Assessment:       1. Seropositive rheumatoid arthritis    2. Immunocompromised state due to drug therapy    3. Encounter for long-term (current) use of NSAIDs    4. Insomnia, unspecified type                Plan:       Seropositive rheumatoid arthritis  -     cyanocobalamin  injection 1,000 mcg    Immunocompromised state due to drug therapy    Encounter for long-term (current) use of NSAIDs    Insomnia, unspecified type            Assessment:  72 year old male with  Seropositive (+CCP) rheumatoid arthritis, elevated CRP  --CKD stage 3, s/p total nephrectomy, followed by Nephrology   --secondary adrenal insufficiency on cortef followed by Endocrinology  --moderate AS, non-obstructive CAD  --chronic insomnia on lunesta  --hyperglycemia, Hgba1c 5.7%  --mild normocytic anemia  --history or prostate cancer    Plan:  1. Cont Simponi monthly  2. Cont. Diclofenac 75 mg daily, avoid additional NSAIDs  3. Cont. lunesta per   4. B12 given today  5. Check labs next week      Follow up:  4 months Dr. Oliveira w/labs prior

## 2023-07-05 ENCOUNTER — LAB VISIT (OUTPATIENT)
Dept: LAB | Facility: HOSPITAL | Age: 73
End: 2023-07-05
Attending: INTERNAL MEDICINE
Payer: MEDICARE

## 2023-07-05 DIAGNOSIS — N18.30 STAGE 3 CHRONIC KIDNEY DISEASE, UNSPECIFIED WHETHER STAGE 3A OR 3B CKD: ICD-10-CM

## 2023-07-05 DIAGNOSIS — M05.9 SEROPOSITIVE RHEUMATOID ARTHRITIS: ICD-10-CM

## 2023-07-05 DIAGNOSIS — Z79.899 IMMUNOCOMPROMISED STATE DUE TO DRUG THERAPY: ICD-10-CM

## 2023-07-05 DIAGNOSIS — E29.1 HYPOGONADISM IN MALE: ICD-10-CM

## 2023-07-05 DIAGNOSIS — G47.00 INSOMNIA, UNSPECIFIED TYPE: ICD-10-CM

## 2023-07-05 DIAGNOSIS — E55.9 VITAMIN D DEFICIENCY, UNSPECIFIED: ICD-10-CM

## 2023-07-05 DIAGNOSIS — E53.8 VITAMIN B 12 DEFICIENCY: ICD-10-CM

## 2023-07-05 DIAGNOSIS — D84.821 IMMUNOCOMPROMISED STATE DUE TO DRUG THERAPY: ICD-10-CM

## 2023-07-05 DIAGNOSIS — R53.83 FATIGUE, UNSPECIFIED TYPE: ICD-10-CM

## 2023-07-05 LAB
25(OH)D3+25(OH)D2 SERPL-MCNC: 34 NG/ML (ref 30–96)
ALBUMIN SERPL BCP-MCNC: 4 G/DL (ref 3.5–5.2)
ALP SERPL-CCNC: 44 U/L (ref 55–135)
ALT SERPL W/O P-5'-P-CCNC: 53 U/L (ref 10–44)
ANION GAP SERPL CALC-SCNC: 8 MMOL/L (ref 8–16)
AST SERPL-CCNC: 27 U/L (ref 10–40)
BASOPHILS # BLD AUTO: 0.03 K/UL (ref 0–0.2)
BASOPHILS NFR BLD: 0.5 % (ref 0–1.9)
BILIRUB SERPL-MCNC: 1 MG/DL (ref 0.1–1)
BUN SERPL-MCNC: 20 MG/DL (ref 8–23)
CALCIUM SERPL-MCNC: 8.7 MG/DL (ref 8.7–10.5)
CHLORIDE SERPL-SCNC: 105 MMOL/L (ref 95–110)
CO2 SERPL-SCNC: 28 MMOL/L (ref 23–29)
CREAT SERPL-MCNC: 1.3 MG/DL (ref 0.5–1.4)
CRP SERPL-MCNC: 1.2 MG/L (ref 0–8.2)
DIFFERENTIAL METHOD: NORMAL
EOSINOPHIL # BLD AUTO: 0.2 K/UL (ref 0–0.5)
EOSINOPHIL NFR BLD: 3.5 % (ref 0–8)
ERYTHROCYTE [DISTWIDTH] IN BLOOD BY AUTOMATED COUNT: 11.9 % (ref 11.5–14.5)
ERYTHROCYTE [SEDIMENTATION RATE] IN BLOOD BY WESTERGREN METHOD: 6 MM/HR (ref 0–10)
EST. GFR  (NO RACE VARIABLE): 58.7 ML/MIN/1.73 M^2
GLUCOSE SERPL-MCNC: 86 MG/DL (ref 70–110)
HCT VFR BLD AUTO: 45.2 % (ref 40–54)
HGB BLD-MCNC: 14.7 G/DL (ref 14–18)
IMM GRANULOCYTES # BLD AUTO: 0.02 K/UL (ref 0–0.04)
IMM GRANULOCYTES NFR BLD AUTO: 0.3 % (ref 0–0.5)
LYMPHOCYTES # BLD AUTO: 1.8 K/UL (ref 1–4.8)
LYMPHOCYTES NFR BLD: 29.5 % (ref 18–48)
MCH RBC QN AUTO: 28.7 PG (ref 27–31)
MCHC RBC AUTO-ENTMCNC: 32.5 G/DL (ref 32–36)
MCV RBC AUTO: 88 FL (ref 82–98)
MONOCYTES # BLD AUTO: 0.5 K/UL (ref 0.3–1)
MONOCYTES NFR BLD: 8.6 % (ref 4–15)
NEUTROPHILS # BLD AUTO: 3.4 K/UL (ref 1.8–7.7)
NEUTROPHILS NFR BLD: 57.6 % (ref 38–73)
NRBC BLD-RTO: 0 /100 WBC
PLATELET # BLD AUTO: 171 K/UL (ref 150–450)
PMV BLD AUTO: 10.3 FL (ref 9.2–12.9)
POTASSIUM SERPL-SCNC: 4.3 MMOL/L (ref 3.5–5.1)
PROT SERPL-MCNC: 6.6 G/DL (ref 6–8.4)
RBC # BLD AUTO: 5.12 M/UL (ref 4.6–6.2)
SODIUM SERPL-SCNC: 141 MMOL/L (ref 136–145)
T4 FREE SERPL-MCNC: 1.03 NG/DL (ref 0.71–1.51)
TSH SERPL DL<=0.005 MIU/L-ACNC: 0.01 UIU/ML (ref 0.4–4)
WBC # BLD AUTO: 5.93 K/UL (ref 3.9–12.7)

## 2023-07-05 PROCEDURE — 84270 ASSAY OF SEX HORMONE GLOBUL: CPT | Performed by: INTERNAL MEDICINE

## 2023-07-05 PROCEDURE — 86140 C-REACTIVE PROTEIN: CPT | Performed by: INTERNAL MEDICINE

## 2023-07-05 PROCEDURE — 84403 ASSAY OF TOTAL TESTOSTERONE: CPT | Performed by: INTERNAL MEDICINE

## 2023-07-05 PROCEDURE — 82306 VITAMIN D 25 HYDROXY: CPT | Performed by: INTERNAL MEDICINE

## 2023-07-05 PROCEDURE — 84443 ASSAY THYROID STIM HORMONE: CPT | Performed by: INTERNAL MEDICINE

## 2023-07-05 PROCEDURE — 85025 COMPLETE CBC W/AUTO DIFF WBC: CPT | Performed by: INTERNAL MEDICINE

## 2023-07-05 PROCEDURE — 85651 RBC SED RATE NONAUTOMATED: CPT | Mod: PO | Performed by: INTERNAL MEDICINE

## 2023-07-05 PROCEDURE — 84439 ASSAY OF FREE THYROXINE: CPT | Performed by: INTERNAL MEDICINE

## 2023-07-05 PROCEDURE — 80053 COMPREHEN METABOLIC PANEL: CPT | Performed by: INTERNAL MEDICINE

## 2023-07-07 ENCOUNTER — OFFICE VISIT (OUTPATIENT)
Dept: ENDOCRINOLOGY | Facility: CLINIC | Age: 73
End: 2023-07-07
Payer: MEDICARE

## 2023-07-07 DIAGNOSIS — E03.9 PRIMARY HYPOTHYROIDISM: ICD-10-CM

## 2023-07-07 DIAGNOSIS — E27.49 SECONDARY ADRENAL INSUFFICIENCY: Primary | Chronic | ICD-10-CM

## 2023-07-07 DIAGNOSIS — R79.89 LOW TESTOSTERONE IN MALE: ICD-10-CM

## 2023-07-07 DIAGNOSIS — D32.9 MENINGIOMA OF RIGHT SPHENOID WING INVOLVING CAVERNOUS SINUS: Chronic | ICD-10-CM

## 2023-07-07 DIAGNOSIS — E03.9 HYPOTHYROIDISM, UNSPECIFIED TYPE: Chronic | ICD-10-CM

## 2023-07-07 PROCEDURE — 1160F RVW MEDS BY RX/DR IN RCRD: CPT | Mod: CPTII,95,, | Performed by: INTERNAL MEDICINE

## 2023-07-07 PROCEDURE — 1160F PR REVIEW ALL MEDS BY PRESCRIBER/CLIN PHARMACIST DOCUMENTED: ICD-10-PCS | Mod: CPTII,95,, | Performed by: INTERNAL MEDICINE

## 2023-07-07 PROCEDURE — 1159F PR MEDICATION LIST DOCUMENTED IN MEDICAL RECORD: ICD-10-PCS | Mod: CPTII,95,, | Performed by: INTERNAL MEDICINE

## 2023-07-07 PROCEDURE — 99214 PR OFFICE/OUTPT VISIT, EST, LEVL IV, 30-39 MIN: ICD-10-PCS | Mod: 95,,, | Performed by: INTERNAL MEDICINE

## 2023-07-07 PROCEDURE — 99214 OFFICE O/P EST MOD 30 MIN: CPT | Mod: 95,,, | Performed by: INTERNAL MEDICINE

## 2023-07-07 PROCEDURE — 1159F MED LIST DOCD IN RCRD: CPT | Mod: CPTII,95,, | Performed by: INTERNAL MEDICINE

## 2023-07-07 RX ORDER — TESTOSTERONE CYPIONATE 200 MG/ML
100 INJECTION, SOLUTION INTRAMUSCULAR
Qty: 2 ML | Refills: 3 | Status: SHIPPED | OUTPATIENT
Start: 2023-07-07 | End: 2023-07-14 | Stop reason: SDUPTHER

## 2023-07-07 RX ORDER — HYDROCORTISONE 10 MG/1
TABLET ORAL
Qty: 75 TABLET | Refills: 11 | Status: SHIPPED | OUTPATIENT
Start: 2023-07-07

## 2023-07-07 RX ORDER — LEVOTHYROXINE SODIUM 112 UG/1
112 TABLET ORAL DAILY
Qty: 90 TABLET | Refills: 3 | Status: SHIPPED | OUTPATIENT
Start: 2023-07-07

## 2023-07-07 NOTE — ASSESSMENT & PLAN NOTE
Continue HC 15/10 mg  Reviewed sick day instructions and written information provided  Will need stress dosing of steroids once has surgery

## 2023-07-07 NOTE — ASSESSMENT & PLAN NOTE
FT4 at goal  Would not check TSH as not accurate given previous surgery and hypopit  Cont Synthroid 112 mcg daily
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
## 2023-07-12 ENCOUNTER — OFFICE VISIT (OUTPATIENT)
Dept: NEUROSURGERY | Facility: CLINIC | Age: 73
End: 2023-07-12
Payer: MEDICARE

## 2023-07-12 VITALS — SYSTOLIC BLOOD PRESSURE: 137 MMHG | DIASTOLIC BLOOD PRESSURE: 80 MMHG | HEART RATE: 51 BPM | TEMPERATURE: 98 F

## 2023-07-12 DIAGNOSIS — Z98.890 S/P CRANIOTOMY: ICD-10-CM

## 2023-07-12 DIAGNOSIS — D32.9 MENINGIOMA OF RIGHT SPHENOID WING INVOLVING CAVERNOUS SINUS: Chronic | ICD-10-CM

## 2023-07-12 DIAGNOSIS — I35.0 MODERATE AORTIC STENOSIS: Primary | ICD-10-CM

## 2023-07-12 LAB
ALBUMIN SERPL-MCNC: 4.2 G/DL (ref 3.6–5.1)
SHBG SERPL-SCNC: 32 NMOL/L (ref 22–77)
TESTOST FREE SERPL-MCNC: 113.5 PG/ML (ref 6–73)
TESTOST SERPL-MCNC: 740 NG/DL (ref 250–1100)
TESTOSTERONE.FREE+WB SERPL-MCNC: 218.7 NG/DL (ref 15–150)

## 2023-07-12 PROCEDURE — 3079F PR MOST RECENT DIASTOLIC BLOOD PRESSURE 80-89 MM HG: ICD-10-PCS | Mod: CPTII,S$GLB,, | Performed by: NEUROLOGICAL SURGERY

## 2023-07-12 PROCEDURE — 1159F MED LIST DOCD IN RCRD: CPT | Mod: CPTII,S$GLB,, | Performed by: NEUROLOGICAL SURGERY

## 2023-07-12 PROCEDURE — 1159F PR MEDICATION LIST DOCUMENTED IN MEDICAL RECORD: ICD-10-PCS | Mod: CPTII,S$GLB,, | Performed by: NEUROLOGICAL SURGERY

## 2023-07-12 PROCEDURE — 3288F FALL RISK ASSESSMENT DOCD: CPT | Mod: CPTII,S$GLB,, | Performed by: NEUROLOGICAL SURGERY

## 2023-07-12 PROCEDURE — 3075F SYST BP GE 130 - 139MM HG: CPT | Mod: CPTII,S$GLB,, | Performed by: NEUROLOGICAL SURGERY

## 2023-07-12 PROCEDURE — 3079F DIAST BP 80-89 MM HG: CPT | Mod: CPTII,S$GLB,, | Performed by: NEUROLOGICAL SURGERY

## 2023-07-12 PROCEDURE — 99215 PR OFFICE/OUTPT VISIT, EST, LEVL V, 40-54 MIN: ICD-10-PCS | Mod: S$GLB,,, | Performed by: NEUROLOGICAL SURGERY

## 2023-07-12 PROCEDURE — 3075F PR MOST RECENT SYSTOLIC BLOOD PRESS GE 130-139MM HG: ICD-10-PCS | Mod: CPTII,S$GLB,, | Performed by: NEUROLOGICAL SURGERY

## 2023-07-12 PROCEDURE — 1101F PT FALLS ASSESS-DOCD LE1/YR: CPT | Mod: CPTII,S$GLB,, | Performed by: NEUROLOGICAL SURGERY

## 2023-07-12 PROCEDURE — 99999 PR PBB SHADOW E&M-EST. PATIENT-LVL III: CPT | Mod: PBBFAC,,, | Performed by: NEUROLOGICAL SURGERY

## 2023-07-12 PROCEDURE — 99999 PR PBB SHADOW E&M-EST. PATIENT-LVL III: ICD-10-PCS | Mod: PBBFAC,,, | Performed by: NEUROLOGICAL SURGERY

## 2023-07-12 PROCEDURE — 1160F PR REVIEW ALL MEDS BY PRESCRIBER/CLIN PHARMACIST DOCUMENTED: ICD-10-PCS | Mod: CPTII,S$GLB,, | Performed by: NEUROLOGICAL SURGERY

## 2023-07-12 PROCEDURE — 99215 OFFICE O/P EST HI 40 MIN: CPT | Mod: S$GLB,,, | Performed by: NEUROLOGICAL SURGERY

## 2023-07-12 PROCEDURE — 1125F AMNT PAIN NOTED PAIN PRSNT: CPT | Mod: CPTII,S$GLB,, | Performed by: NEUROLOGICAL SURGERY

## 2023-07-12 PROCEDURE — 3288F PR FALLS RISK ASSESSMENT DOCUMENTED: ICD-10-PCS | Mod: CPTII,S$GLB,, | Performed by: NEUROLOGICAL SURGERY

## 2023-07-12 PROCEDURE — 1101F PR PT FALLS ASSESS DOC 0-1 FALLS W/OUT INJ PAST YR: ICD-10-PCS | Mod: CPTII,S$GLB,, | Performed by: NEUROLOGICAL SURGERY

## 2023-07-12 PROCEDURE — 1125F PR PAIN SEVERITY QUANTIFIED, PAIN PRESENT: ICD-10-PCS | Mod: CPTII,S$GLB,, | Performed by: NEUROLOGICAL SURGERY

## 2023-07-12 PROCEDURE — 1160F RVW MEDS BY RX/DR IN RCRD: CPT | Mod: CPTII,S$GLB,, | Performed by: NEUROLOGICAL SURGERY

## 2023-07-12 RX ORDER — ERGOCALCIFEROL 1.25 MG/1
1 CAPSULE ORAL
COMMUNITY
End: 2023-07-12

## 2023-07-12 RX ORDER — LEVOTHYROXINE SODIUM 112 UG/1
1 TABLET ORAL DAILY
COMMUNITY
End: 2023-07-12 | Stop reason: SDUPTHER

## 2023-07-12 RX ORDER — ESZOPICLONE 1 MG/1
TABLET, FILM COATED ORAL
COMMUNITY
End: 2023-07-12 | Stop reason: SDUPTHER
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
## 2023-07-14 ENCOUNTER — PATIENT MESSAGE (OUTPATIENT)
Dept: ENDOCRINOLOGY | Facility: CLINIC | Age: 73
End: 2023-07-14
Payer: MEDICARE

## 2023-07-14 DIAGNOSIS — R79.89 LOW TESTOSTERONE IN MALE: ICD-10-CM

## 2023-07-14 RX ORDER — TESTOSTERONE CYPIONATE 200 MG/ML
75 INJECTION, SOLUTION INTRAMUSCULAR
Qty: 2 ML | Refills: 5 | Status: SHIPPED | OUTPATIENT
Start: 2023-07-14 | End: 2024-01-10

## 2023-07-25 DIAGNOSIS — I35.0 MODERATE AORTIC STENOSIS: Primary | ICD-10-CM

## 2023-08-09 ENCOUNTER — HOSPITAL ENCOUNTER (OUTPATIENT)
Dept: CARDIOLOGY | Facility: HOSPITAL | Age: 73
Discharge: HOME | End: 2023-08-09
Attending: THORACIC SURGERY (CARDIOTHORACIC VASCULAR SURGERY)
Payer: MEDICARE

## 2023-08-09 VITALS
SYSTOLIC BLOOD PRESSURE: 137 MMHG | BODY MASS INDEX: 29.53 KG/M2 | DIASTOLIC BLOOD PRESSURE: 80 MMHG | WEIGHT: 218 LBS | HEIGHT: 72 IN | HEART RATE: 66 BPM

## 2023-08-09 DIAGNOSIS — I35.0 MODERATE AORTIC STENOSIS: ICD-10-CM

## 2023-08-09 LAB
AORTIC ROOT ANNULUS: 3.34 CM
ASCENDING AORTA: 2.59 CM
AV INDEX (PROSTH): 0.31
AV MEAN GRADIENT: 39 MMHG
AV PEAK GRADIENT: 77 MMHG
AV VALVE AREA BY VELOCITY RATIO: 0.77 CM²
AV VALVE AREA: 0.84 CM²
AV VELOCITY RATIO: 0.28
BSA FOR ECHO PROCEDURE: 2.24 M2
CV ECHO LV RWT: 0.55 CM
DOP CALC AO PEAK VEL: 4.39 M/S
DOP CALC AO VTI: 104.3 CM
DOP CALC LVOT AREA: 2.7 CM2
DOP CALC LVOT DIAMETER: 1.86 CM
DOP CALC LVOT PEAK VEL: 1.25 M/S
DOP CALC LVOT STROKE VOLUME: 87.45 CM3
DOP CALC RVOT PEAK VEL: 0.97 M/S
DOP CALC RVOT VTI: 20.5 CM
DOP CALCLVOT PEAK VEL VTI: 32.2 CM
E WAVE DECELERATION TIME: 232.32 MSEC
E/A RATIO: 0.76
E/E' RATIO: 10.4 M/S
ECHO LV POSTERIOR WALL: 1.11 CM (ref 0.6–1.1)
FRACTIONAL SHORTENING: 34 % (ref 28–44)
INTERVENTRICULAR SEPTUM: 1.26 CM (ref 0.6–1.1)
IVC DIAMETER: 1.87 CM
IVRT: 99.9 MSEC
LA MAJOR: 5.34 CM
LA MINOR: 4.88 CM
LA WIDTH: 3.6 CM
LEFT ATRIUM SIZE: 3.61 CM
LEFT ATRIUM VOLUME INDEX MOD: 22.8 ML/M2
LEFT ATRIUM VOLUME INDEX: 25.5 ML/M2
LEFT ATRIUM VOLUME MOD: 50.3 CM3
LEFT ATRIUM VOLUME: 56.33 CM3
LEFT INTERNAL DIMENSION IN SYSTOLE: 2.66 CM (ref 2.1–4)
LEFT VENTRICLE DIASTOLIC VOLUME INDEX: 32.48 ML/M2
LEFT VENTRICLE DIASTOLIC VOLUME: 71.79 ML
LEFT VENTRICLE MASS INDEX: 75 G/M2
LEFT VENTRICLE SYSTOLIC VOLUME INDEX: 11.8 ML/M2
LEFT VENTRICLE SYSTOLIC VOLUME: 26.02 ML
LEFT VENTRICULAR INTERNAL DIMENSION IN DIASTOLE: 4.04 CM (ref 3.5–6)
LEFT VENTRICULAR MASS: 164.87 G
LV LATERAL E/E' RATIO: 9.75 M/S
LV SEPTAL E/E' RATIO: 11.14 M/S
LVOT MG: 3.44 MMHG
LVOT MV: 0.86 CM/S
MV PEAK A VEL: 1.02 M/S
MV PEAK E VEL: 0.78 M/S
MV STENOSIS PRESSURE HALF TIME: 67.37 MS
MV VALVE AREA P 1/2 METHOD: 3.27 CM2
PISA TR MAX VEL: 2.39 M/S
PULM VEIN S/D RATIO: 1
PV MEAN GRADIENT: 2 MMHG
PV PEAK D VEL: 0.65 M/S
PV PEAK GRADIENT: 4 MMHG
PV PEAK S VEL: 0.65 M/S
PV PEAK VELOCITY: 1 M/S
RA MAJOR: 4.45 CM
RA PRESSURE ESTIMATED: 3 MMHG
RIGHT VENTRICULAR END-DIASTOLIC DIMENSION: 3.38 CM
RV TB RVSP: 5 MMHG
STJ: 2.99 CM
TDI LATERAL: 0.08 M/S
TDI SEPTAL: 0.07 M/S
TDI: 0.08 M/S
TR MAX PG: 23 MMHG
TRICUSPID ANNULAR PLANE SYSTOLIC EXCURSION: 1.95 CM
TV REST PULMONARY ARTERY PRESSURE: 26 MMHG
Z-SCORE OF LEFT VENTRICULAR DIMENSION IN END DIASTOLE: -6.36
Z-SCORE OF LEFT VENTRICULAR DIMENSION IN END SYSTOLE: -4.39

## 2023-08-09 PROCEDURE — 93306 ECHO (CUPID ONLY): ICD-10-PCS | Mod: 26,,, | Performed by: INTERNAL MEDICINE

## 2023-08-09 PROCEDURE — 93306 TTE W/DOPPLER COMPLETE: CPT | Mod: 26,,, | Performed by: INTERNAL MEDICINE

## 2023-08-09 PROCEDURE — 93306 TTE W/DOPPLER COMPLETE: CPT | Mod: PO

## 2023-08-11 DIAGNOSIS — M05.9 SEROPOSITIVE RHEUMATOID ARTHRITIS: ICD-10-CM

## 2023-08-11 RX ORDER — DICLOFENAC SODIUM 75 MG/1
75 TABLET, DELAYED RELEASE ORAL DAILY PRN
Qty: 90 TABLET | Refills: 1 | Status: SHIPPED | OUTPATIENT
Start: 2023-08-11 | End: 2024-01-18 | Stop reason: SDUPTHER
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
## 2023-08-15 ENCOUNTER — PATIENT MESSAGE (OUTPATIENT)
Dept: CARDIOLOGY | Facility: CLINIC | Age: 73
End: 2023-08-15

## 2023-08-15 ENCOUNTER — DOCUMENTATION ONLY (OUTPATIENT)
Dept: CARDIOLOGY | Facility: CLINIC | Age: 73
End: 2023-08-15
Payer: MEDICARE

## 2023-08-15 ENCOUNTER — OFFICE VISIT (OUTPATIENT)
Dept: CARDIOTHORACIC SURGERY | Facility: CLINIC | Age: 73
End: 2023-08-15
Payer: MEDICARE

## 2023-08-15 ENCOUNTER — OFFICE VISIT (OUTPATIENT)
Dept: CARDIOLOGY | Facility: CLINIC | Age: 73
End: 2023-08-15
Payer: MEDICARE

## 2023-08-15 VITALS
WEIGHT: 217.63 LBS | BODY MASS INDEX: 28.84 KG/M2 | OXYGEN SATURATION: 98 % | SYSTOLIC BLOOD PRESSURE: 148 MMHG | DIASTOLIC BLOOD PRESSURE: 83 MMHG | HEART RATE: 51 BPM | HEIGHT: 73 IN

## 2023-08-15 VITALS
HEIGHT: 72 IN | DIASTOLIC BLOOD PRESSURE: 69 MMHG | BODY MASS INDEX: 29.46 KG/M2 | SYSTOLIC BLOOD PRESSURE: 147 MMHG | HEART RATE: 62 BPM | WEIGHT: 217.5 LBS | OXYGEN SATURATION: 97 %

## 2023-08-15 DIAGNOSIS — I35.0 SEVERE AORTIC STENOSIS: Primary | ICD-10-CM

## 2023-08-15 DIAGNOSIS — R73.03 PREDIABETES: Chronic | ICD-10-CM

## 2023-08-15 DIAGNOSIS — I10 ESSENTIAL HYPERTENSION: Chronic | ICD-10-CM

## 2023-08-15 DIAGNOSIS — I35.0 MODERATE AORTIC STENOSIS: ICD-10-CM

## 2023-08-15 DIAGNOSIS — E27.49 SECONDARY ADRENAL INSUFFICIENCY: Chronic | ICD-10-CM

## 2023-08-15 DIAGNOSIS — E78.2 MIXED HYPERLIPIDEMIA: Chronic | ICD-10-CM

## 2023-08-15 DIAGNOSIS — I70.0 ABDOMINAL AORTIC ATHEROSCLEROSIS: Chronic | ICD-10-CM

## 2023-08-15 DIAGNOSIS — N18.31 CHRONIC RENAL IMPAIRMENT, STAGE 3A: ICD-10-CM

## 2023-08-15 PROCEDURE — 1125F AMNT PAIN NOTED PAIN PRSNT: CPT | Mod: CPTII,S$GLB,, | Performed by: INTERNAL MEDICINE

## 2023-08-15 PROCEDURE — 1126F AMNT PAIN NOTED NONE PRSNT: CPT | Mod: CPTII,S$GLB,, | Performed by: THORACIC SURGERY (CARDIOTHORACIC VASCULAR SURGERY)

## 2023-08-15 PROCEDURE — 3077F PR MOST RECENT SYSTOLIC BLOOD PRESSURE >= 140 MM HG: ICD-10-PCS | Mod: CPTII,S$GLB,, | Performed by: INTERNAL MEDICINE

## 2023-08-15 PROCEDURE — 1159F PR MEDICATION LIST DOCUMENTED IN MEDICAL RECORD: ICD-10-PCS | Mod: CPTII,S$GLB,, | Performed by: INTERNAL MEDICINE

## 2023-08-15 PROCEDURE — 3288F PR FALLS RISK ASSESSMENT DOCUMENTED: ICD-10-PCS | Mod: CPTII,S$GLB,, | Performed by: THORACIC SURGERY (CARDIOTHORACIC VASCULAR SURGERY)

## 2023-08-15 PROCEDURE — 1101F PR PT FALLS ASSESS DOC 0-1 FALLS W/OUT INJ PAST YR: ICD-10-PCS | Mod: CPTII,S$GLB,, | Performed by: THORACIC SURGERY (CARDIOTHORACIC VASCULAR SURGERY)

## 2023-08-15 PROCEDURE — 1159F PR MEDICATION LIST DOCUMENTED IN MEDICAL RECORD: ICD-10-PCS | Mod: CPTII,S$GLB,, | Performed by: THORACIC SURGERY (CARDIOTHORACIC VASCULAR SURGERY)

## 2023-08-15 PROCEDURE — 1159F MED LIST DOCD IN RCRD: CPT | Mod: CPTII,S$GLB,, | Performed by: THORACIC SURGERY (CARDIOTHORACIC VASCULAR SURGERY)

## 2023-08-15 PROCEDURE — 3008F PR BODY MASS INDEX (BMI) DOCUMENTED: ICD-10-PCS | Mod: CPTII,S$GLB,, | Performed by: INTERNAL MEDICINE

## 2023-08-15 PROCEDURE — 3288F FALL RISK ASSESSMENT DOCD: CPT | Mod: CPTII,S$GLB,, | Performed by: THORACIC SURGERY (CARDIOTHORACIC VASCULAR SURGERY)

## 2023-08-15 PROCEDURE — 99205 OFFICE O/P NEW HI 60 MIN: CPT | Mod: S$GLB,,, | Performed by: INTERNAL MEDICINE

## 2023-08-15 PROCEDURE — 3077F SYST BP >= 140 MM HG: CPT | Mod: CPTII,S$GLB,, | Performed by: THORACIC SURGERY (CARDIOTHORACIC VASCULAR SURGERY)

## 2023-08-15 PROCEDURE — 99999 PR PBB SHADOW E&M-EST. PATIENT-LVL IV: ICD-10-PCS | Mod: PBBFAC,,, | Performed by: INTERNAL MEDICINE

## 2023-08-15 PROCEDURE — 1101F PT FALLS ASSESS-DOCD LE1/YR: CPT | Mod: CPTII,S$GLB,, | Performed by: THORACIC SURGERY (CARDIOTHORACIC VASCULAR SURGERY)

## 2023-08-15 PROCEDURE — 99204 OFFICE O/P NEW MOD 45 MIN: CPT | Mod: S$GLB,,, | Performed by: THORACIC SURGERY (CARDIOTHORACIC VASCULAR SURGERY)

## 2023-08-15 PROCEDURE — 3077F SYST BP >= 140 MM HG: CPT | Mod: CPTII,S$GLB,, | Performed by: INTERNAL MEDICINE

## 2023-08-15 PROCEDURE — 1126F PR PAIN SEVERITY QUANTIFIED, NO PAIN PRESENT: ICD-10-PCS | Mod: CPTII,S$GLB,, | Performed by: THORACIC SURGERY (CARDIOTHORACIC VASCULAR SURGERY)

## 2023-08-15 PROCEDURE — 3077F PR MOST RECENT SYSTOLIC BLOOD PRESSURE >= 140 MM HG: ICD-10-PCS | Mod: CPTII,S$GLB,, | Performed by: THORACIC SURGERY (CARDIOTHORACIC VASCULAR SURGERY)

## 2023-08-15 PROCEDURE — 99999 PR PBB SHADOW E&M-EST. PATIENT-LVL III: CPT | Mod: PBBFAC,,, | Performed by: THORACIC SURGERY (CARDIOTHORACIC VASCULAR SURGERY)

## 2023-08-15 PROCEDURE — 3008F PR BODY MASS INDEX (BMI) DOCUMENTED: ICD-10-PCS | Mod: CPTII,S$GLB,, | Performed by: THORACIC SURGERY (CARDIOTHORACIC VASCULAR SURGERY)

## 2023-08-15 PROCEDURE — 99999 PR PBB SHADOW E&M-EST. PATIENT-LVL IV: CPT | Mod: PBBFAC,,, | Performed by: INTERNAL MEDICINE

## 2023-08-15 PROCEDURE — 99205 PR OFFICE/OUTPT VISIT, NEW, LEVL V, 60-74 MIN: ICD-10-PCS | Mod: S$GLB,,, | Performed by: INTERNAL MEDICINE

## 2023-08-15 PROCEDURE — 3078F DIAST BP <80 MM HG: CPT | Mod: CPTII,S$GLB,, | Performed by: THORACIC SURGERY (CARDIOTHORACIC VASCULAR SURGERY)

## 2023-08-15 PROCEDURE — 1160F RVW MEDS BY RX/DR IN RCRD: CPT | Mod: CPTII,S$GLB,, | Performed by: INTERNAL MEDICINE

## 2023-08-15 PROCEDURE — 1160F PR REVIEW ALL MEDS BY PRESCRIBER/CLIN PHARMACIST DOCUMENTED: ICD-10-PCS | Mod: CPTII,S$GLB,, | Performed by: INTERNAL MEDICINE

## 2023-08-15 PROCEDURE — 99204 PR OFFICE/OUTPT VISIT, NEW, LEVL IV, 45-59 MIN: ICD-10-PCS | Mod: S$GLB,,, | Performed by: THORACIC SURGERY (CARDIOTHORACIC VASCULAR SURGERY)

## 2023-08-15 PROCEDURE — 3078F PR MOST RECENT DIASTOLIC BLOOD PRESSURE < 80 MM HG: ICD-10-PCS | Mod: CPTII,S$GLB,, | Performed by: THORACIC SURGERY (CARDIOTHORACIC VASCULAR SURGERY)

## 2023-08-15 PROCEDURE — 1159F MED LIST DOCD IN RCRD: CPT | Mod: CPTII,S$GLB,, | Performed by: INTERNAL MEDICINE

## 2023-08-15 PROCEDURE — 3079F DIAST BP 80-89 MM HG: CPT | Mod: CPTII,S$GLB,, | Performed by: INTERNAL MEDICINE

## 2023-08-15 PROCEDURE — 3008F BODY MASS INDEX DOCD: CPT | Mod: CPTII,S$GLB,, | Performed by: THORACIC SURGERY (CARDIOTHORACIC VASCULAR SURGERY)

## 2023-08-15 PROCEDURE — 3079F PR MOST RECENT DIASTOLIC BLOOD PRESSURE 80-89 MM HG: ICD-10-PCS | Mod: CPTII,S$GLB,, | Performed by: INTERNAL MEDICINE

## 2023-08-15 PROCEDURE — 99999 PR PBB SHADOW E&M-EST. PATIENT-LVL III: ICD-10-PCS | Mod: PBBFAC,,, | Performed by: THORACIC SURGERY (CARDIOTHORACIC VASCULAR SURGERY)

## 2023-08-15 PROCEDURE — 3008F BODY MASS INDEX DOCD: CPT | Mod: CPTII,S$GLB,, | Performed by: INTERNAL MEDICINE

## 2023-08-15 PROCEDURE — 1125F PR PAIN SEVERITY QUANTIFIED, PAIN PRESENT: ICD-10-PCS | Mod: CPTII,S$GLB,, | Performed by: INTERNAL MEDICINE

## 2023-08-15 NOTE — PROGRESS NOTES
OUTPATIENT CATHETERIZATION INSTRUCTIONS    You have been scheduled for a procedure in the catheterization lab on Wednesday, September 13, 2023.     Please report to the Cardiology Waiting Area on the Third floor of the hospital and check in at 8:30 AM.   You will then be taken to the SSCU (Short Stay Cardiac Unit) and prepared for your procedure. Please be aware that this is not the time of your procedure but the time you are to arrive. The procedures are scheduled on an hourly basis; however, emergency cases take precedence over all other cases.       No solid foods 8 hours prior to your procedure.  You may have clear liquids until the time of your admission which should be 2 hours prior to your procedure.  You are encouraged to drink at least 8 ounces of clear liquids prior to your admission to SSCU.  Patients with gastric emptying issues should be fasting for 6- 8 hours prior to the procedure.  Clear liquids include water, black coffee, clear juices, and performance drinks - no pulp or milk.    Heart failure or dialysis patients will be limited to 8 ounces (1 cup) of clear liquids up until 2 hours of the procedure.  23  3.   You may take your regular morning medications with water. If there are any medications that you should not take you will be instructed to hold them that morning. If you         are diabetic and on Metformin (Glucophage) do not take it the day before, the day of, and for 2 days after your procedure.  4.   If you are on Pradaxa, Eliquis or Coumadin , you can hold them 3 days prior to your procedure.      The procedure will take 1-2 hours to perform. After the procedure, you will return to SSCU on the third floor of the hospital. You will need to lie still (or keep your arm still) for the next 2 to 4 hours to minimize bleeding from the puncture site.  This time is determined by your physician.  Your family may remain in the room with you during this time.       You may be able to be discharged  "home that same afternoon if there is someone to drive you home and there are no complications.  Your doctor will determine, based on your progress, the date and time of your discharge. The results of your procedure will be discussed with you before you are discharged. Any further testing or procedures will be scheduled for you either before you leave or after your discharge..       If you should have any questions, concerns, or need to change the date of your procedure, please call "PETAR Sainz @ [phone]            Special Instructions:    Continue to take your current medications.    On the morning of your procedure take your Cortisone, Synthroid, and your Aspirin only.        THE ABOVE INSTRUCTIONS WERE GIVEN TO THE PATIENT VERBALLY AND THEY VERBALIZED UNDERSTANDING.  THEY DO NOT REQUIRE ANY SPECIAL NEEDS AND DO NOT HAVE ANY LEARNING BARRIERS.          Directions for Reporting to Cardiology Waiting Area in the Hospital  If you park in the Parking Garage:  Take elevators to the1st floor of the parking garage.  Continue past the gift shop, coffee shop, and piano.  Take a right and go to the gold elevators. (Elevator B)  Take the elevator to the 3rd floor.  Follow the arrow on the sign on the wall that says Cath Lab Registration/EP Lab Registration.  Follow the long hallway all the way around until you come to a big open area.  This is the registration area.  Check in at Reception Desk.    OR    If family is dropping you off:  Have them drop you off at the front of the Hospital under the green overhang.  Enter through the doors and take a right.  Take the E elevators to the 3rd floor Cardiology Waiting Area.  Check in at the Reception Desk in the waiting room.            "

## 2023-08-15 NOTE — ASSESSMENT & PLAN NOTE
Aurelio Perkins is a 72 y.o. male referred by Dr Garcia for evaluation of severe AS (NYHA Class II sx).    The patient has undergone the following TAVR work-up:    ECHO (Date 08/09/23): MELVIN= 0.84 cm2, MG= 39mmHg, Peak [place]= 4.39 m/s, EF= 65%.    LHC: needs   STS: 1.5%    Frailty: 1/4    Iliacs are > on R and >  on L: Pending CT   LVOT area by CTA is  cm2 ( mm X  mm) and Avg Diameter is  per Dr: pending CT   Incidental findings on CT: needs   CT Surgery risk assessment: low risk, per Dr Garcia, TAVR is patient preference   Rhythm issues: none   PFTs: Not indicated   KCCQ/5 meter walk: 3.8s   Comorbidities: menigioma s/p resection, adrenal insufficiency hormonal replacement therapy, CKD3 with single kidney, RA. **He will need stress dose steroids at the time of the procedure.       Aurelio Prekins is a TAVR candidate, pending CT for valve sizing/access.     Plan:   -will plan for CTa and LHC    --LHC +/- PCI, patient is a KATIE candidate  - Anti-platelet Therapy: ASA. Will load with p2y12 if pci indicated   - Access: Right radial  - Catheters: Nikita  - Creatinine/CrCl: 1.3 (single kidney, low contrast technique)  - Allergies: No shellfish / Iodine allergy  - Pre-Hydration: NS  - Pre-Op Med: Bendaryl 50mg pO   - All patient's questions were answered.  -The risks, benefits and alternatives of the procedure were explained to the patient.   -The risks of coronary angiography include but are not limited to: bleeding, infection, heart rhythm abnormalities, allergic reactions, kidney injury and potential need for dialysis, stroke and death.   - Should stenting be indicated, the patient has agreed to dual anti-platelet therapy for 1-consecutive year with a drug-eluting stent and a minimum of 1-month with the use of a bare metal stent  - Additionally, pt is aware that non-compliance is likely to result in stent clotting with heart attack, heart failure, and/or death  -The risks of moderate sedation include  hypotension, respiratory depression, arrhythmias, bronchospasm, and death.   - Informed consent was obtained and the  patient is agreeable to proceed with the procedure.

## 2023-08-15 NOTE — PROGRESS NOTES
"Referring provider: Dr. Valente Garcia     Patient ID:  Aurelio Perkins is a 72 y.o. y.o. male who presents for evaluation Aortic Stenosis      Aurelio Perkins is a 72 y.o. male referred by Dr Garcia for evaluation of severe AS (NYHA Class II sx). His main complaint is fatigue with exertion and occasional dizziness. He states he has noticed a functional decline over the past few months, and now has to rest when doing strenuous activities due to extreme fatigue and occasional dizziness. He can walk thiago. 1/4 of a mile but then has to rest for some time. This is a drop in functional status over the past few months.     The patient has undergone the following TAVR work-up:   ECHO (Date 08/09/23): MELVIN= 0.84 cm2, MG= 39mmHg, Peak Francisco= 4.39 m/s, EF= 65%.   LHC: needs  STS: 1.5%   Frailty: 1/4   Iliacs are > on R and >  on L: Pending CT  LVOT area by CTA is  cm2 ( mm X  mm) and Avg Diameter is  per Dr: pending CT  Incidental findings on CT: needs  CT Surgery risk assessment: low risk, per Dr Garcia, TAVR is patient preference  Rhythm issues: none  PFTs: Not indicated  KCCQ/5 meter walk: 3.8s  Comorbidities: menigioma s/p resection, adrenal insufficiency hormonal replacement therapy, CKD3 with single kidney, RA. **He will need stress dose steroids at the time of the procedure.       Aurelio Perkins is a TAVR candidate, pending CT for valve sizing/access.       Review of Systems   Constitutional:  Positive for malaise/fatigue.   All other systems reviewed and are negative.     Objective:     BP (!) 148/83 (BP Location: Right arm, Patient Position: Sitting, BP Method: Large (Automatic))   Pulse (!) 51   Ht 6' 1" (1.854 m)   Wt 98.7 kg (217 lb 9.5 oz)   SpO2 98%   BMI 28.71 kg/m²     Physical Exam  Vitals and nursing note reviewed.   Constitutional:       Appearance: Normal appearance.   HENT:      Head: Normocephalic and atraumatic.      Right Ear: External ear normal.      Left Ear: External ear normal. " "     Nose: Nose normal.      Mouth/Throat:      Mouth: Mucous membranes are moist.   Eyes:      Pupils: Pupils are equal, round, and reactive to light.   Cardiovascular:      Rate and Rhythm: Normal rate and regular rhythm.      Pulses: Normal pulses.      Heart sounds: Murmur heard.   Pulmonary:      Effort: Pulmonary effort is normal.   Abdominal:      General: Abdomen is flat.   Musculoskeletal:         General: Normal range of motion.      Cervical back: Normal range of motion.   Skin:     General: Skin is warm.      Capillary Refill: Capillary refill takes less than 2 seconds.   Neurological:      General: No focal deficit present.      Mental Status: He is alert and oriented to person, place, and time. Mental status is at baseline.     Labs:     Lab Results   Component Value Date     07/05/2023    K 4.3 07/05/2023     07/05/2023    CO2 28 07/05/2023    BUN 20 07/05/2023    CREATININE 1.3 07/05/2023    ANIONGAP 8 07/05/2023     Lab Results   Component Value Date    HGBA1C 5.7 (H) 09/26/2022     No results found for: "BNP", "BNPTRIAGEBLO"    Lab Results   Component Value Date    WBC 5.93 07/05/2023    HGB 14.7 07/05/2023    HCT 45.2 07/05/2023    HCT 30 (L) 05/29/2019     07/05/2023    GRAN 3.4 07/05/2023    GRAN 57.6 07/05/2023     Lab Results   Component Value Date    CHOL 139 09/26/2022    HDL 42 09/26/2022    LDLCALC 68.4 09/26/2022    TRIG 143 09/26/2022       Meds:     Current Outpatient Medications:     aspirin (ECOTRIN) 81 MG EC tablet, Take 1 tablet (81 mg total) by mouth once daily., Disp: 90 tablet, Rfl: 3    diclofenac (VOLTAREN) 75 MG EC tablet, Take 1 tablet (75 mg total) by mouth daily as needed., Disp: 90 tablet, Rfl: 1    ergocalciferol (ERGOCALCIFEROL) 50,000 unit Cap, TAKE 1 CAPSULE BY MOUTH ONE TIME PER WEEK, Disp: 4 capsule, Rfl: 12    eszopiclone (LUNESTA) 3 mg Tab, TAKE 1 TABLET BY MOUTH EVERY DAY IN THE EVENING, Disp: 30 tablet, Rfl: 4    golimumab (SIMPONI) 50 mg/0.5 " "mL PnIj, Inject 50 mg into the skin every 30 days., Disp: 0.5 mL, Rfl: 12    hydrocortisone (CORTEF) 10 MG Tab, TAKE 1 AND 1/2 TABLETS (15 mg) BY MOUTH IN THE MORNING AND 1 TABLET (10 mg) BY MOUTH IN THE EVENING AT 4PM, Disp: 75 tablet, Rfl: 11    REPATHA PUSHTRONEX 420 mg/3.5 mL Injt, INJECT 3.5 MLS INTO THE SKIN EVERY 30 DAYS., Disp: 10.5 mL, Rfl: 3    SYNTHROID 112 mcg tablet, Take 1 tablet (112 mcg total) by mouth once daily., Disp: 90 tablet, Rfl: 3    testosterone cypionate (DEPOTESTOTERONE CYPIONATE) 200 mg/mL injection, Inject 0.38 mLs (76 mg total) into the muscle every 7 days. (Patient taking differently: Inject 75 mg into the muscle every 14 (fourteen) days.), Disp: 2 mL, Rfl: 5    diclofenac sodium (VOLTAREN) 1 % Gel, Apply topically., Disp: , Rfl:     nitroGLYCERIN (NITROSTAT) 0.4 MG SL tablet, Place 0.4 mg under the tongue., Disp: , Rfl:     safety needles (BD SAFETYGLIDE NEEDLE) 25 gauge x 1" Ndle, 1 each by Misc.(Non-Drug; Combo Route) route every 7 days., Disp: 25 each, Rfl: 0      Assessment & Plan:     Hyperlipidemia  -repatha     Essential hypertension  -well controlled and now off of medications    Abdominal aortic atherosclerosis  -asa, repatha, bp control     Severe aortic stenosis    Aurelio Perkins is a 72 y.o. male referred by Dr Garcia for evaluation of severe AS (NYHA Class II sx).    The patient has undergone the following TAVR work-up:   ECHO (Date 08/09/23): MELVIN= 0.84 cm2, MG= 39mmHg, Peak Francisco= 4.39 m/s, EF= 65%.   LHC: needs  STS: 1.5%   Frailty: 1/4   Iliacs are > on R and >  on L: Pending CT  LVOT area by CTA is  cm2 ( mm X  mm) and Avg Diameter is  per Dr: pending CT  Incidental findings on CT: needs  CT Surgery risk assessment: low risk, per Dr Garcia, TAVR is patient preference  Rhythm issues: none  PFTs: Not indicated  KCCQ/5 meter walk: 3.8s  Comorbidities: menigioma s/p resection, adrenal insufficiency hormonal replacement therapy, CKD3 with single kidney, RA. **He will " need stress dose steroids at the time of the procedure.       Aurelio Perkins is a TAVR candidate, pending CT for valve sizing/access.     Plan:   -will plan for CTa and LHC    --LHC +/- PCI, patient is a KATIE candidate  - Anti-platelet Therapy: ASA. Will load with p2y12 if pci indicated   - Access: Right radial  - Catheters: Nikita  - Creatinine/CrCl: 1.3 (single kidney, low contrast technique)  - Allergies: No shellfish / Iodine allergy  - Pre-Hydration: NS  - Pre-Op Med: Bendaryl 50mg pO   - All patient's questions were answered.  -The risks, benefits and alternatives of the procedure were explained to the patient.   -The risks of coronary angiography include but are not limited to: bleeding, infection, heart rhythm abnormalities, allergic reactions, kidney injury and potential need for dialysis, stroke and death.   - Should stenting be indicated, the patient has agreed to dual anti-platelet therapy for 1-consecutive year with a drug-eluting stent and a minimum of 1-month with the use of a bare metal stent  - Additionally, pt is aware that non-compliance is likely to result in stent clotting with heart attack, heart failure, and/or death  -The risks of moderate sedation include hypotension, respiratory depression, arrhythmias, bronchospasm, and death.   - Informed consent was obtained and the  patient is agreeable to proceed with the procedure.      Chronic renal insufficiency, stage III (moderate)  -single kidney, low contrast with LHC    Secondary adrenal insufficiency  -continue current regimen     Prediabetes  -blood sugar control       I have seen the patient, reviewed the Fellow's history and physical, assessment and plan. I have personally interviewed and examined the patient and agree with the findings.  72-year-old male with dizziness and fatigue referred by Dr. Garcia to evaluate severe aortic stenosis.  Coronary angiography performed over 4 years ago demonstrated nonobstructive disease and needs to  be repeated.  He has a single kidney and will need a TAVR CTA and coronary angiography  by at least 2-4 weeks time.  He will also need stress dose steroids due to hypopituitarism at time of procedures.    JOS Obregon MD

## 2023-08-24 ENCOUNTER — HOSPITAL ENCOUNTER (OUTPATIENT)
Dept: RADIOLOGY | Facility: HOSPITAL | Age: 73
Discharge: HOME | End: 2023-08-24
Attending: INTERNAL MEDICINE
Payer: MEDICARE

## 2023-08-24 DIAGNOSIS — I35.0 SEVERE AORTIC STENOSIS: ICD-10-CM

## 2023-08-24 LAB
CREAT SERPL-MCNC: 1.5 MG/DL (ref 0.5–1.4)
SAMPLE: ABNORMAL

## 2023-08-24 PROCEDURE — 74174 CTA ABD&PLVS W/CONTRAST: CPT | Mod: TC

## 2023-08-24 PROCEDURE — 74174 CTA ABD&PLVS W/CONTRAST: CPT | Mod: 26,,, | Performed by: RADIOLOGY

## 2023-08-24 PROCEDURE — 71275 CT ANGIOGRAPHY CHEST: CPT | Mod: 26,,, | Performed by: RADIOLOGY

## 2023-08-24 PROCEDURE — 74174 CTA CARDIAC TAVR_PARTNERS (XPD): ICD-10-PCS | Mod: 26,,, | Performed by: RADIOLOGY

## 2023-08-24 PROCEDURE — 25500020 PHARM REV CODE 255: Performed by: INTERNAL MEDICINE

## 2023-08-24 PROCEDURE — 71275 CT ANGIOGRAPHY CHEST: CPT | Mod: TC

## 2023-08-24 PROCEDURE — 71275 CTA CARDIAC TAVR_PARTNERS (XPD): ICD-10-PCS | Mod: 26,,, | Performed by: RADIOLOGY

## 2023-08-24 RX ADMIN — IOHEXOL 100 ML: 350 INJECTION, SOLUTION INTRAVENOUS at 10:08

## 2023-08-25 DIAGNOSIS — I35.0 SEVERE AORTIC STENOSIS: Primary | ICD-10-CM

## 2023-08-28 ENCOUNTER — DOCUMENTATION ONLY (OUTPATIENT)
Dept: CARDIAC CATH/INVASIVE PROCEDURES | Facility: HOSPITAL | Age: 73
End: 2023-08-28
Payer: MEDICARE

## 2023-08-28 NOTE — PROGRESS NOTES
Aurelio Perkins is a 72 y.o. male referred by Dr Garcia for evaluation of severe AS (NYHA Class II sx). His main complaint is fatigue with exertion and occasional dizziness. He states he has noticed a functional decline over the past few months, and now has to rest when doing strenuous activities due to extreme fatigue and occasional dizziness. He can walk thiago. 1/4 of a mile but then has to rest for some time. This is a drop in functional status over the past few months.     TAVR plan/summary as below.     The patient has undergone the following TAVR work-up:   ECHO (Date 08/09/23): MELVIN= 0.84 cm2, MG= 39mmHg, Peak Francisco= 4.39 m/s, EF= 65%.   LHC: needs (scheduled 09/13)  STS: 1.5%   Frailty: 1/4   Iliacs are >7.6mm on R and > 7.71mm on L  LVOT area by CTA is 3.91 cm2 (26.7 mm X 19.8 mm) and Avg Diameter is 22.3 per Sherry Obregon  Incidental findings on CT: pulmonary nodules (pulm referral placed)  CT Surgery risk assessment: low risk, per Dr Garcia, TAVR is patient preference  Rhythm issues: none  PFTs: Not indicated  KCCQ/5 meter walk: 3.8s  Comorbidities: menigioma s/p resection, adrenal insufficiency hormonal replacement therapy, CKD3 with single kidney, RA. **He will need stress dose steroids at the time of the procedure.         Aurelio Perkins is a 27mm Navitor valve candidate via RTF access.

## 2023-09-03 DIAGNOSIS — G47.00 INSOMNIA, UNSPECIFIED TYPE: ICD-10-CM

## 2023-09-05 RX ORDER — ESZOPICLONE 3 MG/1
TABLET, FILM COATED ORAL
Qty: 30 TABLET | Refills: 3 | Status: SHIPPED | OUTPATIENT
Start: 2023-09-05 | End: 2024-01-07

## 2023-09-06 ENCOUNTER — PATIENT MESSAGE (OUTPATIENT)
Dept: RHEUMATOLOGY | Facility: CLINIC | Age: 73
End: 2023-09-06
Payer: MEDICARE

## 2023-09-11 ENCOUNTER — LAB VISIT (OUTPATIENT)
Dept: LAB | Facility: HOSPITAL | Age: 73
End: 2023-09-11
Attending: INTERNAL MEDICINE
Payer: MEDICARE

## 2023-09-11 DIAGNOSIS — I35.0 SEVERE AORTIC STENOSIS: ICD-10-CM

## 2023-09-11 LAB
ALBUMIN SERPL BCP-MCNC: 4 G/DL (ref 3.5–5.2)
ALP SERPL-CCNC: 46 U/L (ref 55–135)
ALT SERPL W/O P-5'-P-CCNC: 46 U/L (ref 10–44)
ANION GAP SERPL CALC-SCNC: 11 MMOL/L (ref 8–16)
AST SERPL-CCNC: 29 U/L (ref 10–40)
BASOPHILS # BLD AUTO: 0.03 K/UL (ref 0–0.2)
BASOPHILS NFR BLD: 0.6 % (ref 0–1.9)
BILIRUB SERPL-MCNC: 1 MG/DL (ref 0.1–1)
BNP SERPL-MCNC: 16 PG/ML (ref 0–99)
BUN SERPL-MCNC: 19 MG/DL (ref 8–23)
CALCIUM SERPL-MCNC: 9.1 MG/DL (ref 8.7–10.5)
CHLORIDE SERPL-SCNC: 107 MMOL/L (ref 95–110)
CO2 SERPL-SCNC: 22 MMOL/L (ref 23–29)
CREAT SERPL-MCNC: 1.2 MG/DL (ref 0.5–1.4)
DIFFERENTIAL METHOD: ABNORMAL
EOSINOPHIL # BLD AUTO: 0.3 K/UL (ref 0–0.5)
EOSINOPHIL NFR BLD: 5.3 % (ref 0–8)
ERYTHROCYTE [DISTWIDTH] IN BLOOD BY AUTOMATED COUNT: 12.6 % (ref 11.5–14.5)
EST. GFR  (NO RACE VARIABLE): >60 ML/MIN/1.73 M^2
GLUCOSE SERPL-MCNC: 106 MG/DL (ref 70–110)
HCT VFR BLD AUTO: 44.3 % (ref 40–54)
HGB BLD-MCNC: 14.3 G/DL (ref 14–18)
IMM GRANULOCYTES # BLD AUTO: 0.04 K/UL (ref 0–0.04)
IMM GRANULOCYTES NFR BLD AUTO: 0.8 % (ref 0–0.5)
INR PPP: 1 (ref 0.8–1.2)
LYMPHOCYTES # BLD AUTO: 1.5 K/UL (ref 1–4.8)
LYMPHOCYTES NFR BLD: 30.2 % (ref 18–48)
MCH RBC QN AUTO: 28.9 PG (ref 27–31)
MCHC RBC AUTO-ENTMCNC: 32.3 G/DL (ref 32–36)
MCV RBC AUTO: 90 FL (ref 82–98)
MONOCYTES # BLD AUTO: 0.4 K/UL (ref 0.3–1)
MONOCYTES NFR BLD: 7.7 % (ref 4–15)
NEUTROPHILS # BLD AUTO: 2.8 K/UL (ref 1.8–7.7)
NEUTROPHILS NFR BLD: 55.4 % (ref 38–73)
NRBC BLD-RTO: 0 /100 WBC
PLATELET # BLD AUTO: 155 K/UL (ref 150–450)
PMV BLD AUTO: 10.4 FL (ref 9.2–12.9)
POTASSIUM SERPL-SCNC: 3.9 MMOL/L (ref 3.5–5.1)
PROT SERPL-MCNC: 6.7 G/DL (ref 6–8.4)
PROTHROMBIN TIME: 10.4 SEC (ref 9–12.5)
RBC # BLD AUTO: 4.95 M/UL (ref 4.6–6.2)
SODIUM SERPL-SCNC: 140 MMOL/L (ref 136–145)
WBC # BLD AUTO: 5.07 K/UL (ref 3.9–12.7)

## 2023-09-11 PROCEDURE — 85025 COMPLETE CBC W/AUTO DIFF WBC: CPT | Performed by: INTERNAL MEDICINE

## 2023-09-11 PROCEDURE — 83880 ASSAY OF NATRIURETIC PEPTIDE: CPT | Performed by: INTERNAL MEDICINE

## 2023-09-11 PROCEDURE — 80053 COMPREHEN METABOLIC PANEL: CPT | Performed by: INTERNAL MEDICINE

## 2023-09-11 PROCEDURE — 36415 COLL VENOUS BLD VENIPUNCTURE: CPT | Mod: PO | Performed by: INTERNAL MEDICINE

## 2023-09-11 PROCEDURE — 85610 PROTHROMBIN TIME: CPT | Performed by: INTERNAL MEDICINE

## 2023-09-12 ENCOUNTER — TELEPHONE (OUTPATIENT)
Dept: FAMILY MEDICINE | Facility: CLINIC | Age: 73
End: 2023-09-12
Payer: MEDICARE

## 2023-09-12 NOTE — TELEPHONE ENCOUNTER
----- Message from Lelo Madera sent at 9/12/2023  8:04 AM CDT -----  Contact: Sancho  .Type:  Patient Returning Call    Who Called:Sancho  Who Left Message for Patient:nurse  Does the patient know what this is regarding?:yes  Would the patient rather a call back or a response via [employer]? Call back  Best Call Back Number:[phone]  Additional Information: na        Thanks  JENNIFER

## 2023-09-12 NOTE — TELEPHONE ENCOUNTER
Received a fax from [facility] regarding pt urology supplies, they need office notes from within the past 6 months with noted use and need of supplies. Pt was seen by Dr. Gardner 03/02/2023, however I do not see anything in note regarding supplies needed. Patient seen Dr. Workman in the past, does not see him anymore and needs urology referral. However, he is out of supplies and is completely incontinent, can your note from 03/02/2023 be addend to add patient pt is completely incontinent and dependent on supplies needed. Supplies include condom cath and legs bag 35 caths per month with 2 leg bags. Patient did express that he would like to get 4 leg bags her month if possible. Please advise.

## 2023-09-12 NOTE — TELEPHONE ENCOUNTER
----- Message from Lelo Madera sent at 9/12/2023  8:04 AM CDT -----  Contact: Sancho  .Type:  Patient Returning Call    Who Called:Sancho  Who Left Message for Patient:nurse  Does the patient know what this is regarding?:yes  Would the patient rather a call back or a response via [employer]ner? Call back  Best Call Back Number:[phone]  Additional Information: na        Thanks  JENNIFER

## 2023-09-12 NOTE — TELEPHONE ENCOUNTER
----- Message from Salena Harris sent at 9/12/2023 11:15 AM CDT -----  Contact: Jacey/Sari [phone]  Jacey calling stating they did not receive paperwork for urology supplies that were faxed this morning. They are asking you fax it to [phone]

## 2023-09-13 ENCOUNTER — PATIENT MESSAGE (OUTPATIENT)
Dept: CARDIOLOGY | Facility: CLINIC | Age: 73
End: 2023-09-13

## 2023-09-13 ENCOUNTER — HOSPITAL ENCOUNTER (OUTPATIENT)
Facility: HOSPITAL | Age: 73
Discharge: HOME | End: 2023-09-13
Attending: INTERNAL MEDICINE | Admitting: INTERNAL MEDICINE
Payer: MEDICARE

## 2023-09-13 ENCOUNTER — DOCUMENTATION ONLY (OUTPATIENT)
Dept: CARDIOLOGY | Facility: CLINIC | Age: 73
End: 2023-09-13
Payer: MEDICARE

## 2023-09-13 VITALS
SYSTOLIC BLOOD PRESSURE: 142 MMHG | BODY MASS INDEX: 29.8 KG/M2 | RESPIRATION RATE: 16 BRPM | HEART RATE: 67 BPM | OXYGEN SATURATION: 96 % | TEMPERATURE: 98 F | WEIGHT: 220 LBS | HEIGHT: 72 IN | DIASTOLIC BLOOD PRESSURE: 68 MMHG

## 2023-09-13 DIAGNOSIS — I35.0 AORTIC STENOSIS, SEVERE: ICD-10-CM

## 2023-09-13 DIAGNOSIS — Z98.890 STATUS POST LEFT HEART CATHETERIZATION: ICD-10-CM

## 2023-09-13 LAB
ABO + RH BLD: NORMAL
BLD GP AB SCN CELLS X3 SERPL QL: NORMAL

## 2023-09-13 PROCEDURE — 93454 CORONARY ARTERY ANGIO S&I: CPT | Performed by: INTERNAL MEDICINE

## 2023-09-13 PROCEDURE — 99152 MOD SED SAME PHYS/QHP 5/>YRS: CPT | Mod: ,,, | Performed by: INTERNAL MEDICINE

## 2023-09-13 PROCEDURE — 99153 MOD SED SAME PHYS/QHP EA: CPT | Performed by: INTERNAL MEDICINE

## 2023-09-13 PROCEDURE — 93454 PR CATH PLACE/CORONARY ANGIO, IMG SUPER/INTERP: ICD-10-PCS | Mod: 26,,, | Performed by: INTERNAL MEDICINE

## 2023-09-13 PROCEDURE — 63600175 PHARM REV CODE 636 W HCPCS: Performed by: INTERNAL MEDICINE

## 2023-09-13 PROCEDURE — C1894 INTRO/SHEATH, NON-LASER: HCPCS | Performed by: INTERNAL MEDICINE

## 2023-09-13 PROCEDURE — 25000003 PHARM REV CODE 250: Performed by: STUDENT IN AN ORGANIZED HEALTH CARE EDUCATION/TRAINING PROGRAM

## 2023-09-13 PROCEDURE — 25000003 PHARM REV CODE 250: Performed by: INTERNAL MEDICINE

## 2023-09-13 PROCEDURE — C1769 GUIDE WIRE: HCPCS | Performed by: INTERNAL MEDICINE

## 2023-09-13 PROCEDURE — C1887 CATHETER, GUIDING: HCPCS | Performed by: INTERNAL MEDICINE

## 2023-09-13 PROCEDURE — 25500020 PHARM REV CODE 255: Performed by: INTERNAL MEDICINE

## 2023-09-13 PROCEDURE — 93010 ELECTROCARDIOGRAM REPORT: CPT | Mod: ,,, | Performed by: INTERNAL MEDICINE

## 2023-09-13 PROCEDURE — 93005 ELECTROCARDIOGRAM TRACING: CPT | Mod: 59

## 2023-09-13 PROCEDURE — 93454 CORONARY ARTERY ANGIO S&I: CPT | Mod: 26,,, | Performed by: INTERNAL MEDICINE

## 2023-09-13 PROCEDURE — 99152 PR MOD CONSCIOUS SEDATION, SAME PHYS, 5+ YRS, FIRST 15 MIN: ICD-10-PCS | Mod: ,,, | Performed by: INTERNAL MEDICINE

## 2023-09-13 PROCEDURE — 99152 MOD SED SAME PHYS/QHP 5/>YRS: CPT | Performed by: INTERNAL MEDICINE

## 2023-09-13 PROCEDURE — 93010 EKG 12-LEAD: ICD-10-PCS | Mod: ,,, | Performed by: INTERNAL MEDICINE

## 2023-09-13 PROCEDURE — 86850 RBC ANTIBODY SCREEN: CPT | Performed by: INTERNAL MEDICINE

## 2023-09-13 RX ORDER — HEPARIN SOD,PORCINE/0.9 % NACL 1000/500ML
INTRAVENOUS SOLUTION INTRAVENOUS
Status: DISCONTINUED | OUTPATIENT
Start: 2023-09-13 | End: 2023-09-13 | Stop reason: HOSPADM

## 2023-09-13 RX ORDER — FENTANYL CITRATE 50 UG/ML
INJECTION, SOLUTION INTRAMUSCULAR; INTRAVENOUS
Status: DISCONTINUED | OUTPATIENT
Start: 2023-09-13 | End: 2023-09-13 | Stop reason: HOSPADM

## 2023-09-13 RX ORDER — ONDANSETRON 8 MG/1
8 TABLET, ORALLY DISINTEGRATING ORAL EVERY 8 HOURS PRN
Status: DISCONTINUED | OUTPATIENT
Start: 2023-09-13 | End: 2023-09-13 | Stop reason: HOSPADM

## 2023-09-13 RX ORDER — LIDOCAINE HYDROCHLORIDE 20 MG/ML
INJECTION, SOLUTION INFILTRATION; PERINEURAL
Status: DISCONTINUED | OUTPATIENT
Start: 2023-09-13 | End: 2023-09-13 | Stop reason: HOSPADM

## 2023-09-13 RX ORDER — SODIUM CHLORIDE 9 MG/ML
INJECTION, SOLUTION INTRAVENOUS CONTINUOUS
Status: ACTIVE | OUTPATIENT
Start: 2023-09-13

## 2023-09-13 RX ORDER — HEPARIN SODIUM 1000 [USP'U]/ML
INJECTION, SOLUTION INTRAVENOUS; SUBCUTANEOUS
Status: DISCONTINUED | OUTPATIENT
Start: 2023-09-13 | End: 2023-09-13 | Stop reason: HOSPADM

## 2023-09-13 RX ORDER — NAPROXEN SODIUM 220 MG/1
81 TABLET, FILM COATED ORAL DAILY
Status: DISCONTINUED | OUTPATIENT
Start: 2023-09-13 | End: 2023-09-13 | Stop reason: HOSPADM

## 2023-09-13 RX ORDER — SODIUM CHLORIDE 9 MG/ML
INJECTION, SOLUTION INTRAVENOUS CONTINUOUS
Status: ACTIVE | OUTPATIENT
Start: 2023-09-13 | End: 2023-09-13

## 2023-09-13 RX ORDER — MIDAZOLAM HYDROCHLORIDE 1 MG/ML
INJECTION, SOLUTION INTRAMUSCULAR; INTRAVENOUS
Status: DISCONTINUED | OUTPATIENT
Start: 2023-09-13 | End: 2023-09-13 | Stop reason: HOSPADM

## 2023-09-13 RX ORDER — ACETAMINOPHEN 325 MG/1
650 TABLET ORAL EVERY 4 HOURS PRN
Status: DISCONTINUED | OUTPATIENT
Start: 2023-09-13 | End: 2023-09-13 | Stop reason: HOSPADM

## 2023-09-13 RX ADMIN — SODIUM CHLORIDE: 9 INJECTION, SOLUTION INTRAVENOUS at 03:09

## 2023-09-13 RX ADMIN — ASPIRIN 81 MG 81 MG: 81 TABLET ORAL at 12:09

## 2023-09-13 RX ADMIN — ACETAMINOPHEN 650 MG: 325 TABLET ORAL at 04:09

## 2023-09-13 RX ADMIN — SODIUM CHLORIDE: 9 INJECTION, SOLUTION INTRAVENOUS at 12:09

## 2023-09-13 NOTE — PROGRESS NOTES
Transcatheter Valve Replacement (TAVR)    You have been scheduled for your valve replacement on Thursday, September 28, 2023.     Please report to the Cardiology Waiting Area on the Third floor of the hospital and check in at 8 AM.   You will then be taken to the SSCU (Short Stay Cardiac Unit) and prepared for your procedure. Please be aware that this is not the time of your procedure but the time you are to arrive.     Preperations for your procedure:  Shower with Dial soap the night before and the morning of your procedure.  No solid foods 8 hours prior to your procedure.  You may have clear liquids until the time of your admission which should be 2 hours prior to your procedure.  You are encouraged to have at least 8 ounces of clear liquids prior to admission to SSCU.  Patients with gastric emptying issues should be fasting for 6- 8 hours prior to the procedure.  Clear liquids include water, black coffee, clear juices, and performance drinks - no pulp or milk.    Heart failure or dialysis patients will be limited to 8 ounces (1 cup) of clear liquids up until 2 hours of the procedure.  You may take your regular morning medications         with as much water as necessary.   Bring your cane and/or walker with you to the hospital.  Bring CPAP/BiPAP, or oxygen if you are on oxygen at home.  Call the office for any signs of infection ( fever, cough, pneumonia, urinary tract, etc.) We cannot implant a device in an infected patient.    Medications:  You may take your regular scheduled medications the morning of your procedure with as much water as necessary.  If you are diabetic and on Metformin (Glucophage), do not take it the day before, the day of, and 2 days after your procedure.  If you are on Pradaxa, Xarelto, Eliquis, or Coumadin hold 3 days prior to your procedure.     How long will the procedure take?  The entire procedure takes three to four hours.  After the procedure, you will go to an ICU where you will be  monitored closely for the next several hours.  You will remain in the ICU overnight.        If you walk with a cane or walker, please bring it with you to the hospital so that we can get you out of bed several hours after your valve replacement.  Our goal is to get you up in a chair and moving as quickly as possible as long as it is safe for you to do so.    When can I go home?  Your length of stay in the hospital, will be determined by your physician.  Be prepared to stay 1-3 days.  You will be discharged when your physician thinks it is safe for you to go home.  You must have someone to drive you home when you are discharged.    Follow Up After Valve Replacement:  It is required that you return to [facility] for the follow up in one month and one year with an echo, lab work, and a clinic visit.  If you are in a research trial, your follow up will be slightly different and according to the trial requirements.  You can follow up with your regular cardiologist regarding any other heart issues.    Contacts one of our nurses at [phone] for any questions.  PETAR Mercado RN        Continue to take your current medications    THE ABOVE INSTRUCTIONS WERE GIVEN TO THE PATIENT VERBALLY AND THEY VERBALIZED UNDERSTANDING.  THEY DO NOT REQUIRE ANY SPECIAL NEEDS AND DO NOT HAVE ANY LEARNING BARRIERS.          Directions for Reporting to Cardiology Waiting Area in the Hospital  If you park in the Parking Garage:  Take elevators to the1st floor of the parking garage.  Continue past the gift shop, coffee shop, and piano.  Take a right and go to the gold elevators. (Elevator B)  Take the elevator to the 3rd floor.  Follow the arrow on the sign on the wall that says Cath Lab Registration/EP Lab Registration.  Follow the long hallway all the way around until you come to a big open area.  This is the registration area.  Check in at Reception Desk.    OR    If family is dropping you off:  Have them drop you off at  the front of the Hospital under the green overhang.  Enter through the doors and take a right.  Take the E elevators to the 3rd floor Cardiology Waiting Area.  Check in at the Reception Desk in the waiting room.

## 2023-09-13 NOTE — PROGRESS NOTES
Pt and wife given discharge paperwork. Education reiterated. All questions and concerns addressed. VS wnl. Gauze and tegaderm applied to right write wrist site. Site remains free from s/s of bleeding with dressing intact. Pt walked and used restroom without issues. IV and tele removed. Pt wheeled out by his wife.

## 2023-09-13 NOTE — DISCHARGE INSTRUCTIONS
AFTER THE PROCEDURE:Discharge Instructions and Care of Your Wrist After a Cardiac Catheterization Procedure Performed via the Radial Artery    For 24 Hours following the procedure:  Do not subject your affected hand/arm to any forceful movements (i.e. supporting weight when rising from a chair or bed)  Do not drive a car for 24 hours  The dressing (band-aid) on the puncture site may be removed after 24 hours and left open to air.  If there is minor oozing, you may apply another Band-aid and remove after 12 hours  You may shower on the day following your procedure.  Do not take a tub bath or submerge the puncture site in water for 3 days following the procedure    For 72 hours following the procedure:   Do not lift anything heavier than 3 to 5 pounds with the affected hand/arm  Do not operate a lawnmower, motorcycle, chainsaw, or all-terrain vehicle   Avoid excessive (extension/flexion) wrist movement (i.e. supporting weight when rising from a chair or bed, push-ups, lifting garage doors, etc.)  Do not engage in vigorous exercise (i.e. Tennis, Golf, Bowling) using the affected arm    If bleeding should occur following discharge:  Sit down and apply firm pressure to the puncture site with your fingers for 10 minutes   If the bleeding stops, continue to sit quietly, keeping your wrist straight for 2 hours. Notify your physician as soon as possible   If bleeding does not stop after 10 minutes or if there is a large amount of bleeding or spurting, call 911 immediately.  Do not drive yourself to the hospital.     You should expect mild tingling in your hand and tenderness at the puncture site for up to 3 days.     Notify your physician if these symptoms persist or if you experience:  Change in color or temperature of the hand or arm  Redness, heat, or pus at the puncture site  Chills or fever greater than 101.0 F

## 2023-09-13 NOTE — PROGRESS NOTES
RNDenis brought pt to room 11 on SSCU. Bedside report completed. Pt put on tele. VS taken and wnl. Pt free from s/s of distress. Safety measures in place and wife arrived to bedside.

## 2023-09-13 NOTE — DISCHARGE SUMMARY
"Discharge Summary  Interventional Cardiology      Admit Date: 9/13/2023    Discharge Date:  9/13/2023    Attending Physician: Santy Obregon MD    Discharge Physician: Santy Obregon MD    Principal Diagnoses: <principal problem not specified>  Indication for Admission: Angiogram, Coronary (N/A)    Discharged Condition: Good    Hospital Course:   Patient presented for outpatient coronary angiogram which went without complication. Coronary angiogram was nonobstructive. See full cath report in Epic for details. Hemostasis of patient's R Radial access site was achieved with VascBand. Patient was monitored per post-cath protocol, and his R radial access site was c/d/i with no hematoma. Patient was able to ambulate without difficulty. He was feeling well and anticipating discharge home today.     Outpatient Plan:  - There were no medication changes    Diet: Cardiac diet    Activity: Ad yan, wound care instructions provided    Disposition: Home or Self Care      Discharge Medications:      Medication List        CONTINUE taking these medications      aspirin 81 MG EC tablet  Commonly known as: ECOTRIN  Take 1 tablet (81 mg total) by mouth once daily.     BD SAFETYGLIDE NEEDLE 25 gauge x 1" Ndle  Generic drug: safety needles  1 each by Misc.(Non-Drug; Combo Route) route every 7 days.     * diclofenac sodium 1 % Gel  Commonly known as: VOLTAREN     * diclofenac 75 MG EC tablet  Commonly known as: VOLTAREN  Take 1 tablet (75 mg total) by mouth daily as needed.     ergocalciferol 50,000 unit Cap  Commonly known as: ERGOCALCIFEROL  TAKE 1 CAPSULE BY MOUTH ONE TIME PER WEEK     eszopiclone 3 mg Tab  Commonly known as: LUNESTA  TAKE 1 TABLET BY MOUTH EVERY DAY IN THE EVENING     hydrocortisone 10 MG Tab  Commonly known as: CORTEF  TAKE 1 AND 1/2 TABLETS (15 mg) BY MOUTH IN THE MORNING AND 1 TABLET (10 mg) BY MOUTH IN THE EVENING AT 4PM     nitroGLYCERIN 0.4 MG SL tablet  Commonly known as: NITROSTAT     REPATHA " PUSHTRONEX 420 mg/3.5 mL Injt  Generic drug: evolocumab  INJECT 3.5 MLS INTO THE SKIN EVERY 30 DAYS.     SIMPONI 50 mg/0.5 mL Pnij  Generic drug: golimumab  Inject 50 mg into the skin every 30 days.     SYNTHROID 112 MCG tablet  Generic drug: levothyroxine  Take 1 tablet (112 mcg total) by mouth once daily.     testosterone cypionate 200 mg/mL injection  Commonly known as: DEPOTESTOTERONE CYPIONATE  Inject 0.38 mLs (76 mg total) into the muscle every 7 days.           * This list has 2 medication(s) that are the same as other medications prescribed for you. Read the directions carefully, and ask your doctor or other care provider to review them with you.                  Sanju Bhatia  Cardiology Fellow PGY-6

## 2023-09-13 NOTE — BRIEF OP NOTE
Brief Operative Note:    : Santy Obregon MD     Referring Physician: Valente Garcia     All Operators: Surgeon(s):  Santy Obregon MD Bagh, Imad, MD Graham, Jordan, MD     Preoperative Diagnosis: Aortic stenosis, severe [I35.0]     Postop Diagnosis: Aortic stenosis, severe [I35.0]    Treatments/Procedures: Procedure(s) (LRB):  Angiogram, Coronary (N/A)    Access: Right radial artery    Findings: Non-obstructive CAD noted     See catheterization report for full details.    Intervention:     See catheterization report for full details.    Closure device:  VascBand       Plan:  - Post cath protocol   - IVF @ 200 cc/hr x 2 hours  - Bed rest x 1 hours   - Continue aspirin 81 mg daily indefinitely  - Continue high intensity statin therapy (LDL goal < 70)  - Risk factor reduction (BP <130/80 mmHg, glycemic control, etc)  - Follow up with outpatient cardiologist    Estimated Blood loss: 20 cc    Specimens removed: No    Sanju Bhatia MD  Interventional Cardiology PGY-6    
[place]

## 2023-09-14 ENCOUNTER — DOCUMENTATION ONLY (OUTPATIENT)
Dept: CARDIAC CATH/INVASIVE PROCEDURES | Facility: HOSPITAL | Age: 73
End: 2023-09-14
Payer: MEDICARE

## 2023-09-14 NOTE — PROGRESS NOTES
Aurelio Perkins is a 72 y.o. male referred by Dr Garcia for evaluation of severe AS (NYHA Class II sx).    The patient has undergone the following TAVR work-up:   ECHO (Date 8/9/23): MELVIN= 0.84 cm2, MG= 39 mmHg, Peak Francisco= 4.39 m/s, EF= 65%.   LHC (Date 9/13/23): Non obstructive coronary artery disease    STS: 1.4%   Frailty: 1/4   Iliacs are >7.64  on R and > 7.71  on L   LVOT area by CTA is 3.91  cm2 (26.7 mm X 19.8 mm) and Avg Diameter is 22.3 per Dr Obregon   Incidental findings on CT: Small pulmonary nodules all measuring 6 mm or less  CT Surgery risk assessment: low risk, per Dr Garcia, TAVR is patient preference  Rhythm issues: none   PFTs: FEV1 not indicated   KCCQ/5 meter walk: KCCQ is pending   Comorbidities: menigioma s/p resection, adrenal insufficiency hormonal replacement therapy, CKD3 with single kidney, RA. He will need stress dose steroids at the time of the procedure.       Aurelio Perkins is a 27 mm  Navitor valve candidate via R TFA access.     Transcatheter Aortic valve replacement   Valve: 27 mm Navitor   ECMO:  On Call  TAVR access: R TFA   Valvuloplasty balloon: 18 True   Viabahn size if needed: 9 X 5   Antithrombotic therapy: Aspirin   Temporary pacing wire: Yes  Pacemaker risk factors: None    Post op destination: Stepdown  Antibiotics given: (to be done during procedure)  Heart failure on admission?  CONSENTING: Consent is pending     Donny Coffman MD   Interventional Cardiology Fellow

## 2023-09-19 ENCOUNTER — TELEPHONE (OUTPATIENT)
Dept: FAMILY MEDICINE | Facility: CLINIC | Age: 73
End: 2023-09-19
Payer: MEDICARE

## 2023-09-19 NOTE — TELEPHONE ENCOUNTER
----- Message from Nicolásbrendansarah Freedom sent at 9/19/2023  1:46 PM CDT -----  Contact: Pat/Ti Stewart with [employer] is calling regarding notes. Reports to be needing at lease 6 months of clinical notes from today's date. Please faxed notes to [phone]. Please give Ms. Stewart a call back, if necessary at [phone].  Thank you,  TH

## 2023-09-20 NOTE — PROGRESS NOTES
Administered 1 cc ( 1000 mcg/ml ) of b12 to the right upper outer gluteal. Informed of s/s to report verbalized understanding. No adverse reactions noted.    Lot # [id number]  Expiration oct 19    Administered 2 cc ( 80 mg/ml ) of depomedrol to the right upper outer gluteal. Informed of s/s to report verbalized understanding. No adverse reactions noted.    Lot # [id number]  Expiration 05/19     It's [employer] calling, not the pt

## 2023-09-26 ENCOUNTER — PATIENT MESSAGE (OUTPATIENT)
Dept: FAMILY MEDICINE | Facility: CLINIC | Age: 73
End: 2023-09-26
Payer: MEDICARE

## 2023-09-29 ENCOUNTER — OFFICE VISIT (OUTPATIENT)
Dept: FAMILY MEDICINE | Facility: CLINIC | Age: 73
End: 2023-09-29
Payer: MEDICARE

## 2023-09-29 ENCOUNTER — TELEPHONE (OUTPATIENT)
Dept: FAMILY MEDICINE | Facility: CLINIC | Age: 73
End: 2023-09-29

## 2023-09-29 DIAGNOSIS — Z90.79 H/O PROSTATECTOMY: ICD-10-CM

## 2023-09-29 DIAGNOSIS — Z79.899 ENCOUNTER FOR LONG-TERM (CURRENT) USE OF MEDICATIONS: ICD-10-CM

## 2023-09-29 DIAGNOSIS — R32 URINARY INCONTINENCE, UNSPECIFIED TYPE: ICD-10-CM

## 2023-09-29 DIAGNOSIS — U07.1 COVID-19: Primary | ICD-10-CM

## 2023-09-29 PROCEDURE — 1160F PR REVIEW ALL MEDS BY PRESCRIBER/CLIN PHARMACIST DOCUMENTED: ICD-10-PCS | Mod: CPTII,95,, | Performed by: FAMILY MEDICINE

## 2023-09-29 PROCEDURE — 1160F RVW MEDS BY RX/DR IN RCRD: CPT | Mod: CPTII,95,, | Performed by: FAMILY MEDICINE

## 2023-09-29 PROCEDURE — 99214 OFFICE O/P EST MOD 30 MIN: CPT | Mod: 95,,, | Performed by: FAMILY MEDICINE

## 2023-09-29 PROCEDURE — 99214 PR OFFICE/OUTPT VISIT, EST, LEVL IV, 30-39 MIN: ICD-10-PCS | Mod: 95,,, | Performed by: FAMILY MEDICINE

## 2023-09-29 PROCEDURE — 1159F PR MEDICATION LIST DOCUMENTED IN MEDICAL RECORD: ICD-10-PCS | Mod: CPTII,95,, | Performed by: FAMILY MEDICINE

## 2023-09-29 PROCEDURE — 1159F MED LIST DOCD IN RCRD: CPT | Mod: CPTII,95,, | Performed by: FAMILY MEDICINE

## 2023-09-29 NOTE — TELEPHONE ENCOUNTER
----- Message from Cuauhtemoc Gardner MD sent at 9/29/2023  1:09 PM CDT -----  Please fax this note and prescription to [facility] for condom catheter fax [phone]

## 2023-09-29 NOTE — PROGRESS NOTES
Primary Care Telemedicine Note  The patient location is:  Patient's Home - [place]  The chief complaint leading to consultation is:   Chief Complaint   Patient presents with    catheter supplies    Urinary Incontinence    COVID-19 Concerns      Total time:  see MDM below. The total time spent on the encounter, which includes face to face time and non-face to face time preparing to see the patient (eg, review of previous medical records, tests), Obtaining and/or reviewing separately obtained history, documenting clinical information in the electronic or other health record, independently interpreting results (not separately reported)/communicating results to the patient/family/caregiver, and/or care coordination (not separately reported).    Visit type: Virtual visit with synchronous audio and video  Each patient to whom he or she provides medical services by telemedicine is:  (1) informed of the relationship between the physician and patient and the respective role of any other health care provider with respect to management of the patient; and (2) notified that he or she may decline to receive medical services by telemedicine and may withdraw from such care at any time.  =================================================================  PLAN:      Problem List Items Addressed This Visit       Encounter for long-term (current) use of medications (Chronic)     Complete history and physical was completed today.  Complete and thorough medication reconciliation was performed.  Discussed risks and benefits of medications.  Advised patient on orders and health maintenance.  We discussed old records and old labs if available.  Will request any records not available through epic.  Continue current medications listed on your summary sheet.           Urinary incontinence (Chronic)     Supplies for condom catheters and 4leg bags per month.  Follow-up with Urology.         Relevant Orders    URINARY CATHETER SUPPLIES FOR HOME  "USE    H/O prostatectomy    COVID-19 - Primary     Patient advised that test will remain positive for quite some time.  He can stop checking and come out of quarantine as long as it is been five days.  Discussed condition course and signs and symptoms to expect.  Patient advised take anti-inflammatories and or Tylenol for pain or fever.  ER precautions.  Call MD or follow-up to clinic if not improving or worsening symptoms.            Future Appointments       Date Provider Specialty Appt Notes    10/19/2023  [facility]    10/19/2023  [facility]    10/23/2023 Inocencio Grijalva MD Urology New technology to correct incontinence after prostate surgery    10/26/2023 Yoav Oliveira MD Rheumatology 4 month f/u progress notes in brown folder           Medication Management for assessment above:   Medication List with Changes/Refills   Current Medications    ASPIRIN (ECOTRIN) 81 MG EC TABLET    Take 1 tablet (81 mg total) by mouth once daily.    DICLOFENAC (VOLTAREN) 75 MG EC TABLET    Take 1 tablet (75 mg total) by mouth daily as needed.    DICLOFENAC SODIUM (VOLTAREN) 1 % GEL    Apply topically.    ERGOCALCIFEROL (ERGOCALCIFEROL) 50,000 UNIT CAP    TAKE 1 CAPSULE BY MOUTH ONE TIME PER WEEK    ESZOPICLONE (LUNESTA) 3 MG TAB    TAKE 1 TABLET BY MOUTH EVERY DAY IN THE EVENING    GOLIMUMAB (SIMPONI) 50 MG/0.5 ML PNIJ    Inject 50 mg into the skin every 30 days.    HYDROCORTISONE (CORTEF) 10 MG TAB    TAKE 1 AND 1/2 TABLETS (15 mg) BY MOUTH IN THE MORNING AND 1 TABLET (10 mg) BY MOUTH IN THE EVENING AT 4PM    NITROGLYCERIN (NITROSTAT) 0.4 MG SL TABLET    Place 0.4 mg under the tongue.    REPATHA PUSHTRONEX 420 MG/3.5 ML INJT    INJECT 3.5 MLS INTO THE SKIN EVERY 30 DAYS.    SAFETY NEEDLES (BD SAFETYGLIDE NEEDLE) 25 GAUGE X 1" NDLE    1 each by Misc.(Non-Drug; Combo Route) route every 7 days.    SYNTHROID 112 MCG TABLET    Take 1 tablet (112 mcg total) by mouth once daily.    TESTOSTERONE CYPIONATE " (DEPOTESTOTERONE CYPIONATE) 200 MG/ML INJECTION    Inject 0.38 mLs (76 mg total) into the muscle every 7 days.       Cuauhtemoc Gardner M.D.  ==========================================================================  Subjective:   Patient ID: Aurelio Perkins is a 72 y.o. male.  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   Chief Complaint: catheter supplies, Urinary Incontinence, and COVID-19 Concerns      Problem List Items Addressed This Visit       Encounter for long-term (current) use of medications (Chronic)    Overview     September 2023: Reviewed labs.  March 2023: Reviewed labs.  August 2022: Reviewed labs.  07/11/2019 CHRONIC long-term drug therapy for managed conditions. See medication list. Reports compliance.  No side effects reported.  Routine lab work is being monitored.  Patient does not  need refills today. CHRONIC long-term drug therapy for managed conditions. See medication list. Reports compliance.  No side effects reported.  Routine lab work is being monitored.  Patient does need refills today. January 2021:CHRONIC. Stable. Compliant with medications for managed conditions. See medication list. No SE reported.   Routine lab analysis is being monitored. Refills were addressed.  June 2021:  Reviewed labs.  Lab Results   Component Value Date    WBC 7.41 09/26/2022    WBC 7.41 09/26/2022    HGB 13.6 (L) 09/26/2022    HGB 13.6 (L) 09/26/2022    HCT 41.3 09/26/2022    HCT 41.3 09/26/2022    MCV 89 09/26/2022    MCV 89 09/26/2022     09/26/2022     09/26/2022       Chemistry        Component Value Date/Time     09/26/2022 0755    K 4.2 09/26/2022 0755      09/26/2022 0755    CO2 25 09/26/2022 0755    BUN 15 09/26/2022 0755    CREATININE 1.4 09/26/2022 0755    GLU 83 09/26/2022 0755        Component Value Date/Time    CALCIUM 9.1 09/26/2022 0755    ALKPHOS 44 (L) 09/26/2022 0755    AST 26 09/26/2022 0755    ALT 43 09/26/2022 0755    BILITOT 1.2 (H) 09/26/2022 0755    ESTGFRAFRICA 58.4 (A) 06/10/2022 1000    EGFRNONAA 50.5 (A) 06/10/2022 1000          Lab Results   Component Value Date    TSH 0.014 (L) 12/21/2021    I0CTHJV 52 (L) 03/03/2019    S8URYEM 3.9 (L) 03/03/2019    FREET4 1.12 09/26/2022    T3FREE 2.0 (L) 06/30/2017     =================================         Current Assessment & Plan     Complete history and physical was completed today.  Complete and thorough medication reconciliation was performed.  Discussed risks and benefits of medications.  Advised patient on orders and health maintenance.  We discussed old records and old labs if available.  Will request any records not available through epic.  Continue current medications listed on your summary sheet.           Urinary incontinence (Chronic)    Overview     Chronic.  September 2023: Patient reports he is running out of supplies.  The [employer] is stating that they need notes from me.    Uncontrolled.  Patient uses condom catheter. he is out of supplies and is completely incontinent,  pt is completely incontinent and dependent on supplies needed. Supplies include condom cath and legs bag 35 caths per month with 2 leg bags.     Patient did express that he would like to get 4 leg bags her month if possible.     Previous History:urinary incontinence status post radical prostatectomy.           Current Assessment & Plan     Supplies for condom catheters and 4leg bags per month.  Follow-up with Urology.         H/O prostatectomy    COVID-19 - Primary    Overview     Patient states that they had COVID starting Saturday of last week.  Symptoms are improving.  Patient still testing positive.         Current  "Assessment & Plan     Patient advised that test will remain positive for quite some time.  He can stop checking and come out of quarantine as long as it is been five days.  Discussed condition course and signs and symptoms to expect.  Patient advised take anti-inflammatories and or Tylenol for pain or fever.  ER precautions.  Call MD or follow-up to clinic if not improving or worsening symptoms.               Review of patient's allergies indicates:   Allergen Reactions    Antihistamines - alkylamine Other (See Comments)     hallucinations    Benadryl [diphenhydramine hcl] Other (See Comments)     hallucinations    Codeine Itching     Turns red    Lodine [etodolac] Other (See Comments)     fatigue    Methotrexate analogues Other (See Comments)     Sunlight sensitivity    Morphine Itching     Turns red    Statins-hmg-coa reductase inhibitors      Current Outpatient Medications   Medication Instructions    aspirin (ECOTRIN) 81 mg, Oral, Daily    diclofenac (VOLTAREN) 75 mg, Oral, Daily PRN    diclofenac sodium (VOLTAREN) 1 % Gel Topical (Top)    ergocalciferol (ERGOCALCIFEROL) 50,000 unit Cap TAKE 1 CAPSULE BY MOUTH ONE TIME PER WEEK    eszopiclone (LUNESTA) 3 mg Tab TAKE 1 TABLET BY MOUTH EVERY DAY IN THE EVENING    hydrocortisone (CORTEF) 10 MG Tab TAKE 1 AND 1/2 TABLETS (15 mg) BY MOUTH IN THE MORNING AND 1 TABLET (10 mg) BY MOUTH IN THE EVENING AT 4PM    nitroGLYCERIN (NITROSTAT) 0.4 mg, Sublingual    REPATHA PUSHTRONEX 420 mg/3.5 mL Injt INJECT 3.5 MLS INTO THE SKIN EVERY 30 DAYS.    safety needles (BD SAFETYGLIDE NEEDLE) 25 gauge x 1" Ndle 1 each, Misc.(Non-Drug; Combo Route), Every 7 days    SIMPONI 50 mg, Subcutaneous, Every 30 days    SYNTHROID 112 mcg, Oral, Daily    testosterone cypionate (DEPOTESTOTERONE CYPIONATE) 76 mg, Intramuscular, Every 7 days      I have reviewed the PMH, social history, FamilyHx, surgical history, allergies and medications documented / confirmed by the patient at the time of this " visit.  Review of Systems   Constitutional:  Negative for activity change and unexpected weight change.   HENT:  Positive for rhinorrhea. Negative for hearing loss and trouble swallowing.    Eyes:  Negative for discharge and visual disturbance.   Respiratory:  Negative for chest tightness and wheezing.    Cardiovascular:  Negative for chest pain and palpitations.   Gastrointestinal:  Negative for blood in stool, constipation, diarrhea and vomiting.   Endocrine: Negative for polydipsia and polyuria.   Genitourinary:  Positive for enuresis. Negative for difficulty urinating, hematuria and urgency.        + chronic urinary incontinence   Musculoskeletal:  Positive for arthralgias and joint swelling. Negative for neck pain.   Neurological:  Positive for weakness. Negative for headaches.   Psychiatric/Behavioral:  Positive for confusion. Negative for dysphoric mood.      Objective:   There were no vitals taken for this visit.  Physical Exam  Constitutional:       General: He is not in acute distress.     Appearance: He is well-developed. He is not diaphoretic.   HENT:      Head: Normocephalic and atraumatic.   Eyes:      Extraocular Movements: Extraocular movements intact.      Pupils: Pupils are equal, round, and reactive to light.   Pulmonary:      Effort: Pulmonary effort is normal.   Musculoskeletal:         General: Normal range of motion.      Cervical back: Normal range of motion.   Skin:     Findings: No rash.   Neurological:      Mental Status: He is alert and oriented to person, place, and time.   Psychiatric:         Attention and Perception: He is attentive.         Mood and Affect: Mood normal. Mood is not anxious or depressed. Affect is not labile, blunt, angry or inappropriate.         Speech: He is communicative. Speech is not rapid and pressured, delayed, slurred or tangential.         Behavior: Behavior normal. Behavior is not agitated, slowed, aggressive, withdrawn, hyperactive or combative.          Thought Content: Thought content normal. Thought content is not paranoid or delusional. Thought content does not include homicidal or suicidal ideation. Thought content does not include homicidal or suicidal plan.         Cognition and Memory: Memory is not impaired.         Judgment: Judgment normal. Judgment is not impulsive or inappropriate.         Assessment:     1. COVID-19    2. Encounter for long-term (current) use of medications    3. Urinary incontinence, unspecified type    4. H/O prostatectomy      MDM:   Moderate medical complexity.  Moderate risk.  Total time: 31 minutes.  This includes total time spent on the encounter, which includes face to face time and non-face to face time preparing to see the patient (eg, review of previous medical records, tests), Obtaining and/or reviewing separately obtained history, documenting clinical information in the electronic or other health record, independently interpreting results (not separately reported)/communicating results to the patient/family/caregiver, and/or care coordination (not separately reported).    I have Reviewed and summarized old records.  I have performed thorough medication reconciliation today and discussed risk and benefits of medications.  I have reviewed labs and discussed with patient.  All questions were answered.  I am requesting old records and will review them once they are available.    I have signed for the following orders AND/OR meds.  Orders Placed This Encounter   Procedures    URINARY CATHETER SUPPLIES FOR HOME USE     Order Specific Question:   Height:     Answer:   6ft     Order Specific Question:   Weight:     Answer:   22lb     Order Specific Question:   Length of need (1-99 months):     Answer:   99           Follow up in about 6 months (around 3/29/2024), or if symptoms worsen or fail to improve, for In Office.  Future Appointments       Date Provider Specialty Appt Notes    10/19/2023  [facility]URG [facility]     10/19/2023  [facility]    10/23/2023 Inocencio Grijalva MD Urology New technology to correct incontinence after prostate surgery    10/26/2023 Yoav Gallardo MD Rheumatology 4 month f/u progress notes in brown folder          If no improvement in symptoms or symptoms worsen, advised to call/follow-up at clinic or go to ER. Patient voiced understanding and all questions/concerns were addressed.   DISCLAIMER: This note was compiled by using a speech recognition dictation system and therefore please be aware that typographical / speech recognition errors can and do occur.  Please contact me if you see any errors specifically.    Cuauhtemoc Gardner M.D.       Office: [phone] [address] [place] [address]  FAX: [phone]

## 2023-09-29 NOTE — ASSESSMENT & PLAN NOTE
Patient advised that test will remain positive for quite some time.  He can stop checking and come out of quarantine as long as it is been five days.  Discussed condition course and signs and symptoms to expect.  Patient advised take anti-inflammatories and or Tylenol for pain or fever.  ER precautions.  Call MD or follow-up to clinic if not improving or worsening symptoms.

## 2023-09-29 NOTE — PATIENT INSTRUCTIONS
Follow up in about 6 months (around 3/29/2024), or if symptoms worsen or fail to improve, for In Office.     Dear patient,   As a result of recent federal legislation (The Federal Cures Act), you may receive lab or pathology results from your visit in your [facility] account before your physician is able to contact you. Your physician or their representative will relay the results to you with their recommendations at their soonest availability.     If no improvement in symptoms or symptoms worsen, please be advised to call MD, follow-up at clinic and/or go to ER if becomes severe.    Cuauhtemoc Gardner M.D.        We Offer TELEHEALTH & Same Day Appointments!   Book your Telehealth appointment with me through my nurse or   Clinic appointments on [facility]!    [id number] [place] [address]    Office: [phone]   FAX: [phone]    Check out my Facebook Page and Follow Me at: [email or web address]    Check out my website at [email or web address] by clicking on: [email or web address]    To Schedule appointments online, go to [employer]: [email or web address]

## 2023-10-18 ENCOUNTER — LAB VISIT (OUTPATIENT)
Dept: LAB | Facility: HOSPITAL | Age: 73
End: 2023-10-18
Attending: PHYSICIAN ASSISTANT
Payer: MEDICARE

## 2023-10-18 ENCOUNTER — ANESTHESIA EVENT (OUTPATIENT)
Dept: MEDSURG UNIT | Facility: HOSPITAL | Age: 73
DRG: 267 | End: 2023-10-18
Payer: MEDICARE

## 2023-10-18 DIAGNOSIS — M05.9 SEROPOSITIVE RHEUMATOID ARTHRITIS: ICD-10-CM

## 2023-10-18 DIAGNOSIS — R79.89 LOW TESTOSTERONE IN MALE: ICD-10-CM

## 2023-10-18 LAB
BASOPHILS # BLD AUTO: 0.02 K/UL (ref 0–0.2)
BASOPHILS NFR BLD: 0.3 % (ref 0–1.9)
DIFFERENTIAL METHOD: ABNORMAL
EOSINOPHIL # BLD AUTO: 0.2 K/UL (ref 0–0.5)
EOSINOPHIL NFR BLD: 2.7 % (ref 0–8)
ERYTHROCYTE [DISTWIDTH] IN BLOOD BY AUTOMATED COUNT: 13.3 % (ref 11.5–14.5)
ERYTHROCYTE [SEDIMENTATION RATE] IN BLOOD BY WESTERGREN METHOD: 11 MM/HR (ref 0–10)
HCT VFR BLD AUTO: 39.6 % (ref 40–54)
HGB BLD-MCNC: 12.7 G/DL (ref 14–18)
IMM GRANULOCYTES # BLD AUTO: 0.02 K/UL (ref 0–0.04)
IMM GRANULOCYTES NFR BLD AUTO: 0.3 % (ref 0–0.5)
LYMPHOCYTES # BLD AUTO: 1.3 K/UL (ref 1–4.8)
LYMPHOCYTES NFR BLD: 19.7 % (ref 18–48)
MCH RBC QN AUTO: 29.3 PG (ref 27–31)
MCHC RBC AUTO-ENTMCNC: 32.1 G/DL (ref 32–36)
MCV RBC AUTO: 91 FL (ref 82–98)
MONOCYTES # BLD AUTO: 0.7 K/UL (ref 0.3–1)
MONOCYTES NFR BLD: 10 % (ref 4–15)
NEUTROPHILS # BLD AUTO: 4.4 K/UL (ref 1.8–7.7)
NEUTROPHILS NFR BLD: 67 % (ref 38–73)
NRBC BLD-RTO: 0 /100 WBC
PLATELET # BLD AUTO: 158 K/UL (ref 150–450)
PMV BLD AUTO: 10.5 FL (ref 9.2–12.9)
RBC # BLD AUTO: 4.34 M/UL (ref 4.6–6.2)
WBC # BLD AUTO: 6.61 K/UL (ref 3.9–12.7)

## 2023-10-18 PROCEDURE — 85025 COMPLETE CBC W/AUTO DIFF WBC: CPT | Performed by: PHYSICIAN ASSISTANT

## 2023-10-18 PROCEDURE — 86480 TB TEST CELL IMMUN MEASURE: CPT | Performed by: PHYSICIAN ASSISTANT

## 2023-10-18 PROCEDURE — 80053 COMPREHEN METABOLIC PANEL: CPT | Performed by: PHYSICIAN ASSISTANT

## 2023-10-18 PROCEDURE — 86706 HEP B SURFACE ANTIBODY: CPT | Mod: 91 | Performed by: PHYSICIAN ASSISTANT

## 2023-10-18 PROCEDURE — 36415 COLL VENOUS BLD VENIPUNCTURE: CPT | Mod: PO | Performed by: PHYSICIAN ASSISTANT

## 2023-10-18 PROCEDURE — 85651 RBC SED RATE NONAUTOMATED: CPT | Mod: PO | Performed by: PHYSICIAN ASSISTANT

## 2023-10-18 PROCEDURE — 86803 HEPATITIS C AB TEST: CPT | Performed by: PHYSICIAN ASSISTANT

## 2023-10-18 PROCEDURE — 86704 HEP B CORE ANTIBODY TOTAL: CPT | Performed by: PHYSICIAN ASSISTANT

## 2023-10-18 PROCEDURE — 82040 ASSAY OF SERUM ALBUMIN: CPT | Mod: 91 | Performed by: INTERNAL MEDICINE

## 2023-10-18 PROCEDURE — 86140 C-REACTIVE PROTEIN: CPT | Performed by: PHYSICIAN ASSISTANT

## 2023-10-18 PROCEDURE — 87340 HEPATITIS B SURFACE AG IA: CPT | Performed by: PHYSICIAN ASSISTANT

## 2023-10-18 NOTE — ANESTHESIA PREPROCEDURE EVALUATION
[facility]  Anesthesia Pre-Operative Evaluation         Patient Name/: Aurelio Perkins, 1950  MRN: [id number]    SUBJECTIVE:     Pre-operative evaluation for Procedure(s) (LRB):  REPLACEMENT, AORTIC VALVE, TRANSCATHETER (TAVR) (N/A)     10/18/2023    Aurelio Perkins is a 72 y.o. male w/ a significant PMHx of CKD, hx prostate cancer s/p prostatectomy, adrenal insufficiency, RA, and Aortic Stenosis who presents for TAVR.    The patient has undergone the following TAVR work-up:    ECHO (Date 23): MELVIN= 0.84 cm2, MG= 39 mmHg, Peak Francisco= 4.39 m/s, EF= 65%.    LHC (Date 23): Non obstructive coronary artery disease     STS: 1.4%    Frailty: /    Iliacs are >7.64  on R and > 7.71  on L    LVOT area by CTA is 3.91  cm2 (26.7 mm X 19.8 mm) and Avg Diameter is 22.3 per Dr Obregon    Incidental findings on CT: Small pulmonary nodules all measuring 6 mm or less   CT Surgery risk assessment: low risk, per Dr Garcia, TAVR is patient preference   Rhythm issues: none    PFTs: FEV1 not indicated    KCCQ/5 meter walk: KCCQ is pending    Comorbidities: menigioma s/p resection, adrenal insufficiency hormonal replacement therapy, CKD3 with single kidney, RA. He will need stress dose steroids at the time of the procedure.     ________________________________________  Results for orders placed during the hospital encounter of 23    Echo    Interpretation Summary    Left Ventricle: The left ventricle is normal in size. Increased wall thickness. There is mild concentric hypertrophy. Normal wall motion. There is normal systolic function with a visually estimated ejection fraction of 60 - 65%. Grade I diastolic dysfunction.    Right Ventricle: Normal right ventricular cavity size. Wall thickness is normal. Right ventricle wall motion  is normal. Systolic function is normal.    Aortic Valve: There is severe aortic valve sclerosis. Severely restricted motion. There is severe  stenosis. Aortic valve area by VTI is 0.84 cm². Aortic valve peak velocity is 4.39 m/s. Mean gradient is 39 mmHg. The dimensionless index is 0.31.    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    S/P craniotomy    Secondary adrenal insufficiency    H/O secondary hypogonadism    Arthropathy    Chronic fatigue    Low testosterone in male    Hyperlipidemia    Encounter for long-term (current) use of medications    Vitamin B12 deficiency    Vitamin D deficiency    Severe aortic stenosis    Encounter for screening colonoscopy    Fatigue    Statins contraindicated    Abdominal aortic atherosclerosis    Statin intolerance    Erectile dysfunction after radical prostatectomy    Acquired absence of kidney    Chronic renal insufficiency, stage III (moderate)    Panhypopituitarism    Anemia of chronic renal failure, stage 3 (moderate)    Urinary incontinence    H/O prostatectomy    Refused pneumococcal vaccination    Memory change    History of colon polyps    Chronic diarrhea    Colon cancer screening    Prediabetes    COVID-19       Review of patient's allergies indicates:   Allergen Reactions    Antihistamines - alkylamine Other (See Comments)     hallucinations    Benadryl [diphenhydramine hcl] Other (See Comments)     hallucinations    Codeine Itching     Turns red    Lodine [etodolac] Other (See Comments)     fatigue    Methotrexate analogues Other (See Comments)     Sunlight sensitivity    Morphine Itching     Turns red    Statins-hmg-coa reductase inhibitors        Current Inpatient Medications:       Current  Facility-Administered Medications on File Prior to Encounter   Medication Dose Route Frequency Provider Last Rate Last Admin    0.9%  NaCl infusion   Intravenous Continuous Santy Obregon  mL/hr at 09/13/23 1234 New Bag at 09/13/23 1234     Current Outpatient Medications on File Prior to Encounter   Medication Sig Dispense Refill    aspirin (ECOTRIN) 81 MG EC tablet Take 1 tablet (81 mg total) by mouth once daily. 90 tablet 3    diclofenac (VOLTAREN) 75 MG EC tablet Take 1 tablet (75 mg total) by mouth daily as needed. 90 tablet 1    diclofenac sodium (VOLTAREN) 1 % Gel Apply topically.      ergocalciferol (ERGOCALCIFEROL) 50,000 unit Cap TAKE 1 CAPSULE BY MOUTH ONE TIME PER WEEK 4 capsule 12    eszopiclone (LUNESTA) 3 mg Tab TAKE 1 TABLET BY MOUTH EVERY DAY IN THE EVENING 30 tablet 3    golimumab (SIMPONI) 50 mg/0.5 mL PnIj Inject 50 mg into the skin every 30 days. 0.5 mL 12    hydrocortisone (CORTEF) 10 MG Tab TAKE 1 AND 1/2 TABLETS (15 mg) BY MOUTH IN THE MORNING AND 1 TABLET (10 mg) BY MOUTH IN THE EVENING AT 4PM 75 tablet 11    nitroGLYCERIN (NITROSTAT) 0.4 MG SL tablet Place 0.4 mg under the tongue.      REPATHA PUSHTRONEX 420 mg/3.5 mL Injt INJECT 3.5 MLS INTO THE SKIN EVERY 30 DAYS. 10.5 mL 3    safety needles (BD SAFETYGLIDE NEEDLE) 25 gauge x 1" Ndle 1 each by Misc.(Non-Drug; Combo Route) route every 7 days. 25 each 0    SYNTHROID 112 mcg tablet Take 1 tablet (112 mcg total) by mouth once daily. 90 tablet 3    testosterone cypionate (DEPOTESTOTERONE CYPIONATE) 200 mg/mL injection Inject 0.38 mLs (76 mg total) into the muscle every 7 days. (Patient taking differently: Inject 75 mg into the muscle every 14 (fourteen) days.) 2 mL 5       Past Surgical History:   Procedure Laterality Date    COLONOSCOPY N/A 3/29/2022    Procedure: COLONOSCOPY;  Surgeon: Cayla Sherman MD;  [facility];  Service: Endoscopy;  Laterality: N/A;    CORONARY ANGIOGRAPHY N/A 9/13/2023    Procedure: " Angiogram, Coronary;  Surgeon: Santy Obregon MD;  Location: [facility] CATH LAB;  Service: Cardiology;  Laterality: N/A;    CRANIOTOMY Right 5/29/2019    Procedure: CRANIOTOMY  (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)  Co-surgery with Dr Vaibhav Jara - please put in his room  Microscope [place] [employer] Sonsasha Fierro;  Surgeon: Jimmy Segura DO;  Location: [facility] OR [address] [place] [address];  Service: Neurosurgery;  Laterality: Right;    HEMORRHOID SURGERY      NEPHRECTOMY      PROSTATECTOMY      TONSILLECTOMY         Social History:  Tobacco Use: Medium Risk (9/29/2023)    Patient History     Smoking Tobacco Use: Former     Smokeless Tobacco Use: Never     Passive Exposure: Not on file       Alcohol Use: Not At Risk (11/19/2019)    AUDIT-C     Frequency of Alcohol Consumption: Monthly or less     Average Number of Drinks: 1 or 2     Frequency of Binge Drinking: Never       OBJECTIVE:     Vital Signs Range:  BMI Readings from Last 1 Encounters:   09/13/23 29.84 kg/m²               Significant Labs:        Component Value Date/Time    WBC 5.07 09/11/2023 0856    HGB 14.3 09/11/2023 0856    HCT 44.3 09/11/2023 0856    HCT 30 (L) 05/29/2019 1038     09/11/2023 0856     09/11/2023 0856    K 3.9 09/11/2023 0856     09/11/2023 0856    CO2 22 (L) 09/11/2023 0856     09/11/2023 0856    BUN 19 09/11/2023 0856    CREATININE 1.2 09/11/2023 0856    MG 1.9 04/12/2022 0912    PHOS 2.1 (L) 04/12/2022 0912    CALCIUM 9.1 09/11/2023 0856    ALBUMIN 4.0 09/11/2023 0856    ALBUMIN 4.2 07/05/2023 0913    PROT 6.7 09/11/2023 0856    ALKPHOS 46 (L) 09/11/2023 0856    BILITOT 1.0 09/11/2023 0856    AST 29 09/11/2023 0856    ALT 46 (H) 09/11/2023 0856    INR 1.0 09/11/2023 0856    HGBA1C 5.7 (H) 09/26/2022 0755        Please see Results Review for additional labs.     Diagnostic Studies: No relevant studies.    EKG:   Results for orders placed or performed during the  hospital encounter of 09/13/23   EKG 12-lead    Collection Time: 09/13/23  8:25 AM    Narrative    Test Reason : I35.0,Z98.890,    Vent. Rate : 059 BPM     Atrial Rate : 059 BPM     P-R Int : 186 ms          QRS Dur : 082 ms      QT Int : 426 ms       P-R-T Axes : 071 039 062 degrees     QTc Int : 421 ms    Sinus bradycardia with marked sinus arrythmia  Otherwise normal ECG  When compared with ECG of 08-MAR-2021 08:24,  No significant change was found  Confirmed by Ramy LINO MD ([id number]) on 9/13/2023 11:06:19 AM    Referred By: SYLVESTER ROCHA           Confirmed By:Ramy LINO MD       ECHO:  See subjective, if available.      ASSESSMENT/PLAN:           Pre-op Assessment    I have reviewed the Patient Summary Reports.     I have reviewed the Nursing Notes. I have reviewed the NPO Status.   I have reviewed the Medications.     Review of Systems  Anesthesia Hx:  No problems with previous Anesthesia  History of prior surgery of interest to airway management or planning: Denies Family Hx of Anesthesia complications.   Denies Personal Hx of Anesthesia complications.   Social:  Non-Smoker    Cardiovascular:   Hypertension    Renal/:   Chronic Renal Disease, CKD    Endocrine:   Hypothyroidism Panhypopituitarism and adrenal insufficiency       Physical Exam  General: Well nourished, Cooperative and Alert    Airway:  Mallampati: III / II  Mouth Opening: Normal  TM Distance: Normal  Tongue: Normal  Neck ROM: Normal ROM    Chest/Lungs:  Normal Respiratory Rate    Heart:  Rate: Normal  Rhythm: Regular Rhythm        Anesthesia Plan  Type of Anesthesia, risks & benefits discussed:    Anesthesia Type: Gen ETT, Gen Supraglottic Airway, Gen Natural Airway  Intra-op Monitoring Plan: Standard ASA Monitors  Post Op Pain Control Plan: multimodal analgesia and IV/PO Opioids PRN  Induction:  IV  Airway Plan: Direct and Video, Post-Induction  Informed Consent: Informed consent signed with the Patient and all parties understand the risks and  agree with anesthesia plan.  All questions answered.   ASA Score: 4  Day of Surgery Review of History & Physical: H&P Update referred to the surgeon/provider.    Ready For Surgery From Anesthesia Perspective.     .

## 2023-10-19 ENCOUNTER — HOSPITAL ENCOUNTER (INPATIENT)
Facility: HOSPITAL | Age: 73
LOS: 1 days | Discharge: HOME | DRG: 267 | End: 2023-10-20
Attending: INTERNAL MEDICINE | Admitting: INTERNAL MEDICINE
Payer: MEDICARE

## 2023-10-19 ENCOUNTER — TELEPHONE (OUTPATIENT)
Dept: CARDIAC REHAB | Facility: CLINIC | Age: 73
End: 2023-10-19
Payer: MEDICARE

## 2023-10-19 ENCOUNTER — ANESTHESIA (OUTPATIENT)
Dept: MEDSURG UNIT | Facility: HOSPITAL | Age: 73
DRG: 267 | End: 2023-10-19
Payer: MEDICARE

## 2023-10-19 DIAGNOSIS — Z95.2 HEART VALVE REPLACED BY TRANSPLANT: Primary | ICD-10-CM

## 2023-10-19 DIAGNOSIS — I35.0 SEVERE AORTIC STENOSIS: ICD-10-CM

## 2023-10-19 DIAGNOSIS — Z95.3 STATUS POST TRANSCATHETER AORTIC VALVE REPLACEMENT (TAVR) USING BIOPROSTHESIS: Primary | ICD-10-CM

## 2023-10-19 DIAGNOSIS — I35.0 AORTIC STENOSIS, SEVERE: ICD-10-CM

## 2023-10-19 DIAGNOSIS — I35.0 AORTIC STENOSIS: ICD-10-CM

## 2023-10-19 DIAGNOSIS — Z95.3 STATUS POST TRANSCATHETER AORTIC VALVE REPLACEMENT (TAVR) USING BIOPROSTHESIS: ICD-10-CM

## 2023-10-19 LAB
ABO + RH BLD: NORMAL
ALBUMIN SERPL BCP-MCNC: 3.8 G/DL (ref 3.5–5.2)
ALBUMIN SERPL BCP-MCNC: 3.8 G/DL (ref 3.5–5.2)
ALP SERPL-CCNC: 44 U/L (ref 55–135)
ALP SERPL-CCNC: 49 U/L (ref 55–135)
ALT SERPL W/O P-5'-P-CCNC: 51 U/L (ref 10–44)
ALT SERPL W/O P-5'-P-CCNC: 52 U/L (ref 10–44)
ANION GAP SERPL CALC-SCNC: 11 MMOL/L (ref 8–16)
ANION GAP SERPL CALC-SCNC: 9 MMOL/L (ref 8–16)
AST SERPL-CCNC: 28 U/L (ref 10–40)
AST SERPL-CCNC: 28 U/L (ref 10–40)
BASOPHILS # BLD AUTO: 0.05 K/UL (ref 0–0.2)
BASOPHILS NFR BLD: 1 % (ref 0–1.9)
BILIRUB SERPL-MCNC: 0.9 MG/DL (ref 0.1–1)
BILIRUB SERPL-MCNC: 0.9 MG/DL (ref 0.1–1)
BLD GP AB SCN CELLS X3 SERPL QL: NORMAL
BNP SERPL-MCNC: 21 PG/ML (ref 0–99)
BUN SERPL-MCNC: 16 MG/DL (ref 8–23)
BUN SERPL-MCNC: 18 MG/DL (ref 8–23)
CALCIUM SERPL-MCNC: 8.8 MG/DL (ref 8.7–10.5)
CALCIUM SERPL-MCNC: 9.3 MG/DL (ref 8.7–10.5)
CHLORIDE SERPL-SCNC: 106 MMOL/L (ref 95–110)
CHLORIDE SERPL-SCNC: 108 MMOL/L (ref 95–110)
CO2 SERPL-SCNC: 22 MMOL/L (ref 23–29)
CO2 SERPL-SCNC: 23 MMOL/L (ref 23–29)
CREAT SERPL-MCNC: 1.3 MG/DL (ref 0.5–1.4)
CREAT SERPL-MCNC: 1.3 MG/DL (ref 0.5–1.4)
CRP SERPL-MCNC: 1.4 MG/L (ref 0–8.2)
DIFFERENTIAL METHOD: ABNORMAL
EOSINOPHIL # BLD AUTO: 0.2 K/UL (ref 0–0.5)
EOSINOPHIL NFR BLD: 4.2 % (ref 0–8)
ERYTHROCYTE [DISTWIDTH] IN BLOOD BY AUTOMATED COUNT: 13.1 % (ref 11.5–14.5)
EST. GFR  (NO RACE VARIABLE): 58.7 ML/MIN/1.73 M^2
EST. GFR  (NO RACE VARIABLE): 58.7 ML/MIN/1.73 M^2
GAMMA INTERFERON BACKGROUND BLD IA-ACNC: 0.02 IU/ML
GLUCOSE SERPL-MCNC: 100 MG/DL (ref 70–110)
GLUCOSE SERPL-MCNC: 96 MG/DL (ref 70–110)
HBV CORE AB SERPL QL IA: NORMAL
HBV SURFACE AB SER-ACNC: <3 MIU/ML
HBV SURFACE AB SER-ACNC: NORMAL M[IU]/ML
HBV SURFACE AG SERPL QL IA: NORMAL
HCT VFR BLD AUTO: 38 % (ref 40–54)
HCV AB SERPL QL IA: NORMAL
HGB BLD-MCNC: 12.9 G/DL (ref 14–18)
IMM GRANULOCYTES # BLD AUTO: 0.01 K/UL (ref 0–0.04)
IMM GRANULOCYTES NFR BLD AUTO: 0.2 % (ref 0–0.5)
INR PPP: 1 (ref 0.8–1.2)
LYMPHOCYTES # BLD AUTO: 1.3 K/UL (ref 1–4.8)
LYMPHOCYTES NFR BLD: 24.3 % (ref 18–48)
M TB IFN-G CD4+ BCKGRND COR BLD-ACNC: 0.03 IU/ML
M TB IFN-G CD4+ BCKGRND COR BLD-ACNC: 0.03 IU/ML
MCH RBC QN AUTO: 29.6 PG (ref 27–31)
MCHC RBC AUTO-ENTMCNC: 33.9 G/DL (ref 32–36)
MCV RBC AUTO: 87 FL (ref 82–98)
MITOGEN IGNF BCKGRD COR BLD-ACNC: 5.04 IU/ML
MONOCYTES # BLD AUTO: 0.6 K/UL (ref 0.3–1)
MONOCYTES NFR BLD: 10.5 % (ref 4–15)
NEUTROPHILS # BLD AUTO: 3.2 K/UL (ref 1.8–7.7)
NEUTROPHILS NFR BLD: 59.8 % (ref 38–73)
NRBC BLD-RTO: 0 /100 WBC
PLATELET # BLD AUTO: 145 K/UL (ref 150–450)
PMV BLD AUTO: 10.4 FL (ref 9.2–12.9)
POCT GLUCOSE: 110 MG/DL (ref 70–110)
POCT GLUCOSE: 160 MG/DL (ref 70–110)
POTASSIUM SERPL-SCNC: 3.7 MMOL/L (ref 3.5–5.1)
POTASSIUM SERPL-SCNC: 4.1 MMOL/L (ref 3.5–5.1)
PROT SERPL-MCNC: 6.5 G/DL (ref 6–8.4)
PROT SERPL-MCNC: 6.5 G/DL (ref 6–8.4)
PROTHROMBIN TIME: 10.3 SEC (ref 9–12.5)
RBC # BLD AUTO: 4.36 M/UL (ref 4.6–6.2)
SODIUM SERPL-SCNC: 138 MMOL/L (ref 136–145)
SODIUM SERPL-SCNC: 141 MMOL/L (ref 136–145)
TB GOLD PLUS: NEGATIVE
WBC # BLD AUTO: 5.26 K/UL (ref 3.9–12.7)

## 2023-10-19 PROCEDURE — 25500020 PHARM REV CODE 255: Performed by: INTERNAL MEDICINE

## 2023-10-19 PROCEDURE — C1760 CLOSURE DEV, VASC: HCPCS | Performed by: INTERNAL MEDICINE

## 2023-10-19 PROCEDURE — 94761 N-INVAS EAR/PLS OXIMETRY MLT: CPT

## 2023-10-19 PROCEDURE — 25000003 PHARM REV CODE 250: Performed by: STUDENT IN AN ORGANIZED HEALTH CARE EDUCATION/TRAINING PROGRAM

## 2023-10-19 PROCEDURE — 93005 ELECTROCARDIOGRAM TRACING: CPT

## 2023-10-19 PROCEDURE — 33361 PR TAVR, PERCUTANEOUS FEMORAL: ICD-10-PCS | Mod: 62,Q0,, | Performed by: INTERNAL MEDICINE

## 2023-10-19 PROCEDURE — 63600175 PHARM REV CODE 636 W HCPCS: Performed by: STUDENT IN AN ORGANIZED HEALTH CARE EDUCATION/TRAINING PROGRAM

## 2023-10-19 PROCEDURE — D9220A PRA ANESTHESIA: Mod: Q0,,, | Performed by: ANESTHESIOLOGY

## 2023-10-19 PROCEDURE — 99900035 HC TECH TIME PER 15 MIN (STAT)

## 2023-10-19 PROCEDURE — 85610 PROTHROMBIN TIME: CPT | Performed by: INTERNAL MEDICINE

## 2023-10-19 PROCEDURE — 85025 COMPLETE CBC W/AUTO DIFF WBC: CPT | Performed by: INTERNAL MEDICINE

## 2023-10-19 PROCEDURE — 37000009 HC ANESTHESIA EA ADD 15 MINS: Performed by: INTERNAL MEDICINE

## 2023-10-19 PROCEDURE — 99233 SBSQ HOSP IP/OBS HIGH 50: CPT | Mod: GC,,, | Performed by: INTERNAL MEDICINE

## 2023-10-19 PROCEDURE — 36620 ARTERIAL: ICD-10-PCS | Mod: 59,,, | Performed by: ANESTHESIOLOGY

## 2023-10-19 PROCEDURE — 33361 REPLACE AORTIC VALVE PERQ: CPT | Mod: 62,Q0,, | Performed by: INTERNAL MEDICINE

## 2023-10-19 PROCEDURE — 33361 REPLACE AORTIC VALVE PERQ: CPT | Mod: 62,Q0,, | Performed by: THORACIC SURGERY (CARDIOTHORACIC VASCULAR SURGERY)

## 2023-10-19 PROCEDURE — C1769 GUIDE WIRE: HCPCS | Performed by: INTERNAL MEDICINE

## 2023-10-19 PROCEDURE — D9220A PRA ANESTHESIA: ICD-10-PCS | Mod: Q0,,, | Performed by: ANESTHESIOLOGY

## 2023-10-19 PROCEDURE — 20600001 HC STEP DOWN PRIVATE ROOM

## 2023-10-19 PROCEDURE — 25000003 PHARM REV CODE 250: Performed by: HOSPITALIST

## 2023-10-19 PROCEDURE — 86900 BLOOD TYPING SEROLOGIC ABO: CPT | Performed by: INTERNAL MEDICINE

## 2023-10-19 PROCEDURE — 99233 PR SUBSEQUENT HOSPITAL CARE,LEVL III: ICD-10-PCS | Mod: GC,,, | Performed by: INTERNAL MEDICINE

## 2023-10-19 PROCEDURE — C1725 CATH, TRANSLUMIN NON-LASER: HCPCS | Performed by: INTERNAL MEDICINE

## 2023-10-19 PROCEDURE — C1894 INTRO/SHEATH, NON-LASER: HCPCS | Performed by: INTERNAL MEDICINE

## 2023-10-19 PROCEDURE — 25000003 PHARM REV CODE 250: Performed by: INTERNAL MEDICINE

## 2023-10-19 PROCEDURE — 36620 INSERTION CATHETER ARTERY: CPT | Mod: 59,,, | Performed by: ANESTHESIOLOGY

## 2023-10-19 PROCEDURE — 37000008 HC ANESTHESIA 1ST 15 MINUTES: Performed by: INTERNAL MEDICINE

## 2023-10-19 PROCEDURE — 93010 ELECTROCARDIOGRAM REPORT: CPT | Mod: 76,,, | Performed by: INTERNAL MEDICINE

## 2023-10-19 PROCEDURE — 33361 REPLACE AORTIC VALVE PERQ: CPT | Mod: Q0 | Performed by: INTERNAL MEDICINE

## 2023-10-19 PROCEDURE — 33361 PR TAVR, PERCUTANEOUS FEMORAL: ICD-10-PCS | Mod: 62,Q0,, | Performed by: THORACIC SURGERY (CARDIOTHORACIC VASCULAR SURGERY)

## 2023-10-19 PROCEDURE — 83880 ASSAY OF NATRIURETIC PEPTIDE: CPT | Performed by: INTERNAL MEDICINE

## 2023-10-19 PROCEDURE — 27800903 OPTIME MED/SURG SUP & DEVICES OTHER IMPLANTS: Performed by: INTERNAL MEDICINE

## 2023-10-19 PROCEDURE — 27201423 OPTIME MED/SURG SUP & DEVICES STERILE SUPPLY: Performed by: INTERNAL MEDICINE

## 2023-10-19 PROCEDURE — 27201037 HC PRESSURE MONITORING SET UP

## 2023-10-19 PROCEDURE — 93010 EKG 12-LEAD: ICD-10-PCS | Mod: 76,,, | Performed by: INTERNAL MEDICINE

## 2023-10-19 PROCEDURE — 80053 COMPREHEN METABOLIC PANEL: CPT | Performed by: INTERNAL MEDICINE

## 2023-10-19 PROCEDURE — 93010 ELECTROCARDIOGRAM REPORT: CPT | Mod: ,,, | Performed by: INTERNAL MEDICINE

## 2023-10-19 PROCEDURE — 94799 UNLISTED PULMONARY SVC/PX: CPT

## 2023-10-19 DEVICE — TRANSCATHETER HEART VALVE
Type: IMPLANTABLE DEVICE | Site: HEART | Status: FUNCTIONAL
Brand: NAVITOR™

## 2023-10-19 RX ORDER — PROTAMINE SULFATE 10 MG/ML
INJECTION, SOLUTION INTRAVENOUS
Status: DISCONTINUED | OUTPATIENT
Start: 2023-10-19 | End: 2023-10-19

## 2023-10-19 RX ORDER — LEVOTHYROXINE SODIUM 112 UG/1
112 TABLET ORAL DAILY
Status: DISCONTINUED | OUTPATIENT
Start: 2023-10-20 | End: 2023-10-20 | Stop reason: HOSPADM

## 2023-10-19 RX ORDER — ASPIRIN 81 MG/1
81 TABLET ORAL DAILY
Status: DISCONTINUED | OUTPATIENT
Start: 2023-10-20 | End: 2023-10-20 | Stop reason: HOSPADM

## 2023-10-19 RX ORDER — ONDANSETRON 8 MG/1
8 TABLET, ORALLY DISINTEGRATING ORAL EVERY 8 HOURS PRN
Status: DISCONTINUED | OUTPATIENT
Start: 2023-10-19 | End: 2023-10-20 | Stop reason: HOSPADM

## 2023-10-19 RX ORDER — SODIUM CHLORIDE 9 MG/ML
INJECTION, SOLUTION INTRAVENOUS CONTINUOUS
Status: ACTIVE | OUTPATIENT
Start: 2023-10-19

## 2023-10-19 RX ORDER — DEXAMETHASONE SODIUM PHOSPHATE 4 MG/ML
INJECTION, SOLUTION INTRA-ARTICULAR; INTRALESIONAL; INTRAMUSCULAR; INTRAVENOUS; SOFT TISSUE
Status: DISCONTINUED | OUTPATIENT
Start: 2023-10-19 | End: 2023-10-19

## 2023-10-19 RX ORDER — DIPHENHYDRAMINE HCL 50 MG
50 CAPSULE ORAL ONCE
Status: ACTIVE | OUTPATIENT
Start: 2023-10-19

## 2023-10-19 RX ORDER — HEPARIN SODIUM 1000 [USP'U]/ML
INJECTION, SOLUTION INTRAVENOUS; SUBCUTANEOUS
Status: DISCONTINUED | OUTPATIENT
Start: 2023-10-19 | End: 2023-10-19

## 2023-10-19 RX ORDER — SODIUM CHLORIDE 9 MG/ML
INJECTION, SOLUTION INTRAVENOUS CONTINUOUS
Status: ACTIVE | OUTPATIENT
Start: 2023-10-19 | End: 2023-10-19

## 2023-10-19 RX ORDER — LIDOCAINE HYDROCHLORIDE 20 MG/ML
INJECTION, SOLUTION INFILTRATION; PERINEURAL
Status: DISCONTINUED | OUTPATIENT
Start: 2023-10-19 | End: 2023-10-20 | Stop reason: HOSPADM

## 2023-10-19 RX ORDER — HEPARIN SODIUM 200 [USP'U]/100ML
INJECTION, SOLUTION INTRAVENOUS
Status: DISCONTINUED | OUTPATIENT
Start: 2023-10-19 | End: 2023-10-20 | Stop reason: HOSPADM

## 2023-10-19 RX ORDER — LIDOCAINE HYDROCHLORIDE 20 MG/ML
INJECTION, SOLUTION EPIDURAL; INFILTRATION; INTRACAUDAL; PERINEURAL
Status: DISCONTINUED | OUTPATIENT
Start: 2023-10-19 | End: 2023-10-19

## 2023-10-19 RX ORDER — PROCHLORPERAZINE EDISYLATE 5 MG/ML
5 INJECTION INTRAMUSCULAR; INTRAVENOUS EVERY 30 MIN PRN
Status: DISCONTINUED | OUTPATIENT
Start: 2023-10-19 | End: 2023-10-20 | Stop reason: HOSPADM

## 2023-10-19 RX ORDER — CEFAZOLIN SODIUM 1 G/3ML
INJECTION, POWDER, FOR SOLUTION INTRAMUSCULAR; INTRAVENOUS
Status: DISCONTINUED | OUTPATIENT
Start: 2023-10-19 | End: 2023-10-19

## 2023-10-19 RX ORDER — FENTANYL CITRATE 50 UG/ML
INJECTION, SOLUTION INTRAMUSCULAR; INTRAVENOUS
Status: DISCONTINUED | OUTPATIENT
Start: 2023-10-19 | End: 2023-10-19

## 2023-10-19 RX ORDER — PROPOFOL 10 MG/ML
VIAL (ML) INTRAVENOUS
Status: DISCONTINUED | OUTPATIENT
Start: 2023-10-19 | End: 2023-10-19

## 2023-10-19 RX ORDER — TALC
9 POWDER (GRAM) TOPICAL NIGHTLY PRN
Status: DISCONTINUED | OUTPATIENT
Start: 2023-10-19 | End: 2023-10-20 | Stop reason: HOSPADM

## 2023-10-19 RX ORDER — DEXMEDETOMIDINE HYDROCHLORIDE 100 UG/ML
INJECTION, SOLUTION INTRAVENOUS CONTINUOUS PRN
Status: DISCONTINUED | OUTPATIENT
Start: 2023-10-19 | End: 2023-10-19

## 2023-10-19 RX ORDER — HYDROMORPHONE HYDROCHLORIDE 1 MG/ML
0.2 INJECTION, SOLUTION INTRAMUSCULAR; INTRAVENOUS; SUBCUTANEOUS EVERY 5 MIN PRN
Status: DISCONTINUED | OUTPATIENT
Start: 2023-10-19 | End: 2023-10-20 | Stop reason: HOSPADM

## 2023-10-19 RX ORDER — ACETAMINOPHEN 325 MG/1
650 TABLET ORAL EVERY 4 HOURS PRN
Status: DISCONTINUED | OUTPATIENT
Start: 2023-10-19 | End: 2023-10-20 | Stop reason: HOSPADM

## 2023-10-19 RX ORDER — SODIUM CHLORIDE 9 MG/ML
INJECTION, SOLUTION INTRAVENOUS CONTINUOUS
Status: DISCONTINUED | OUTPATIENT
Start: 2023-10-19 | End: 2023-10-19

## 2023-10-19 RX ADMIN — ACETAMINOPHEN 650 MG: 325 TABLET ORAL at 09:10

## 2023-10-19 RX ADMIN — FENTANYL CITRATE 50 MCG: 50 INJECTION INTRAMUSCULAR; INTRAVENOUS at 08:10

## 2023-10-19 RX ADMIN — PROPOFOL 10 MG: 10 INJECTION, EMULSION INTRAVENOUS at 09:10

## 2023-10-19 RX ADMIN — HYDROCORTISONE SODIUM SUCCINATE 25 MG: 100 INJECTION, POWDER, FOR SOLUTION INTRAMUSCULAR; INTRAVENOUS at 03:10

## 2023-10-19 RX ADMIN — SODIUM CHLORIDE: 9 INJECTION, SOLUTION INTRAVENOUS at 10:10

## 2023-10-19 RX ADMIN — PROTAMINE SULFATE 120 MG: 10 INJECTION, SOLUTION INTRAVENOUS at 09:10

## 2023-10-19 RX ADMIN — PROPOFOL 20 MG: 10 INJECTION, EMULSION INTRAVENOUS at 08:10

## 2023-10-19 RX ADMIN — PROPOFOL 10 MG: 10 INJECTION, EMULSION INTRAVENOUS at 08:10

## 2023-10-19 RX ADMIN — CEFAZOLIN 2 G: 330 INJECTION, POWDER, FOR SOLUTION INTRAMUSCULAR; INTRAVENOUS at 08:10

## 2023-10-19 RX ADMIN — HEPARIN SODIUM 3000 UNITS: 1000 INJECTION, SOLUTION INTRAVENOUS; SUBCUTANEOUS at 09:10

## 2023-10-19 RX ADMIN — DEXAMETHASONE SODIUM PHOSPHATE 4 MG: 4 INJECTION INTRA-ARTICULAR; INTRALESIONAL; INTRAMUSCULAR; INTRAVENOUS; SOFT TISSUE at 08:10

## 2023-10-19 RX ADMIN — ACETAMINOPHEN 650 MG: 325 TABLET ORAL at 01:10

## 2023-10-19 RX ADMIN — SODIUM CHLORIDE 500 ML: 9 INJECTION, SOLUTION INTRAVENOUS at 12:10

## 2023-10-19 RX ADMIN — Medication 9 MG: at 10:10

## 2023-10-19 RX ADMIN — HEPARIN SODIUM 10000 UNITS: 1000 INJECTION, SOLUTION INTRAVENOUS; SUBCUTANEOUS at 08:10

## 2023-10-19 RX ADMIN — LIDOCAINE HYDROCHLORIDE 60 MG: 20 INJECTION, SOLUTION EPIDURAL; INFILTRATION; INTRACAUDAL at 08:10

## 2023-10-19 RX ADMIN — SODIUM CHLORIDE: 9 INJECTION, SOLUTION INTRAVENOUS at 07:10

## 2023-10-19 RX ADMIN — DEXMEDETOMIDINE 1 MCG/KG/HR: 200 INJECTION, SOLUTION INTRAVENOUS at 08:10

## 2023-10-19 NOTE — Clinical Note
The catheter was repositioned into the RCFA. An angiography was performed of the RCFA. Multiple views were taken.

## 2023-10-19 NOTE — TRANSFER OF CARE
Anesthesia Transfer of Care Note    Patient: Aurelio Perkins    Procedure(s) Performed: Procedure(s) (LRB):  REPLACEMENT, AORTIC VALVE, TRANSCATHETER (TAVR) (N/A)  Cardiac Cath Cosurgeon (N/A)  REPLACEMENT, AORTIC VALVE, TRANSCATHETER (TAVR)    Patient location: PACU    Anesthesia Type: MAC    Transport from OR: Transported from OR on 6-10 L/min O2 by face mask with adequate spontaneous ventilation    Post pain: adequate analgesia    Post assessment: no apparent anesthetic complications    Post vital signs: stable    Level of consciousness: awake and alert    Nausea/Vomiting: no nausea/vomiting    Complications: none    Transfer of care protocol was followed      Last vitals:   Visit Vitals  BP (!) 144/87 (BP Location: Left arm, Patient Position: Lying)   Pulse 61   Temp 36.5 °C (97.7 °F) (Temporal)   Resp 18   Ht 6' (1.829 m)   Wt 94.3 kg (208 lb)   SpO2 98%   BMI 28.21 kg/m²

## 2023-10-19 NOTE — PROGRESS NOTES
Dr. Obregon notified of SBP 90s/50s. MD said to increase MIVF to 200ml/hr and give 500ml Nacl bolus so total post cath fluid equals 1000ml. Will follow through and continue to monitor.

## 2023-10-19 NOTE — NURSING
Nurses Note -- 4 Eyes      10/19/2023   3:45 PM      Skin assessed during: Admit      [x] No Altered Skin Integrity Present    []Prevention Measures Documented      [] Yes- Altered Skin Integrity Present or Discovered   [] LDA Added if Not in Epic (Describe Wound)   [] New Altered Skin Integrity was Present on Admit and Documented in LDA   [] Wound Image Taken    Wound Care Consulted? NO    Attending Nurse:  Phyllis Jose LPN     Second RN/Staff Member:  Cayla Matthews LPN

## 2023-10-19 NOTE — PROGRESS NOTES
Nursing Transfer Note        Reason patient is being transferred: to assigned room post recovery    Transfer To: 303    Transfer via stretcher    Transfer with cardiac monitoring    Transported by escort    Telemetry: Box Number [id number]    Medicines sent: none **pt needs 1400 hydrocortisone on arrival to room**    Any special needs or follow-up needed: bedrest complete. Pt to get up to chair on arrival to room. Needs 1400 hydrocortisone      Patient belongings transferred with patient: Yes    Chart send with patient: Yes    Notified: spouse    Patient reassessed at: to be done by receiving RN (date, time)

## 2023-10-19 NOTE — PROGRESS NOTES
Dr. Coffman came to pt's bedside to check on pt. HR 44. /62. Sao2 99% on RA. Adolph groin sites soft, mod dng to R groin dsg stable and unchanged from previous assessment. MD said ok to proceed with POC to transfer to pt assigned room on CSU.

## 2023-10-19 NOTE — H&P
"                                            Interventional cardiology                                                  H&P     Subjective -    Aurelio Perkins is a 72 y.o. y.o. male who presents for evaluation Aortic Stenosis        Aurelio Perkins is a 72 y.o. male referred by Dr Garcia for evaluation of severe AS (NYHA Class II sx). His main complaint is fatigue with exertion and occasional dizziness. He states he has noticed a functional decline over the past few months, and now has to rest when doing strenuous activities due to extreme fatigue and occasional dizziness. He can walk thiago. 1/4 of a mile but then has to rest for some time. This is a drop in functional status over the past few months.      The patient has undergone the following TAVR work-up:   ECHO (Date 08/09/23): MELVIN= 0.84 cm2, MG= 39mmHg, Peak Francisco= 4.39 m/s, EF= 65%.   LHC: needs  STS: 1.5%   Frailty: 1/4   Iliacs are > on R and >  on L: Pending CT  LVOT area by CTA is  cm2 ( mm X  mm) and Avg Diameter is  per Dr: pending CT  Incidental findings on CT: needs  CT Surgery risk assessment: low risk, per Dr Garcia, TAVR is patient preference  Rhythm issues: none  PFTs: Not indicated  KCCQ/5 meter walk: 3.8s  Comorbidities: menigioma s/p resection, adrenal insufficiency hormonal replacement therapy, CKD3 with single kidney, RA. **He will need stress dose steroids at the time of the procedure.         Aurelio Perkins is a TAVR candidate, pending CT for valve sizing/access.         Review of Systems   Constitutional:  Positive for malaise/fatigue.   All other systems reviewed and are negative.     Objective:      BP (!) 148/83 (BP Location: Right arm, Patient Position: Sitting, BP Method: Large (Automatic))   Pulse (!) 51   Ht 6' 1" (1.854 m)   Wt 98.7 kg (217 lb 9.5 oz)   SpO2 98%   BMI 28.71 kg/m²      Physical Exam  Vitals and nursing note reviewed.   Constitutional:       Appearance: Normal appearance.   HENT:      Head: " "Normocephalic and atraumatic.      Right Ear: External ear normal.      Left Ear: External ear normal.      Nose: Nose normal.      Mouth/Throat:      Mouth: Mucous membranes are moist.   Eyes:      Pupils: Pupils are equal, round, and reactive to light.   Cardiovascular:      Rate and Rhythm: Normal rate and regular rhythm.      Pulses: Normal pulses.      Heart sounds: Murmur heard.   Pulmonary:      Effort: Pulmonary effort is normal.   Abdominal:      General: Abdomen is flat.   Musculoskeletal:         General: Normal range of motion.      Cervical back: Normal range of motion.   Skin:     General: Skin is warm.      Capillary Refill: Capillary refill takes less than 2 seconds.   Neurological:      General: No focal deficit present.      Mental Status: He is alert and oriented to person, place, and time. Mental status is at baseline.      Labs:            Lab Results   Component Value Date      07/05/2023     K 4.3 07/05/2023      07/05/2023     CO2 28 07/05/2023     BUN 20 07/05/2023     CREATININE 1.3 07/05/2023     ANIONGAP 8 07/05/2023            Lab Results   Component Value Date     HGBA1C 5.7 (H) 09/26/2022      No results found for: "BNP", "BNPTRIAGEBLO"           Lab Results   Component Value Date     WBC 5.93 07/05/2023     HGB 14.7 07/05/2023     HCT 45.2 07/05/2023     HCT 30 (L) 05/29/2019      07/05/2023     GRAN 3.4 07/05/2023     GRAN 57.6 07/05/2023            Lab Results   Component Value Date     CHOL 139 09/26/2022     HDL 42 09/26/2022     LDLCALC 68.4 09/26/2022     TRIG 143 09/26/2022         Meds:      Current Outpatient Medications:     aspirin (ECOTRIN) 81 MG EC tablet, Take 1 tablet (81 mg total) by mouth once daily., Disp: 90 tablet, Rfl: 3    diclofenac (VOLTAREN) 75 MG EC tablet, Take 1 tablet (75 mg total) by mouth daily as needed., Disp: 90 tablet, Rfl: 1    ergocalciferol (ERGOCALCIFEROL) 50,000 unit Cap, TAKE 1 CAPSULE BY MOUTH ONE TIME PER WEEK, Disp: 4 " "capsule, Rfl: 12    eszopiclone (LUNESTA) 3 mg Tab, TAKE 1 TABLET BY MOUTH EVERY DAY IN THE EVENING, Disp: 30 tablet, Rfl: 4    golimumab (SIMPONI) 50 mg/0.5 mL PnIj, Inject 50 mg into the skin every 30 days., Disp: 0.5 mL, Rfl: 12    hydrocortisone (CORTEF) 10 MG Tab, TAKE 1 AND 1/2 TABLETS (15 mg) BY MOUTH IN THE MORNING AND 1 TABLET (10 mg) BY MOUTH IN THE EVENING AT 4PM, Disp: 75 tablet, Rfl: 11    REPATHA PUSHTRONEX 420 mg/3.5 mL Injt, INJECT 3.5 MLS INTO THE SKIN EVERY 30 DAYS., Disp: 10.5 mL, Rfl: 3    SYNTHROID 112 mcg tablet, Take 1 tablet (112 mcg total) by mouth once daily., Disp: 90 tablet, Rfl: 3    testosterone cypionate (DEPOTESTOTERONE CYPIONATE) 200 mg/mL injection, Inject 0.38 mLs (76 mg total) into the muscle every 7 days. (Patient taking differently: Inject 75 mg into the muscle every 14 (fourteen) days.), Disp: 2 mL, Rfl: 5    diclofenac sodium (VOLTAREN) 1 % Gel, Apply topically., Disp: , Rfl:     nitroGLYCERIN (NITROSTAT) 0.4 MG SL tablet, Place 0.4 mg under the tongue., Disp: , Rfl:     safety needles (BD SAFETYGLIDE NEEDLE) 25 gauge x 1" Ndle, 1 each by Misc.(Non-Drug; Combo Route) route every 7 days., Disp: 25 each, Rfl: 0      Assessment & Plan:         Severe aortic stenosis   Aurelio Perkins is a 72 y.o. male referred by Dr Garcia for evaluation of severe AS (NYHA Class II sx).     The patient has undergone the following TAVR work-up:   ECHO (Date 8/9/23): MELVIN= 0.84 cm2, MG= 39 mmHg, Peak Francisco= 4.39 m/s, EF= 65%.   LHC (Date 9/13/23): Non obstructive coronary artery disease    STS: 1.4%   Frailty: 1/4   Iliacs are >7.64  on R and > 7.71  on L   LVOT area by CTA is 3.91  cm2 (26.7 mm X 19.8 mm) and Avg Diameter is 22.3 per Dr Obregon   Incidental findings on CT: Small pulmonary nodules all measuring 6 mm or less  CT Surgery risk assessment: low risk, per Dr Garcia, TAVR is patient preference  Rhythm issues: none   PFTs: FEV1 not indicated   KCCQ/5 meter walk: KCCQ is pending "   Comorbidities: menigioma s/p resection, adrenal insufficiency hormonal replacement therapy, CKD3 with single kidney, RA. He will need stress dose steroids at the time of the procedure.         Aurelio Juli Gregorio is a 27 mm  Navitor valve candidate via R TFA access.      Transcatheter Aortic valve replacement   Valve: 27 mm Navitor   ECMO:  On Call  TAVR access: R TFA   Valvuloplasty balloon: 18 True   Viabahn size if needed: 9 X 5   Antithrombotic therapy: Aspirin   Temporary pacing wire: Yes  Pacemaker risk factors: None    Post op destination: Stepdown  Antibiotics given: (to be done during procedure)  Heart failure on admission?        Hyperlipidemia  -repatha      Essential hypertension  -well controlled and now off of medications     Abdominal aortic atherosclerosis  -asa, repatha, bp control      Chronic renal insufficiency, stage III (moderate)  -single kidney, low contrast with LHC     Secondary adrenal insufficiency  -continue current regimen      Prediabetes  -blood sugar control

## 2023-10-19 NOTE — TELEPHONE ENCOUNTER
"Information packet sent to patient, which includes "Your guide to living with heart disease". Letter was also sent to patient.  Patient lives in [place], contact info sent for local CR.    PETAR Triplett  Cardiac Rehab Nurse  "

## 2023-10-19 NOTE — NURSING
Patient admitted to CSU. AAOx4 and VSS. Patient connected to telemetry, assessed,  oriented to room, and instructed to call for assistance or any needs. Call bell in reach. Reviewed goals for today. Will continue to monitor.     Bed rest completed prior to transfer. Bilat groin sites assessed. L groin site clean, dry, and intact. R groin site intact with blood on gauze. Soft upon palpation, no evidence of hematoma.  Marked around blood with marker, continuing to monitor. No complaints of pain. Q1hour VS till 1730.  Pt up in chair.

## 2023-10-19 NOTE — NURSING
Groin sites reassessed. L groin site CDI. R groin site gauze saturated with blood past marked area. Site soft upon palpation and no pain expressed by pt. Gauze and transparent film removed and replaced. Slow superficial bleed present.

## 2023-10-19 NOTE — ANESTHESIA PROCEDURE NOTES
Arterial    Diagnosis: AS    Patient location during procedure: done in OR    Staffing  Authorizing Provider: KILEY Martínez MD  Performing Provider: Obdulio Kay MD    Staffing  Performed by: Obdulio Kay MD  Authorized by: KILEY Martínez MD    Anesthesiologist was present at the time of the procedure.    Preanesthetic Checklist  Completed: patient identified, IV checked, site marked, risks and benefits discussed, surgical consent, monitors and equipment checked, pre-op evaluation, timeout performed and anesthesia consent givenArterial  Skin Prep: chlorhexidine gluconate and isopropyl alcohol  Local Infiltration: lidocaine  Orientation: left  Location: radial    Catheter Size: 20 G  Catheter placement by Anatomical landmarks. Heme positive aspiration all ports. Insertion Attempts: 1  Assessment  Dressing: secured with tape and tegaderm  Patient: Tolerated well

## 2023-10-19 NOTE — OP NOTE
Brief Operative Note:    : Santy Obregon MD     Referring Physician: Valente Garcia     All Operators: Surgeon(s):  Todd Hall MD Parrino, Patrick E., MD Jenkins, James S., MD Garikapati, Kiran, MD     Preoperative Diagnosis: Aortic stenosis, severe [I35.0]     Postop Diagnosis: Aortic stenosis, severe [I35.0]    Treatments/Procedures: Procedure(s) (LRB):  REPLACEMENT, AORTIC VALVE, TRANSCATHETER (TAVR) (N/A)  Cardiac Cath Cosurgeon (N/A)  REPLACEMENT, AORTIC VALVE, TRANSCATHETER (TAVR)    Access: Right CFA, Left CFA    Findings:Severe  aortic   disease is present.     See catheterization report for full details.    Intervention: TAVR    See catheterization report for full details.    Closure device: Angioseal       Plan:  - Post cath protocol    - Bed rest x 3 hours   - Continue aspirin 81 mg daily indefinitely  - Continue high intensity statin therapy (LDL goal < 70)  - Risk factor reduction (BP <130/80 mmHg, glycemic control, etc)  - Cardiac rehab referral  - Follow up with outpatient cardiologist    Estimated Blood loss: 20 cc    Specimens removed: No    Boaz Davies MD  [facility]

## 2023-10-20 VITALS
SYSTOLIC BLOOD PRESSURE: 154 MMHG | DIASTOLIC BLOOD PRESSURE: 67 MMHG | BODY MASS INDEX: 28.16 KG/M2 | OXYGEN SATURATION: 97 % | TEMPERATURE: 99 F | RESPIRATION RATE: 20 BRPM | HEIGHT: 72 IN | HEART RATE: 61 BPM | WEIGHT: 207.88 LBS

## 2023-10-20 LAB
ANION GAP SERPL CALC-SCNC: 10 MMOL/L (ref 8–16)
ANISOCYTOSIS BLD QL SMEAR: SLIGHT
BASOPHILS # BLD AUTO: 0.02 K/UL (ref 0–0.2)
BASOPHILS NFR BLD: 0.1 % (ref 0–1.9)
BUN SERPL-MCNC: 22 MG/DL (ref 8–23)
CALCIUM SERPL-MCNC: 8.7 MG/DL (ref 8.7–10.5)
CHLORIDE SERPL-SCNC: 107 MMOL/L (ref 95–110)
CO2 SERPL-SCNC: 21 MMOL/L (ref 23–29)
CREAT SERPL-MCNC: 1.2 MG/DL (ref 0.5–1.4)
DIFFERENTIAL METHOD: ABNORMAL
EOSINOPHIL # BLD AUTO: 0 K/UL (ref 0–0.5)
EOSINOPHIL NFR BLD: 0 % (ref 0–8)
ERYTHROCYTE [DISTWIDTH] IN BLOOD BY AUTOMATED COUNT: 13.1 % (ref 11.5–14.5)
EST. GFR  (NO RACE VARIABLE): >60 ML/MIN/1.73 M^2
GLUCOSE SERPL-MCNC: 114 MG/DL (ref 70–110)
HCT VFR BLD AUTO: 33.3 % (ref 40–54)
HGB BLD-MCNC: 11.3 G/DL (ref 14–18)
IMM GRANULOCYTES # BLD AUTO: 0.11 K/UL (ref 0–0.04)
IMM GRANULOCYTES NFR BLD AUTO: 0.7 % (ref 0–0.5)
LYMPHOCYTES # BLD AUTO: 1 K/UL (ref 1–4.8)
LYMPHOCYTES NFR BLD: 6.1 % (ref 18–48)
MCH RBC QN AUTO: 29 PG (ref 27–31)
MCHC RBC AUTO-ENTMCNC: 33.9 G/DL (ref 32–36)
MCV RBC AUTO: 86 FL (ref 82–98)
MONOCYTES # BLD AUTO: 1.1 K/UL (ref 0.3–1)
MONOCYTES NFR BLD: 7 % (ref 4–15)
NEUTROPHILS # BLD AUTO: 13.6 K/UL (ref 1.8–7.7)
NEUTROPHILS NFR BLD: 86.1 % (ref 38–73)
NRBC BLD-RTO: 0 /100 WBC
PLATELET # BLD AUTO: 88 K/UL (ref 150–450)
PLATELET BLD QL SMEAR: ABNORMAL
PMV BLD AUTO: 10.2 FL (ref 9.2–12.9)
POC ACTIVATED CLOTTING TIME K: 227 SEC (ref 74–137)
POC ACTIVATED CLOTTING TIME K: 263 SEC (ref 74–137)
POLYCHROMASIA BLD QL SMEAR: ABNORMAL
POTASSIUM SERPL-SCNC: 4.2 MMOL/L (ref 3.5–5.1)
RBC # BLD AUTO: 3.89 M/UL (ref 4.6–6.2)
SAMPLE: ABNORMAL
SAMPLE: ABNORMAL
SODIUM SERPL-SCNC: 138 MMOL/L (ref 136–145)
WBC # BLD AUTO: 15.83 K/UL (ref 3.9–12.7)

## 2023-10-20 PROCEDURE — 99233 PR SUBSEQUENT HOSPITAL CARE,LEVL III: ICD-10-PCS | Mod: ,,, | Performed by: INTERNAL MEDICINE

## 2023-10-20 PROCEDURE — 93005 ELECTROCARDIOGRAM TRACING: CPT

## 2023-10-20 PROCEDURE — 36415 COLL VENOUS BLD VENIPUNCTURE: CPT | Performed by: STUDENT IN AN ORGANIZED HEALTH CARE EDUCATION/TRAINING PROGRAM

## 2023-10-20 PROCEDURE — 93010 ELECTROCARDIOGRAM REPORT: CPT | Mod: ,,, | Performed by: INTERNAL MEDICINE

## 2023-10-20 PROCEDURE — 85025 COMPLETE CBC W/AUTO DIFF WBC: CPT | Performed by: STUDENT IN AN ORGANIZED HEALTH CARE EDUCATION/TRAINING PROGRAM

## 2023-10-20 PROCEDURE — 99233 SBSQ HOSP IP/OBS HIGH 50: CPT | Mod: ,,, | Performed by: INTERNAL MEDICINE

## 2023-10-20 PROCEDURE — 80048 BASIC METABOLIC PNL TOTAL CA: CPT | Performed by: STUDENT IN AN ORGANIZED HEALTH CARE EDUCATION/TRAINING PROGRAM

## 2023-10-20 PROCEDURE — 93010 EKG 12-LEAD: ICD-10-PCS | Mod: ,,, | Performed by: INTERNAL MEDICINE

## 2023-10-20 PROCEDURE — 25000003 PHARM REV CODE 250: Performed by: STUDENT IN AN ORGANIZED HEALTH CARE EDUCATION/TRAINING PROGRAM

## 2023-10-20 RX ADMIN — ASPIRIN 81 MG: 81 TABLET, COATED ORAL at 09:10

## 2023-10-20 NOTE — PLAN OF CARE
10/20/23 1136   Final Note   Assessment Type Final Discharge Note   Anticipated Discharge Disposition Home   What phone number can be called within the next 1-3 days to see how you are doing after discharge? [phone]   Hospital Resources/Appts/Education Provided Provided patient/caregiver with written discharge plan information;Provided education on problems/symptoms using teachback   Post-Acute Status   Discharge Delays None known at this time     Patient discharged prior to initial discharge assessment . Per physician discharge summary:He was then discharged in stable condition.     Agnes Sauer RN    [phone]      Future Appointments   Date Time Provider Department Center   10/23/2023  9:40 AM Inocencio Grijalva MD [facility] UROLOGY [place]   10/26/2023 10:00 AM Yoav Oliveira MD [facility]   11/21/2023  9:05 AM LAB, APPOINTMENT [facility] LAB VNP [facility]   [facility]   11/21/2023  2:30 PM Santy Obregon MD [facility]

## 2023-10-20 NOTE — HOSPITAL COURSE
Patient brought for planned TAVR that was successful with 27mm Navitor valve. He tolerated the procedure well and was instructed to continue asa monotherapy and other home medications. ECG revealed no new bundle branch block or rhythm issues. He was then discharged in stable condition.

## 2023-10-20 NOTE — ANESTHESIA POSTPROCEDURE EVALUATION
Anesthesia Post Evaluation    Patient: Aurelio Perkins    Procedure(s) Performed: Procedure(s) (LRB):  REPLACEMENT, AORTIC VALVE, TRANSCATHETER (TAVR) (N/A)  Cardiac Cath Cosurgeon (N/A)  REPLACEMENT, AORTIC VALVE, TRANSCATHETER (TAVR)    Final Anesthesia Type: general      Patient location during evaluation: PACU  Patient participation: Yes- Able to Participate  Level of consciousness: awake and alert  Post-procedure vital signs: reviewed and stable  Pain management: adequate  Airway patency: patent    PONV status at discharge: No PONV  Anesthetic complications: no      Cardiovascular status: blood pressure returned to baseline  Respiratory status: unassisted, spontaneous ventilation and room air  Hydration status: euvolemic            Vitals Value Taken Time   /67 10/20/23 0726   Temp 37.1 °C (98.7 °F) 10/20/23 0726   Pulse 61 10/20/23 0726   Resp 20 10/20/23 0726   SpO2 97 % 10/20/23 0726         No case tracking events are documented in the log.      Pain/Justin Score: Pain Rating Prior to Med Admin: 4 (10/19/2023  9:16 PM)  Pain Rating Post Med Admin: 0 (10/19/2023 10:16 PM)  Justin Score: 9 (10/19/2023 10:30 AM)

## 2023-10-20 NOTE — DISCHARGE SUMMARY
"[facility] - Cardiology Stepdown  Interventional Cardiology  Discharge Summary      Patient Name: Aurelio Perkins  MRN: [id number]  Admission Date: 10/19/2023  Hospital Length of Stay: 1 days  Discharge Date and Time:  10/20/2023 7:04 AM  Attending Physician: Santy Obregon MD  Discharging Provider: Todd Hall MD  Primary Care Physician: Cuauhtemoc Gardner MD    HPI:  No notes on file    Procedure(s) (LRB):  REPLACEMENT, AORTIC VALVE, TRANSCATHETER (TAVR) (N/A)  Cardiac Cath Cosurgeon (N/A)  REPLACEMENT, AORTIC VALVE, TRANSCATHETER (TAVR)     Indwelling Lines/Drains at time of discharge:  Lines/Drains/Airways       None                   Hospital Course:  Patient brought for planned TAVR that was successful with 27mm Navitor valve. He tolerated the procedure well and was instructed to continue asa monotherapy and other home medications. ECG revealed no new bundle branch block or rhythm issues. He was then discharged in stable condition.         Goals of Care Treatment Preferences:  Code Status: Full Code      Pending Diagnostic Studies:       None          No new Assessment & Plan notes have been filed under this hospital service since the last note was generated.  Service: Interventional Cardiology      Discharged Condition: good    Follow Up:    Patient Instructions:   No discharge procedures on file.  Medications:  Reconciled Home Medications:      Medication List        CONTINUE taking these medications      aspirin 81 MG EC tablet  Commonly known as: ECOTRIN  Take 1 tablet (81 mg total) by mouth once daily.     BD SAFETYGLIDE NEEDLE 25 gauge x 1" Ndle  Generic drug: safety needles  1 each by Misc.(Non-Drug; Combo Route) route every 7 days.     * diclofenac sodium 1 % Gel  Commonly known as: VOLTAREN  Apply topically.     * diclofenac 75 MG EC tablet  Commonly known as: VOLTAREN  Take 1 tablet (75 mg total) by mouth daily as needed.     ergocalciferol 50,000 unit Cap  Commonly known as: " ERGOCALCIFEROL  TAKE 1 CAPSULE BY MOUTH ONE TIME PER WEEK     eszopiclone 3 mg Tab  Commonly known as: LUNESTA  TAKE 1 TABLET BY MOUTH EVERY DAY IN THE EVENING     hydrocortisone 10 MG Tab  Commonly known as: CORTEF  TAKE 1 AND 1/2 TABLETS (15 mg) BY MOUTH IN THE MORNING AND 1 TABLET (10 mg) BY MOUTH IN THE EVENING AT 4PM     nitroGLYCERIN 0.4 MG SL tablet  Commonly known as: NITROSTAT  Place 0.4 mg under the tongue.     REPATHA PUSHTRONEX 420 mg/3.5 mL Injt  Generic drug: evolocumab  INJECT 3.5 MLS INTO THE SKIN EVERY 30 DAYS.     SIMPONI 50 mg/0.5 mL Pnij  Generic drug: golimumab  Inject 50 mg into the skin every 30 days.     SYNTHROID 112 MCG tablet  Generic drug: levothyroxine  Take 1 tablet (112 mcg total) by mouth once daily.     testosterone cypionate 200 mg/mL injection  Commonly known as: DEPOTESTOTERONE CYPIONATE  Inject 0.38 mLs (76 mg total) into the muscle every 7 days.           * This list has 2 medication(s) that are the same as other medications prescribed for you. Read the directions carefully, and ask your doctor or other care provider to review them with you.                  Time spent on the discharge of patient: 20 minutes    Todd Hall MD  Interventional Cardiology  [facility] - Cardiology Stepdown

## 2023-10-23 ENCOUNTER — PATIENT OUTREACH (OUTPATIENT)
Dept: ADMINISTRATIVE | Facility: CLINIC | Age: 73
End: 2023-10-23
Payer: MEDICARE

## 2023-10-23 NOTE — TELEPHONE ENCOUNTER
Called and spoke with the patient, and he states that he does not want to make a follow up appointment at this time from the heart value replacement that he had done, as he states that everything went very well and he is doing good. I did offer again to make a follow up appointment and he states  that one is not needed at this time.

## 2023-10-26 ENCOUNTER — OFFICE VISIT (OUTPATIENT)
Dept: RHEUMATOLOGY | Facility: CLINIC | Age: 73
End: 2023-10-26
Payer: MEDICARE

## 2023-10-26 VITALS
HEART RATE: 70 BPM | DIASTOLIC BLOOD PRESSURE: 79 MMHG | SYSTOLIC BLOOD PRESSURE: 132 MMHG | HEIGHT: 72 IN | BODY MASS INDEX: 29.09 KG/M2 | WEIGHT: 214.75 LBS

## 2023-10-26 DIAGNOSIS — Z79.1 ENCOUNTER FOR LONG-TERM (CURRENT) USE OF NSAIDS: ICD-10-CM

## 2023-10-26 DIAGNOSIS — E55.9 VITAMIN D DEFICIENCY, UNSPECIFIED: ICD-10-CM

## 2023-10-26 DIAGNOSIS — G89.29 CHRONIC RIGHT SHOULDER PAIN: ICD-10-CM

## 2023-10-26 DIAGNOSIS — E53.8 VITAMIN B 12 DEFICIENCY: ICD-10-CM

## 2023-10-26 DIAGNOSIS — M25.511 CHRONIC RIGHT SHOULDER PAIN: ICD-10-CM

## 2023-10-26 DIAGNOSIS — Z79.899 IMMUNOCOMPROMISED STATE DUE TO DRUG THERAPY: ICD-10-CM

## 2023-10-26 DIAGNOSIS — M05.9 SEROPOSITIVE RHEUMATOID ARTHRITIS: ICD-10-CM

## 2023-10-26 DIAGNOSIS — E53.8 B12 DEFICIENCY: Primary | ICD-10-CM

## 2023-10-26 DIAGNOSIS — D84.821 IMMUNOCOMPROMISED STATE DUE TO DRUG THERAPY: ICD-10-CM

## 2023-10-26 PROCEDURE — 3008F BODY MASS INDEX DOCD: CPT | Mod: CPTII,S$GLB,, | Performed by: INTERNAL MEDICINE

## 2023-10-26 PROCEDURE — 3075F PR MOST RECENT SYSTOLIC BLOOD PRESS GE 130-139MM HG: ICD-10-PCS | Mod: CPTII,S$GLB,, | Performed by: INTERNAL MEDICINE

## 2023-10-26 PROCEDURE — 1159F MED LIST DOCD IN RCRD: CPT | Mod: CPTII,S$GLB,, | Performed by: INTERNAL MEDICINE

## 2023-10-26 PROCEDURE — 1125F AMNT PAIN NOTED PAIN PRSNT: CPT | Mod: CPTII,S$GLB,, | Performed by: INTERNAL MEDICINE

## 2023-10-26 PROCEDURE — 1160F PR REVIEW ALL MEDS BY PRESCRIBER/CLIN PHARMACIST DOCUMENTED: ICD-10-PCS | Mod: CPTII,S$GLB,, | Performed by: INTERNAL MEDICINE

## 2023-10-26 PROCEDURE — 3288F PR FALLS RISK ASSESSMENT DOCUMENTED: ICD-10-PCS | Mod: CPTII,S$GLB,, | Performed by: INTERNAL MEDICINE

## 2023-10-26 PROCEDURE — 3075F SYST BP GE 130 - 139MM HG: CPT | Mod: CPTII,S$GLB,, | Performed by: INTERNAL MEDICINE

## 2023-10-26 PROCEDURE — 3288F FALL RISK ASSESSMENT DOCD: CPT | Mod: CPTII,S$GLB,, | Performed by: INTERNAL MEDICINE

## 2023-10-26 PROCEDURE — 20610 INTERMEDIATE JOINT ASPIRATION/INJECTION: R ACROMIOCLAVICULAR: ICD-10-PCS | Mod: RT,S$GLB,, | Performed by: INTERNAL MEDICINE

## 2023-10-26 PROCEDURE — 3078F PR MOST RECENT DIASTOLIC BLOOD PRESSURE < 80 MM HG: ICD-10-PCS | Mod: CPTII,S$GLB,, | Performed by: INTERNAL MEDICINE

## 2023-10-26 PROCEDURE — 1111F PR DISCHARGE MEDS RECONCILED W/ CURRENT OUTPATIENT MED LIST: ICD-10-PCS | Mod: CPTII,S$GLB,, | Performed by: INTERNAL MEDICINE

## 2023-10-26 PROCEDURE — 99999 PR PBB SHADOW E&M-EST. PATIENT-LVL III: CPT | Mod: PBBFAC,,, | Performed by: INTERNAL MEDICINE

## 2023-10-26 PROCEDURE — 1160F RVW MEDS BY RX/DR IN RCRD: CPT | Mod: CPTII,S$GLB,, | Performed by: INTERNAL MEDICINE

## 2023-10-26 PROCEDURE — 1125F PR PAIN SEVERITY QUANTIFIED, PAIN PRESENT: ICD-10-PCS | Mod: CPTII,S$GLB,, | Performed by: INTERNAL MEDICINE

## 2023-10-26 PROCEDURE — 1159F PR MEDICATION LIST DOCUMENTED IN MEDICAL RECORD: ICD-10-PCS | Mod: CPTII,S$GLB,, | Performed by: INTERNAL MEDICINE

## 2023-10-26 PROCEDURE — 3078F DIAST BP <80 MM HG: CPT | Mod: CPTII,S$GLB,, | Performed by: INTERNAL MEDICINE

## 2023-10-26 PROCEDURE — 99999 PR PBB SHADOW E&M-EST. PATIENT-LVL III: ICD-10-PCS | Mod: PBBFAC,,, | Performed by: INTERNAL MEDICINE

## 2023-10-26 PROCEDURE — 99214 OFFICE O/P EST MOD 30 MIN: CPT | Mod: 25,S$GLB,, | Performed by: INTERNAL MEDICINE

## 2023-10-26 PROCEDURE — 3008F PR BODY MASS INDEX (BMI) DOCUMENTED: ICD-10-PCS | Mod: CPTII,S$GLB,, | Performed by: INTERNAL MEDICINE

## 2023-10-26 PROCEDURE — 20610 DRAIN/INJ JOINT/BURSA W/O US: CPT | Mod: RT,S$GLB,, | Performed by: INTERNAL MEDICINE

## 2023-10-26 PROCEDURE — 1101F PR PT FALLS ASSESS DOC 0-1 FALLS W/OUT INJ PAST YR: ICD-10-PCS | Mod: CPTII,S$GLB,, | Performed by: INTERNAL MEDICINE

## 2023-10-26 PROCEDURE — 1101F PT FALLS ASSESS-DOCD LE1/YR: CPT | Mod: CPTII,S$GLB,, | Performed by: INTERNAL MEDICINE

## 2023-10-26 PROCEDURE — 99214 PR OFFICE/OUTPT VISIT, EST, LEVL IV, 30-39 MIN: ICD-10-PCS | Mod: 25,S$GLB,, | Performed by: INTERNAL MEDICINE

## 2023-10-26 PROCEDURE — 1111F DSCHRG MED/CURRENT MED MERGE: CPT | Mod: CPTII,S$GLB,, | Performed by: INTERNAL MEDICINE

## 2023-10-26 RX ORDER — NEEDLES, DISPOSABLE 25GX5/8"
1 NEEDLE, DISPOSABLE MISCELLANEOUS WEEKLY
Qty: 25 EACH | Refills: 3 | Status: SHIPPED | OUTPATIENT
Start: 2023-10-26

## 2023-10-26 RX ORDER — CYANOCOBALAMIN 1000 UG/ML
1000 INJECTION, SOLUTION INTRAMUSCULAR; SUBCUTANEOUS
Qty: 10 ML | Refills: 3 | Status: SHIPPED | OUTPATIENT
Start: 2023-10-26

## 2023-10-26 RX ADMIN — TRIAMCINOLONE ACETONIDE 40 MG: 40 INJECTION, SUSPENSION INTRA-ARTICULAR; INTRAMUSCULAR at 10:10

## 2023-10-26 ASSESSMENT — ROUTINE ASSESSMENT OF PATIENT INDEX DATA (RAPID3)
FATIGUE SCORE: 2.2
MDHAQ FUNCTION SCORE: 0.3
PSYCHOLOGICAL DISTRESS SCORE: 0
TOTAL RAPID3 SCORE: 2.33
PATIENT GLOBAL ASSESSMENT SCORE: 2
PAIN SCORE: 4

## 2023-10-26 NOTE — PROGRESS NOTES
Subjective:       Patient ID: Aurelio Perkins is a 72 y.o. male.    Chief Complaint: Disease Management      Follow up:72 year old male who presents to clinic for follow up on seropositive rheumatoid arthritis.  He had bovine valve replaced and also had covid .He was within Simponi for a month due to delays with changing assistance programs with [employer]. He reports arthritis flare off of Simponi, but he has resumed treatment. He reports joints pain in his hands has improved significantly.  He is taking diclofenac 75 mg daily and tylenol nightly prn for pain.    No serious infections since his last visit.     He taking lunesta for insomnia, which is working well.     He is followed by Endocrinology on hydrocortisone.     Current tx:  1. Simponi    Prior hx:  1. Enbrel  2. Rinvoq (GI upset:  3. Xeljanz      Review of Systems   Constitutional:  Positive for activity change. Negative for chills.   Eyes:  Negative for visual disturbance.   Respiratory:  Negative for cough, shortness of breath and wheezing.    Cardiovascular:  Negative for chest pain, palpitations and leg swelling.   Gastrointestinal:  Negative for abdominal pain, constipation, diarrhea, nausea and vomiting.   Musculoskeletal:  Positive for arthralgias and back pain.   Neurological:  Negative for dizziness and syncope.   Hematological:  Negative for adenopathy.         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    Ptosis of right eyelid 5/29/2019    S/P craniotomy 6/17/2019     Past Surgical History:   Procedure Laterality Date    CARDIAC CATH COSURGEON  N/A 10/19/2023    Procedure: Cardiac Cath Cosurgeon;  Surgeon: Valente Garcia MD;  Location: [facility] CATH LAB;  Service: Cardiology;  Laterality: N/A;    COLONOSCOPY N/A 3/29/2022    Procedure: COLONOSCOPY;  Surgeon: Cayla Sherman MD;  Location: [facility] ENDO;  Service: Endoscopy;  Laterality: N/A;    CORONARY ANGIOGRAPHY N/A 9/13/2023    Procedure: Angiogram, Coronary;  Surgeon: Santy Obregon MD;  Location: [facility] CATH LAB;  Service: Cardiology;  Laterality: N/A;    CRANIOTOMY Right 5/29/2019    Procedure: CRANIOTOMY  (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)  Co-surgery with Dr Vaibhav Jara - please put in his room  Microscope [facility] [employer];  Surgeon: Jimmy Segura DO;  Location: [address];  Service: Neurosurgery;  Laterality: Right;    HEMORRHOID SURGERY      NEPHRECTOMY      PROSTATECTOMY      TONSILLECTOMY      TRANSCATHETER AORTIC VALVE REPLACEMENT (TAVR) N/A 10/19/2023    Procedure: REPLACEMENT, AORTIC VALVE, TRANSCATHETER (TAVR);  Surgeon: Santy Obregon MD;  Location: [facility] CATH LAB;  Service: Cardiology;  Laterality: N/A;    TRANSCATHETER AORTIC VALVE REPLACEMENT (TAVR)  10/19/2023    Procedure: REPLACEMENT, AORTIC VALVE, TRANSCATHETER (TAVR);  Surgeon: Valente Garcia MD;  Location: [facility] CATH LAB;  Service: Cardiology;;          Physical Exam   Constitutional: He is oriented to person, place, and time.   Eyes: Right conjunctiva is not injected. Left conjunctiva is not injected.   Neck: No JVD present. No thyromegaly present.   Cardiovascular: Normal rate and regular rhythm. Exam reveals no decreased pulses.   Pulmonary/Chest: Effort normal.   Musculoskeletal:      Right wrist: Tenderness present.      Left wrist: Tenderness present.   Lymphadenopathy:     He has no cervical adenopathy.   Neurological: He is alert and oriented to person, place, and time. Gait normal.   Skin: No rash noted.   Psychiatric: Mood and affect  normal.       Right Side Rheumatological Exam     The patient is tender to palpation of the wrist, 1st PIP, 1st MCP, 2nd PIP, 2nd MCP, 3rd PIP, 3rd MCP, 4th PIP, 4th MCP, 5th PIP and 5th MCP    Left Side Rheumatological Exam     The patient is tender to palpation of the wrist, 1st PIP, 1st MCP, 2nd PIP, 2nd MCP, 3rd PIP, 3rd MCP, 4th PIP, 4th MCP, 5th PIP and 5th MCP.          Results for orders placed or performed during the hospital encounter of 10/19/23   CBC auto differential   Result Value Ref Range    WBC 5.26 3.90 - 12.70 K/uL    RBC 4.36 (L) 4.60 - 6.20 M/uL    Hemoglobin 12.9 (L) 14.0 - 18.0 g/dL    Hematocrit 38.0 (L) 40.0 - 54.0 %    MCV 87 82 - 98 fL    MCH 29.6 27.0 - 31.0 pg    MCHC 33.9 32.0 - 36.0 g/dL    RDW 13.1 11.5 - 14.5 %    Platelets 145 (L) 150 - 450 K/uL    MPV 10.4 9.2 - 12.9 fL    Immature Granulocytes 0.2 0.0 - 0.5 %    Gran # (ANC) 3.2 1.8 - 7.7 K/uL    Immature Grans (Abs) 0.01 0.00 - 0.04 K/uL    Lymph # 1.3 1.0 - 4.8 K/uL    Mono # 0.6 0.3 - 1.0 K/uL    Eos # 0.2 0.0 - 0.5 K/uL    Baso # 0.05 0.00 - 0.20 K/uL    nRBC 0 0 /100 WBC    Gran % 59.8 38.0 - 73.0 %    Lymph % 24.3 18.0 - 48.0 %    Mono % 10.5 4.0 - 15.0 %    Eosinophil % 4.2 0.0 - 8.0 %    Basophil % 1.0 0.0 - 1.9 %    Differential Method Automated    Comprehensive metabolic panel   Result Value Ref Range    Sodium 141 136 - 145 mmol/L    Potassium 4.1 3.5 - 5.1 mmol/L    Chloride 108 95 - 110 mmol/L    CO2 22 (L) 23 - 29 mmol/L    Glucose 96 70 - 110 mg/dL    BUN 16 8 - 23 mg/dL    Creatinine 1.3 0.5 - 1.4 mg/dL    Calcium 8.8 8.7 - 10.5 mg/dL    Total Protein 6.5 6.0 - 8.4 g/dL    Albumin 3.8 3.5 - 5.2 g/dL    Total Bilirubin 0.9 0.1 - 1.0 mg/dL    Alkaline Phosphatase 49 (L) 55 - 135 U/L    AST 28 10 - 40 U/L    ALT 52 (H) 10 - 44 U/L    eGFR 58.7 (A) >60 mL/min/1.73 m^2    Anion Gap 11 8 - 16 mmol/L   Protime-INR   Result Value Ref Range    Prothrombin Time 10.3 9.0 - 12.5 sec    INR 1.0 0.8 - 1.2   BNP   Result Value  Ref Range    BNP 21 0 - 99 pg/mL   CBC auto differential   Result Value Ref Range    WBC 15.83 (H) 3.90 - 12.70 K/uL    RBC 3.89 (L) 4.60 - 6.20 M/uL    Hemoglobin 11.3 (L) 14.0 - 18.0 g/dL    Hematocrit 33.3 (L) 40.0 - 54.0 %    MCV 86 82 - 98 fL    MCH 29.0 27.0 - 31.0 pg    MCHC 33.9 32.0 - 36.0 g/dL    RDW 13.1 11.5 - 14.5 %    Platelets 88 (L) 150 - 450 K/uL    MPV 10.2 9.2 - 12.9 fL    Immature Granulocytes 0.7 (H) 0.0 - 0.5 %    Gran # (ANC) 13.6 (H) 1.8 - 7.7 K/uL    Immature Grans (Abs) 0.11 (H) 0.00 - 0.04 K/uL    Lymph # 1.0 1.0 - 4.8 K/uL    Mono # 1.1 (H) 0.3 - 1.0 K/uL    Eos # 0.0 0.0 - 0.5 K/uL    Baso # 0.02 0.00 - 0.20 K/uL    nRBC 0 0 /100 WBC    Gran % 86.1 (H) 38.0 - 73.0 %    Lymph % 6.1 (L) 18.0 - 48.0 %    Mono % 7.0 4.0 - 15.0 %    Eosinophil % 0.0 0.0 - 8.0 %    Basophil % 0.1 0.0 - 1.9 %    Platelet Estimate Decreased (A)     Aniso Slight     Poly Occasional     Differential Method Automated    Basic metabolic panel   Result Value Ref Range    Sodium 138 136 - 145 mmol/L    Potassium 4.2 3.5 - 5.1 mmol/L    Chloride 107 95 - 110 mmol/L    CO2 21 (L) 23 - 29 mmol/L    Glucose 114 (H) 70 - 110 mg/dL    BUN 22 8 - 23 mg/dL    Creatinine 1.2 0.5 - 1.4 mg/dL    Calcium 8.7 8.7 - 10.5 mg/dL    Anion Gap 10 8 - 16 mmol/L    eGFR >60.0 >60 mL/min/1.73 m^2   Type And Screen Preop   Result Value Ref Range    Group & Rh A POS     Indirect Aiden NEG    POCT glucose   Result Value Ref Range    POCT Glucose 160 (H) 70 - 110 mg/dL   POCT glucose   Result Value Ref Range    POCT Glucose 110 70 - 110 mg/dL   ISTAT ACT-K   Result Value Ref Range    POC ACTIVATED CLOTTING TIME K 227 (H) 74 - 137 sec    Sample ARTERIAL    ISTAT ACT-K   Result Value Ref Range    POC ACTIVATED CLOTTING TIME K 263 (H) 74 - 137 sec    Sample ARTERIAL          Assessment:       1. B12 deficiency    2. Seropositive rheumatoid arthritis    3. Immunocompromised state due to drug therapy    4. Encounter for long-term (current) use of  "NSAIDs    5. Vitamin B 12 deficiency    6. Vitamin D deficiency, unspecified     7. Chronic right shoulder pain                  Plan:       B12 deficiency  -     cyanocobalamin 1,000 mcg/mL injection; Inject 1 mL (1,000 mcg total) into the skin every 7 days.  Dispense: 10 mL; Refill: 3  -     syringe, disposable, 1 mL Syrg; 1 Syringe by Misc.(Non-Drug; Combo Route) route once a week.  Dispense: 25 each; Refill: 1  -     needle, disp, 30 gauge 30 gauge x 1/2" Ndle; 1 each by Misc.(Non-Drug; Combo Route) route once a week.  Dispense: 25 each; Refill: 3  -     blunt needle, disposable 18 x 1 1/2 " Ndle; 1 Syringe by Misc.(Non-Drug; Combo Route) route once a week.  Dispense: 25 each; Refill: 3  -     Sedimentation rate; Future; Expected date: 10/26/2023  -     C-Reactive Protein; Future; Expected date: 10/26/2023  -     Comprehensive Metabolic Panel; Future; Expected date: 10/26/2023  -     CBC Auto Differential; Future; Expected date: 10/26/2023  -     T4, Free; Future; Expected date: 10/26/2023  -     Thyroid Peroxidase Antibody; Future; Expected date: 10/26/2023  -     TSH; Future; Expected date: 10/26/2023  -     T3, Free; Future; Expected date: 10/26/2023    Seropositive rheumatoid arthritis  -     Sedimentation rate; Future; Expected date: 10/26/2023  -     C-Reactive Protein; Future; Expected date: 10/26/2023  -     Comprehensive Metabolic Panel; Future; Expected date: 10/26/2023  -     CBC Auto Differential; Future; Expected date: 10/26/2023  -     T4, Free; Future; Expected date: 10/26/2023  -     Thyroid Peroxidase Antibody; Future; Expected date: 10/26/2023  -     TSH; Future; Expected date: 10/26/2023  -     T3, Free; Future; Expected date: 10/26/2023    Immunocompromised state due to drug therapy  -     Sedimentation rate; Future; Expected date: 10/26/2023  -     C-Reactive Protein; Future; Expected date: 10/26/2023  -     Comprehensive Metabolic Panel; Future; Expected date: 10/26/2023  -     CBC Auto " Differential; Future; Expected date: 10/26/2023  -     T4, Free; Future; Expected date: 10/26/2023  -     Thyroid Peroxidase Antibody; Future; Expected date: 10/26/2023  -     TSH; Future; Expected date: 10/26/2023  -     T3, Free; Future; Expected date: 10/26/2023    Encounter for long-term (current) use of NSAIDs  -     Sedimentation rate; Future; Expected date: 10/26/2023  -     C-Reactive Protein; Future; Expected date: 10/26/2023  -     Comprehensive Metabolic Panel; Future; Expected date: 10/26/2023  -     CBC Auto Differential; Future; Expected date: 10/26/2023  -     T4, Free; Future; Expected date: 10/26/2023  -     Thyroid Peroxidase Antibody; Future; Expected date: 10/26/2023  -     TSH; Future; Expected date: 10/26/2023  -     T3, Free; Future; Expected date: 10/26/2023    Vitamin B 12 deficiency  -     Sedimentation rate; Future; Expected date: 10/26/2023  -     C-Reactive Protein; Future; Expected date: 10/26/2023  -     Comprehensive Metabolic Panel; Future; Expected date: 10/26/2023  -     CBC Auto Differential; Future; Expected date: 10/26/2023  -     T4, Free; Future; Expected date: 10/26/2023  -     Thyroid Peroxidase Antibody; Future; Expected date: 10/26/2023  -     TSH; Future; Expected date: 10/26/2023  -     T3, Free; Future; Expected date: 10/26/2023    Vitamin D deficiency, unspecified   -     Sedimentation rate; Future; Expected date: 10/26/2023  -     C-Reactive Protein; Future; Expected date: 10/26/2023  -     Comprehensive Metabolic Panel; Future; Expected date: 10/26/2023  -     CBC Auto Differential; Future; Expected date: 10/26/2023  -     T4, Free; Future; Expected date: 10/26/2023  -     Thyroid Peroxidase Antibody; Future; Expected date: 10/26/2023  -     TSH; Future; Expected date: 10/26/2023  -     T3, Free; Future; Expected date: 10/26/2023    Chronic right shoulder pain  -     Intermediate Joint Aspiration/Injection: R acromioclavicular              Assessment:  72 year old  male with  Seropositive (+CCP) rheumatoid arthritis, elevated CRP  --CKD stage 3, s/p total nephrectomy, followed by Nephrology   --secondary adrenal insufficiency on cortef followed by Endocrinology  --moderate AS, non-obstructive CAD  --chronic insomnia on lunesta  --hyperglycemia, Hgba1c 5.7%  --mild normocytic anemia  --history or prostate cancer    Plan:  1. Cont Simponi monthly  2. Cont. Diclofenac 75 mg daily, avoid additional NSAIDs  3. Cont. lunesta     Rt shoulder kenalog 40 mg no buvicaine      More than 50% of the  40 minute encounter was spent face to face counseling the patient regarding current status and future plan of care as well as side effects  of the medications. All questions were answered to patient's satisfaction also includes  non-face to face time preparing to see the patient (eg, review of tests), Obtaining and/or reviewing separately obtained history, Documenting clinical information in the electronic or other health record, Independently interpreting results

## 2023-10-27 ENCOUNTER — OFFICE VISIT (OUTPATIENT)
Dept: UROLOGY | Facility: CLINIC | Age: 73
End: 2023-10-27
Payer: MEDICARE

## 2023-10-27 VITALS — BODY MASS INDEX: 29.21 KG/M2 | WEIGHT: 215.63 LBS | HEIGHT: 72 IN

## 2023-10-27 DIAGNOSIS — N39.3 MALE STRESS INCONTINENCE: ICD-10-CM

## 2023-10-27 PROCEDURE — 1101F PT FALLS ASSESS-DOCD LE1/YR: CPT | Mod: CPTII,S$GLB,, | Performed by: UROLOGY

## 2023-10-27 PROCEDURE — 1159F MED LIST DOCD IN RCRD: CPT | Mod: CPTII,S$GLB,, | Performed by: UROLOGY

## 2023-10-27 PROCEDURE — 1126F AMNT PAIN NOTED NONE PRSNT: CPT | Mod: CPTII,S$GLB,, | Performed by: UROLOGY

## 2023-10-27 PROCEDURE — 1126F PR PAIN SEVERITY QUANTIFIED, NO PAIN PRESENT: ICD-10-PCS | Mod: CPTII,S$GLB,, | Performed by: UROLOGY

## 2023-10-27 PROCEDURE — 1111F PR DISCHARGE MEDS RECONCILED W/ CURRENT OUTPATIENT MED LIST: ICD-10-PCS | Mod: CPTII,S$GLB,, | Performed by: UROLOGY

## 2023-10-27 PROCEDURE — 99999 PR PBB SHADOW E&M-EST. PATIENT-LVL III: ICD-10-PCS | Mod: PBBFAC,,, | Performed by: UROLOGY

## 2023-10-27 PROCEDURE — 99203 PR OFFICE/OUTPT VISIT, NEW, LEVL III, 30-44 MIN: ICD-10-PCS | Mod: S$GLB,,, | Performed by: UROLOGY

## 2023-10-27 PROCEDURE — 1101F PR PT FALLS ASSESS DOC 0-1 FALLS W/OUT INJ PAST YR: ICD-10-PCS | Mod: CPTII,S$GLB,, | Performed by: UROLOGY

## 2023-10-27 PROCEDURE — 3008F BODY MASS INDEX DOCD: CPT | Mod: CPTII,S$GLB,, | Performed by: UROLOGY

## 2023-10-27 PROCEDURE — 99999 PR PBB SHADOW E&M-EST. PATIENT-LVL III: CPT | Mod: PBBFAC,,, | Performed by: UROLOGY

## 2023-10-27 PROCEDURE — 3288F FALL RISK ASSESSMENT DOCD: CPT | Mod: CPTII,S$GLB,, | Performed by: UROLOGY

## 2023-10-27 PROCEDURE — 3288F PR FALLS RISK ASSESSMENT DOCUMENTED: ICD-10-PCS | Mod: CPTII,S$GLB,, | Performed by: UROLOGY

## 2023-10-27 PROCEDURE — 1159F PR MEDICATION LIST DOCUMENTED IN MEDICAL RECORD: ICD-10-PCS | Mod: CPTII,S$GLB,, | Performed by: UROLOGY

## 2023-10-27 PROCEDURE — 1111F DSCHRG MED/CURRENT MED MERGE: CPT | Mod: CPTII,S$GLB,, | Performed by: UROLOGY

## 2023-10-27 PROCEDURE — 99203 OFFICE O/P NEW LOW 30 MIN: CPT | Mod: S$GLB,,, | Performed by: UROLOGY

## 2023-10-27 PROCEDURE — 3008F PR BODY MASS INDEX (BMI) DOCUMENTED: ICD-10-PCS | Mod: CPTII,S$GLB,, | Performed by: UROLOGY

## 2023-10-27 NOTE — PROGRESS NOTES
Subjective:       Patient ID: Aurelio Perkins is a 72 y.o. male.    Chief Complaint: discuss surg options    HPI    72-year-old with a distant history of prostate cancer.  He originally was treated with brachytherapy and then underwent salvage prostatectomy.  His PSA is undetectable.  He had severe urinary incontinence postop.  He underwent artificial urinary sphincter placement which was effective initially but then his incontinence returned.  The device was then revised.  The details are unknown but apparently there was no improvement in his urinary incontinence.  He has lived with severe incontinence for the last 20 years.  He uses a external catheter.  He had a recent heart valve repair.  He is now interested in revisiting surgical treatment options for post prostatectomy incontinence.  We discussed sphincter revision and possible causes for failure.      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    Ptosis of right eyelid 5/29/2019    S/P craniotomy 6/17/2019     Past Surgical History:   Procedure Laterality Date    CARDIAC CATH COSURGEON N/A 10/19/2023    Procedure: Cardiac Cath Cosurgeon;  Surgeon: Valente Garcia MD;  Location: [facility] CATH LAB;  Service: Cardiology;  Laterality: N/A;    COLONOSCOPY N/A 3/29/2022    Procedure: COLONOSCOPY;  Surgeon: Cayla Sherman MD;  Location: [facility] ENDO;  Service: Endoscopy;  Laterality: N/A;    CORONARY ANGIOGRAPHY N/A 9/13/2023    Procedure: Angiogram, Coronary;  Surgeon: Santy Obregon MD;  Location: [facility] CATH LAB;  Service: Cardiology;  Laterality: N/A;    CRANIOTOMY Right 5/29/2019    Procedure: CRANIOTOMY  (Right frontotemporal  "craniotomy for resection of cavernous sinus meningioma)  Co-surgery with Dr Vaibhav Jara - please put in his room  [facility] [place] [facility];  Surgeon: Jimmy Segura DO;  Location: [address];  Service: Neurosurgery;  Laterality: Right;    HEMORRHOID SURGERY      NEPHRECTOMY      PROSTATECTOMY      TONSILLECTOMY      TRANSCATHETER AORTIC VALVE REPLACEMENT (TAVR) N/A 10/19/2023    Procedure: REPLACEMENT, AORTIC VALVE, TRANSCATHETER (TAVR);  Surgeon: Santy Obregon MD;  Location: [facility] CATH LAB;  Service: Cardiology;  Laterality: N/A;    TRANSCATHETER AORTIC VALVE REPLACEMENT (TAVR)  10/19/2023    Procedure: REPLACEMENT, AORTIC VALVE, TRANSCATHETER (TAVR);  Surgeon: Valente Garcia MD;  Location: [facility] CATH LAB;  Service: Cardiology;;       Current Outpatient Medications:     aspirin (ECOTRIN) 81 MG EC tablet, Take 1 tablet (81 mg total) by mouth once daily., Disp: 90 tablet, Rfl: 3    blunt needle, disposable 18 x 1 1/2 " Ndle, 1 Syringe by Misc.(Non-Drug; Combo Route) route once a week., Disp: 25 each, Rfl: 3    cyanocobalamin 1,000 mcg/mL injection, Inject 1 mL (1,000 mcg total) into the skin every 7 days., Disp: 10 mL, Rfl: 3    diclofenac (VOLTAREN) 75 MG EC tablet, Take 1 tablet (75 mg total) by mouth daily as needed., Disp: 90 tablet, Rfl: 1    diclofenac sodium (VOLTAREN) 1 % Gel, Apply topically., Disp: , Rfl:     ergocalciferol (ERGOCALCIFEROL) 50,000 unit Cap, TAKE 1 CAPSULE BY MOUTH ONE TIME PER WEEK, Disp: 4 capsule, Rfl: 12    eszopiclone (LUNESTA) 3 mg Tab, TAKE 1 TABLET BY MOUTH EVERY DAY IN THE EVENING, Disp: 30 tablet, Rfl: 3    golimumab (SIMPONI) 50 mg/0.5 mL PnIj, Inject 50 mg into the skin every 30 days., Disp: 0.5 mL, Rfl: 12    hydrocortisone (CORTEF) 10 MG Tab, TAKE 1 AND 1/2 TABLETS (15 mg) BY MOUTH IN THE MORNING AND 1 TABLET (10 mg) BY MOUTH IN THE EVENING AT 4PM, Disp: 75 tablet, Rfl: 11    needle, disp, 30 gauge 30 gauge x 1/2" Ndle, 1 each by " "Misc.(Non-Drug; Combo Route) route once a week., Disp: 25 each, Rfl: 3    nitroGLYCERIN (NITROSTAT) 0.4 MG SL tablet, Place 0.4 mg under the tongue., Disp: , Rfl:     REPATHA PUSHTRONEX 420 mg/3.5 mL Injt, INJECT 3.5 MLS INTO THE SKIN EVERY 30 DAYS., Disp: 10.5 mL, Rfl: 3    safety needles (BD SAFETYGLIDE NEEDLE) 25 gauge x 1" Ndle, 1 each by Misc.(Non-Drug; Combo Route) route every 7 days., Disp: 25 each, Rfl: 0    SYNTHROID 112 mcg tablet, Take 1 tablet (112 mcg total) by mouth once daily., Disp: 90 tablet, Rfl: 3    syringe, disposable, 1 mL Syrg, 1 Syringe by Misc.(Non-Drug; Combo Route) route once a week., Disp: 25 each, Rfl: 1    testosterone cypionate (DEPOTESTOTERONE CYPIONATE) 200 mg/mL injection, Inject 0.38 mLs (76 mg total) into the muscle every 7 days. (Patient taking differently: Inject 75 mg into the muscle every 14 (fourteen) days.), Disp: 2 mL, Rfl: 5  No current facility-administered medications for this visit.    Facility-Administered Medications Ordered in Other Visits:     0.9%  NaCl infusion, , Intravenous, Continuous, Santy Obregon MD, Last Rate: 125 mL/hr at 09/13/23 1234, New Bag at 09/13/23 1234    0.9%  NaCl infusion, , Intravenous, Continuous, Santy Obregon MD    diphenhydrAMINE capsule 50 mg, 50 mg, Oral, Once, Santy Obregon MD      Review of Systems   Constitutional:  Negative for fever.   Genitourinary:  Negative for dysuria and hematuria.       Objective:      Physical Exam  Vitals reviewed.   Constitutional:       Appearance: He is well-developed.   Pulmonary:      Effort: Pulmonary effort is normal.   Skin:     Findings: No rash.   Neurological:      Mental Status: He is alert and oriented to person, place, and time.         Assessment:       1. Male stress incontinence        Plan:       Male stress incontinence  -     Ambulatory referral/consult to Urology      He is interested in considering revision of his AUS.  I recommend he discuss this with subspecialist.  " Follow-up with Dr. Grijalva

## 2023-10-31 NOTE — OP NOTE
DATE OF PROCEDURE: 10/19/2023    ATTENDING SURGEONS: Santy Obregon M.D and Valente Garcia M.D.    PREOPERATIVE DIAGNOSES:  1. Severe aortic stenosis.    POSTOPERATIVE DIAGNOSES:  1. Severe aortic stenosis      OPERATION PERFORMED: Transcatheter aortic valve insertion via right transfemoral   approach using a 27 mm Navitor valve    ANESTHESIA: General.    ESTIMATED BLOOD LOSS: 10 mL.    BRIEF HISTORY: This patient is a 72-year-old man with severe aortic stenosis.  The patient has had progressive dyspnea on exertion. This prompted a thoughtful and thorough evaluation, which demonstrated severe aortic stenosis. Given the comorbid conditions, a transcatheter valve insertion was recommended. The patient now presents for transcatheter aortic valve insertion.    PROCEDURE: After obtaining informed and written consent, the patient was   brought to the cath lab and placed on the cath table in supine position. After   induction of adequate anesthesia, a transvenous pacing wire was placed.   Bilateral femoral arterial and femoral venous access was obtained.  A wire was advanced across the aortic valve. It was exchanged for stiff wire, and  a  18 mm true balloon was placed. Under rapid ventricular pacing, balloon valvuloplasty was performed. The balloon was then withdrawn, and a 27 mm Navitor valve was advanced to the level of the aortic annulus. Once the team was satisfied that the valve was in proper position, it was deployed. Excellent positioning was obtained. Post-procedure echo demonstrated no aortic insufficiency. The femoral access sites were controlled using percutaneous techniques. The patient tolerated the procedure well, there were no complications. At the conclusion of the case, sponge and instrument counts were correct.

## 2023-11-03 LAB
ALBUMIN SERPL-MCNC: 4.1 G/DL (ref 3.6–5.1)
SHBG SERPL-SCNC: 33 NMOL/L (ref 22–77)
TESTOST FREE SERPL-MCNC: 19.2 PG/ML (ref 6–73)
TESTOST SERPL-MCNC: 155 NG/DL (ref 250–1100)
TESTOSTERONE.FREE+WB SERPL-MCNC: 36.2 NG/DL (ref 15–150)

## 2023-11-05 RX ORDER — TRIAMCINOLONE ACETONIDE 40 MG/ML
40 INJECTION, SUSPENSION INTRA-ARTICULAR; INTRAMUSCULAR
Status: DISCONTINUED | OUTPATIENT
Start: 2023-10-26 | End: 2023-11-05 | Stop reason: HOSPADM

## 2023-11-06 NOTE — PROCEDURES
Intermediate Joint Aspiration/Injection: R acromioclavicular    Date/Time: 10/26/2023 10:00 AM    Performed by: Yoav Oliveira MD  Authorized by: Yoav Oliveira MD    Consent Done?:  Yes (Verbal)  Indications:  Arthritis  Site marked: The procedure site was marked      Location:  Shoulder  Site:  R acromioclavicular  Needle size:  25 G  Approach:  Posterior  Medications:  40 mg triamcinolone acetonide 40 mg/mL     Additional Comments: After verbal consent and cleansing with betadine and alcohol, skin was numbed with ethylene chloride spray 2cc 1% lidocaine was injected into to right shoulder after waiting 45 sec  Shoulder was injected with Kenalog 40mg with 1 ml 1 % lidocaine. Patient tolerated procedure well.

## 2023-11-07 ENCOUNTER — PATIENT MESSAGE (OUTPATIENT)
Dept: ENDOCRINOLOGY | Facility: CLINIC | Age: 73
End: 2023-11-07
Payer: MEDICARE

## 2023-11-15 ENCOUNTER — PATIENT MESSAGE (OUTPATIENT)
Dept: FAMILY MEDICINE | Facility: CLINIC | Age: 73
End: 2023-11-15
Payer: MEDICARE

## 2023-11-16 ENCOUNTER — OFFICE VISIT (OUTPATIENT)
Dept: FAMILY MEDICINE | Facility: CLINIC | Age: 73
End: 2023-11-16
Payer: MEDICARE

## 2023-11-16 ENCOUNTER — HOSPITAL ENCOUNTER (OUTPATIENT)
Dept: RADIOLOGY | Facility: HOSPITAL | Age: 73
Discharge: HOME | End: 2023-11-16
Attending: NURSE PRACTITIONER
Payer: MEDICARE

## 2023-11-16 VITALS
BODY MASS INDEX: 29.36 KG/M2 | DIASTOLIC BLOOD PRESSURE: 78 MMHG | SYSTOLIC BLOOD PRESSURE: 146 MMHG | TEMPERATURE: 98 F | WEIGHT: 216.81 LBS | HEART RATE: 68 BPM | HEIGHT: 72 IN | RESPIRATION RATE: 18 BRPM

## 2023-11-16 DIAGNOSIS — D69.6 THROMBOCYTOPENIA, UNSPECIFIED: ICD-10-CM

## 2023-11-16 DIAGNOSIS — M54.41 ACUTE MIDLINE LOW BACK PAIN WITH RIGHT-SIDED SCIATICA: ICD-10-CM

## 2023-11-16 DIAGNOSIS — M54.41 ACUTE MIDLINE LOW BACK PAIN WITH RIGHT-SIDED SCIATICA: Primary | ICD-10-CM

## 2023-11-16 PROCEDURE — 99999 PR PBB SHADOW E&M-EST. PATIENT-LVL V: ICD-10-PCS | Mod: PBBFAC,,, | Performed by: NURSE PRACTITIONER

## 2023-11-16 PROCEDURE — 3288F FALL RISK ASSESSMENT DOCD: CPT | Mod: CPTII,S$GLB,, | Performed by: NURSE PRACTITIONER

## 2023-11-16 PROCEDURE — 3077F PR MOST RECENT SYSTOLIC BLOOD PRESSURE >= 140 MM HG: ICD-10-PCS | Mod: CPTII,S$GLB,, | Performed by: NURSE PRACTITIONER

## 2023-11-16 PROCEDURE — 3077F SYST BP >= 140 MM HG: CPT | Mod: CPTII,S$GLB,, | Performed by: NURSE PRACTITIONER

## 2023-11-16 PROCEDURE — 1111F PR DISCHARGE MEDS RECONCILED W/ CURRENT OUTPATIENT MED LIST: ICD-10-PCS | Mod: CPTII,S$GLB,, | Performed by: NURSE PRACTITIONER

## 2023-11-16 PROCEDURE — 3078F PR MOST RECENT DIASTOLIC BLOOD PRESSURE < 80 MM HG: ICD-10-PCS | Mod: CPTII,S$GLB,, | Performed by: NURSE PRACTITIONER

## 2023-11-16 PROCEDURE — 1125F PR PAIN SEVERITY QUANTIFIED, PAIN PRESENT: ICD-10-PCS | Mod: CPTII,S$GLB,, | Performed by: NURSE PRACTITIONER

## 2023-11-16 PROCEDURE — 1160F PR REVIEW ALL MEDS BY PRESCRIBER/CLIN PHARMACIST DOCUMENTED: ICD-10-PCS | Mod: CPTII,S$GLB,, | Performed by: NURSE PRACTITIONER

## 2023-11-16 PROCEDURE — 1159F PR MEDICATION LIST DOCUMENTED IN MEDICAL RECORD: ICD-10-PCS | Mod: CPTII,S$GLB,, | Performed by: NURSE PRACTITIONER

## 2023-11-16 PROCEDURE — 3288F PR FALLS RISK ASSESSMENT DOCUMENTED: ICD-10-PCS | Mod: CPTII,S$GLB,, | Performed by: NURSE PRACTITIONER

## 2023-11-16 PROCEDURE — 99999 PR PBB SHADOW E&M-EST. PATIENT-LVL V: CPT | Mod: PBBFAC,,, | Performed by: NURSE PRACTITIONER

## 2023-11-16 PROCEDURE — 3008F PR BODY MASS INDEX (BMI) DOCUMENTED: ICD-10-PCS | Mod: CPTII,S$GLB,, | Performed by: NURSE PRACTITIONER

## 2023-11-16 PROCEDURE — 1111F DSCHRG MED/CURRENT MED MERGE: CPT | Mod: CPTII,S$GLB,, | Performed by: NURSE PRACTITIONER

## 2023-11-16 PROCEDURE — 1101F PT FALLS ASSESS-DOCD LE1/YR: CPT | Mod: CPTII,S$GLB,, | Performed by: NURSE PRACTITIONER

## 2023-11-16 PROCEDURE — 72100 XR LUMBAR SPINE AP AND LATERAL: ICD-10-PCS | Mod: 26,,, | Performed by: RADIOLOGY

## 2023-11-16 PROCEDURE — 96372 THER/PROPH/DIAG INJ SC/IM: CPT | Mod: S$GLB,,, | Performed by: NURSE PRACTITIONER

## 2023-11-16 PROCEDURE — 1160F RVW MEDS BY RX/DR IN RCRD: CPT | Mod: CPTII,S$GLB,, | Performed by: NURSE PRACTITIONER

## 2023-11-16 PROCEDURE — 3078F DIAST BP <80 MM HG: CPT | Mod: CPTII,S$GLB,, | Performed by: NURSE PRACTITIONER

## 2023-11-16 PROCEDURE — 99214 OFFICE O/P EST MOD 30 MIN: CPT | Mod: 25,S$GLB,, | Performed by: NURSE PRACTITIONER

## 2023-11-16 PROCEDURE — 3008F BODY MASS INDEX DOCD: CPT | Mod: CPTII,S$GLB,, | Performed by: NURSE PRACTITIONER

## 2023-11-16 PROCEDURE — 72100 X-RAY EXAM L-S SPINE 2/3 VWS: CPT | Mod: 26,,, | Performed by: RADIOLOGY

## 2023-11-16 PROCEDURE — 1125F AMNT PAIN NOTED PAIN PRSNT: CPT | Mod: CPTII,S$GLB,, | Performed by: NURSE PRACTITIONER

## 2023-11-16 PROCEDURE — 96372 PR INJECTION,THERAP/PROPH/DIAG2ST, IM OR SUBCUT: ICD-10-PCS | Mod: S$GLB,,, | Performed by: NURSE PRACTITIONER

## 2023-11-16 PROCEDURE — 1101F PR PT FALLS ASSESS DOC 0-1 FALLS W/OUT INJ PAST YR: ICD-10-PCS | Mod: CPTII,S$GLB,, | Performed by: NURSE PRACTITIONER

## 2023-11-16 PROCEDURE — 72100 X-RAY EXAM L-S SPINE 2/3 VWS: CPT | Mod: TC,PO

## 2023-11-16 PROCEDURE — 99214 PR OFFICE/OUTPT VISIT, EST, LEVL IV, 30-39 MIN: ICD-10-PCS | Mod: 25,S$GLB,, | Performed by: NURSE PRACTITIONER

## 2023-11-16 PROCEDURE — 1159F MED LIST DOCD IN RCRD: CPT | Mod: CPTII,S$GLB,, | Performed by: NURSE PRACTITIONER

## 2023-11-16 RX ORDER — TIZANIDINE 2 MG/1
2 TABLET ORAL EVERY 8 HOURS PRN
Qty: 30 TABLET | Refills: 0 | Status: SHIPPED | OUTPATIENT
Start: 2023-11-16 | End: 2023-11-26

## 2023-11-16 RX ORDER — DEXAMETHASONE SODIUM PHOSPHATE 4 MG/ML
8 INJECTION, SOLUTION INTRA-ARTICULAR; INTRALESIONAL; INTRAMUSCULAR; INTRAVENOUS; SOFT TISSUE ONCE
Status: COMPLETED | OUTPATIENT
Start: 2023-11-16 | End: 2023-11-16

## 2023-11-16 RX ADMIN — DEXAMETHASONE SODIUM PHOSPHATE 8 MG: 4 INJECTION, SOLUTION INTRA-ARTICULAR; INTRALESIONAL; INTRAMUSCULAR; INTRAVENOUS; SOFT TISSUE at 10:11

## 2023-11-16 NOTE — PROGRESS NOTES
Subjective:       Patient ID: Aurelio Perkins is a 72 y.o. male.    Chief Complaint: sciatica (Pt felt a pain radiate down his right left making it non-weight bearing on Monday. Now he has pain in his lower lumbar area as well)    Leg Pain   The incident occurred 5 to 7 days ago. There was no injury mechanism. The pain is present in the right leg. The quality of the pain is described as aching and burning. The pain is at a severity of 7/10. The pain has been Constant since onset. Associated symptoms include muscle weakness and tingling. The symptoms are aggravated by weight bearing. He has tried NSAIDs and acetaminophen for the symptoms. The treatment provided no relief.     X-Ray Lumbar Spine AP And Lateral  Narrative: EXAM: XR LUMBAR SPINE AP AND LATERAL    CLINICAL HISTORY: Back pain with sciatica    FINDINGS:  3 views.   Normal alignment.     No fractures or dislocations.     Vertebral body heights appear normal.     Moderate to severe degenerative disc change at L1-2.  Mild disc space narrowing and endplate spurring at L2-3.  Impression: Moderate severe degenerative disc changes at L1-2.  Mild disc space narrowing and endplate spurring at L2-3.    Finalized on: 11/16/2023 11:24 AM By:  Eric Marmolejo MD  [facility]# [id number]      2023-11-16 11:26:47.192    [facility]        Thrombocytopenia, unspecified  Lab Results   Component Value Date    WBC 15.83 (H) 10/20/2023    HGB 11.3 (L) 10/20/2023    HCT 33.3 (L) 10/20/2023    MCV 86 10/20/2023    PLT 88 (L) 10/20/2023           Review of Systems   Constitutional:  Negative for fatigue, fever and unexpected weight change.   HENT:  Negative for ear pain and sore throat.    Eyes: Negative.  Negative for pain and visual disturbance.   Respiratory:  Negative for cough and shortness of breath.    Cardiovascular:  Negative for chest pain and palpitations.   Gastrointestinal:  Negative for abdominal pain, diarrhea, nausea and vomiting.   Genitourinary:  Negative for dysuria and  "frequency.   Musculoskeletal:  Positive for gait problem. Negative for arthralgias and myalgias.   Skin:  Negative for color change and rash.   Neurological:  Positive for tingling. Negative for dizziness and headaches.   Psychiatric/Behavioral:  Negative for sleep disturbance. The patient is not nervous/anxious.        Vitals:    11/16/23 0941   BP: (!) 146/78   Pulse: 68   Resp: 18   Temp: 97.9 °F (36.6 °C)       Objective:     Current Outpatient Medications   Medication Sig Dispense Refill    blunt needle, disposable 18 x 1 1/2 " Ndle 1 Syringe by Misc.(Non-Drug; Combo Route) route once a week. 25 each 3    cyanocobalamin 1,000 mcg/mL injection Inject 1 mL (1,000 mcg total) into the skin every 7 days. 10 mL 3    diclofenac (VOLTAREN) 75 MG EC tablet Take 1 tablet (75 mg total) by mouth daily as needed. 90 tablet 1    diclofenac sodium (VOLTAREN) 1 % Gel Apply topically.      ergocalciferol (ERGOCALCIFEROL) 50,000 unit Cap TAKE 1 CAPSULE BY MOUTH ONE TIME PER WEEK 4 capsule 12    eszopiclone (LUNESTA) 3 mg Tab TAKE 1 TABLET BY MOUTH EVERY DAY IN THE EVENING 30 tablet 3    hydrocortisone (CORTEF) 10 MG Tab TAKE 1 AND 1/2 TABLETS (15 mg) BY MOUTH IN THE MORNING AND 1 TABLET (10 mg) BY MOUTH IN THE EVENING AT 4PM 75 tablet 11    needle, disp, 30 gauge 30 gauge x 1/2" Ndle 1 each by Misc.(Non-Drug; Combo Route) route once a week. 25 each 3    nitroGLYCERIN (NITROSTAT) 0.4 MG SL tablet Place 0.4 mg under the tongue.      REPATHA PUSHTRONEX 420 mg/3.5 mL Injt INJECT 3.5 MLS INTO THE SKIN EVERY 30 DAYS. 10.5 mL 3    safety needles (BD SAFETYGLIDE NEEDLE) 25 gauge x 1" Ndle 1 each by Misc.(Non-Drug; Combo Route) route every 7 days. 25 each 0    SYNTHROID 112 mcg tablet Take 1 tablet (112 mcg total) by mouth once daily. 90 tablet 3    syringe, disposable, 1 mL Syrg 1 Syringe by Misc.(Non-Drug; Combo Route) route once a week. 25 each 1    testosterone cypionate (DEPOTESTOTERONE CYPIONATE) 200 mg/mL injection Inject 0.38 mLs " (76 mg total) into the muscle every 7 days. (Patient taking differently: Inject 75 mg into the muscle every 14 (fourteen) days.) 2 mL 5    aspirin (ECOTRIN) 81 MG EC tablet Take 1 tablet (81 mg total) by mouth once daily. 90 tablet 3    golimumab (SIMPONI) 50 mg/0.5 mL PnIj Inject 50 mg into the skin every 30 days. 0.5 mL 12    tiZANidine (ZANAFLEX) 2 MG tablet Take 1 tablet (2 mg total) by mouth every 8 (eight) hours as needed (muscle pain). 30 tablet 0     No current facility-administered medications for this visit.     Facility-Administered Medications Ordered in Other Visits   Medication Dose Route Frequency Provider Last Rate Last Admin    0.9%  NaCl infusion   Intravenous Continuous Santy Obregon  mL/hr at 09/13/23 1234 New Bag at 09/13/23 1234    0.9%  NaCl infusion   Intravenous Continuous Santy Obregon MD        diphenhydrAMINE capsule 50 mg  50 mg Oral Once Santy Obregon MD           Physical Exam  Constitutional:       Appearance: He is well-developed.   HENT:      Head: Normocephalic.   Pulmonary:      Effort: Pulmonary effort is normal. No respiratory distress.   Musculoskeletal:      Cervical back: Normal range of motion.      Lumbar back: Tenderness (right SI) present.   Neurological:      Mental Status: He is alert and oriented to person, place, and time.   Psychiatric:         Thought Content: Thought content normal.         Judgment: Judgment normal.         Assessment:       1. Acute midline low back pain with right-sided sciatica    2. Thrombocytopenia, unspecified        Plan:   Acute midline low back pain with right-sided sciatica  -     X-Ray Lumbar Spine AP And Lateral; Future; Expected date: 11/16/2023  -     dexAMETHasone injection 8 mg    Thrombocytopenia, unspecified  Continue to monitor    Other orders  -     tiZANidine (ZANAFLEX) 2 MG tablet; Take 1 tablet (2 mg total) by mouth every 8 (eight) hours as needed (muscle pain).  Dispense: 30 tablet; Refill: 0        No  follow-ups on file.    There are no Patient Instructions on file for this visit.

## 2023-11-21 ENCOUNTER — HOSPITAL ENCOUNTER (OUTPATIENT)
Dept: CARDIOLOGY | Facility: HOSPITAL | Age: 73
Discharge: HOME | End: 2023-11-21
Attending: INTERNAL MEDICINE
Payer: MEDICARE

## 2023-11-21 ENCOUNTER — OFFICE VISIT (OUTPATIENT)
Dept: CARDIOLOGY | Facility: CLINIC | Age: 73
End: 2023-11-21
Payer: MEDICARE

## 2023-11-21 VITALS
SYSTOLIC BLOOD PRESSURE: 130 MMHG | HEIGHT: 72 IN | HEART RATE: 74 BPM | WEIGHT: 215 LBS | BODY MASS INDEX: 29.12 KG/M2 | DIASTOLIC BLOOD PRESSURE: 82 MMHG

## 2023-11-21 VITALS
WEIGHT: 220.25 LBS | SYSTOLIC BLOOD PRESSURE: 153 MMHG | HEART RATE: 82 BPM | HEIGHT: 72 IN | BODY MASS INDEX: 29.83 KG/M2 | DIASTOLIC BLOOD PRESSURE: 72 MMHG

## 2023-11-21 DIAGNOSIS — Z95.3 STATUS POST TRANSCATHETER AORTIC VALVE REPLACEMENT (TAVR) USING BIOPROSTHESIS: Primary | ICD-10-CM

## 2023-11-21 DIAGNOSIS — Z78.9 STATIN INTOLERANCE: Chronic | ICD-10-CM

## 2023-11-21 DIAGNOSIS — Z95.3 STATUS POST TRANSCATHETER AORTIC VALVE REPLACEMENT (TAVR) USING BIOPROSTHESIS: ICD-10-CM

## 2023-11-21 DIAGNOSIS — I70.0 ABDOMINAL AORTIC ATHEROSCLEROSIS: Chronic | ICD-10-CM

## 2023-11-21 DIAGNOSIS — E78.2 MIXED HYPERLIPIDEMIA: Chronic | ICD-10-CM

## 2023-11-21 DIAGNOSIS — Z95.2 HISTORY OF TRANSCATHETER AORTIC VALVE REPLACEMENT (TAVR): Primary | ICD-10-CM

## 2023-11-21 DIAGNOSIS — I10 ESSENTIAL HYPERTENSION: Chronic | ICD-10-CM

## 2023-11-21 LAB
ASCENDING AORTA: 3.7 CM
AV INDEX (PROSTH): 0.37
AV MEAN GRADIENT: 18 MMHG
AV PEAK GRADIENT: 29 MMHG
AV VALVE AREA BY VELOCITY RATIO: 1.4 CM²
AV VALVE AREA: 1.33 CM²
AV VELOCITY RATIO: 0.39
BSA FOR ECHO PROCEDURE: 2.23 M2
CV ECHO LV RWT: 0.35 CM
DOP CALC AO PEAK VEL: 2.68 M/S
DOP CALC AO VTI: 61.67 CM
DOP CALC LVOT AREA: 3.6 CM2
DOP CALC LVOT DIAMETER: 2.13 CM
DOP CALC LVOT PEAK VEL: 1.05 M/S
DOP CALC LVOT STROKE VOLUME: 82.27 CM3
DOP CALCLVOT PEAK VEL VTI: 23.1 CM
E WAVE DECELERATION TIME: 238.41 MSEC
E/A RATIO: 0.95
E/E' RATIO: 12.71 M/S
ECHO LV POSTERIOR WALL: 0.91 CM (ref 0.6–1.1)
EJECTION FRACTION: 65 %
FRACTIONAL SHORTENING: 38 % (ref 28–44)
INTERVENTRICULAR SEPTUM: 0.79 CM (ref 0.6–1.1)
LA MAJOR: 5.54 CM
LA MINOR: 5.4 CM
LA WIDTH: 3.99 CM
LEFT ATRIUM SIZE: 4.73 CM
LEFT ATRIUM VOLUME INDEX MOD: 22.3 ML/M2
LEFT ATRIUM VOLUME INDEX: 39.9 ML/M2
LEFT ATRIUM VOLUME MOD: 48.99 CM3
LEFT ATRIUM VOLUME: 87.73 CM3
LEFT INTERNAL DIMENSION IN SYSTOLE: 3.24 CM (ref 2.1–4)
LEFT VENTRICLE DIASTOLIC VOLUME INDEX: 59.33 ML/M2
LEFT VENTRICLE DIASTOLIC VOLUME: 130.53 ML
LEFT VENTRICLE MASS INDEX: 72 G/M2
LEFT VENTRICLE SYSTOLIC VOLUME INDEX: 19.2 ML/M2
LEFT VENTRICLE SYSTOLIC VOLUME: 42.34 ML
LEFT VENTRICULAR INTERNAL DIMENSION IN DIASTOLE: 5.22 CM (ref 3.5–6)
LEFT VENTRICULAR MASS: 157.96 G
LV LATERAL E/E' RATIO: 12 M/S
LV SEPTAL E/E' RATIO: 13.5 M/S
MV A" WAVE DURATION": 7.61 MSEC
MV PEAK A VEL: 1.14 M/S
MV PEAK E VEL: 1.08 M/S
MV STENOSIS PRESSURE HALF TIME: 69.14 MS
MV VALVE AREA P 1/2 METHOD: 3.18 CM2
PISA TR MAX VEL: 2.1 M/S
PULM VEIN S/D RATIO: 0.91
PV PEAK D VEL: 0.81 M/S
PV PEAK S VEL: 0.74 M/S
RA MAJOR: 5.36 CM
RA PRESSURE ESTIMATED: 3 MMHG
RA WIDTH: 4.09 CM
RIGHT VENTRICULAR END-DIASTOLIC DIMENSION: 4 CM
RV TB RVSP: 5 MMHG
SINUS: 3.41 CM
STJ: 3.19 CM
TDI LATERAL: 0.09 M/S
TDI SEPTAL: 0.08 M/S
TDI: 0.09 M/S
TR MAX PG: 18 MMHG
TRICUSPID ANNULAR PLANE SYSTOLIC EXCURSION: 2.14 CM
TV REST PULMONARY ARTERY PRESSURE: 21 MMHG
Z-SCORE OF LEFT VENTRICULAR DIMENSION IN END DIASTOLE: -3.64
Z-SCORE OF LEFT VENTRICULAR DIMENSION IN END SYSTOLE: -2.71

## 2023-11-21 PROCEDURE — 1125F PR PAIN SEVERITY QUANTIFIED, PAIN PRESENT: ICD-10-PCS | Mod: CPTII,S$GLB,, | Performed by: INTERNAL MEDICINE

## 2023-11-21 PROCEDURE — 1159F MED LIST DOCD IN RCRD: CPT | Mod: CPTII,S$GLB,, | Performed by: INTERNAL MEDICINE

## 2023-11-21 PROCEDURE — 3288F PR FALLS RISK ASSESSMENT DOCUMENTED: ICD-10-PCS | Mod: CPTII,S$GLB,, | Performed by: INTERNAL MEDICINE

## 2023-11-21 PROCEDURE — 3078F DIAST BP <80 MM HG: CPT | Mod: CPTII,S$GLB,, | Performed by: INTERNAL MEDICINE

## 2023-11-21 PROCEDURE — 3008F BODY MASS INDEX DOCD: CPT | Mod: CPTII,S$GLB,, | Performed by: INTERNAL MEDICINE

## 2023-11-21 PROCEDURE — 3077F PR MOST RECENT SYSTOLIC BLOOD PRESSURE >= 140 MM HG: ICD-10-PCS | Mod: CPTII,S$GLB,, | Performed by: INTERNAL MEDICINE

## 2023-11-21 PROCEDURE — 99999 PR PBB SHADOW E&M-EST. PATIENT-LVL IV: CPT | Mod: PBBFAC,,, | Performed by: INTERNAL MEDICINE

## 2023-11-21 PROCEDURE — 3077F SYST BP >= 140 MM HG: CPT | Mod: CPTII,S$GLB,, | Performed by: INTERNAL MEDICINE

## 2023-11-21 PROCEDURE — 3288F FALL RISK ASSESSMENT DOCD: CPT | Mod: CPTII,S$GLB,, | Performed by: INTERNAL MEDICINE

## 2023-11-21 PROCEDURE — 1125F AMNT PAIN NOTED PAIN PRSNT: CPT | Mod: CPTII,S$GLB,, | Performed by: INTERNAL MEDICINE

## 2023-11-21 PROCEDURE — 99215 PR OFFICE/OUTPT VISIT, EST, LEVL V, 40-54 MIN: ICD-10-PCS | Mod: S$GLB,,, | Performed by: INTERNAL MEDICINE

## 2023-11-21 PROCEDURE — 93306 TTE W/DOPPLER COMPLETE: CPT | Mod: 26,,, | Performed by: INTERNAL MEDICINE

## 2023-11-21 PROCEDURE — 99999 PR PBB SHADOW E&M-EST. PATIENT-LVL IV: ICD-10-PCS | Mod: PBBFAC,,, | Performed by: INTERNAL MEDICINE

## 2023-11-21 PROCEDURE — 1159F PR MEDICATION LIST DOCUMENTED IN MEDICAL RECORD: ICD-10-PCS | Mod: CPTII,S$GLB,, | Performed by: INTERNAL MEDICINE

## 2023-11-21 PROCEDURE — 93306 TTE W/DOPPLER COMPLETE: CPT

## 2023-11-21 PROCEDURE — 1160F RVW MEDS BY RX/DR IN RCRD: CPT | Mod: CPTII,S$GLB,, | Performed by: INTERNAL MEDICINE

## 2023-11-21 PROCEDURE — 1101F PT FALLS ASSESS-DOCD LE1/YR: CPT | Mod: CPTII,S$GLB,, | Performed by: INTERNAL MEDICINE

## 2023-11-21 PROCEDURE — 93306 ECHO (CUPID ONLY): ICD-10-PCS | Mod: 26,,, | Performed by: INTERNAL MEDICINE

## 2023-11-21 PROCEDURE — 99215 OFFICE O/P EST HI 40 MIN: CPT | Mod: S$GLB,,, | Performed by: INTERNAL MEDICINE

## 2023-11-21 PROCEDURE — 1101F PR PT FALLS ASSESS DOC 0-1 FALLS W/OUT INJ PAST YR: ICD-10-PCS | Mod: CPTII,S$GLB,, | Performed by: INTERNAL MEDICINE

## 2023-11-21 PROCEDURE — 1160F PR REVIEW ALL MEDS BY PRESCRIBER/CLIN PHARMACIST DOCUMENTED: ICD-10-PCS | Mod: CPTII,S$GLB,, | Performed by: INTERNAL MEDICINE

## 2023-11-21 PROCEDURE — 3008F PR BODY MASS INDEX (BMI) DOCUMENTED: ICD-10-PCS | Mod: CPTII,S$GLB,, | Performed by: INTERNAL MEDICINE

## 2023-11-21 PROCEDURE — 3078F PR MOST RECENT DIASTOLIC BLOOD PRESSURE < 80 MM HG: ICD-10-PCS | Mod: CPTII,S$GLB,, | Performed by: INTERNAL MEDICINE

## 2023-11-21 NOTE — PROGRESS NOTES
Referring provider: No ref. provider found     Patient ID:  Aurelio Perkins is a 72 y.o. y.o. male who presents for evaluation Aortic Stenosis      Aurelio Perkins is a 72 y.o. male referred by Dr Garcia for evaluation of severe AS (NYHA Class II sx). His main complaint is fatigue with exertion and occasional dizziness. He noticed a functional decline when doing activities. This is a drop in functional status over the past few months.     Was treated with a 27 mm Navitor valve via transfemoral access on 10/19/2023. He now feels great from cardiac perspective. Able to do garden work without limitations. He did jsut injure his back and is taking a Medrol-pack.     Has METS>4. Able to do activities with issues. Independent to ADLs. Currently retired. Lives with wife.     Today patient mentions no angina, heart failure symptoms, claudication or palpitations. He denies syncope, or near syncope.      Review of Systems   Constitutional:  Negative for malaise/fatigue.   All other systems reviewed and are negative.     Objective:     BP (!) 153/72 (BP Location: Right arm, Patient Position: Sitting, BP Method: Large (Automatic))   Pulse 82   Ht 6' (1.829 m)   Wt 99.9 kg (220 lb 3.8 oz)   BMI 29.87 kg/m²     Physical Exam  Vitals and nursing note reviewed.   Constitutional:       Appearance: Normal appearance.   HENT:      Head: Normocephalic and atraumatic.      Right Ear: External ear normal.      Left Ear: External ear normal.      Nose: Nose normal.      Mouth/Throat:      Mouth: Mucous membranes are moist.   Eyes:      Pupils: Pupils are equal, round, and reactive to light.   Cardiovascular:      Rate and Rhythm: Normal rate and regular rhythm.      Pulses: Normal pulses.      Heart sounds: Murmur heard.   Pulmonary:      Effort: Pulmonary effort is normal.   Abdominal:      General: Abdomen is flat.   Musculoskeletal:         General: Normal range of motion.      Cervical back: Normal range of motion.  "  Skin:     General: Skin is warm.      Capillary Refill: Capillary refill takes less than 2 seconds.   Neurological:      General: No focal deficit present.      Mental Status: He is alert and oriented to person, place, and time. Mental status is at baseline.       Labs:     Lab Results   Component Value Date     11/21/2023    K 4.0 11/21/2023     11/21/2023    CO2 25 11/21/2023    BUN 19 11/21/2023    CREATININE 1.5 (H) 11/21/2023    ANIONGAP 7 (L) 11/21/2023     Lab Results   Component Value Date    HGBA1C 5.7 (H) 09/26/2022     Lab Results   Component Value Date    BNP 21 10/19/2023    BNP 16 09/11/2023       Lab Results   Component Value Date    WBC 8.57 11/21/2023    HGB 12.2 (L) 11/21/2023    HCT 37.4 (L) 11/21/2023    HCT 30 (L) 05/29/2019    PLT 83 (L) 11/21/2023    GRAN 5.9 11/21/2023    GRAN 68.9 11/21/2023     Lab Results   Component Value Date    CHOL 139 09/26/2022    HDL 42 09/26/2022    LDLCALC 68.4 09/26/2022    TRIG 143 09/26/2022       Meds:     Current Outpatient Medications:     blunt needle, disposable 18 x 1 1/2 " Ndle, 1 Syringe by Misc.(Non-Drug; Combo Route) route once a week., Disp: 25 each, Rfl: 3    cyanocobalamin 1,000 mcg/mL injection, Inject 1 mL (1,000 mcg total) into the skin every 7 days., Disp: 10 mL, Rfl: 3    diclofenac (VOLTAREN) 75 MG EC tablet, Take 1 tablet (75 mg total) by mouth daily as needed., Disp: 90 tablet, Rfl: 1    diclofenac sodium (VOLTAREN) 1 % Gel, Apply topically., Disp: , Rfl:     ergocalciferol (ERGOCALCIFEROL) 50,000 unit Cap, TAKE 1 CAPSULE BY MOUTH ONE TIME PER WEEK, Disp: 4 capsule, Rfl: 12    eszopiclone (LUNESTA) 3 mg Tab, TAKE 1 TABLET BY MOUTH EVERY DAY IN THE EVENING, Disp: 30 tablet, Rfl: 3    hydrocortisone (CORTEF) 10 MG Tab, TAKE 1 AND 1/2 TABLETS (15 mg) BY MOUTH IN THE MORNING AND 1 TABLET (10 mg) BY MOUTH IN THE EVENING AT 4PM, Disp: 75 tablet, Rfl: 11    needle, disp, 30 gauge 30 gauge x 1/2" Ndle, 1 each by Misc.(Non-Drug; Combo " "Route) route once a week., Disp: 25 each, Rfl: 3    nitroGLYCERIN (NITROSTAT) 0.4 MG SL tablet, Place 0.4 mg under the tongue., Disp: , Rfl:     REPATHA PUSHTRONEX 420 mg/3.5 mL Injt, INJECT 3.5 MLS INTO THE SKIN EVERY 30 DAYS., Disp: 10.5 mL, Rfl: 3    safety needles (BD SAFETYGLIDE NEEDLE) 25 gauge x 1" Ndle, 1 each by Misc.(Non-Drug; Combo Route) route every 7 days., Disp: 25 each, Rfl: 0    SYNTHROID 112 mcg tablet, Take 1 tablet (112 mcg total) by mouth once daily., Disp: 90 tablet, Rfl: 3    syringe, disposable, 1 mL Syrg, 1 Syringe by Misc.(Non-Drug; Combo Route) route once a week., Disp: 25 each, Rfl: 1    testosterone cypionate (DEPOTESTOTERONE CYPIONATE) 200 mg/mL injection, Inject 0.38 mLs (76 mg total) into the muscle every 7 days. (Patient taking differently: Inject 75 mg into the muscle every 14 (fourteen) days.), Disp: 2 mL, Rfl: 5    tiZANidine (ZANAFLEX) 2 MG tablet, Take 1 tablet (2 mg total) by mouth every 8 (eight) hours as needed (muscle pain)., Disp: 30 tablet, Rfl: 0    aspirin (ECOTRIN) 81 MG EC tablet, Take 1 tablet (81 mg total) by mouth once daily., Disp: 90 tablet, Rfl: 3    golimumab (SIMPONI) 50 mg/0.5 mL PnIj, Inject 50 mg into the skin every 30 days., Disp: 0.5 mL, Rfl: 12  No current facility-administered medications for this visit.    Facility-Administered Medications Ordered in Other Visits:     0.9%  NaCl infusion, , Intravenous, Continuous, Santy Obregon MD, Last Rate: 125 mL/hr at 09/13/23 1234, New Bag at 09/13/23 1234    0.9%  NaCl infusion, , Intravenous, Continuous, Santy Obregon MD    diphenhydrAMINE capsule 50 mg, 50 mg, Oral, Once, Santy Obregon MD    Echo today shows:    Left Ventricle: The left ventricle is normal in size. Normal wall thickness. Normal wall motion. There is normal systolic function with a visually estimated ejection fraction of 65 - 70%. Ejection fraction by visual approximation is 65%. There is normal diastolic function.    Right " Ventricle: Normal right ventricular cavity size. Wall thickness is normal. Right ventricle wall motion  is normal. Systolic function is normal.    Left Atrium: Left atrium is mildly dilated.    Right Atrium: Right atrium is mildly dilated.    Aortic Valve: There is a transcatheter valve replacement in the aortic position. There is moderate stenosis. Aortic valve area by VTI is 1.33 cm². Aortic valve peak velocity is 2.68 m/s. Mean gradient is 18 mmHg. The dimensionless index is 0.37. Trace paravalvular regurgitation.    Mitral Valve: There is mild mitral annular calcification present. There is mild regurgitation.    Tricuspid Valve: There is mild regurgitation.    Pulmonary Artery: The estimated pulmonary artery systolic pressure is 21 mmHg.    IVC/SVC: Normal venous pressure at 3 mmHg.    Pericardium: There is a trivial posterior effusion at base.     Assessment & Plan:   Transcatheter Aortic valve replacement   Post 27 mm Navitor valve. Feeling excellent today.   Will see him in a year and can follow with his general cardiologist int he meantime. Can see us sooner PRN for any issues.       Hyperlipidemia  -repatha      Essential hypertension  -well controlled and now off of medications     Abdominal aortic atherosclerosis  -asa, repatha, bp control     Chronic renal insufficiency, stage III (moderate)  -single kidney, low contrast with LHC     Secondary adrenal insufficiency  -continue current regimen      Prediabetes  -blood sugar control     Viktor Navas  Cardiology    I have seen the patient, reviewed the Fellow's history and physical, assessment and plan. I have personally interviewed and examined the patient and agree with the findings.     JOS Obregon MD

## 2023-12-18 ENCOUNTER — OFFICE VISIT (OUTPATIENT)
Dept: CARDIOLOGY | Facility: CLINIC | Age: 73
End: 2023-12-18
Payer: MEDICARE

## 2023-12-18 VITALS
WEIGHT: 217 LBS | HEIGHT: 72 IN | DIASTOLIC BLOOD PRESSURE: 66 MMHG | SYSTOLIC BLOOD PRESSURE: 126 MMHG | HEART RATE: 70 BPM | OXYGEN SATURATION: 98 % | BODY MASS INDEX: 29.39 KG/M2

## 2023-12-18 DIAGNOSIS — I70.0 ABDOMINAL AORTIC ATHEROSCLEROSIS: Chronic | ICD-10-CM

## 2023-12-18 DIAGNOSIS — I35.0 SEVERE AORTIC STENOSIS: ICD-10-CM

## 2023-12-18 DIAGNOSIS — E78.2 MIXED HYPERLIPIDEMIA: Chronic | ICD-10-CM

## 2023-12-18 DIAGNOSIS — I10 ESSENTIAL HYPERTENSION: Primary | Chronic | ICD-10-CM

## 2023-12-18 DIAGNOSIS — Z95.3 STATUS POST TRANSCATHETER AORTIC VALVE REPLACEMENT (TAVR) USING BIOPROSTHESIS: ICD-10-CM

## 2023-12-18 PROCEDURE — 3288F FALL RISK ASSESSMENT DOCD: CPT | Mod: CPTII,S$GLB,, | Performed by: INTERNAL MEDICINE

## 2023-12-18 PROCEDURE — 1159F MED LIST DOCD IN RCRD: CPT | Mod: CPTII,S$GLB,, | Performed by: INTERNAL MEDICINE

## 2023-12-18 PROCEDURE — 99999 PR PBB SHADOW E&M-EST. PATIENT-LVL IV: ICD-10-PCS | Mod: PBBFAC,,, | Performed by: INTERNAL MEDICINE

## 2023-12-18 PROCEDURE — 3078F DIAST BP <80 MM HG: CPT | Mod: CPTII,S$GLB,, | Performed by: INTERNAL MEDICINE

## 2023-12-18 PROCEDURE — 1101F PT FALLS ASSESS-DOCD LE1/YR: CPT | Mod: CPTII,S$GLB,, | Performed by: INTERNAL MEDICINE

## 2023-12-18 PROCEDURE — 3078F PR MOST RECENT DIASTOLIC BLOOD PRESSURE < 80 MM HG: ICD-10-PCS | Mod: CPTII,S$GLB,, | Performed by: INTERNAL MEDICINE

## 2023-12-18 PROCEDURE — 99214 OFFICE O/P EST MOD 30 MIN: CPT | Mod: S$GLB,,, | Performed by: INTERNAL MEDICINE

## 2023-12-18 PROCEDURE — 3008F BODY MASS INDEX DOCD: CPT | Mod: CPTII,S$GLB,, | Performed by: INTERNAL MEDICINE

## 2023-12-18 PROCEDURE — 99999 PR PBB SHADOW E&M-EST. PATIENT-LVL IV: CPT | Mod: PBBFAC,,, | Performed by: INTERNAL MEDICINE

## 2023-12-18 PROCEDURE — 3008F PR BODY MASS INDEX (BMI) DOCUMENTED: ICD-10-PCS | Mod: CPTII,S$GLB,, | Performed by: INTERNAL MEDICINE

## 2023-12-18 PROCEDURE — 1159F PR MEDICATION LIST DOCUMENTED IN MEDICAL RECORD: ICD-10-PCS | Mod: CPTII,S$GLB,, | Performed by: INTERNAL MEDICINE

## 2023-12-18 PROCEDURE — 99214 PR OFFICE/OUTPT VISIT, EST, LEVL IV, 30-39 MIN: ICD-10-PCS | Mod: S$GLB,,, | Performed by: INTERNAL MEDICINE

## 2023-12-18 PROCEDURE — 3288F PR FALLS RISK ASSESSMENT DOCUMENTED: ICD-10-PCS | Mod: CPTII,S$GLB,, | Performed by: INTERNAL MEDICINE

## 2023-12-18 PROCEDURE — 1101F PR PT FALLS ASSESS DOC 0-1 FALLS W/OUT INJ PAST YR: ICD-10-PCS | Mod: CPTII,S$GLB,, | Performed by: INTERNAL MEDICINE

## 2023-12-18 PROCEDURE — 1126F PR PAIN SEVERITY QUANTIFIED, NO PAIN PRESENT: ICD-10-PCS | Mod: CPTII,S$GLB,, | Performed by: INTERNAL MEDICINE

## 2023-12-18 PROCEDURE — 3074F SYST BP LT 130 MM HG: CPT | Mod: CPTII,S$GLB,, | Performed by: INTERNAL MEDICINE

## 2023-12-18 PROCEDURE — 1126F AMNT PAIN NOTED NONE PRSNT: CPT | Mod: CPTII,S$GLB,, | Performed by: INTERNAL MEDICINE

## 2023-12-18 PROCEDURE — 3074F PR MOST RECENT SYSTOLIC BLOOD PRESSURE < 130 MM HG: ICD-10-PCS | Mod: CPTII,S$GLB,, | Performed by: INTERNAL MEDICINE

## 2023-12-18 NOTE — PROGRESS NOTES
Subjective:   Patient ID:  Aurelio Perkins is a 73 y.o. male who presents for follow-up of Follow-up      HPI73 yo WM with HLD and recent TAVR. Denies chest pain, SOB, or edema  Denies palpitations, weak spells, and syncope     Summary:     Successful transfemoral aortic valve replacement with a 27mm Navitor valve.   2  Non-obstructive CAD by cath  Recommendations:     Routine post TAVR care  Transfer to CCU  EP consultation  Physical therapy for early ambulation  Antibiotics X 3 doses  Antithrombotics: ASA    Review of Systems   Constitutional: Negative for decreased appetite, fever, malaise/fatigue, weight gain and weight loss.   HENT:  Negative for hearing loss and nosebleeds.    Eyes:  Negative for visual disturbance.   Cardiovascular:  Negative for chest pain, claudication, cyanosis, dyspnea on exertion, irregular heartbeat, leg swelling, near-syncope, orthopnea, palpitations, paroxysmal nocturnal dyspnea and syncope.   Respiratory:  Negative for cough, hemoptysis, shortness of breath, sleep disturbances due to breathing, snoring and wheezing.    Endocrine: Negative for cold intolerance, heat intolerance, polydipsia and polyuria.   Hematologic/Lymphatic: Negative for adenopathy and bleeding problem. Does not bruise/bleed easily.   Skin:  Negative for color change, itching, poor wound healing, rash and suspicious lesions.   Musculoskeletal:  Positive for arthritis and joint pain. Negative for back pain, falls, joint swelling, muscle cramps, muscle weakness and myalgias.   Gastrointestinal:  Negative for bloating, abdominal pain, change in bowel habit, constipation, flatus, heartburn, hematemesis, hematochezia, hemorrhoids, jaundice, melena, nausea and vomiting.   Genitourinary:  Negative for bladder incontinence, decreased libido, frequency, hematuria, hesitancy and urgency.   Neurological:  Negative for brief paralysis, difficulty with concentration, excessive daytime sleepiness, dizziness, focal  weakness, headaches, light-headedness, loss of balance, numbness, vertigo and weakness.   Psychiatric/Behavioral:  Negative for altered mental status, depression and memory loss. The patient does not have insomnia and is not nervous/anxious.    Allergic/Immunologic: Negative for environmental allergies, hives and persistent infections.       Objective:   Physical Exam  Constitutional:       General: He is not in acute distress.     Appearance: He is well-developed. He is not diaphoretic.      Comments: /66   Pulse 70   Ht 6' (1.829 m)   Wt 98.4 kg (217 lb)   SpO2 98%   BMI 29.43 kg/m²      HENT:      Head: Normocephalic and atraumatic.   Eyes:      General: Lids are normal. No scleral icterus.        Right eye: No discharge.      Conjunctiva/sclera: Conjunctivae normal.      Pupils: Pupils are equal, round, and reactive to light.   Neck:      Thyroid: No thyromegaly.      Vascular: No JVD.      Trachea: No tracheal deviation.   Cardiovascular:      Rate and Rhythm: Normal rate and regular rhythm.      Pulses: Intact distal pulses.           Carotid pulses are 2+ on the right side and 2+ on the left side.       Radial pulses are 2+ on the right side and 2+ on the left side.        Femoral pulses are 2+ on the right side and 2+ on the left side.       Popliteal pulses are 2+ on the right side and 2+ on the left side.        Dorsalis pedis pulses are 2+ on the right side and 2+ on the left side.        Posterior tibial pulses are 2+ on the right side and 2+ on the left side.      Heart sounds: Normal heart sounds, S1 normal and S2 normal. No murmur heard.     No friction rub. No gallop.   Pulmonary:      Effort: Pulmonary effort is normal. No respiratory distress.      Breath sounds: Normal breath sounds. No wheezing or rales.   Chest:      Chest wall: No tenderness.   Abdominal:      General: Bowel sounds are normal. There is no distension.      Palpations: Abdomen is soft. There is no hepatomegaly or mass.       Tenderness: There is no abdominal tenderness. There is no guarding or rebound.   Musculoskeletal:         General: No tenderness. Normal range of motion.      Cervical back: Normal range of motion and neck supple.   Lymphadenopathy:      Cervical: No cervical adenopathy.   Skin:     General: Skin is warm and dry.      Coloration: Skin is not pale.      Findings: No erythema or rash.   Neurological:      Mental Status: He is alert and oriented to person, place, and time.      Cranial Nerves: No cranial nerve deficit.      Coordination: Coordination normal.      Deep Tendon Reflexes: Reflexes are normal and symmetric.   Psychiatric:         Speech: Speech normal.         Behavior: Behavior normal.         Thought Content: Thought content normal.         Judgment: Judgment normal.         Assessment:      1. Essential hypertension    2. Status post transcatheter aortic valve replacement (TAVR) using bioprosthesis    3. Mixed hyperlipidemia    4. Abdominal aortic atherosclerosis    5. Severe aortic stenosis        Plan:   Cardiac status stable   Patient advised to modify risk factors such as weight, exercise, diet,  tobacco and alcohol exposure    Low salt diey     Orders Placed This Encounter   Procedures    Lipid Panel     Follow up in about 6 months (around 6/18/2024).

## 2024-01-04 ENCOUNTER — PATIENT MESSAGE (OUTPATIENT)
Dept: RHEUMATOLOGY | Facility: CLINIC | Age: 74
End: 2024-01-04
Payer: MEDICARE

## 2024-01-04 DIAGNOSIS — G47.00 INSOMNIA, UNSPECIFIED TYPE: ICD-10-CM

## 2024-01-05 ENCOUNTER — PATIENT MESSAGE (OUTPATIENT)
Dept: RHEUMATOLOGY | Facility: CLINIC | Age: 74
End: 2024-01-05
Payer: MEDICARE

## 2024-01-07 RX ORDER — ESZOPICLONE 3 MG/1
TABLET, FILM COATED ORAL
Qty: 30 TABLET | Refills: 3 | Status: SHIPPED | OUTPATIENT
Start: 2024-01-07

## 2024-01-18 DIAGNOSIS — M05.9 SEROPOSITIVE RHEUMATOID ARTHRITIS: ICD-10-CM

## 2024-01-18 NOTE — TELEPHONE ENCOUNTER
Pharmacy requesting refill on Diclofenac 75mg  Pt's LOV 10/26/2023  Pt's NOV 03/01/2024  Medication pending

## 2024-01-19 RX ORDER — DICLOFENAC SODIUM 75 MG/1
75 TABLET, DELAYED RELEASE ORAL DAILY PRN
Qty: 90 TABLET | Refills: 1 | Status: SHIPPED | OUTPATIENT
Start: 2024-01-19

## 2024-01-22 ENCOUNTER — OFFICE VISIT (OUTPATIENT)
Dept: UROLOGY | Facility: CLINIC | Age: 74
End: 2024-01-22
Payer: MEDICARE

## 2024-01-22 VITALS — BODY MASS INDEX: 29.62 KG/M2 | HEIGHT: 72 IN | WEIGHT: 218.69 LBS

## 2024-01-22 DIAGNOSIS — N39.3 MALE STRESS INCONTINENCE: Primary | ICD-10-CM

## 2024-01-22 LAB
BILIRUBIN, UA POC OHS: NEGATIVE
BLOOD, UA POC OHS: NEGATIVE
CLARITY, UA POC OHS: ABNORMAL
COLOR, UA POC OHS: YELLOW
GLUCOSE, UA POC OHS: NEGATIVE
KETONES, UA POC OHS: NEGATIVE
LEUKOCYTES, UA POC OHS: NEGATIVE
NITRITE, UA POC OHS: POSITIVE
PH, UA POC OHS: 6.5
POC RESIDUAL URINE VOLUME: 0 ML (ref 0–100)
PROTEIN, UA POC OHS: NEGATIVE
SPECIFIC GRAVITY, UA POC OHS: 1.02
UROBILINOGEN, UA POC OHS: 0.2

## 2024-01-22 PROCEDURE — 81003 URINALYSIS AUTO W/O SCOPE: CPT | Mod: QW,S$GLB,, | Performed by: UROLOGY

## 2024-01-22 PROCEDURE — 51798 US URINE CAPACITY MEASURE: CPT | Mod: S$GLB,,, | Performed by: UROLOGY

## 2024-01-22 PROCEDURE — 3288F FALL RISK ASSESSMENT DOCD: CPT | Mod: CPTII,S$GLB,, | Performed by: UROLOGY

## 2024-01-22 PROCEDURE — 3008F BODY MASS INDEX DOCD: CPT | Mod: CPTII,S$GLB,, | Performed by: UROLOGY

## 2024-01-22 PROCEDURE — 99999 PR PBB SHADOW E&M-EST. PATIENT-LVL III: CPT | Mod: PBBFAC,,, | Performed by: UROLOGY

## 2024-01-22 PROCEDURE — 1159F MED LIST DOCD IN RCRD: CPT | Mod: CPTII,S$GLB,, | Performed by: UROLOGY

## 2024-01-22 PROCEDURE — 1101F PT FALLS ASSESS-DOCD LE1/YR: CPT | Mod: CPTII,S$GLB,, | Performed by: UROLOGY

## 2024-01-22 PROCEDURE — 1126F AMNT PAIN NOTED NONE PRSNT: CPT | Mod: CPTII,S$GLB,, | Performed by: UROLOGY

## 2024-01-22 PROCEDURE — 99213 OFFICE O/P EST LOW 20 MIN: CPT | Mod: S$GLB,,, | Performed by: UROLOGY

## 2024-01-22 NOTE — PROGRESS NOTES
"[facility]      New Male Urinary Incontinence Note    1/22/2024    Referred by:  No ref. provider found    HPI: Aurelio Perkins is a very pleasant 73 y.o. man who has not previously been seen by an [facility] specialist in our department referred for evaluation of urinary incontinence of  22  years duration. He initially had brachytherapy for CaP in 2002. He had a persistently elevated PSA and subsequently underwent a salvage prostatectomy in 2004. Shortly after, due to profound KRISTIN, he underwent AUS placement in 2004. This resulted in no appreciable change in his KRISTIN. He then, in 2005, underwent replacement of his AUS, possibly transcorporal placement. Again, he notes perhaps "5%" improvement in his KRISTIN. He initially cycles the sphincter to void; he no longer does so. He uses an external catheter to treat his KRISTIN>     He denies symptoms of irritative voiding including dysuria. He denies symptoms of obstructive voiding including decreased stream, hesitancy, intermittency, post void dribbling, and sense of incomplete emptying. Bladder scan PVR was 0 mL.  He notes little incontinence while supine; most incontinence occurs when he is standing or otherwise active. He notes no urgency of frequency symptoms.     I attempted to cycle the device today. The scrotal pump bulb is empty. It does not appear to be locked open as attempts to activate were futile. I suspect that he had a leak at some point in the last 18 years.     A review of 10+ systems was conducted with pertinent positive and negative findings documented in HPI with all other systems reviewed and negative.    Past medical, family, surgical and social history reviewed as documented in chart with pertinent positive medical, family, surgical and social history detailed in HPI.    Exam Findings:    Const: no acute distress, conversant and alert  Eyes: anicteric, extraocular muscles intact  ENMT: normocephalic, Nl oral membranes  Cardio: no cyanosis, " nl cap refill  Pulm: no tachypnea; no resp distress  Abd: soft, no tenderness  Musc: no laceration, no tenderness  Neuro: alert; oriented x 3  Skin: warm, dry; no petichiae  Psych: no anxiety; normal speech  : AUS as detailed above       Assessment/Plan:    Stress Urinary Incontinence (new, addt'l workup): He reports urinary incontinence suggestive of KRISTIN currently treated with an external catheter. He has a non-functioning AUS, likely resulting from a leak. He has had placement of two AUS without any significant change in his KRISTIN with either one. I doubt that placement of a third is likely to have a significant impact. He was asking about male slings. I would not recommend this or ProACT in a man with profound KRISTIN following radiation and salvage prostatectomy as the likelihood of benefit is low.

## 2024-02-28 DIAGNOSIS — M05.9 SEROPOSITIVE RHEUMATOID ARTHRITIS: ICD-10-CM

## 2024-02-28 RX ORDER — GOLIMUMAB 50 MG/.5ML
50 INJECTION, SOLUTION SUBCUTANEOUS
Qty: 0.5 ML | Refills: 12 | Status: ACTIVE | OUTPATIENT
Start: 2024-02-28 | End: 2025-02-27

## 2024-03-01 ENCOUNTER — OFFICE VISIT (OUTPATIENT)
Dept: RHEUMATOLOGY | Facility: CLINIC | Age: 74
End: 2024-03-01
Payer: MEDICARE

## 2024-03-01 VITALS
HEIGHT: 72 IN | SYSTOLIC BLOOD PRESSURE: 133 MMHG | BODY MASS INDEX: 29.53 KG/M2 | DIASTOLIC BLOOD PRESSURE: 73 MMHG | WEIGHT: 218 LBS | HEART RATE: 72 BPM

## 2024-03-01 DIAGNOSIS — D84.821 IMMUNOCOMPROMISED STATE DUE TO DRUG THERAPY: ICD-10-CM

## 2024-03-01 DIAGNOSIS — N18.30 STAGE 3 CHRONIC KIDNEY DISEASE, UNSPECIFIED WHETHER STAGE 3A OR 3B CKD: ICD-10-CM

## 2024-03-01 DIAGNOSIS — G47.00 INSOMNIA, UNSPECIFIED TYPE: ICD-10-CM

## 2024-03-01 DIAGNOSIS — Z79.899 IMMUNOCOMPROMISED STATE DUE TO DRUG THERAPY: ICD-10-CM

## 2024-03-01 DIAGNOSIS — M05.9 SEROPOSITIVE RHEUMATOID ARTHRITIS: Primary | ICD-10-CM

## 2024-03-01 PROCEDURE — 1159F MED LIST DOCD IN RCRD: CPT | Mod: CPTII,S$GLB,, | Performed by: PHYSICIAN ASSISTANT

## 2024-03-01 PROCEDURE — 99999 PR PBB SHADOW E&M-EST. PATIENT-LVL III: CPT | Mod: PBBFAC,,, | Performed by: PHYSICIAN ASSISTANT

## 2024-03-01 PROCEDURE — 99213 OFFICE O/P EST LOW 20 MIN: CPT | Mod: S$GLB,,, | Performed by: PHYSICIAN ASSISTANT

## 2024-03-01 PROCEDURE — 3008F BODY MASS INDEX DOCD: CPT | Mod: CPTII,S$GLB,, | Performed by: PHYSICIAN ASSISTANT

## 2024-03-01 PROCEDURE — 3078F DIAST BP <80 MM HG: CPT | Mod: CPTII,S$GLB,, | Performed by: PHYSICIAN ASSISTANT

## 2024-03-01 PROCEDURE — 3075F SYST BP GE 130 - 139MM HG: CPT | Mod: CPTII,S$GLB,, | Performed by: PHYSICIAN ASSISTANT

## 2024-03-01 PROCEDURE — 1160F RVW MEDS BY RX/DR IN RCRD: CPT | Mod: CPTII,S$GLB,, | Performed by: PHYSICIAN ASSISTANT

## 2024-03-01 PROCEDURE — 1125F AMNT PAIN NOTED PAIN PRSNT: CPT | Mod: CPTII,S$GLB,, | Performed by: PHYSICIAN ASSISTANT

## 2024-03-01 ASSESSMENT — ROUTINE ASSESSMENT OF PATIENT INDEX DATA (RAPID3)
MDHAQ FUNCTION SCORE: 0.4
TOTAL RAPID3 SCORE: 1.94
PATIENT GLOBAL ASSESSMENT SCORE: 2
FATIGUE SCORE: 1.1
PSYCHOLOGICAL DISTRESS SCORE: 0
PAIN SCORE: 2.5

## 2024-03-01 NOTE — Clinical Note
Hi,  This patient is on patient assistance program for simponi with em[facility]iana. He said I needed to forward rx to [facility] pharmacy. I see Dr. Oliveira sent to [facility] earlier this week. Please let me know if I need to forward it to another pharmacy. Thanks!

## 2024-03-01 NOTE — PROGRESS NOTES
Subjective:       Patient ID: Aurelio Perkins is a 73 y.o. male.    Chief Complaint: Disease Management      HPI    Diagnosis/es  --Seropositive Rheumatoid Arthritis  Positive serologies: +CCP  Negative serologies: RF  Infectious screening labs: Hepatitis B, C, and TB negative (10/23)  Imagin. X-ray lumbar spine: Moderate severe degenerative disc changes at L1-2.  Mild disc space narrowing and endplate spurring at L2-3.\  2. DEXA scan: : There is a 13.4% risk of a major osteoporotic fracture and a 3.8% risk of hip fracture in the next 10 years (FRAX).      Previous treatments:  Enbrel  Xeljanz  Rinvoq    Current treatments:  Simponi  diclofenac    Interval History:  He is doing well on Simponi monthly. No significant pain or joint stiffness. He reports significant improvement in his physical stamina since valve replacement. He is taking lunesta nightly for sleep which is working well. He is followed by Endocrinology on hydrocortisone.         Review of Systems   Constitutional:  Negative for chills, fatigue and fever.   Eyes:  Negative for visual disturbance.   Respiratory:  Negative for cough, shortness of breath and wheezing.    Cardiovascular:  Negative for chest pain, palpitations and leg swelling.   Gastrointestinal:  Negative for abdominal pain, constipation, diarrhea, nausea and vomiting.   Musculoskeletal:  Positive for arthralgias and back pain. Negative for myalgias.   Neurological:  Negative for dizziness, syncope and headaches.   Hematological:  Negative for adenopathy.         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 ENDO;  Service: Endoscopy;  Laterality: N/A;    CORONARY ANGIOGRAPHY N/A 9/13/2023    Procedure: Angiogram, Coronary;  Surgeon: Santy Obregon MD;  Location: [facility] CATH LAB;  Service: Cardiology;  Laterality: N/A;    CRANIOTOMY Right 5/29/2019    Procedure: CRANIOTOMY  (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)  Co-surgery with Dr Vaibhav Jara - please put in his room  Microscope [employer];  Surgeon: Jimmy Segura DO;  Location: [address];  Service: Neurosurgery;  Laterality: Right;    HEMORRHOID SURGERY      NEPHRECTOMY      PROSTATECTOMY      TONSILLECTOMY      TRANSCATHETER AORTIC VALVE REPLACEMENT (TAVR) N/A 10/19/2023    Procedure: REPLACEMENT, AORTIC VALVE, TRANSCATHETER (TAVR);  Surgeon: Santy Obregon MD;  Location: [facility] CATH LAB;  Service: Cardiology;  Laterality: N/A;    TRANSCATHETER AORTIC VALVE REPLACEMENT (TAVR)  10/19/2023    Procedure: REPLACEMENT, AORTIC VALVE, TRANSCATHETER (TAVR);  Surgeon: Valente Garcia MD;  Location: [facility] CATH LAB;  Service: Cardiology;;          Physical Exam   Constitutional: He is oriented to person, place, and time.   Eyes: Right conjunctiva is not injected. Left conjunctiva is not injected.   Neck: No JVD present. No thyromegaly present.   Cardiovascular: Normal rate and regular rhythm. Exam reveals no decreased pulses.   Pulmonary/Chest: Effort normal.   Musculoskeletal:      Right wrist: Normal.      Left wrist: Normal.      Right knee: Normal.      Left knee:  Normal.   Lymphadenopathy:     He has no cervical adenopathy.   Neurological: He is alert and oriented to person, place, and time. Gait normal.   Skin: No rash noted.   Psychiatric: Mood and affect normal.       Right Side Rheumatological Exam     Examination finds the wrist, knee, 1st PIP, 1st MCP, 2nd PIP, 2nd MCP, 3rd PIP, 3rd MCP, 4th PIP, 4th MCP, 5th PIP and 5th MCP normal.    Left Side Rheumatological Exam     Examination finds the wrist, knee, 1st PIP, 1st MCP, 2nd PIP, 2nd MCP, 3rd PIP, 3rd MCP, 4th PIP, 4th MCP, 5th PIP and 5th MCP normal.          Recent labs reviewed:  Component      Latest Ref Rng 10/18/2023 11/21/2023   WBC      3.90 - 12.70 K/uL  8.57    RBC      4.60 - 6.20 M/uL  4.24 (L)    Hemoglobin      14.0 - 18.0 g/dL  12.2 (L)    Hematocrit      40.0 - 54.0 %  37.4 (L)    MCV      82 - 98 fL  88    MCH      27.0 - 31.0 pg  28.8    MCHC      32.0 - 36.0 g/dL  32.6    RDW      11.5 - 14.5 %  13.3    Platelet Count      150 - 450 K/uL  83 (L)    MPV      9.2 - 12.9 fL  10.8    Immature Granulocytes      0.0 - 0.5 %  1.3 (H)    Gran # (ANC)      1.8 - 7.7 K/uL  5.9    Immature Grans (Abs)      0.00 - 0.04 K/uL  0.11 (H)    Lymph #      1.0 - 4.8 K/uL  1.4    Mono #      0.3 - 1.0 K/uL  0.9    Eos #      0.0 - 0.5 K/uL  0.2    Baso #      0.00 - 0.20 K/uL  0.04    nRBC      0 /100 WBC  0    Gran %      38.0 - 73.0 %  68.9    Lymph %      18.0 - 48.0 %  16.7 (L)    Mono %      4.0 - 15.0 %  10.0    Eos %      0.0 - 8.0 %  2.6    Basophil %      0.0 - 1.9 %  0.5    Differential Method  Automated    Sodium      136 - 145 mmol/L  140    Potassium      3.5 - 5.1 mmol/L  4.0    Chloride      95 - 110 mmol/L  108    CO2      23 - 29 mmol/L  25    Glucose      70 - 110 mg/dL  73    BUN      8 - 23 mg/dL  19    Creatinine      0.5 - 1.4 mg/dL  1.5 (H)    Calcium      8.7 - 10.5 mg/dL  9.2    PROTEIN TOTAL      6.0 - 8.4 g/dL  6.5    Albumin      3.5 - 5.2 g/dL  3.9    BILIRUBIN TOTAL      0.1 - 1.0 mg/dL   0.9    ALP      55 - 135 U/L  42 (L)    AST      10 - 40 U/L  34    ALT      10 - 44 U/L  56 (H)    eGFR      >60 mL/min/1.73 m^2  49.5 !    Anion Gap      8 - 16 mmol/L  7 (L)    NIL      IU/mL 0.46698     TB1 - Nil      IU/mL 0.031     TB2 - Nil      IU/mL 0.028     Mitogen - Nil      IU/mL 5.035     TB Gold Plus      Negative  Negative     Hep B S Ab      mIU/mL <3.00     Hep B S Ab       Non-reactive     Hepatitis B Surface Ag      Non-reactive  Non-reactive     Hep B Core Total Ab      Non-reactive  Non-reactive     Hepatitis C Ab      Non-reactive  Non-reactive        Assessment:       1. Seropositive rheumatoid arthritis    2. Immunocompromised state due to drug therapy    3. Insomnia, unspecified type    4. Stage 3 chronic kidney disease, unspecified whether stage 3a or 3b CKD              This patient is a 73 year old male with history of aortic stenosis s/p TAVR, HTN, HLP, aortic atherosclerosis, panhypopituitary, pre diabetes (A1c 5.7%), adrenal insufficiency, vitamin D deficiency, Vitamin b12 deficiency, ED secondary to radical prostatectomy for prostate cancer, chronic fatigue syndrome, insomnia and seropositive rheumatoid arthiritis. He is doing well on simponi monthly and diclofenac 75 mg daily. We are monitoring renal function closely.    Plan:       Seropositive rheumatoid arthritis    Immunocompromised state due to drug therapy    Insomnia, unspecified type    Stage 3 chronic kidney disease, unspecified whether stage 3a or 3b CKD             1. Cont Simponi monthly  2. Cont. Diclofenac 75 mg daily, avoid additional NSAIDs  3. Cont. lunesta per       Follow up:  4 months Dr. Oliveira w/labs prior

## 2024-03-04 ENCOUNTER — PATIENT MESSAGE (OUTPATIENT)
Dept: RHEUMATOLOGY | Facility: CLINIC | Age: 74
End: 2024-03-04
Payer: MEDICARE

## 2024-03-04 RX ORDER — ESZOPICLONE 3 MG/1
3 TABLET, FILM COATED ORAL NIGHTLY
Qty: 30 TABLET | Refills: 5 | Status: SHIPPED | OUTPATIENT
Start: 2024-03-04

## 2024-03-05 NOTE — TELEPHONE ENCOUNTER
Received update that rx was sent to [facility] today from [facility] once the PA was approved. Notified patient via [employer] message

## 2024-03-05 NOTE — TELEPHONE ENCOUNTER
Seems that [facility] is working on rx. Sent message to [facility] rheum supervisor, Winsome DYE to check status and see if office needs to send rx to [facility]. Awaiting response

## 2024-05-18 ENCOUNTER — PATIENT MESSAGE (OUTPATIENT)
Dept: FAMILY MEDICINE | Facility: CLINIC | Age: 74
End: 2024-05-18
Payer: MEDICARE

## 2024-06-07 ENCOUNTER — TELEPHONE (OUTPATIENT)
Dept: ENDOCRINOLOGY | Facility: CLINIC | Age: 74
End: 2024-06-07
Payer: MEDICARE

## 2024-06-07 ENCOUNTER — TELEPHONE (OUTPATIENT)
Dept: FAMILY MEDICINE | Facility: CLINIC | Age: 74
End: 2024-06-07
Payer: MEDICARE

## 2024-06-07 DIAGNOSIS — E03.9 PRIMARY HYPOTHYROIDISM: Primary | ICD-10-CM

## 2024-06-07 DIAGNOSIS — R79.89 LOW TESTOSTERONE IN MALE: ICD-10-CM

## 2024-06-07 DIAGNOSIS — Z12.5 SCREENING FOR PROSTATE CANCER: ICD-10-CM

## 2024-06-07 NOTE — TELEPHONE ENCOUNTER
----- Message from Amelia Crain sent at 6/7/2024 10:18 AM CDT -----  Name of Who is Calling:  Pt called         What is the request in detail:    The pt os ready for his annual and needs a appt . Please advise       Can the clinic reply by [facility]:    No       What Number to Call Back if not in [facility]: Telephone Information:  Mobile          [phone]

## 2024-06-07 NOTE — TELEPHONE ENCOUNTER
----- Message from Kenyatta Velasquez sent at 6/7/2024 10:08 AM CDT -----  Regarding: appt access  Contact: [phone]  Pt calling in regards to needing to see if pt can have a virtual with provider in order to get pt medicine filled  Pls call

## 2024-06-10 RX ORDER — TESTOSTERONE CYPIONATE 200 MG/ML
75 INJECTION, SOLUTION INTRAMUSCULAR
Qty: 1 ML | Refills: 5 | Status: SHIPPED | OUTPATIENT
Start: 2024-06-10 | End: 2024-12-07

## 2024-06-17 ENCOUNTER — OFFICE VISIT (OUTPATIENT)
Dept: CARDIOLOGY | Facility: CLINIC | Age: 74
End: 2024-06-17
Payer: MEDICARE

## 2024-06-17 VITALS
OXYGEN SATURATION: 99 % | BODY MASS INDEX: 28.93 KG/M2 | WEIGHT: 213.63 LBS | SYSTOLIC BLOOD PRESSURE: 110 MMHG | DIASTOLIC BLOOD PRESSURE: 66 MMHG | HEIGHT: 72 IN | HEART RATE: 76 BPM

## 2024-06-17 DIAGNOSIS — I70.0 ABDOMINAL AORTIC ATHEROSCLEROSIS: ICD-10-CM

## 2024-06-17 DIAGNOSIS — Z95.2 S/P TAVR (TRANSCATHETER AORTIC VALVE REPLACEMENT): ICD-10-CM

## 2024-06-17 DIAGNOSIS — Z53.09 STATINS CONTRAINDICATED: ICD-10-CM

## 2024-06-17 DIAGNOSIS — I10 ESSENTIAL HYPERTENSION: Primary | Chronic | ICD-10-CM

## 2024-06-17 DIAGNOSIS — E78.2 MIXED HYPERLIPIDEMIA: Chronic | ICD-10-CM

## 2024-06-17 DIAGNOSIS — I35.0 SEVERE AORTIC STENOSIS: ICD-10-CM

## 2024-06-17 PROCEDURE — 1159F MED LIST DOCD IN RCRD: CPT | Mod: CPTII,S$GLB,, | Performed by: INTERNAL MEDICINE

## 2024-06-17 PROCEDURE — 4010F ACE/ARB THERAPY RXD/TAKEN: CPT | Mod: CPTII,S$GLB,, | Performed by: INTERNAL MEDICINE

## 2024-06-17 PROCEDURE — 1101F PT FALLS ASSESS-DOCD LE1/YR: CPT | Mod: CPTII,S$GLB,, | Performed by: INTERNAL MEDICINE

## 2024-06-17 PROCEDURE — 3008F BODY MASS INDEX DOCD: CPT | Mod: CPTII,S$GLB,, | Performed by: INTERNAL MEDICINE

## 2024-06-17 PROCEDURE — 3078F DIAST BP <80 MM HG: CPT | Mod: CPTII,S$GLB,, | Performed by: INTERNAL MEDICINE

## 2024-06-17 PROCEDURE — 3074F SYST BP LT 130 MM HG: CPT | Mod: CPTII,S$GLB,, | Performed by: INTERNAL MEDICINE

## 2024-06-17 PROCEDURE — 1125F AMNT PAIN NOTED PAIN PRSNT: CPT | Mod: CPTII,S$GLB,, | Performed by: INTERNAL MEDICINE

## 2024-06-17 PROCEDURE — 3288F FALL RISK ASSESSMENT DOCD: CPT | Mod: CPTII,S$GLB,, | Performed by: INTERNAL MEDICINE

## 2024-06-17 PROCEDURE — 99999 PR PBB SHADOW E&M-EST. PATIENT-LVL IV: CPT | Mod: PBBFAC,,, | Performed by: INTERNAL MEDICINE

## 2024-06-17 PROCEDURE — 99214 OFFICE O/P EST MOD 30 MIN: CPT | Mod: S$GLB,,, | Performed by: INTERNAL MEDICINE

## 2024-06-17 NOTE — PROGRESS NOTES
Subjective   Patient ID:  Aurelio Perkins is a 73 y.o. male who presents for follow-up of Follow-up      HPI73 yo WM with hx of TAVR in October of 2023.Denies chest pain, SOB, or edema  Denies palpitations, weak spells, and syncope  States he is active and never felt better.     Summary:     Successful transfemoral aortic valve replacement with a 27mm Navitor valve.   2  Non-obstructive CAD by cath  Recommendations:     Routine post TAVR care  Transfer to CCU  EP consultation  Physical therapy for early ambulation  Antibiotics X 3 doses  Antithrombotics: ASA       Review of Systems   Constitutional: Negative for decreased appetite, fever, malaise/fatigue, weight gain and weight loss.   HENT:  Negative for hearing loss and nosebleeds.    Eyes:  Negative for visual disturbance.   Cardiovascular:  Negative for chest pain, claudication, cyanosis, dyspnea on exertion, irregular heartbeat, leg swelling, near-syncope, orthopnea, palpitations, paroxysmal nocturnal dyspnea and syncope.   Respiratory:  Negative for cough, hemoptysis, shortness of breath, sleep disturbances due to breathing, snoring and wheezing.    Endocrine: Negative for cold intolerance, heat intolerance, polydipsia and polyuria.   Hematologic/Lymphatic: Negative for adenopathy and bleeding problem. Does not bruise/bleed easily.   Skin:  Negative for color change, itching, poor wound healing, rash and suspicious lesions.   Musculoskeletal:  Negative for arthritis, back pain, falls, joint pain, joint swelling, muscle cramps, muscle weakness and myalgias.   Gastrointestinal:  Negative for bloating, abdominal pain, change in bowel habit, constipation, flatus, heartburn, hematemesis, hematochezia, hemorrhoids, jaundice, melena, nausea and vomiting.   Genitourinary:  Negative for bladder incontinence, decreased libido, frequency, hematuria, hesitancy and urgency.   Neurological:  Negative for brief paralysis, difficulty with concentration, excessive  daytime sleepiness, dizziness, focal weakness, headaches, light-headedness, loss of balance, numbness, vertigo and weakness.   Psychiatric/Behavioral:  Negative for altered mental status, depression and memory loss. The patient does not have insomnia and is not nervous/anxious.    Allergic/Immunologic: Negative for environmental allergies, hives and persistent infections.          Objective     Physical Exam  Constitutional:       General: He is not in acute distress.     Appearance: He is well-developed. He is not diaphoretic.      Comments: /66   Pulse 76   Ht 6' (1.829 m)   Wt 96.9 kg (213 lb 9.6 oz)   SpO2 99%   BMI 28.97 kg/m²      HENT:      Head: Normocephalic and atraumatic.   Eyes:      General: Lids are normal. No scleral icterus.        Right eye: No discharge.      Conjunctiva/sclera: Conjunctivae normal.      Pupils: Pupils are equal, round, and reactive to light.   Neck:      Thyroid: No thyromegaly.      Vascular: No JVD.      Trachea: No tracheal deviation.   Cardiovascular:      Rate and Rhythm: Normal rate and regular rhythm.      Pulses: Intact distal pulses.           Carotid pulses are 2+ on the right side and 2+ on the left side.       Radial pulses are 2+ on the right side and 2+ on the left side.        Femoral pulses are 2+ on the right side and 2+ on the left side.       Popliteal pulses are 2+ on the right side and 2+ on the left side.        Dorsalis pedis pulses are 2+ on the right side and 2+ on the left side.        Posterior tibial pulses are 2+ on the right side and 2+ on the left side.      Heart sounds: S1 normal and S2 normal. Murmur heard.      Harsh midsystolic murmur is present with a grade of 2/6 at the upper right sternal border radiating to the neck.      No friction rub. No gallop.   Pulmonary:      Effort: Pulmonary effort is normal. No respiratory distress.      Breath sounds: Normal breath sounds. No wheezing or rales.   Chest:      Chest wall: No tenderness.    Abdominal:      General: Bowel sounds are normal. There is no distension.      Palpations: Abdomen is soft. There is no hepatomegaly or mass.      Tenderness: There is no abdominal tenderness. There is no guarding or rebound.   Musculoskeletal:         General: No tenderness. Normal range of motion.      Cervical back: Normal range of motion and neck supple.   Lymphadenopathy:      Cervical: No cervical adenopathy.   Skin:     General: Skin is warm and dry.      Coloration: Skin is not pale.      Findings: No erythema or rash.   Neurological:      Mental Status: He is alert and oriented to person, place, and time.      Cranial Nerves: No cranial nerve deficit.      Coordination: Coordination normal.      Deep Tendon Reflexes: Reflexes are normal and symmetric.   Psychiatric:         Speech: Speech normal.         Behavior: Behavior normal.         Thought Content: Thought content normal.         Judgment: Judgment normal.            Assessment and Plan     1. Essential hypertension    2. Mixed hyperlipidemia    3. Abdominal aortic atherosclerosis    4. Severe aortic stenosis    5. S/P TAVR (transcatheter aortic valve replacement)    6. Statins contraindicated        Plan:  Cardiac status stable   Patient advised to modify risk factors such as weight, exercise, diet,  tobacco and alcohol exposure    The current medical regimen is effective;  continue present plan and medications.      Orders Placed This Encounter   Procedures    Lipid Panel     Follow up in about 6 months (around 12/17/2024).   Advance Care Planning     Date: 06/17/2024

## 2024-06-20 ENCOUNTER — LAB VISIT (OUTPATIENT)
Dept: LAB | Facility: HOSPITAL | Age: 74
End: 2024-06-20
Payer: MEDICARE

## 2024-06-20 DIAGNOSIS — M06.9 RHEUMATOID ARTHRITIS FLARE: ICD-10-CM

## 2024-06-20 DIAGNOSIS — R53.83 FATIGUE, UNSPECIFIED TYPE: ICD-10-CM

## 2024-06-20 DIAGNOSIS — Z79.1 ENCOUNTER FOR LONG-TERM (CURRENT) USE OF NSAIDS: ICD-10-CM

## 2024-06-20 DIAGNOSIS — Z00.01 ENCOUNTER FOR GENERAL ADULT MEDICAL EXAMINATION WITH ABNORMAL FINDINGS: ICD-10-CM

## 2024-06-20 DIAGNOSIS — Z13.6 ENCOUNTER FOR LIPID SCREENING FOR CARDIOVASCULAR DISEASE: ICD-10-CM

## 2024-06-20 DIAGNOSIS — E27.49 SECONDARY ADRENAL INSUFFICIENCY: ICD-10-CM

## 2024-06-20 DIAGNOSIS — N18.30 ANEMIA OF CHRONIC RENAL FAILURE, STAGE 3 (MODERATE): Chronic | ICD-10-CM

## 2024-06-20 DIAGNOSIS — F51.01 PRIMARY INSOMNIA: ICD-10-CM

## 2024-06-20 DIAGNOSIS — Z90.5 ACQUIRED ABSENCE OF KIDNEY: ICD-10-CM

## 2024-06-20 DIAGNOSIS — N17.9 ACUTE KIDNEY FAILURE, UNSPECIFIED: ICD-10-CM

## 2024-06-20 DIAGNOSIS — E53.8 VITAMIN B 12 DEFICIENCY: ICD-10-CM

## 2024-06-20 DIAGNOSIS — M06.9 RHEUMATOID ARTHRITIS OF HAND: ICD-10-CM

## 2024-06-20 DIAGNOSIS — E83.39 HYPOPHOSPHATURIA: ICD-10-CM

## 2024-06-20 DIAGNOSIS — E03.9 HYPOTHYROIDISM, UNSPECIFIED TYPE: ICD-10-CM

## 2024-06-20 DIAGNOSIS — D63.1 ANEMIA OF CHRONIC RENAL FAILURE, STAGE 3 (MODERATE): Chronic | ICD-10-CM

## 2024-06-20 DIAGNOSIS — Z90.79 ACQUIRED ABSENCE OF ORGAN, GENITAL ORGANS: ICD-10-CM

## 2024-06-20 DIAGNOSIS — E55.9 AVITAMINOSIS D: ICD-10-CM

## 2024-06-20 DIAGNOSIS — D84.821 IMMUNOCOMPROMISED STATE DUE TO DRUG THERAPY: ICD-10-CM

## 2024-06-20 DIAGNOSIS — E78.2 MIXED HYPERLIPIDEMIA: Chronic | ICD-10-CM

## 2024-06-20 DIAGNOSIS — Z13.220 ENCOUNTER FOR LIPID SCREENING FOR CARDIOVASCULAR DISEASE: ICD-10-CM

## 2024-06-20 DIAGNOSIS — I10 ESSENTIAL HYPERTENSION: ICD-10-CM

## 2024-06-20 DIAGNOSIS — N18.30 STAGE 3 CHRONIC KIDNEY DISEASE, UNSPECIFIED WHETHER STAGE 3A OR 3B CKD: ICD-10-CM

## 2024-06-20 DIAGNOSIS — I10 ESSENTIAL HYPERTENSION, MALIGNANT: ICD-10-CM

## 2024-06-20 DIAGNOSIS — E53.8 B12 DEFICIENCY: ICD-10-CM

## 2024-06-20 DIAGNOSIS — M05.9 SEROPOSITIVE RHEUMATOID ARTHRITIS: ICD-10-CM

## 2024-06-20 DIAGNOSIS — E23.0 PANHYPOPITUITARISM: ICD-10-CM

## 2024-06-20 DIAGNOSIS — N18.9 ANEMIA OF CHRONIC RENAL FAILURE: ICD-10-CM

## 2024-06-20 DIAGNOSIS — D63.1 ANEMIA OF CHRONIC RENAL FAILURE: ICD-10-CM

## 2024-06-20 DIAGNOSIS — N18.30 CHRONIC KIDNEY DISEASE, STAGE III (MODERATE): ICD-10-CM

## 2024-06-20 DIAGNOSIS — R80.9 PROTEINURIA: ICD-10-CM

## 2024-06-20 DIAGNOSIS — Z79.899 IMMUNOCOMPROMISED STATE DUE TO DRUG THERAPY: ICD-10-CM

## 2024-06-20 DIAGNOSIS — E55.9 VITAMIN D DEFICIENCY, UNSPECIFIED: ICD-10-CM

## 2024-06-20 DIAGNOSIS — G89.4 CHRONIC PAIN SYNDROME: ICD-10-CM

## 2024-06-20 DIAGNOSIS — Z79.899 ENCOUNTER FOR LONG-TERM (CURRENT) USE OF MEDICATIONS: ICD-10-CM

## 2024-06-20 LAB
ALBUMIN SERPL BCP-MCNC: 3.9 G/DL (ref 3.5–5.2)
ALP SERPL-CCNC: 52 U/L (ref 55–135)
ALT SERPL W/O P-5'-P-CCNC: 44 U/L (ref 10–44)
ANION GAP SERPL CALC-SCNC: 9 MMOL/L (ref 8–16)
AST SERPL-CCNC: 29 U/L (ref 10–40)
BASOPHILS # BLD AUTO: 0.02 K/UL (ref 0–0.2)
BASOPHILS NFR BLD: 0.3 % (ref 0–1.9)
BILIRUB SERPL-MCNC: 0.7 MG/DL (ref 0.1–1)
BUN SERPL-MCNC: 23 MG/DL (ref 8–23)
CALCIUM SERPL-MCNC: 9 MG/DL (ref 8.7–10.5)
CHLORIDE SERPL-SCNC: 109 MMOL/L (ref 95–110)
CHOLEST SERPL-MCNC: 255 MG/DL (ref 120–199)
CHOLEST/HDLC SERPL: 5.5 {RATIO} (ref 2–5)
CO2 SERPL-SCNC: 22 MMOL/L (ref 23–29)
CREAT SERPL-MCNC: 1.5 MG/DL (ref 0.5–1.4)
CRP SERPL-MCNC: 1 MG/L (ref 0–8.2)
DIFFERENTIAL METHOD BLD: ABNORMAL
EOSINOPHIL # BLD AUTO: 0.1 K/UL (ref 0–0.5)
EOSINOPHIL NFR BLD: 2.4 % (ref 0–8)
ERYTHROCYTE [DISTWIDTH] IN BLOOD BY AUTOMATED COUNT: 12.5 % (ref 11.5–14.5)
ERYTHROCYTE [SEDIMENTATION RATE] IN BLOOD BY WESTERGREN METHOD: 9 MM/HR (ref 0–10)
EST. GFR  (NO RACE VARIABLE): 49.2 ML/MIN/1.73 M^2
ESTIMATED AVG GLUCOSE: 120 MG/DL (ref 68–131)
GLUCOSE SERPL-MCNC: 86 MG/DL (ref 70–110)
HBA1C MFR BLD: 5.8 % (ref 4–5.6)
HCT VFR BLD AUTO: 37.4 % (ref 40–54)
HDLC SERPL-MCNC: 46 MG/DL (ref 40–75)
HDLC SERPL: 18 % (ref 20–50)
HGB BLD-MCNC: 12.6 G/DL (ref 14–18)
IMM GRANULOCYTES # BLD AUTO: 0.04 K/UL (ref 0–0.04)
IMM GRANULOCYTES NFR BLD AUTO: 0.7 % (ref 0–0.5)
LDLC SERPL CALC-MCNC: 158.4 MG/DL (ref 63–159)
LYMPHOCYTES # BLD AUTO: 2.1 K/UL (ref 1–4.8)
LYMPHOCYTES NFR BLD: 35.5 % (ref 18–48)
MCH RBC QN AUTO: 28.8 PG (ref 27–31)
MCHC RBC AUTO-ENTMCNC: 33.7 G/DL (ref 32–36)
MCV RBC AUTO: 85 FL (ref 82–98)
MONOCYTES # BLD AUTO: 0.6 K/UL (ref 0.3–1)
MONOCYTES NFR BLD: 9.3 % (ref 4–15)
NEUTROPHILS # BLD AUTO: 3.1 K/UL (ref 1.8–7.7)
NEUTROPHILS NFR BLD: 51.8 % (ref 38–73)
NONHDLC SERPL-MCNC: 209 MG/DL
NRBC BLD-RTO: 0 /100 WBC
PLATELET # BLD AUTO: 100 K/UL (ref 150–450)
PMV BLD AUTO: 10.9 FL (ref 9.2–12.9)
POTASSIUM SERPL-SCNC: 4.2 MMOL/L (ref 3.5–5.1)
PROT SERPL-MCNC: 6.5 G/DL (ref 6–8.4)
RBC # BLD AUTO: 4.38 M/UL (ref 4.6–6.2)
SODIUM SERPL-SCNC: 140 MMOL/L (ref 136–145)
T3FREE SERPL-MCNC: 2.6 PG/ML (ref 2.3–4.2)
T4 FREE SERPL-MCNC: 1.08 NG/DL (ref 0.71–1.51)
THYROPEROXIDASE IGG SERPL-ACNC: <6 IU/ML
TRIGL SERPL-MCNC: 253 MG/DL (ref 30–150)
TSH SERPL DL<=0.005 MIU/L-ACNC: <0.01 UIU/ML (ref 0.4–4)
WBC # BLD AUTO: 5.91 K/UL (ref 3.9–12.7)

## 2024-06-20 PROCEDURE — 86140 C-REACTIVE PROTEIN: CPT | Performed by: INTERNAL MEDICINE

## 2024-06-20 PROCEDURE — 85651 RBC SED RATE NONAUTOMATED: CPT | Mod: PO | Performed by: INTERNAL MEDICINE

## 2024-06-20 PROCEDURE — 84481 FREE ASSAY (FT-3): CPT | Performed by: INTERNAL MEDICINE

## 2024-06-20 PROCEDURE — 80053 COMPREHEN METABOLIC PANEL: CPT | Performed by: INTERNAL MEDICINE

## 2024-06-20 PROCEDURE — 84439 ASSAY OF FREE THYROXINE: CPT | Performed by: INTERNAL MEDICINE

## 2024-06-20 PROCEDURE — 86376 MICROSOMAL ANTIBODY EACH: CPT | Performed by: INTERNAL MEDICINE

## 2024-06-20 PROCEDURE — 80061 LIPID PANEL: CPT | Performed by: FAMILY MEDICINE

## 2024-06-20 PROCEDURE — 36415 COLL VENOUS BLD VENIPUNCTURE: CPT | Mod: PO | Performed by: FAMILY MEDICINE

## 2024-06-20 PROCEDURE — 84443 ASSAY THYROID STIM HORMONE: CPT | Performed by: INTERNAL MEDICINE

## 2024-06-20 PROCEDURE — 85025 COMPLETE CBC W/AUTO DIFF WBC: CPT | Performed by: INTERNAL MEDICINE

## 2024-06-20 PROCEDURE — 83036 HEMOGLOBIN GLYCOSYLATED A1C: CPT | Performed by: FAMILY MEDICINE

## 2024-06-21 ENCOUNTER — OFFICE VISIT (OUTPATIENT)
Dept: FAMILY MEDICINE | Facility: CLINIC | Age: 74
End: 2024-06-21
Payer: MEDICARE

## 2024-06-21 VITALS
DIASTOLIC BLOOD PRESSURE: 82 MMHG | WEIGHT: 213.19 LBS | BODY MASS INDEX: 28.88 KG/M2 | OXYGEN SATURATION: 97 % | HEART RATE: 65 BPM | SYSTOLIC BLOOD PRESSURE: 118 MMHG | HEIGHT: 72 IN

## 2024-06-21 DIAGNOSIS — I25.118 CORONARY ARTERY DISEASE OF NATIVE ARTERY OF NATIVE HEART WITH STABLE ANGINA PECTORIS: ICD-10-CM

## 2024-06-21 DIAGNOSIS — Z53.09 STATINS CONTRAINDICATED: ICD-10-CM

## 2024-06-21 DIAGNOSIS — Z78.9 STATIN INTOLERANCE: Chronic | ICD-10-CM

## 2024-06-21 DIAGNOSIS — N18.31 CHRONIC RENAL IMPAIRMENT, STAGE 3A: ICD-10-CM

## 2024-06-21 DIAGNOSIS — E27.49 SECONDARY ADRENAL INSUFFICIENCY: ICD-10-CM

## 2024-06-21 DIAGNOSIS — E78.2 MIXED HYPERLIPIDEMIA: Chronic | ICD-10-CM

## 2024-06-21 DIAGNOSIS — D69.6 THROMBOCYTOPENIA, UNSPECIFIED: ICD-10-CM

## 2024-06-21 DIAGNOSIS — D32.9 MENINGIOMA OF RIGHT SPHENOID WING INVOLVING CAVERNOUS SINUS: Primary | ICD-10-CM

## 2024-06-21 PROCEDURE — 99999 PR PBB SHADOW E&M-EST. PATIENT-LVL IV: CPT | Mod: PBBFAC,,, | Performed by: FAMILY MEDICINE

## 2024-06-21 RX ORDER — EVOLOCUMAB 140 MG/ML
140 INJECTION, SOLUTION SUBCUTANEOUS
Qty: 2 ML | Refills: 12 | Status: ACTIVE | OUTPATIENT
Start: 2024-06-21

## 2024-06-21 NOTE — PATIENT INSTRUCTIONS
Follow up in about 1 year (around 6/21/2025), or if symptoms worsen or fail to improve, for Med refills, LAB RESULTS.     Dear patient,   As a result of recent federal legislation (The Federal Cures Act), you may receive lab or pathology results from your visit in your [facility] account before your physician is able to contact you. Your physician or their representative will relay the results to you with their recommendations at their soonest availability.     If no improvement in symptoms or symptoms worsen, please be advised to call MD, follow-up at clinic and/or go to ER if becomes severe.    Cuauhtemoc Gardner M.D.        We Offer TELEHEALTH & Same Day Appointments!   Book your Telehealth appointment with me through my nurse or   Clinic appointments on [facility]!    [address] [place] [address]    Office: [phone]   FAX: [phone]    Check out my Facebook Page and Follow Me at: [email or web address]    Check out my website at [employer] by clicking on: [email or web address]    To Schedule appointments online, go to [facility]: [email or web address]

## 2024-06-21 NOTE — PROGRESS NOTES
PLAN:    Assessment & Plan  1. Coronary Artery Disease of the Native Coronary Artery.  A prescription for Repatha has been issued.    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         Secondary adrenal insufficiency (Chronic)     Defer treatment of this condition to Endocrinology.  Continue follow-up with Endocrinology.         Hyperlipidemia (Chronic)     Restart Repatha.  Target LDL less than 70.  Continue diet.Counseled on hyperlipidemia disease course, healthy diet and increased need for exercise.  Please be advised of the risk of cardiovascular disease, increase stroke and heart attack risk with uncontrolled/untreated hyperlipidemia.     Patient voiced understanding and understood the treatment plan. All questions were answered.              Relevant Medications    evolocumab (REPATHA SURECLICK) 140 mg/mL PnIj    Coronary artery disease of native artery of native heart with stable angina pectoris (Chronic)     Restart Repatha         Relevant Medications    evolocumab (REPATHA SURECLICK) 140 mg/mL PnIj    Statins contraindicated (Chronic)    Statin intolerance (Chronic)     Restart Repatha         Relevant Medications    evolocumab (REPATHA SURECLICK) 140 mg/mL PnIj    Chronic renal impairment, stage 3a (Chronic)     -single kidney, increase hydration with water.  Avoid anti-inflammatory medication.         Relevant Orders    Microalbumin/Creatinine Ratio, Urine    Thrombocytopenia, unspecified (Chronic)     Chronic.  Stable.  No issues with bleeding.  Bruises easily.          Future Appointments       Date Provider Specialty Appt Notes    6/27/2024 Yoav Oliveira MD Rheumatology 4 month f/u    8/21/2024 Cuauhtemoc Gardner MD Family Medicine annual    10/24/2024  Lab per dr hammonds    10/30/2024 Maria Esther Hammonds MD Endocrinology checkup ok per dr hammonds    12/23/2024 Valente Regalado  "MD [facility] 6 month f/u           Medication Management for assessment above:   Medication List with Changes/Refills   New Medications    EVOLOCUMAB (REPATHA SURECLICK) 140 MG/ML PNIJ    Inject 1 mL (140 mg total) into the skin every 14 (fourteen) days.   Current Medications    ASPIRIN (ECOTRIN) 81 MG EC TABLET    Take 1 tablet (81 mg total) by mouth once daily.    BLUNT NEEDLE, DISPOSABLE 18 X 1 1/2 " NDLE    1 Syringe by Misc.(Non-Drug; Combo Route) route once a week.    CYANOCOBALAMIN 1,000 MCG/ML INJECTION    Inject 1 mL (1,000 mcg total) into the skin every 7 days.    DICLOFENAC (VOLTAREN) 75 MG EC TABLET    Take 1 tablet (75 mg total) by mouth daily as needed.    DICLOFENAC SODIUM (VOLTAREN) 1 % GEL    Apply topically.    ERGOCALCIFEROL (ERGOCALCIFEROL) 50,000 UNIT CAP    TAKE 1 CAPSULE BY MOUTH ONE TIME PER WEEK    ESZOPICLONE (LUNESTA) 3 MG TAB    Take 1 tablet (3 mg total) by mouth every evening.    GOLIMUMAB (SIMPONI) 50 MG/0.5 ML PNIJ    Inject 50 mg into the skin every 30 days.    HYDROCORTISONE (CORTEF) 10 MG TAB    TAKE 1 AND 1/2 TABLETS (15 mg) BY MOUTH IN THE MORNING AND 1 TABLET (10 mg) BY MOUTH IN THE EVENING AT 4PM    NEEDLE, DISP, 30 GAUGE 30 GAUGE X 1/2" NDLE    1 each by Misc.(Non-Drug; Combo Route) route once a week.    SAFETY NEEDLES (BD SAFETYGLIDE NEEDLE) 25 GAUGE X 1" NDLE    1 each by Misc.(Non-Drug; Combo Route) route every 7 days.    SYNTHROID 112 MCG TABLET    Take 1 tablet (112 mcg total) by mouth once daily.    SYRINGE, DISPOSABLE, 1 ML SYRG    1 Syringe by Misc.(Non-Drug; Combo Route) route once a week.    TESTOSTERONE CYPIONATE (DEPOTESTOTERONE CYPIONATE) 200 MG/ML INJECTION    Inject 0.38 mLs (76 mg total) into the muscle every 14 (fourteen) days.    VALSARTAN (DIOVAN) 80 MG TABLET    Take 1 tablet (80 mg total) by mouth once daily.   Discontinued Medications    REPATHA PUSHTRONEX 420 MG/3.5 ML INJT    INJECT 3.5 MLS INTO THE SKIN EVERY 30 DAYS.       Cuauhtemoc T. Jj, " M.D.  ==========================================================================  Subjective:   Patient ID: Aurelio Perkins is a 73 y.o. male.  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   Chief Complaint: Follow-up      History of Present Illness  The patient is a 73-year-old male here for follow-up on his blood work and chronic medical issues. He had lab work performed. He is accompanied by his wife.    The patient initiated valsartan therapy under the guidance of a digital medicine due to suboptimal blood pressure control. After a month of use, his blood pressure has consistently remained around 120/80 or lower. The medication, in conjunction with valsartan sodium, has been effective in managing his kidney function. However, he requires renewal of his prescription, citing financial constraints. His heart valve replacement surgery was performed, during which an angiogram revealed no blockages. He expresses a desire to maintain his cholesterol levels. His renal function has shown improvement, and he reports feeling 20 years younger post-surgery. His physical capacity has improved, enabling him to engage in outdoor activities for several hours with a 15-minute break. He also reports an improvement in his ability to operate a tractor.    Supplemental Information  His meningioma is stable. He has adrenal insufficiency. He is going to see endocrinology, Dr. Sanabria, in 10/2024. His pseudotumor has recurred.    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  Stable.  WHO grade I.  Monitored by  neuro surgery.         Current Assessment & Plan     Continue follow-up with neuro surgery.         Secondary adrenal insufficiency (Chronic)    Overview     Chronic.  Patient following with Endocrinology.  Patient due for a visit and updated labs with them.         Current Assessment & Plan     Defer treatment of this condition to Endocrinology.  Continue follow-up with Endocrinology.         Hyperlipidemia (Chronic)    Overview     June 2024:  Patient has been off of his Repatha.  January 2021:  Patient's LDL remains excellent control after adding Repatha.    Initial HPI:  Chronic.  Uncontrolled.  Patient not taking any medication for this.  Follow-up HPI:  Patient reports statins contraindicated per Rheumatology while on Xeljanz.  Follow-up HPI:This condition is chronic.  Currently uncontrolled.  Patient reports that Rheumatology as instructed him not to take statins while on Xeljanz.  Patient does have a history of coronary artery disease. Patient takes aspirin.  Lab Results   Component Value Date    CHOL 255 (H) 06/20/2024    CHOL 139 09/26/2022    CHOL 210 (H) 12/21/2021     Lab Results   Component Value Date    HDL 46 06/20/2024    HDL 42 09/26/2022    HDL 45 12/21/2021     Lab Results   Component Value Date    LDLCALC 158.4 06/20/2024    LDLCALC 68.4 09/26/2022    LDLCALC 133.0 12/21/2021     Lab Results   Component Value Date    TRIG 253 (H) 06/20/2024    TRIG 143 09/26/2022    TRIG 160 (H) 12/21/2021     Lab Results   Component Value Date    CHOLHDL 18.0 (L) 06/20/2024    CHOLHDL 30.2 09/26/2022    CHOLHDL 21.4 12/21/2021 February 2020:  Reviewed special lipids.  Total cholesterol and LDL have reduced tremendously while on Repatha.  Patient did have device failure in the 3rd month December 2019 where the device only pumped for a few seconds and then stopped.Triglycerides remain elevated.  Patient advised to decrease fatty foods and start fish oil supplementation.         Current Assessment & Plan      Restart Repatha.  Target LDL less than 70.  Continue diet.Counseled on hyperlipidemia disease course, healthy diet and increased need for exercise.  Please be advised of the risk of cardiovascular disease, increase stroke and heart attack risk with uncontrolled/untreated hyperlipidemia.     Patient voiced understanding and understood the treatment plan. All questions were answered.              Coronary artery disease of native artery of native heart with stable angina pectoris (Chronic)    Overview     Chronic.  Questionable vasospasms with coronary artery disease. Patient following with Cardiology.  Patient reports that rheumatologist has said that he cannot take statins due to comorbid conditions and medications.    Patient needs to be on Repatha.    ===============================================  eCly Moreno MD     7/24/2019 10:50 AM    Indication for the procedure :                                                                                        NSTEMI    Procedures performed, findings and recommendations  · Moderate sedation  · Coronary angiography  · Left heart catheterization  · Non obstructive CAD without any culprit lesion. ? vasospasm  · Moderate AS      Details of the procedure  Patient was admitted observation for cardiac catheterization. The   risks, benefits and alternative therapy options were discussed in   detail. Informed consent was obtained. Conscious sedation was   used using intravenous midazolam and intravenous fentanyl per   protocol.    Moderate sedation    Physician - Cely Moreno MD, [facility], [facility]    Sedation monitor - homer DAVEY    Monitored conscious sedation time - 45 minutes         Show:Clear all  [x]Written[x]Templated[]Copied    Added by:  [x]Sanju Bhatia MD    []Tawanna for details  Brief Operative Note:     : Santy Obregon MD      Referring Physician: Valente Garcia      All Operators: Surgeon(s):  Santy Obregon MD Bag, bruna,  Sanju Boothe MD      Preoperative Diagnosis: Aortic stenosis, severe [I35.0]      Postop Diagnosis: Aortic stenosis, severe [I35.0]     Treatments/Procedures: Procedure(s) (LRB):  Angiogram, Coronary (N/A)     Access: Right radial artery     Findings: Non-obstructive CAD noted      See catheterization report for full details.     Intervention:      See catheterization report for full details.     Closure device:  VascBand        Plan:  - Post cath protocol   - IVF @ 200 cc/hr x 2 hours  - Bed rest x 1 hours   - Continue aspirin 81 mg daily indefinitely  - Continue high intensity statin therapy (LDL goal < 70)  - Risk factor reduction (BP <130/80 mmHg, glycemic control, etc)  - Follow up with outpatient cardiologist     Estimated Blood loss: 20 cc     Specimens removed: No     Sanju Bhatia MD  Interventional Cardiology PGY-6         Cosigned by: Santy Obregon MD at 9/13/2023  3:46 PM            Current Assessment & Plan     Restart Repatha         Statins contraindicated (Chronic)    Statin intolerance (Chronic)    Overview     Patient is intolerant to all statins.  Patient is not able to take statins due to other medications that he is on for autoimmune disease.     Patient has started Repatha and doing well.         Current Assessment & Plan     Restart Repatha         Chronic renal impairment, stage 3a (Chronic)    Overview     BMP  Lab Results   Component Value Date     06/20/2024    K 4.2 06/20/2024     06/20/2024    CO2 22 (L) 06/20/2024    BUN 23 06/20/2024    CREATININE 1.5 (H) 06/20/2024    CALCIUM 9.0 06/20/2024    ANIONGAP 9 06/20/2024    EGFRNORACEVR 49.2 (A) 06/20/2024              Current Assessment & Plan     -single kidney, increase hydration with water.  Avoid anti-inflammatory medication.         Thrombocytopenia, unspecified (Chronic)    Overview     Lab Results   Component Value Date    IRON 77 04/12/2022    TRANSFERRIN 206 04/12/2022    TIBC 305 04/12/2022    FESATURATED  "25 04/12/2022      Lab Results   Component Value Date    DRRAGWEX53 358 09/26/2022     Lab Results   Component Value Date    FOLATE 14.2 10/05/2021     Lab Results   Component Value Date    WBC 5.91 06/20/2024    HGB 12.6 (L) 06/20/2024    HCT 37.4 (L) 06/20/2024    MCV 85 06/20/2024     (L) 06/20/2024                Current Assessment & Plan     Chronic.  Stable.  No issues with bleeding.  Bruises easily.             Review of patient's allergies indicates:   Allergen Reactions    Antihistamines - alkylamine Other (See Comments)     hallucinations    Benadryl [diphenhydramine hcl] Other (See Comments)     hallucinations    Codeine Itching     Turns red    Lodine [etodolac] Other (See Comments)     fatigue    Methotrexate analogues Other (See Comments)     Sunlight sensitivity    Morphine Itching     Turns red    Statins-hmg-coa reductase inhibitors      Current Outpatient Medications   Medication Instructions    aspirin (ECOTRIN) 81 mg, Oral, Daily    blunt needle, disposable 18 x 1 1/2 " Ndle 1 Syringe, Misc.(Non-Drug; Combo Route), Weekly    cyanocobalamin 1,000 mcg, Subcutaneous, Every 7 days    diclofenac (VOLTAREN) 75 mg, Oral, Daily PRN    diclofenac sodium (VOLTAREN) 1 % Gel Topical (Top)    ergocalciferol (ERGOCALCIFEROL) 50,000 unit Cap TAKE 1 CAPSULE BY MOUTH ONE TIME PER WEEK    eszopiclone (LUNESTA) 3 mg, Oral, Nightly    hydrocortisone (CORTEF) 10 MG Tab TAKE 1 AND 1/2 TABLETS (15 mg) BY MOUTH IN THE MORNING AND 1 TABLET (10 mg) BY MOUTH IN THE EVENING AT 4PM    needle, disp, 30 gauge 30 gauge x 1/2" Ndle 1 each, Misc.(Non-Drug; Combo Route), Weekly    REPATHA SURECLICK 140 mg, Subcutaneous, Every 14 days    safety needles (BD SAFETYGLIDE NEEDLE) 25 gauge x 1" Ndle 1 each, Misc.(Non-Drug; Combo Route), Every 7 days    SIMPONI 50 mg, Subcutaneous, Every 30 days    SYNTHROID 112 mcg, Oral, Daily    syringe, disposable, 1 mL Syrg 1 Syringe, Misc.(Non-Drug; Combo Route), Weekly    testosterone " cypionate (DEPOTESTOTERONE CYPIONATE) 76 mg, Intramuscular, Every 14 days    valsartan (DIOVAN) 80 mg, Oral, Daily      I have reviewed the PMH, social history, FamilyHx, surgical history, allergies and medications documented / confirmed by the patient at the time of this visit.  Review of Systems   Constitutional:  Negative for activity change and unexpected weight change.   HENT:  Negative for hearing loss, rhinorrhea and trouble swallowing.    Eyes:  Negative for discharge and visual disturbance.   Respiratory:  Negative for chest tightness and wheezing.    Cardiovascular:  Negative for chest pain and palpitations.   Gastrointestinal:  Negative for blood in stool, constipation, diarrhea and vomiting.   Endocrine: Negative for polydipsia and polyuria.   Genitourinary:  Positive for enuresis. Negative for difficulty urinating, hematuria and urgency.        + chronic urinary incontinence   Musculoskeletal:  Positive for arthralgias. Negative for joint swelling and neck pain.   Neurological:  Negative for weakness and headaches.   Psychiatric/Behavioral:  Negative for confusion and dysphoric mood.      Objective:   /82   Pulse 65   Ht 6' (1.829 m)   Wt 96.7 kg (213 lb 3.2 oz)   SpO2 97%   BMI 28.92 kg/m²   Physical Exam  Vitals and nursing note reviewed.   Constitutional:       General: He is not in acute distress.     Appearance: He is well-developed. He is not diaphoretic.      Comments: Here with wife   HENT:      Head: Normocephalic and atraumatic.   Eyes:      Extraocular Movements: Extraocular movements intact.      Pupils: Pupils are equal, round, and reactive to light.      Comments: Right eye ptosis chronic   Neck:      Vascular: No carotid bruit.   Cardiovascular:      Rate and Rhythm: Normal rate and regular rhythm.      Heart sounds: Murmur (significant change from previous) heard.      Systolic murmur is present with a grade of 3/6.   Pulmonary:      Effort: Pulmonary effort is normal. No  respiratory distress.      Breath sounds: Normal breath sounds. No wheezing.   Abdominal:      General: Bowel sounds are normal.      Palpations: Abdomen is soft.   Genitourinary:     Comments: Wearing catheter  Musculoskeletal:         General: Normal range of motion.      Cervical back: Normal range of motion and neck supple.   Skin:     General: Skin is warm and dry.      Capillary Refill: Capillary refill takes less than 2 seconds.      Findings: No rash.   Neurological:      General: No focal deficit present.      Mental Status: He is alert and oriented to person, place, and time. Mental status is at baseline.      Cranial Nerves: No cranial nerve deficit.      Motor: No weakness.      Gait: Gait normal.   Psychiatric:         Attention and Perception: He is attentive.         Mood and Affect: Mood normal. Mood is not anxious or depressed. Affect is not labile, blunt, angry or inappropriate.         Speech: He is communicative. Speech is not rapid and pressured, delayed, slurred or tangential.         Behavior: Behavior normal. Behavior is not agitated, slowed, aggressive, withdrawn, hyperactive or combative.         Thought Content: Thought content normal. Thought content is not paranoid or delusional. Thought content does not include homicidal or suicidal ideation. Thought content does not include homicidal or suicidal plan.         Cognition and Memory: Memory is not impaired.         Judgment: Judgment normal. Judgment is not impulsive or inappropriate.       Physical Exam  Lungs are clear.    Results  Laboratory Studies  Kidney function is stable.    Assessment:     1. 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 Secondary adrenal insufficiency    3. Thrombocytopenia, unspecified    4. Chronic renal impairment, stage 3a    5. Statin intolerance    6. Mixed hyperlipidemia    7. Coronary artery disease of native artery of native heart with stable angina pectoris    8. Statins  contraindicated      MDM:   Moderate medical complexity.  Moderate risk.  Total time: 21 minutes.  This includes total time spent on the encounter, which includes face to face time and non-face to face time preparing to see the patient (eg, review of previous medical records, tests), Obtaining and/or reviewing separately obtained history, documenting clinical information in the electronic or other health record, independently interpreting results (not separately reported)/communicating results to the patient/family/caregiver, and/or care coordination (not separately reported).    I have Reviewed and summarized old records.  I have performed thorough medication reconciliation today and discussed risk and benefits of medications.  I have reviewed labs and discussed with patient.  All questions were answered.  I am requesting old records and will review them once they are available.  Endocrinology  Visit today included increased complexity associated with the care of the episodic problem see above assessment addressed and managing the longitudinal care of the patient due to the serious and/or complex managed problem(s) see above.    I have signed for the following orders AND/OR meds.  Orders Placed This Encounter   Procedures    Microalbumin/Creatinine Ratio, Urine     Standing Status:   Future     Standing Expiration Date:   8/20/2025     Order Specific Question:   Specimen Source     Answer:   Urine     Medications Ordered This Encounter   Medications    evolocumab (REPATHA SURECLICK) 140 mg/mL PnIj     Sig: Inject 1 mL (140 mg total) into the skin every 14 (fourteen) days.     Dispense:  2 mL     Refill:  12     Order Specific Question:   For: 1. benefits investigation, prior auth. and appeals; 2. patient financial assistance; and 3. patient education and medication management send to [facility] to fill the prescription.     Answer:   Yes, send prescription to [facility]        Follow  up in about 1 year (around 6/21/2025), or if symptoms worsen or fail to improve, for Med refills, LAB RESULTS.  Future Appointments       Date Provider Specialty Appt Notes    6/27/2024 Yoav Oliveira MD Rheumatology 4 month f/u    8/21/2024 Cuauhtemoc Gardner MD Family Medicine annual    10/24/2024  Lab per dr hammonds    10/30/2024 Maria Esther Hammonds MD Endocrinology checkup ok per dr hammonds    12/23/2024 Valente Regalado Jr., MD Cardiology 6 month f/u          If no improvement in symptoms or symptoms worsen, advised to call/follow-up at clinic or go to ER. Patient voiced understanding and all questions/concerns were addressed.   DISCLAIMER: This note was compiled by using a speech recognition dictation system and therefore please be aware that typographical / speech recognition errors can and do occur.  Please contact me if you see any errors specifically.  Consent was obtained for AJ recording system prior to the visit.    Cuauhtemoc Gardner M.D.       Office: [phone] [address] [place] [address]  FAX: [phone]

## 2024-06-21 NOTE — PROGRESS NOTES
Make follow-up lab appointment per recommendation below.  Check to see if patient has seen the results through my chart.  If not then,  #CALL THE PATIENT# to discuss results/see if they have questions and document verification of contact. Make F/U appt if needed. [phone]    #My interpretation that was sent to them through [employer]:  Sancho, I have reviewed your recent blood work.     Your cholesterol is increased significantly from previous.  Will discuss at follow-up office.  Your hemoglobin A1c is stable.  This test is gold standard screening test for diabetes.  It is a measures 3 months of your average blood sugar.  =========================  Also please address any outstanding health maintenance that may be due: RSV Vaccine (Age 60+ and Pregnant patients)(1 - 1-dose 60+ series) Never done  Annual UACr due on 12/22/2015  COVID-19 Vaccine(6 - 2023-24 season) due on 09/01/2023  PROSTATE-SPECIFIC ANTIGEN due on 09/26/2023

## 2024-06-21 NOTE — ASSESSMENT & PLAN NOTE
Restart Repatha.  Target LDL less than 70.  Continue diet.Counseled on hyperlipidemia disease course, healthy diet and increased need for exercise.  Please be advised of the risk of cardiovascular disease, increase stroke and heart attack risk with uncontrolled/untreated hyperlipidemia.     Patient voiced understanding and understood the treatment plan. All questions were answered.

## 2024-06-27 ENCOUNTER — OFFICE VISIT (OUTPATIENT)
Dept: RHEUMATOLOGY | Facility: CLINIC | Age: 74
End: 2024-06-27
Payer: MEDICARE

## 2024-06-27 VITALS
HEART RATE: 48 BPM | SYSTOLIC BLOOD PRESSURE: 145 MMHG | BODY MASS INDEX: 27.21 KG/M2 | WEIGHT: 200.63 LBS | DIASTOLIC BLOOD PRESSURE: 67 MMHG

## 2024-06-27 DIAGNOSIS — Z79.899 IMMUNOCOMPROMISED STATE DUE TO DRUG THERAPY: ICD-10-CM

## 2024-06-27 DIAGNOSIS — D84.821 IMMUNOCOMPROMISED STATE DUE TO DRUG THERAPY: ICD-10-CM

## 2024-06-27 DIAGNOSIS — E53.8 B12 DEFICIENCY: ICD-10-CM

## 2024-06-27 DIAGNOSIS — R53.83 FATIGUE, UNSPECIFIED TYPE: ICD-10-CM

## 2024-06-27 DIAGNOSIS — G47.00 INSOMNIA, UNSPECIFIED TYPE: ICD-10-CM

## 2024-06-27 DIAGNOSIS — N18.30 STAGE 3 CHRONIC KIDNEY DISEASE, UNSPECIFIED WHETHER STAGE 3A OR 3B CKD: ICD-10-CM

## 2024-06-27 DIAGNOSIS — M05.9 SEROPOSITIVE RHEUMATOID ARTHRITIS: Primary | ICD-10-CM

## 2024-06-27 DIAGNOSIS — E53.8 VITAMIN B 12 DEFICIENCY: ICD-10-CM

## 2024-06-27 PROCEDURE — 99214 OFFICE O/P EST MOD 30 MIN: CPT | Mod: 25,S$GLB,, | Performed by: INTERNAL MEDICINE

## 2024-06-27 PROCEDURE — 3078F DIAST BP <80 MM HG: CPT | Mod: CPTII,S$GLB,, | Performed by: INTERNAL MEDICINE

## 2024-06-27 PROCEDURE — 3077F SYST BP >= 140 MM HG: CPT | Mod: CPTII,S$GLB,, | Performed by: INTERNAL MEDICINE

## 2024-06-27 PROCEDURE — 1125F AMNT PAIN NOTED PAIN PRSNT: CPT | Mod: CPTII,S$GLB,, | Performed by: INTERNAL MEDICINE

## 2024-06-27 PROCEDURE — 99999 PR PBB SHADOW E&M-EST. PATIENT-LVL II: CPT | Mod: PBBFAC,,, | Performed by: INTERNAL MEDICINE

## 2024-06-27 PROCEDURE — 96372 THER/PROPH/DIAG INJ SC/IM: CPT | Mod: S$GLB,,, | Performed by: INTERNAL MEDICINE

## 2024-06-27 PROCEDURE — 4010F ACE/ARB THERAPY RXD/TAKEN: CPT | Mod: CPTII,S$GLB,, | Performed by: INTERNAL MEDICINE

## 2024-06-27 PROCEDURE — 3008F BODY MASS INDEX DOCD: CPT | Mod: CPTII,S$GLB,, | Performed by: INTERNAL MEDICINE

## 2024-06-27 PROCEDURE — 3044F HG A1C LEVEL LT 7.0%: CPT | Mod: CPTII,S$GLB,, | Performed by: INTERNAL MEDICINE

## 2024-06-27 RX ORDER — NEEDLES, DISPOSABLE 25GX5/8"
1 NEEDLE, DISPOSABLE MISCELLANEOUS WEEKLY
Qty: 25 EACH | Refills: 3 | Status: SHIPPED | OUTPATIENT
Start: 2024-06-27

## 2024-06-27 RX ORDER — DICLOFENAC SODIUM 75 MG/1
75 TABLET, DELAYED RELEASE ORAL DAILY PRN
Qty: 90 TABLET | Refills: 1 | Status: SHIPPED | OUTPATIENT
Start: 2024-06-27

## 2024-06-27 RX ORDER — ESZOPICLONE 3 MG/1
3 TABLET, FILM COATED ORAL NIGHTLY
Qty: 30 TABLET | Refills: 5 | Status: SHIPPED | OUTPATIENT
Start: 2024-06-27

## 2024-06-27 RX ORDER — CYANOCOBALAMIN 1000 UG/ML
1000 INJECTION, SOLUTION INTRAMUSCULAR; SUBCUTANEOUS
Qty: 10 ML | Refills: 3 | Status: SHIPPED | OUTPATIENT
Start: 2024-06-27

## 2024-06-27 RX ORDER — CYANOCOBALAMIN 1000 UG/ML
1000 INJECTION, SOLUTION INTRAMUSCULAR; SUBCUTANEOUS
Status: COMPLETED | OUTPATIENT
Start: 2024-06-27 | End: 2024-06-27

## 2024-06-27 RX ADMIN — CYANOCOBALAMIN 1000 MCG: 1000 INJECTION, SOLUTION INTRAMUSCULAR; SUBCUTANEOUS at 01:06

## 2024-06-27 NOTE — PROGRESS NOTES
Subjective:     Patient ID:  Aurelio Perkins    Chief Complaint:  Disease Management     History of Present Illness:  Pt is a 73 y.o. maled iagnosis/e she had his TARV and now his hr is low normal  Seropositive Rheumatoid Arthritis  Positive serologies: +CCP  Negative serologies: RF  Infectious screening labs: Hepatitis B, C, and TB negative (10/23)  Imagin. X-ray lumbar spine: Moderate severe degenerative disc changes at L1-2.  Mild disc space narrowing and endplate spurring at L2-3.\  2. DEXA scan: : There is a 13.4% risk of a major osteoporotic fracture and a 3.8% risk of hip fracture in the next 10 years (FRAX).        Previous treatments:  Enbrel  Xeljanz  Rinvoq     Current treatments:  Simponi  diclofenac  Rheumatologic History:   - Diagnosis/es:  - Positive serologies:  - Infectious screening labs:  - Previous Treatments:  - Current Treatments:     Interval History:   Hospitalization since last office visit: No    Patient Active Problem List    Diagnosis Date Noted    Immunocompromised state due to drug therapy 2024    S/P TAVR (transcatheter aortic valve replacement) 2023    Thrombocytopenia, unspecified 2023    COVID-19 2023    Prediabetes 2023    Colon cancer screening 2022    History of colon polyps 2022    Chronic diarrhea 2022    Refused pneumococcal vaccination 2021    Memory change 2021    H/O prostatectomy 2021    Urinary incontinence 2021    Anemia of chronic renal failure, stage 3 (moderate) 2020    Erectile dysfunction after radical prostatectomy 2020    Acquired absence of kidney 2020    Chronic renal impairment, stage 3a 2020    Panhypopituitarism 2020    Statin intolerance 10/31/2019    Coronary artery disease of native artery of native heart with stable angina pectoris 10/05/2019    Statins contraindicated 10/05/2019    Abdominal aortic atherosclerosis 10/05/2019    Encounter  for screening colonoscopy 10/03/2019    Fatigue 10/03/2019    Severe aortic stenosis 07/29/2019    Vitamin B12 deficiency 07/11/2019    Vitamin D deficiency 07/11/2019    Hyperlipidemia 07/09/2019    Encounter for long-term (current) use of medications 07/09/2019    Secondary adrenal insufficiency 06/25/2019    H/O secondary hypogonadism 06/25/2019    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 06/17/2019    S/P craniotomy 06/17/2019    Ptosis of right eyelid 05/29/2019    Essential hypertension 05/29/2019    Pituitary abnormality 03/04/2019    Hyperglycemia     Hypothyroidism 11/09/2017    Chronic fatigue 11/09/2017    Low testosterone in male 11/09/2017    Seropositive rheumatoid arthritis 11/16/2014    Arthritis pain 11/16/2014    Arthropathy 11/16/2014     Past Surgical History:   Procedure Laterality Date    CARDIAC CATH COSURGEON N/A 10/19/2023    Procedure: Cardiac Cath Cosurgeon;  Surgeon: Valente Garcia MD;  Location: [facility] CATH LAB;  Service: Cardiology;  Laterality: N/A;    COLONOSCOPY N/A 3/29/2022    Procedure: COLONOSCOPY;  Surgeon: Cayla Sherman MD;  Location: [facility] ENDO;  Service: Endoscopy;  Laterality: N/A;    CORONARY ANGIOGRAPHY N/A 9/13/2023    Procedure: Angiogram, Coronary;  Surgeon: Santy Obregon MD;  Location: [facility] CATH LAB;  Service: Cardiology;  Laterality: N/A;    CRANIOTOMY Right 5/29/2019    Procedure: CRANIOTOMY  (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)  Co-surgery with Dr Vaibhav Jara - please put in his room  [facility];  Surgeon: Jimmy Segura DO;  Location: [facility] OR [address] [place] [address];  Service: Neurosurgery;  Laterality: Right;    HEMORRHOID SURGERY      NEPHRECTOMY      PROSTATECTOMY      TONSILLECTOMY      TRANSCATHETER AORTIC VALVE REPLACEMENT (TAVR) N/A 10/19/2023    Procedure: REPLACEMENT, AORTIC VALVE, TRANSCATHETER (TAVR);  Surgeon: Santy Obregon MD;  Location: [facility] CATH  "LAB;  Service: Cardiology;  Laterality: N/A;    TRANSCATHETER AORTIC VALVE REPLACEMENT (TAVR)  10/19/2023    Procedure: REPLACEMENT, AORTIC VALVE, TRANSCATHETER (TAVR);  Surgeon: Valente Garcia MD;  Location: [facility] CATH LAB;  Service: Cardiology;;     Social History     Tobacco Use    Smoking status: Former     Current packs/day: 0.00     Types: Cigarettes     Quit date: 2006     Years since quittin.4     Passive exposure: Never    Smokeless tobacco: Never   Substance Use Topics    Alcohol use: Not Currently    Drug use: No     Family History   Adopted: Yes   Family history unknown: Yes     Review of patient's allergies indicates:   Allergen Reactions    Antihistamines - alkylamine Other (See Comments)     hallucinations    Benadryl [diphenhydramine hcl] Other (See Comments)     hallucinations    Codeine Itching     Turns red    Lodine [etodolac] Other (See Comments)     fatigue    Methotrexate analogues Other (See Comments)     Sunlight sensitivity    Morphine Itching     Turns red    Statins-hmg-coa reductase inhibitors        Review of Systems   Review of Systems     Current Medications:  Current Outpatient Medications   Medication Instructions    aspirin (ECOTRIN) 81 mg, Oral, Daily    blunt needle, disposable 18 x 1 1/2 " Ndle 1 Syringe, Misc.(Non-Drug; Combo Route), Weekly    cyanocobalamin 1,000 mcg, Subcutaneous, Every 7 days    diclofenac (VOLTAREN) 75 mg, Oral, Daily PRN    diclofenac sodium (VOLTAREN) 1 % Gel Topical (Top)    ergocalciferol (ERGOCALCIFEROL) 50,000 unit Cap TAKE 1 CAPSULE BY MOUTH ONE TIME PER WEEK    eszopiclone (LUNESTA) 3 mg, Oral, Nightly    hydrocortisone (CORTEF) 10 MG Tab TAKE 1 AND 1/2 TABLETS (15 mg) BY MOUTH IN THE MORNING AND 1 TABLET (10 mg) BY MOUTH IN THE EVENING AT 4PM    needle, disp, 30 gauge 30 gauge x 1/2" Ndle 1 each, Misc.(Non-Drug; Combo Route), Weekly    REPATHA SURECLICK 140 mg, Subcutaneous, Every 14 days    safety needles (BD SAFETYGLIDE NEEDLE) 25 " "gauge x 1" Ndle 1 each, Misc.(Non-Drug; Combo Route), Every 7 days    SIMPONI 50 mg, Subcutaneous, Every 30 days    SYNTHROID 112 mcg, Oral, Daily    syringe, disposable, 1 mL Syrg 1 Syringe, Misc.(Non-Drug; Combo Route), Weekly    testosterone cypionate (DEPOTESTOTERONE CYPIONATE) 76 mg, Intramuscular, Every 14 days    valsartan (DIOVAN) 80 mg, Oral, Daily         Objective:     Vitals:    06/27/24 1207   BP: (!) 145/67   Pulse: (!) 48   Weight: 91 kg (200 lb 9.9 oz)   PainSc:   3      Body mass index is 27.21 kg/m².     Physical Examinations:  Physical Exam   Constitutional: He is oriented to person, place, and time.   HENT:   Head: Normocephalic and atraumatic.   Mouth/Throat: Oropharynx is clear and moist.   Eyes: Pupils are equal, round, and reactive to light.   Neck: No thyromegaly present.   Cardiovascular: Normal rate, regular rhythm and normal heart sounds. Exam reveals no gallop and no friction rub.   No murmur heard.  Pulmonary/Chest: Breath sounds normal. He has no wheezes. He has no rales. He exhibits no tenderness.   Abdominal: There is no abdominal tenderness. There is no rebound and no guarding.   Musculoskeletal:         General: Tenderness present.      Right shoulder: Tenderness present.      Left shoulder: Tenderness present.      Right elbow: Swelling present. Tenderness present.      Left elbow: Tenderness present.      Right wrist: Swelling and tenderness present.      Left wrist: Swelling and tenderness present.      Cervical back: Neck supple.      Right knee: Swelling and effusion present. Tenderness present.      Left knee: Swelling and effusion present. Tenderness present.   Lymphadenopathy:     He has no cervical adenopathy.   Neurological: He is alert and oriented to person, place, and time. Gait normal.   Skin: Bruising noted. No rash noted. There is erythema. No pallor.   Psychiatric: Mood and affect normal.   Vitals reviewed.      Right Side Rheumatological Exam     The patient is " tender to palpation of the shoulder, elbow, wrist, knee, 1st PIP, 1st MCP, 2nd PIP, 2nd MCP, 3rd PIP, 3rd MCP, 4th PIP, 4th MCP and 5th PIP    He has swelling of the elbow, wrist, knee, 1st PIP, 1st MCP, 2nd PIP, 2nd MCP, 3rd PIP, 3rd MCP, 4th PIP, 4th MCP, 5th PIP and 5th MCP    The patient has an enlarged wrist and knee    Shoulder Exam   Tenderness Location: no tenderness    Range of Motion   Active abduction:  abnormal   Adduction: abnormal  Sensation: normal    Knee Exam   Tenderness Location: medial joint line and LCL  Patellofemoral Crepitus: positive  Effusion: positive  Sensation: normal    Hip Exam   Tenderness Location: posterior and anterior  Sensation: normal    Elbow/Wrist Exam   Tenderness Location: no tenderness  Sensation: normal    Left Side Rheumatological Exam     The patient is tender to palpation of the shoulder, elbow, wrist, knee, 1st PIP, 1st MCP, 2nd PIP, 2nd MCP, 3rd PIP, 3rd MCP, 4th PIP, 4th MCP, 5th PIP and 5th MCP.    He has swelling of the wrist, knee, 1st PIP, 1st MCP, 2nd PIP, 2nd MCP, 3rd PIP, 3rd MCP, 4th PIP, 4th MCP, 5th PIP and 5th MCP    The patient has an enlarged knee.    Shoulder Exam   Tenderness Location: no tenderness    Range of Motion   Active abduction:  abnormal   Sensation: normal    Knee Exam   Tenderness Location: lateral joint line and medial joint line    Patellofemoral Crepitus: positive  Effusion: positive  Sensation: normal    Hip Exam   Tenderness Location: posterior and anterior  Sensation: normal    Elbow/Wrist Exam   Sensation: normal      Back/Neck Exam   General Inspection   Gait: normal       Tenderness Right paramedian tenderness of the Upper C-Spine, Lower C-Spine, Lower L-Spine and SI Joint.Left paramedian tenderness of the Upper C-Spine, Lower L-Spine, Lower C-Spine and SI Joint.    Neck Range of Motion   Flexion:  Limited  Extension:  Limited       Disease Assessment Scores:  Patient's Global Assessment of arthritis (0-10): 3  Physician's Global  "Assessment of arthritis (0-10): 2  Number of Tender Joints (0-28): 2  Number of Swollen Joints (0-28): 2         No data to display                Monitoring Lab Results:  Lab Results   Component Value Date    WBC 5.91 06/20/2024    RBC 4.38 (L) 06/20/2024    HGB 12.6 (L) 06/20/2024    HCT 37.4 (L) 06/20/2024    MCV 85 06/20/2024    MCH 28.8 06/20/2024    MCHC 33.7 06/20/2024    RDW 12.5 06/20/2024     (L) 06/20/2024        Lab Results   Component Value Date     06/20/2024    K 4.2 06/20/2024     06/20/2024    CO2 22 (L) 06/20/2024    GLU 86 06/20/2024    BUN 23 06/20/2024    CREATININE 1.5 (H) 06/20/2024    CALCIUM 9.0 06/20/2024    PROT 6.5 06/20/2024    ALBUMIN 3.9 06/20/2024    BILITOT 0.7 06/20/2024    ALKPHOS 52 (L) 06/20/2024    AST 29 06/20/2024    ALT 44 06/20/2024    ANIONGAP 9 06/20/2024    EGFRNORACEVR 49.2 (A) 06/20/2024       Lab Results   Component Value Date    SEDRATE 9 06/20/2024    CRP 1.0 06/20/2024        Lab Results   Component Value Date    FQNSYILL68ZP 34 07/05/2023    ELGRQZZL81 358 09/26/2022        Lab Results   Component Value Date    CHOL 255 (H) 06/20/2024    HDL 46 06/20/2024    LDLCALC 158.4 06/20/2024    TRIG 253 (H) 06/20/2024       Lab Results   Component Value Date    RF <13.0 12/21/2021    CCPANTIBODIE 1.1 12/21/2021     No results found for: "ANASCREEN", "ANATITER", "ANAPATTE", "DSDNA", "SMRNPAB", "SSAANTIBODY", "SSBANTIBODY", "WKC55VU", "JO1AB"  No results found for: "HLABB27"    Infectious Disease Screening:  Lab Results   Component Value Date    HEPBSAG Non-reactive 10/18/2023    HEPBCAB Non-reactive 10/18/2023    HEPBSAB <3.00 10/18/2023    HEPBSAB Non-reactive 10/18/2023     Lab Results   Component Value Date    HEPCAB Non-reactive 10/18/2023     Lab Results   Component Value Date    TBGOLDPLUS Negative 10/18/2023     No results found for: "QUANTIFERON", "SVCMT", "QUANTAGVALUE", "QUANTNILVALU", "QUANTMITOGEN", "QFTTBAG", "QINT"     Imaging: DEXA, Xrays, " "MRIs, CTs, etc    Old & Outside Medical Records:  Reviewed old and all outside medical records available in Care Everywhere     Assessment:     Pt is a 73 y.o. male with rheumatoid arthritis and psoriatic arthritis. The patient has not achieved clinical remission. Plan is to continue golimumab (SIMPONI)    Plan:      Encounter Diagnoses   Name Primary?    Seropositive rheumatoid arthritis Yes    Immunocompromised state due to drug therapy     Stage 3 chronic kidney disease, unspecified whether stage 3a or 3b CKD     Vitamin B 12 deficiency     Fatigue, unspecified type     Insomnia, unspecified type      Aurelio Mcfadden" was seen today for disease management.    Diagnoses and all orders for this visit:    Seropositive rheumatoid arthritis  -     Apolipoprotein B; Future  -     Sedimentation rate; Future  -     C-Reactive Protein; Future  -     Comprehensive Metabolic Panel; Future  -     CBC Auto Differential; Future  -     diclofenac (VOLTAREN) 75 MG EC tablet; Take 1 tablet (75 mg total) by mouth daily as needed.    Immunocompromised state due to drug therapy  -     Apolipoprotein B; Future  -     Sedimentation rate; Future  -     C-Reactive Protein; Future  -     Comprehensive Metabolic Panel; Future  -     CBC Auto Differential; Future    Stage 3 chronic kidney disease, unspecified whether stage 3a or 3b CKD  -     Apolipoprotein B; Future  -     Sedimentation rate; Future  -     C-Reactive Protein; Future  -     Comprehensive Metabolic Panel; Future  -     CBC Auto Differential; Future    Vitamin B 12 deficiency  -     Apolipoprotein B; Future  -     Sedimentation rate; Future  -     C-Reactive Protein; Future  -     Comprehensive Metabolic Panel; Future  -     CBC Auto Differential; Future    Fatigue, unspecified type  -     Apolipoprotein B; Future  -     Sedimentation rate; Future  -     C-Reactive Protein; Future  -     Comprehensive Metabolic Panel; Future  -     CBC Auto Differential; Future    Insomnia, " unspecified type  -     eszopiclone (LUNESTA) 3 mg Tab; Take 1 tablet (3 mg total) by mouth every evening.        1. Continue simponi  2. Diclofenac daily  3. F/u 6 month     Follow-up 6 months    More than 50% of the  30 minute encounter was spent face to face counseling the patient regarding current status and future plan of care as well as side effects  of the medications. All questions were answered to patient's satisfaction also includes  non-face to face time preparing to see the patient (eg, review of tests), Obtaining and/or reviewing separately obtained history, Documenting clinical information in the electronic or other health record, Independently interpreting results

## 2024-06-27 NOTE — PROGRESS NOTES
Two patient identifier used, allergies verified, see MAR for med, dose, and injection site. Patient tolerated well.

## 2024-07-02 ENCOUNTER — PATIENT MESSAGE (OUTPATIENT)
Dept: NEUROSURGERY | Facility: CLINIC | Age: 74
End: 2024-07-02
Payer: MEDICARE

## 2024-07-16 DIAGNOSIS — D32.9 MENINGIOMA OF RIGHT SPHENOID WING INVOLVING CAVERNOUS SINUS: Primary | ICD-10-CM

## 2024-07-28 ENCOUNTER — PATIENT MESSAGE (OUTPATIENT)
Dept: FAMILY MEDICINE | Facility: CLINIC | Age: 74
End: 2024-07-28
Payer: MEDICARE

## 2024-07-29 DIAGNOSIS — E27.49 SECONDARY ADRENAL INSUFFICIENCY: Primary | ICD-10-CM

## 2024-07-29 RX ORDER — HYDROCORTISONE 10 MG/1
TABLET ORAL
Qty: 75 TABLET | Refills: 2 | Status: SHIPPED | OUTPATIENT
Start: 2024-07-29

## 2024-07-29 RX ORDER — HYDROCORTISONE 10 MG/1
TABLET ORAL
Qty: 75 TABLET | Refills: 11 | OUTPATIENT
Start: 2024-07-29

## 2024-07-29 NOTE — TELEPHONE ENCOUNTER
This medication is prescribed by one of his specialist. I recommend follow up with specialist for refills.

## 2024-07-29 NOTE — TELEPHONE ENCOUNTER
This medication is not prescribed by Dr. Gardner, however, pt lost rx and stated it takes too long for Dr. Hammonds's office to respond. Please advise if you will fill this or if it needs to come from Dr. Hammonds.

## 2024-07-29 NOTE — TELEPHONE ENCOUNTER
No care due was identified.  [facility] Embedded Care Due Messages. Reference number: [id number].   7/29/2024 12:20:01 PM CDT

## 2024-07-31 ENCOUNTER — PATIENT MESSAGE (OUTPATIENT)
Dept: RESEARCH | Facility: HOSPITAL | Age: 74
End: 2024-07-31
Payer: MEDICARE

## 2024-08-15 DIAGNOSIS — E03.9 PRIMARY HYPOTHYROIDISM: ICD-10-CM

## 2024-08-15 RX ORDER — LEVOTHYROXINE SODIUM 112 UG/1
112 TABLET ORAL
Qty: 90 TABLET | Refills: 3 | Status: SHIPPED | OUTPATIENT
Start: 2024-08-15

## 2024-08-20 ENCOUNTER — LAB VISIT (OUTPATIENT)
Dept: LAB | Facility: HOSPITAL | Age: 74
End: 2024-08-20
Attending: FAMILY MEDICINE
Payer: MEDICARE

## 2024-08-20 DIAGNOSIS — Z00.01 ENCOUNTER FOR GENERAL ADULT MEDICAL EXAMINATION WITH ABNORMAL FINDINGS: ICD-10-CM

## 2024-08-20 DIAGNOSIS — R80.9 PROTEINURIA: ICD-10-CM

## 2024-08-20 DIAGNOSIS — D84.821 IMMUNOCOMPROMISED STATE DUE TO DRUG THERAPY: ICD-10-CM

## 2024-08-20 DIAGNOSIS — Z13.220 ENCOUNTER FOR LIPID SCREENING FOR CARDIOVASCULAR DISEASE: ICD-10-CM

## 2024-08-20 DIAGNOSIS — E78.2 MIXED HYPERLIPIDEMIA: Chronic | ICD-10-CM

## 2024-08-20 DIAGNOSIS — E55.9 VITAMIN D DEFICIENCY, UNSPECIFIED: ICD-10-CM

## 2024-08-20 DIAGNOSIS — D63.1 ANEMIA OF CHRONIC RENAL FAILURE, STAGE 3 (MODERATE): Chronic | ICD-10-CM

## 2024-08-20 DIAGNOSIS — Z79.899 IMMUNOCOMPROMISED STATE DUE TO DRUG THERAPY: ICD-10-CM

## 2024-08-20 DIAGNOSIS — N18.30 STAGE 3 CHRONIC KIDNEY DISEASE, UNSPECIFIED WHETHER STAGE 3A OR 3B CKD: ICD-10-CM

## 2024-08-20 DIAGNOSIS — E03.9 HYPOTHYROIDISM, UNSPECIFIED TYPE: ICD-10-CM

## 2024-08-20 DIAGNOSIS — E27.49 SECONDARY ADRENAL INSUFFICIENCY: ICD-10-CM

## 2024-08-20 DIAGNOSIS — D63.1 ANEMIA OF CHRONIC RENAL FAILURE: ICD-10-CM

## 2024-08-20 DIAGNOSIS — N18.30 ANEMIA OF CHRONIC RENAL FAILURE, STAGE 3 (MODERATE): Chronic | ICD-10-CM

## 2024-08-20 DIAGNOSIS — M06.9 RHEUMATOID ARTHRITIS FLARE: ICD-10-CM

## 2024-08-20 DIAGNOSIS — E83.39 HYPOPHOSPHATURIA: ICD-10-CM

## 2024-08-20 DIAGNOSIS — I10 ESSENTIAL HYPERTENSION, MALIGNANT: ICD-10-CM

## 2024-08-20 DIAGNOSIS — G89.4 CHRONIC PAIN SYNDROME: ICD-10-CM

## 2024-08-20 DIAGNOSIS — Z79.899 ENCOUNTER FOR LONG-TERM (CURRENT) USE OF MEDICATIONS: ICD-10-CM

## 2024-08-20 DIAGNOSIS — N18.9 ANEMIA OF CHRONIC RENAL FAILURE: ICD-10-CM

## 2024-08-20 DIAGNOSIS — Z90.79 ACQUIRED ABSENCE OF ORGAN, GENITAL ORGANS: ICD-10-CM

## 2024-08-20 DIAGNOSIS — F51.01 PRIMARY INSOMNIA: ICD-10-CM

## 2024-08-20 DIAGNOSIS — M06.9 RHEUMATOID ARTHRITIS OF HAND: ICD-10-CM

## 2024-08-20 DIAGNOSIS — M05.9 SEROPOSITIVE RHEUMATOID ARTHRITIS: ICD-10-CM

## 2024-08-20 DIAGNOSIS — R53.83 FATIGUE, UNSPECIFIED TYPE: ICD-10-CM

## 2024-08-20 DIAGNOSIS — Z13.6 ENCOUNTER FOR LIPID SCREENING FOR CARDIOVASCULAR DISEASE: ICD-10-CM

## 2024-08-20 DIAGNOSIS — I10 ESSENTIAL HYPERTENSION: ICD-10-CM

## 2024-08-20 DIAGNOSIS — Z90.5 ACQUIRED ABSENCE OF KIDNEY: ICD-10-CM

## 2024-08-20 DIAGNOSIS — N17.9 ACUTE KIDNEY FAILURE, UNSPECIFIED: ICD-10-CM

## 2024-08-20 DIAGNOSIS — E55.9 AVITAMINOSIS D: ICD-10-CM

## 2024-08-20 DIAGNOSIS — E23.0 PANHYPOPITUITARISM: ICD-10-CM

## 2024-08-20 DIAGNOSIS — N18.30 CHRONIC KIDNEY DISEASE, STAGE III (MODERATE): ICD-10-CM

## 2024-08-20 LAB
CHOLEST SERPL-MCNC: 193 MG/DL (ref 120–199)
CHOLEST/HDLC SERPL: 3.6 {RATIO} (ref 2–5)
ESTIMATED AVG GLUCOSE: 117 MG/DL (ref 68–131)
HBA1C MFR BLD: 5.7 % (ref 4–5.6)
HDLC SERPL-MCNC: 53 MG/DL (ref 40–75)
HDLC SERPL: 27.5 % (ref 20–50)
LDLC SERPL CALC-MCNC: 89.8 MG/DL (ref 63–159)
NONHDLC SERPL-MCNC: 140 MG/DL
TRIGL SERPL-MCNC: 251 MG/DL (ref 30–150)

## 2024-08-20 PROCEDURE — 80061 LIPID PANEL: CPT | Performed by: FAMILY MEDICINE

## 2024-08-20 PROCEDURE — 83036 HEMOGLOBIN GLYCOSYLATED A1C: CPT | Performed by: FAMILY MEDICINE

## 2024-08-21 ENCOUNTER — OFFICE VISIT (OUTPATIENT)
Dept: FAMILY MEDICINE | Facility: CLINIC | Age: 74
End: 2024-08-21
Payer: MEDICARE

## 2024-08-21 VITALS
OXYGEN SATURATION: 97 % | HEART RATE: 78 BPM | WEIGHT: 213.5 LBS | DIASTOLIC BLOOD PRESSURE: 60 MMHG | BODY MASS INDEX: 28.92 KG/M2 | HEIGHT: 72 IN | SYSTOLIC BLOOD PRESSURE: 112 MMHG

## 2024-08-21 DIAGNOSIS — R73.03 PREDIABETES: ICD-10-CM

## 2024-08-21 DIAGNOSIS — E78.2 MIXED HYPERLIPIDEMIA: Primary | ICD-10-CM

## 2024-08-21 DIAGNOSIS — Z79.899 ENCOUNTER FOR LONG-TERM (CURRENT) USE OF MEDICATIONS: ICD-10-CM

## 2024-08-21 DIAGNOSIS — Z78.9 STATIN INTOLERANCE: ICD-10-CM

## 2024-08-21 PROCEDURE — 99214 OFFICE O/P EST MOD 30 MIN: CPT | Mod: S$GLB,,, | Performed by: FAMILY MEDICINE

## 2024-08-21 PROCEDURE — 1160F RVW MEDS BY RX/DR IN RCRD: CPT | Mod: CPTII,S$GLB,, | Performed by: FAMILY MEDICINE

## 2024-08-21 PROCEDURE — 3074F SYST BP LT 130 MM HG: CPT | Mod: CPTII,S$GLB,, | Performed by: FAMILY MEDICINE

## 2024-08-21 PROCEDURE — 1159F MED LIST DOCD IN RCRD: CPT | Mod: CPTII,S$GLB,, | Performed by: FAMILY MEDICINE

## 2024-08-21 PROCEDURE — 1126F AMNT PAIN NOTED NONE PRSNT: CPT | Mod: CPTII,S$GLB,, | Performed by: FAMILY MEDICINE

## 2024-08-21 PROCEDURE — 1101F PT FALLS ASSESS-DOCD LE1/YR: CPT | Mod: CPTII,S$GLB,, | Performed by: FAMILY MEDICINE

## 2024-08-21 PROCEDURE — 3288F FALL RISK ASSESSMENT DOCD: CPT | Mod: CPTII,S$GLB,, | Performed by: FAMILY MEDICINE

## 2024-08-21 PROCEDURE — 4010F ACE/ARB THERAPY RXD/TAKEN: CPT | Mod: CPTII,S$GLB,, | Performed by: FAMILY MEDICINE

## 2024-08-21 PROCEDURE — 3078F DIAST BP <80 MM HG: CPT | Mod: CPTII,S$GLB,, | Performed by: FAMILY MEDICINE

## 2024-08-21 PROCEDURE — 3044F HG A1C LEVEL LT 7.0%: CPT | Mod: CPTII,S$GLB,, | Performed by: FAMILY MEDICINE

## 2024-08-21 PROCEDURE — 3008F BODY MASS INDEX DOCD: CPT | Mod: CPTII,S$GLB,, | Performed by: FAMILY MEDICINE

## 2024-08-21 PROCEDURE — G2211 COMPLEX E/M VISIT ADD ON: HCPCS | Mod: S$GLB,,, | Performed by: FAMILY MEDICINE

## 2024-08-21 PROCEDURE — 99999 PR PBB SHADOW E&M-EST. PATIENT-LVL III: CPT | Mod: PBBFAC,,, | Performed by: FAMILY MEDICINE

## 2024-08-21 NOTE — PATIENT INSTRUCTIONS
Follow up in about 1 year (around 8/21/2025), or if symptoms worsen or fail to improve.     Dear patient,   As a result of recent federal legislation (The Federal Cures Act), you may receive lab or pathology results from your visit in your [facility] account before your physician is able to contact you. Your physician or their representative will relay the results to you with their recommendations at their soonest availability.     If no improvement in symptoms or symptoms worsen, please be advised to call MD, follow-up at clinic and/or go to ER if becomes severe.    Cuauhtemoc Gardner M.D.        We Offer TELEHEALTH & Same Day Appointments!   Book your Telehealth appointment with me through my nurse or   Clinic appointments on [employer]!    [address] [place] [address]    Office: [phone]   FAX: [phone]    Check out my Facebook Page and Follow Me at: [email or web address]    Check out my website at [facility] by clicking on: [email or web address]    To Schedule appointments online, go to "[email or web address]: [email or web address]

## 2024-08-21 NOTE — ASSESSMENT & PLAN NOTE
Continue Repatha.  Consider starting fenofibrate or other prescription medication if triglycerides do not improve with diet and fish oil supplementation.  Recheck labs in six months.  Counseled on hyperlipidemia disease course, healthy diet and increased need for exercise.  Please be advised of the risk of cardiovascular disease, increase stroke and heart attack risk with uncontrolled/untreated hyperlipidemia.     Patient voiced understanding and understood the treatment plan. All questions were answered.

## 2024-08-21 NOTE — ASSESSMENT & PLAN NOTE
Discussed lifestyle modification with diet and exercise.  We will plan to monitor hemoglobin A1c at designated intervals 3 to 6 months.  I recommend ongoing Education for diabetic diet and exercise protocol.  We will continue to monitor for side effects.    Please be advised of symptoms to monitor for and to notify me immediately if persistent or worsening.  Follow up with Ophthalmology/Optometry and Podiatry at least annually.

## 2024-08-21 NOTE — PROGRESS NOTES
PLAN:    Assessment & Plan  1. Hypertriglyceridemia.  Triglycerides remain elevated at 251, slightly down from 253. Discussed treatment options including fish oil supplements and fenofibrate. He prefers to try fish oil, which he can obtain over the counter with his [employer] card. If triglycerides do not improve, medication will be considered.    2. Prediabetes.  A1c has improved slightly to 5.7 from 5.8. No need for medication at this time due to stable condition. He has been more active and reduced sugar intake, contributing to the improvement. Continued monitoring is advised. Labs for A1c will be repeated in 6 months.    3. Hypercholesterolemia.  Significant improvement in cholesterol levels noted, with LDL decreasing to 89 from 158 and total cholesterol to 193 from 255. He has been compliant with Repatha, which has contributed to the improvement. Repatha 140 mg every two weeks will be continued.     4. Medication Management.  Refill for Repatha was confirmed, with 10 refills available. He reports no issues with the current medication regimen.    5. Health Maintenance.  A urine protein test is scheduled for next week. He will also have his annual MRI and a CAT scan on 08/27/2024.        Problem List Items Addressed This Visit       Hyperlipidemia - Primary (Chronic)     Continue Repatha.  Consider starting fenofibrate or other prescription medication if triglycerides do not improve with diet and fish oil supplementation.  Recheck labs in six months.  Counseled on hyperlipidemia disease course, healthy diet and increased need for exercise.  Please be advised of the risk of cardiovascular disease, increase stroke and heart attack risk with uncontrolled/untreated hyperlipidemia.     Patient voiced understanding and understood the treatment plan. All questions were answered.            Relevant Orders    CBC Without Differential    Comprehensive Metabolic Panel    TSH    Hemoglobin A1C    Lipid Panel     Encounter for long-term (current) use of medications (Chronic)     Complete history and physical was completed today.  Complete and thorough medication reconciliation was performed.  Discussed risks and benefits of medications.  Advised patient on orders and health maintenance.  We discussed old records and old labs if available.  Will request any records not available through epic.  Continue current medications listed on your summary sheet.           Relevant Orders    CBC Without Differential    Comprehensive Metabolic Panel    TSH    Hemoglobin A1C    Lipid Panel    Statin intolerance (Chronic)     Continue Repatha         Relevant Orders    CBC Without Differential    Comprehensive Metabolic Panel    TSH    Hemoglobin A1C    Lipid Panel    Prediabetes (Chronic)     Discussed lifestyle modification with diet and exercise.  We will plan to monitor hemoglobin A1c at designated intervals 3 to 6 months.  I recommend ongoing Education for diabetic diet and exercise protocol.  We will continue to monitor for side effects.    Please be advised of symptoms to monitor for and to notify me immediately if persistent or worsening.  Follow up with Ophthalmology/Optometry and Podiatry at least annually.           Relevant Orders    CBC Without Differential    Comprehensive Metabolic Panel    TSH    Hemoglobin A1C    Lipid Panel     Future Appointments       Date Provider Specialty Appt Notes    8/27/2024  Radiology [facility]    8/27/2024 Jimmy Segura DO Neurosurgery 1 yr meningioma f/u w MRI    10/24/2024  Lab per dr hammonds    10/30/2024 Maria Esther Hammonds MD Endocrinology checkup ok per dr hammonds    10/31/2024 Brea Godoy PA-C Rheumatology f/u    12/23/2024 Valente Regalado Jr., MD Cardiology 6 month f/u    3/6/2025 Yoav Oliveira MD Rheumatology 4 mo           Medication Management for assessment above:   Medication List with Changes/Refills   Current Medications    ASPIRIN (ECOTRIN) 81 MG EC TABLET    Take 1 tablet (81  "mg total) by mouth once daily.    BLUNT NEEDLE, DISPOSABLE 18 X 1 1/2 " NDLE    1 Syringe by Misc.(Non-Drug; Combo Route) route once a week.    CYANOCOBALAMIN 1,000 MCG/ML INJECTION    Inject 1 mL (1,000 mcg total) into the skin every 7 days.    DICLOFENAC (VOLTAREN) 75 MG EC TABLET    Take 1 tablet (75 mg total) by mouth daily as needed.    DICLOFENAC SODIUM (VOLTAREN) 1 % GEL    Apply topically.    ESZOPICLONE (LUNESTA) 3 MG TAB    Take 1 tablet (3 mg total) by mouth every evening.    EVOLOCUMAB (REPATHA SURECLICK) 140 MG/ML PNIJ    Inject 1 mL (140 mg total) into the skin every 14 (fourteen) days.    GOLIMUMAB (SIMPONI) 50 MG/0.5 ML PNIJ    Inject 50 mg into the skin every 30 days.    HYDROCORTISONE (CORTEF) 10 MG TAB    TAKE 1 AND 1/2 TABLETS (15 mg) BY MOUTH IN THE MORNING AND 1 TABLET (10 mg) BY MOUTH IN THE EVENING AT 4PM    NEEDLE, DISP, 30 GAUGE 30 GAUGE X 1/2" NDLE    1 each by Misc.(Non-Drug; Combo Route) route once a week.    SAFETY NEEDLES (BD SAFETYGLIDE NEEDLE) 25 GAUGE X 1" NDLE    1 each by Misc.(Non-Drug; Combo Route) route every 7 days.    SYNTHROID 112 MCG TABLET    TAKE 1 TABLET BY MOUTH ONCE DAILY.    SYRINGE, DISPOSABLE, 1 ML SYRG    1 Syringe by Misc.(Non-Drug; Combo Route) route once a week.    TESTOSTERONE CYPIONATE (DEPOTESTOTERONE CYPIONATE) 200 MG/ML INJECTION    Inject 0.38 mLs (76 mg total) into the muscle every 14 (fourteen) days.    VALSARTAN (DIOVAN) 80 MG TABLET    Take 1 tablet (80 mg total) by mouth once daily.   Discontinued Medications    ERGOCALCIFEROL (ERGOCALCIFEROL) 50,000 UNIT CAP    TAKE 1 CAPSULE BY MOUTH ONE TIME PER WEEK       Cuauhtemoc Gardner M.D.  ==========================================================================  Subjective:   Patient ID: Aurelio Perkins is a 73 y.o. male.  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   Chief Complaint: Follow-up      History of Present Illness  The patient is a 73-year-old male who presents for follow up for his annual checkup. Refilled medications, reviewed blood work. He is accompanied by his wife.    He reports feeling well overall. He has resumed his Repatha medication, which he had temporarily stopped due to his wife's absence last week. His cholesterol levels have shown improvement. He is scheduled for a urine protein test next week.    Problem List Items Addressed This Visit       Hyperlipidemia - Primary (Chronic)    Overview     August 2024:  Patient has been back on Repatha.  Cholesterol has improved.  Triglycerides remain high.  Patient not on fish oil currently.    June 2024:  Patient has been off of his Repatha.  January 2021:  Patient's LDL remains excellent control after adding Repatha.    Initial HPI:  Chronic.  Uncontrolled.  Patient not taking any medication for this.  Follow-up HPI:  Patient reports statins contraindicated per Rheumatology while on Xeljanz.  Follow-up HPI:This condition is chronic.  Currently uncontrolled.  Patient reports that Rheumatology as instructed him not to take statins while on Xeljanz.  Patient does have a history of coronary artery disease. Patient takes aspirin.  Lab Results   Component Value Date    CHOL 193 08/20/2024    CHOL 255 (H) 06/20/2024    CHOL 139 09/26/2022     Lab Results   Component Value Date    HDL 53 08/20/2024    HDL 46 06/20/2024    HDL 42 09/26/2022     Lab Results   Component Value Date    LDLCALC 89.8 08/20/2024    LDLCALC 158.4 06/20/2024    LDLCALC 68.4 09/26/2022     Lab Results   Component Value Date    TRIG 251 (H) 08/20/2024    TRIG 253 (H) 06/20/2024    TRIG 143 09/26/2022     Lab Results   Component Value Date    CHOLHDL 27.5  08/20/2024    CHOLHDL 18.0 (L) 06/20/2024    CHOLHDL 30.2 09/26/2022 February 2020:  Reviewed special lipids.  Total cholesterol and LDL have reduced tremendously while on Repatha.  Patient did have device failure in the 3rd month December 2019 where the device only pumped for a few seconds and then stopped.Triglycerides remain elevated.  Patient advised to decrease fatty foods and start fish oil supplementation.    The 10-year ASCVD risk score (Jessie ZHANG, et al., 2019) is: 18.5%    Values used to calculate the score:      Age: 73 years      Sex: Male      Is Non- : No      Diabetic: No      Tobacco smoker: No      Systolic Blood Pressure: 112 mmHg      Is BP treated: Yes      HDL Cholesterol: 53 mg/dL      Total Cholesterol: 193 mg/dL           Current Assessment & Plan     Continue Repatha.  Consider starting fenofibrate or other prescription medication if triglycerides do not improve with diet and fish oil supplementation.  Recheck labs in six months.  Counseled on hyperlipidemia disease course, healthy diet and increased need for exercise.  Please be advised of the risk of cardiovascular disease, increase stroke and heart attack risk with uncontrolled/untreated hyperlipidemia.     Patient voiced understanding and understood the treatment plan. All questions were answered.            Encounter for long-term (current) use of medications (Chronic)    Overview     August 2024:  Reviewed labs.  September 2023: Reviewed labs.  March 2023: Reviewed labs.  August 2022: Reviewed labs.  07/11/2019 CHRONIC long-term drug therapy for managed conditions. See medication list. Reports compliance.  No side effects reported.  Routine lab work is being monitored.  Patient does not  need refills today. CHRONIC long-term drug therapy for managed conditions. See medication list. Reports compliance.  No side effects reported.  Routine lab work is being monitored.  Patient does need refills today. January  2021:CHRONIC. Stable. Compliant with medications for managed conditions. See medication list. No SE reported.   Routine lab analysis is being monitored. Refills were addressed.  June 2021:  Reviewed labs.  Lab Results   Component Value Date    WBC 5.91 06/20/2024    HGB 12.6 (L) 06/20/2024    HCT 37.4 (L) 06/20/2024    MCV 85 06/20/2024     (L) 06/20/2024         Chemistry        Component Value Date/Time     06/20/2024 0825    K 4.2 06/20/2024 0825     06/20/2024 0825    CO2 22 (L) 06/20/2024 0825    BUN 23 06/20/2024 0825    CREATININE 1.5 (H) 06/20/2024 0825    GLU 86 06/20/2024 0825        Component Value Date/Time    CALCIUM 9.0 06/20/2024 0825    ALKPHOS 52 (L) 06/20/2024 0825    AST 29 06/20/2024 0825    ALT 44 06/20/2024 0825    BILITOT 0.7 06/20/2024 0825    ESTGFRAFRICA 58.4 (A) 06/10/2022 1000    EGFRNONAA 50.5 (A) 06/10/2022 1000          Lab Results   Component Value Date    TSH <0.010 (L) 06/20/2024    Z4WWARF 52 (L) 03/03/2019    E8VIPXW 3.9 (L) 03/03/2019    FREET4 1.08 06/20/2024    T3FREE 2.6 06/20/2024       =================================         Current Assessment & Plan     Complete history and physical was completed today.  Complete and thorough medication reconciliation was performed.  Discussed risks and benefits of medications.  Advised patient on orders and health maintenance.  We discussed old records and old labs if available.  Will request any records not available through epic.  Continue current medications listed on your summary sheet.           Statin intolerance (Chronic)    Overview     Patient is intolerant to all statins.  Patient is not able to take statins due to other medications that he is on for autoimmune disease.     Patient has started Repatha and doing well.         Current Assessment & Plan     Continue Repatha         Prediabetes (Chronic)    Overview     August 2024:  A1c improved from previous.  Patient reports being more active and having less  sugar in the diet.  Patient denies any history of thyroid cancer, pancreatitis, MEN syndrome.  Reviewed prediabetes  prediabetes Management Status    Statin: Not taking  ACE/ARB: Taking    Screening or Prevention Patient's value Goal Complete/Controlled?   HgA1C Testing and Control   Lab Results   Component Value Date    HGBA1C 5.7 (H) 08/20/2024      Annually/Less than 8% Yes   Lipid profile : 08/20/2024 Annually Yes   LDL control Lab Results   Component Value Date    LDLCALC 89.8 08/20/2024    Annually/Less than 100 mg/dl  Yes   Nephropathy screening Lab Results   Component Value Date    LABMICR 12.0 12/22/2014     Lab Results   Component Value Date    PROTEINUA Negative 04/12/2022     Lab Results   Component Value Date    UTPCR Unable to calculate 04/12/2022      Annually No   Blood pressure BP Readings from Last 1 Encounters:   08/21/24 112/60    Less than 140/90 Yes   Dilated retinal exam Most Recent Eye Exam Date: Not Found Annually No   Foot exam   Most Recent Foot Exam Date: Not Found Annually No              Current Assessment & Plan     Discussed lifestyle modification with diet and exercise.  We will plan to monitor hemoglobin A1c at designated intervals 3 to 6 months.  I recommend ongoing Education for diabetic diet and exercise protocol.  We will continue to monitor for side effects.    Please be advised of symptoms to monitor for and to notify me immediately if persistent or worsening.  Follow up with Ophthalmology/Optometry and Podiatry at least annually.               Review of patient's allergies indicates:   Allergen Reactions    Antihistamines - alkylamine Other (See Comments)     hallucinations    Benadryl [diphenhydramine hcl] Other (See Comments)     hallucinations    Codeine Itching     Turns red    Lodine [etodolac] Other (See Comments)     fatigue    Methotrexate analogues Other (See Comments)     Sunlight sensitivity    Morphine Itching     Turns red    Statins-hmg-coa reductase inhibitors   "    Current Outpatient Medications   Medication Instructions    aspirin (ECOTRIN) 81 mg, Oral, Daily    blunt needle, disposable 18 x 1 1/2 " Ndle 1 Syringe, Misc.(Non-Drug; Combo Route), Weekly    cyanocobalamin 1,000 mcg, Subcutaneous, Every 7 days    diclofenac (VOLTAREN) 75 mg, Oral, Daily PRN    diclofenac sodium (VOLTAREN) 1 % Gel Topical (Top)    eszopiclone (LUNESTA) 3 mg, Oral, Nightly    hydrocortisone (CORTEF) 10 MG Tab TAKE 1 AND 1/2 TABLETS (15 mg) BY MOUTH IN THE MORNING AND 1 TABLET (10 mg) BY MOUTH IN THE EVENING AT 4PM    needle, disp, 30 gauge 30 gauge x 1/2" Ndle 1 each, Misc.(Non-Drug; Combo Route), Weekly    REPATHA SURECLICK 140 mg, Subcutaneous, Every 14 days    safety needles (BD SAFETYGLIDE NEEDLE) 25 gauge x 1" Ndle 1 each, Misc.(Non-Drug; Combo Route), Every 7 days    SIMPONI 50 mg, Subcutaneous, Every 30 days    SYNTHROID 112 mcg, Oral    syringe, disposable, 1 mL Syrg 1 Syringe, Misc.(Non-Drug; Combo Route), Weekly    testosterone cypionate (DEPOTESTOTERONE CYPIONATE) 76 mg, Intramuscular, Every 14 days    valsartan (DIOVAN) 80 mg, Oral, Daily      I have reviewed the PMH, social history, FamilyHx, surgical history, allergies and medications documented / confirmed by the patient at the time of this visit.  Review of Systems   Constitutional:  Negative for activity change and unexpected weight change.   HENT:  Negative for hearing loss, rhinorrhea and trouble swallowing.    Eyes:  Negative for discharge and visual disturbance.   Respiratory:  Negative for chest tightness and wheezing.    Cardiovascular:  Negative for chest pain and palpitations.   Gastrointestinal:  Negative for blood in stool, constipation, diarrhea and vomiting.   Endocrine: Negative for polydipsia and polyuria.   Genitourinary:  Positive for enuresis. Negative for difficulty urinating, hematuria and urgency.        + chronic urinary incontinence   Musculoskeletal:  Positive for arthralgias. Negative for joint swelling " and neck pain.   Neurological:  Negative for weakness and headaches.   Psychiatric/Behavioral:  Negative for confusion and dysphoric mood.      Objective:   /60   Pulse 78   Ht 6' (1.829 m)   Wt 96.8 kg (213 lb 8 oz)   SpO2 97%   BMI 28.96 kg/m²   Physical Exam  Vitals and nursing note reviewed.   Constitutional:       General: He is not in acute distress.     Appearance: He is well-developed. He is not diaphoretic.      Comments: Here with wife   KARLY:      Head: Normocephalic and atraumatic.   Eyes:      Extraocular Movements: Extraocular movements intact.      Pupils: Pupils are equal, round, and reactive to light.      Comments: Right eye ptosis chronic   Neck:      Vascular: No carotid bruit.   Cardiovascular:      Rate and Rhythm: Normal rate and regular rhythm.      Heart sounds: Murmur (Stable) heard.      Systolic murmur is present with a grade of 3/6.   Pulmonary:      Effort: Pulmonary effort is normal. No respiratory distress.      Breath sounds: Normal breath sounds. No wheezing.   Abdominal:      General: Bowel sounds are normal.      Palpations: Abdomen is soft.   Genitourinary:     Comments: Wearing catheter  Musculoskeletal:         General: Normal range of motion.      Cervical back: Normal range of motion and neck supple.   Skin:     General: Skin is warm and dry.      Capillary Refill: Capillary refill takes less than 2 seconds.      Findings: No rash.   Neurological:      General: No focal deficit present.      Mental Status: He is alert and oriented to person, place, and time. Mental status is at baseline.      Cranial Nerves: No cranial nerve deficit.      Motor: No weakness.      Gait: Gait normal.   Psychiatric:         Attention and Perception: He is attentive.         Mood and Affect: Mood normal. Mood is not anxious or depressed. Affect is not labile, blunt, angry or inappropriate.         Speech: He is communicative. Speech is not rapid and pressured, delayed, slurred or  tangential.         Behavior: Behavior normal. Behavior is not agitated, slowed, aggressive, withdrawn, hyperactive or combative.         Thought Content: Thought content normal. Thought content is not paranoid or delusional. Thought content does not include homicidal or suicidal ideation. Thought content does not include homicidal or suicidal plan.         Cognition and Memory: Memory is not impaired.         Judgment: Judgment normal. Judgment is not impulsive or inappropriate.       Physical Exam      Results  Laboratory Studies  LDL cholesterol down to 89 from 158. Total cholesterol down to 193 from 255. Triglycerides at 251 from 253. A1c at 5.7 down from 5.8.    Assessment:     1. Mixed hyperlipidemia    2. Prediabetes    3. Statin intolerance    4. Encounter for long-term (current) use of medications      MDM:   Moderate medical complexity.  Moderate risk.  Total time: 21 minutes.  This includes total time spent on the encounter, which includes face to face time and non-face to face time preparing to see the patient (eg, review of previous medical records, tests), Obtaining and/or reviewing separately obtained history, documenting clinical information in the electronic or other health record, independently interpreting results (not separately reported)/communicating results to the patient/family/caregiver, and/or care coordination (not separately reported).    I have Reviewed and summarized old records.  I have performed thorough medication reconciliation today and discussed risk and benefits of medications.  I have reviewed labs and discussed with patient.  All questions were answered.  I am requesting old records and will review them once they are available.  Visit today included increased complexity associated with the care of the episodic problem see above assessment addressed and managing the longitudinal care of the patient due to the serious and/or complex managed problem(s) see above.  I have signed  for the following orders AND/OR meds.  Orders Placed This Encounter   Procedures    CBC Without Differential     Standing Status:   Future     Standing Expiration Date:   10/20/2025    Comprehensive Metabolic Panel     Standing Status:   Future     Standing Expiration Date:   10/20/2025    TSH     Standing Status:   Future     Standing Expiration Date:   10/20/2025    Hemoglobin A1C     Standing Status:   Future     Standing Expiration Date:   10/20/2025    Lipid Panel     Standing Status:   Future     Standing Expiration Date:   10/20/2025           Follow up in about 1 year (around 8/21/2025), or if symptoms worsen or fail to improve, for Annual Wellness Exam.  Future Appointments       Date Provider Specialty Appt Notes    8/27/2024  Radiology [facility]    8/27/2024 Jimmy Segura DO Neurosurgery 1 yr meningioma f/u w MRI    10/24/2024  Lab per dr hammonds    10/30/2024 Maria Esther Hammonds MD Endocrinology checkup ok per dr hammonds    10/31/2024 Brea Godoy PA-C Rheumatology f/u    12/23/2024 Valente Regalado Jr., MD Cardiology 6 month f/u    3/6/2025 Yoav Oliveira MD Rheumatology 4 mo          If no improvement in symptoms or symptoms worsen, advised to call/follow-up at clinic or go to ER. Patient voiced understanding and all questions/concerns were addressed.   DISCLAIMER: This note was compiled by using a speech recognition dictation system and therefore please be aware that typographical / speech recognition errors can and do occur.  Please contact me if you see any errors specifically.  Consent was obtained for AJ recording system prior to the visit.    Cuauhtemoc Gardner M.D.       Office: [phone] [id number] [place] [address]  FAX: [phone]

## 2024-09-06 DIAGNOSIS — D32.9 MENINGIOMA: Primary | ICD-10-CM

## 2024-09-09 ENCOUNTER — HOSPITAL ENCOUNTER (OUTPATIENT)
Dept: RADIOLOGY | Facility: HOSPITAL | Age: 74
Discharge: HOME | End: 2024-09-09
Attending: NEUROLOGICAL SURGERY
Payer: MEDICARE

## 2024-09-09 DIAGNOSIS — D32.9 MENINGIOMA OF RIGHT SPHENOID WING INVOLVING CAVERNOUS SINUS: ICD-10-CM

## 2024-09-09 PROCEDURE — 70553 MRI BRAIN STEM W/O & W/DYE: CPT | Mod: 26,,, | Performed by: RADIOLOGY

## 2024-09-09 PROCEDURE — 70553 MRI BRAIN STEM W/O & W/DYE: CPT | Mod: TC,PO

## 2024-09-09 PROCEDURE — 25500020 PHARM REV CODE 255: Mod: PO | Performed by: NEUROLOGICAL SURGERY

## 2024-09-09 PROCEDURE — A9585 GADOBUTROL INJECTION: HCPCS | Mod: PO | Performed by: NEUROLOGICAL SURGERY

## 2024-09-09 RX ORDER — GADOBUTROL 604.72 MG/ML
9 INJECTION INTRAVENOUS
Status: COMPLETED | OUTPATIENT
Start: 2024-09-09 | End: 2024-09-09

## 2024-09-09 RX ADMIN — GADOBUTROL 9 ML: 604.72 INJECTION INTRAVENOUS at 09:09

## 2024-09-10 ENCOUNTER — OFFICE VISIT (OUTPATIENT)
Dept: NEUROSURGERY | Facility: CLINIC | Age: 74
End: 2024-09-10
Payer: MEDICARE

## 2024-09-10 ENCOUNTER — PATIENT MESSAGE (OUTPATIENT)
Dept: ADMINISTRATIVE | Facility: OTHER | Age: 74
End: 2024-09-10
Payer: MEDICARE

## 2024-09-10 DIAGNOSIS — D32.9 MENINGIOMA OF RIGHT SPHENOID WING INVOLVING CAVERNOUS SINUS: Chronic | ICD-10-CM

## 2024-09-10 DIAGNOSIS — Z98.890 S/P CRANIOTOMY: Primary | ICD-10-CM

## 2024-09-10 PROCEDURE — 3044F HG A1C LEVEL LT 7.0%: CPT | Mod: CPTII,95,, | Performed by: NEUROLOGICAL SURGERY

## 2024-09-10 PROCEDURE — 99214 OFFICE O/P EST MOD 30 MIN: CPT | Mod: 95,,, | Performed by: NEUROLOGICAL SURGERY

## 2024-09-10 PROCEDURE — 1160F RVW MEDS BY RX/DR IN RCRD: CPT | Mod: CPTII,95,, | Performed by: NEUROLOGICAL SURGERY

## 2024-09-10 PROCEDURE — 4010F ACE/ARB THERAPY RXD/TAKEN: CPT | Mod: CPTII,95,, | Performed by: NEUROLOGICAL SURGERY

## 2024-09-10 PROCEDURE — 1159F MED LIST DOCD IN RCRD: CPT | Mod: CPTII,95,, | Performed by: NEUROLOGICAL SURGERY
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
## 2024-09-11 ENCOUNTER — PATIENT MESSAGE (OUTPATIENT)
Dept: FAMILY MEDICINE | Facility: CLINIC | Age: 74
End: 2024-09-11
Payer: MEDICARE

## 2024-09-13 ENCOUNTER — PATIENT MESSAGE (OUTPATIENT)
Dept: ADMINISTRATIVE | Facility: OTHER | Age: 74
End: 2024-09-13
Payer: MEDICARE

## 2024-09-27 NOTE — PLAN OF CARE
Problem: Adult Inpatient Plan of Care  Goal: Plan of Care Review  Outcome: Ongoing, Progressing  Goal: Patient-Specific Goal (Individualized)  Outcome: Ongoing, Progressing  Goal: Absence of Hospital-Acquired Illness or Injury  Outcome: Ongoing, Progressing  Goal: Optimal Comfort and Wellbeing  Outcome: Ongoing, Progressing  Goal: Readiness for Transition of Care  Outcome: Ongoing, Progressing     Problem: Fall Injury Risk  Goal: Absence of Fall and Fall-Related Injury  Outcome: Ongoing, Progressing  Plan of care discussed with patient.  Patient ambulating independently, fall precautions in place. Continuing to encourage IS and ambulation. Patient has no complaints of pain. Discussed medications and care. Patient has no questions at this time. Will continue to monitor.  AAOx4 and VSS.            37

## 2024-10-20 DIAGNOSIS — E27.49 SECONDARY ADRENAL INSUFFICIENCY: ICD-10-CM

## 2024-10-21 ENCOUNTER — TELEPHONE (OUTPATIENT)
Dept: ADMINISTRATIVE | Facility: CLINIC | Age: 74
End: 2024-10-21
Payer: MEDICARE

## 2024-10-21 RX ORDER — HYDROCORTISONE 10 MG/1
TABLET ORAL
Qty: 75 TABLET | Refills: 0 | Status: SHIPPED | OUTPATIENT
Start: 2024-10-21

## 2024-10-22 ENCOUNTER — PATIENT OUTREACH (OUTPATIENT)
Dept: ADMINISTRATIVE | Facility: HOSPITAL | Age: 74
End: 2024-10-22
Payer: MEDICARE

## 2024-10-22 ENCOUNTER — OFFICE VISIT (OUTPATIENT)
Dept: FAMILY MEDICINE | Facility: CLINIC | Age: 74
End: 2024-10-22
Payer: MEDICARE

## 2024-10-22 VITALS
HEART RATE: 67 BPM | OXYGEN SATURATION: 97 % | HEIGHT: 72 IN | DIASTOLIC BLOOD PRESSURE: 58 MMHG | RESPIRATION RATE: 18 BRPM | WEIGHT: 216.81 LBS | SYSTOLIC BLOOD PRESSURE: 116 MMHG | BODY MASS INDEX: 29.36 KG/M2

## 2024-10-22 DIAGNOSIS — E03.9 HYPOTHYROIDISM, UNSPECIFIED TYPE: Chronic | ICD-10-CM

## 2024-10-22 DIAGNOSIS — N18.32 STAGE 3B CHRONIC KIDNEY DISEASE: ICD-10-CM

## 2024-10-22 DIAGNOSIS — E53.8 VITAMIN B12 DEFICIENCY: ICD-10-CM

## 2024-10-22 DIAGNOSIS — I10 ESSENTIAL HYPERTENSION: Chronic | ICD-10-CM

## 2024-10-22 DIAGNOSIS — E78.2 MIXED HYPERLIPIDEMIA: Chronic | ICD-10-CM

## 2024-10-22 DIAGNOSIS — D84.821 IMMUNOCOMPROMISED STATE DUE TO DRUG THERAPY: ICD-10-CM

## 2024-10-22 DIAGNOSIS — R73.03 PREDIABETES: Chronic | ICD-10-CM

## 2024-10-22 DIAGNOSIS — Z79.899 IMMUNOCOMPROMISED STATE DUE TO DRUG THERAPY: ICD-10-CM

## 2024-10-22 DIAGNOSIS — M05.9 SEROPOSITIVE RHEUMATOID ARTHRITIS: Chronic | ICD-10-CM

## 2024-10-22 DIAGNOSIS — Z85.46 HISTORY OF PROSTATE CANCER: ICD-10-CM

## 2024-10-22 DIAGNOSIS — E55.9 VITAMIN D DEFICIENCY: ICD-10-CM

## 2024-10-22 DIAGNOSIS — N18.31 CHRONIC RENAL IMPAIRMENT, STAGE 3A: Chronic | ICD-10-CM

## 2024-10-22 DIAGNOSIS — Z00.00 ENCOUNTER FOR MEDICARE ANNUAL WELLNESS EXAM: Primary | ICD-10-CM

## 2024-10-22 DIAGNOSIS — Z90.5 ACQUIRED ABSENCE OF KIDNEY: ICD-10-CM

## 2024-10-22 PROCEDURE — 3288F FALL RISK ASSESSMENT DOCD: CPT | Mod: CPTII,S$GLB,, | Performed by: NURSE PRACTITIONER

## 2024-10-22 PROCEDURE — 99999 PR PBB SHADOW E&M-EST. PATIENT-LVL V: CPT | Mod: PBBFAC,,, | Performed by: NURSE PRACTITIONER

## 2024-10-22 PROCEDURE — 1160F RVW MEDS BY RX/DR IN RCRD: CPT | Mod: CPTII,S$GLB,, | Performed by: NURSE PRACTITIONER

## 2024-10-22 PROCEDURE — 1170F FXNL STATUS ASSESSED: CPT | Mod: CPTII,S$GLB,, | Performed by: NURSE PRACTITIONER

## 2024-10-22 PROCEDURE — 1126F AMNT PAIN NOTED NONE PRSNT: CPT | Mod: CPTII,S$GLB,, | Performed by: NURSE PRACTITIONER

## 2024-10-22 PROCEDURE — G0439 PPPS, SUBSEQ VISIT: HCPCS | Mod: S$GLB,,, | Performed by: NURSE PRACTITIONER

## 2024-10-22 PROCEDURE — 1158F ADVNC CARE PLAN TLK DOCD: CPT | Mod: CPTII,S$GLB,, | Performed by: NURSE PRACTITIONER

## 2024-10-22 PROCEDURE — 3074F SYST BP LT 130 MM HG: CPT | Mod: CPTII,S$GLB,, | Performed by: NURSE PRACTITIONER

## 2024-10-22 PROCEDURE — 1101F PT FALLS ASSESS-DOCD LE1/YR: CPT | Mod: CPTII,S$GLB,, | Performed by: NURSE PRACTITIONER

## 2024-10-22 PROCEDURE — 4010F ACE/ARB THERAPY RXD/TAKEN: CPT | Mod: CPTII,S$GLB,, | Performed by: NURSE PRACTITIONER

## 2024-10-22 PROCEDURE — 1159F MED LIST DOCD IN RCRD: CPT | Mod: CPTII,S$GLB,, | Performed by: NURSE PRACTITIONER

## 2024-10-22 PROCEDURE — 3078F DIAST BP <80 MM HG: CPT | Mod: CPTII,S$GLB,, | Performed by: NURSE PRACTITIONER

## 2024-10-22 PROCEDURE — 3044F HG A1C LEVEL LT 7.0%: CPT | Mod: CPTII,S$GLB,, | Performed by: NURSE PRACTITIONER

## 2024-10-22 NOTE — PATIENT INSTRUCTIONS
Alvarado Carey,     If you are due for any health screening(s) below please notify me so we can arrange them to be ordered and scheduled. Most healthy patients at your age complete them, but you are free to accept or refuse.     If you can't do it, I'll definitely understand. If you can, I'd certainly appreciate it!    All of your core healthy metrics are met.                     Counseling and Referral of Other Preventative  (Italic type indicates deductible and co-insurance are waived)    Patient Name: Aurelio Perkins  Today's Date: 10/22/2024    Health Maintenance       Date Due Completion Date    RSV Vaccine (Age 60+ and Pregnant patients) (1 - Risk 60-74 years 1-dose series) Never done ---    Annual UACr 12/22/2015 12/22/2014    PROSTATE-SPECIFIC ANTIGEN 09/26/2023 9/26/2022    COVID-19 Vaccine (7 - 2024-25 season) 09/01/2024 10/2/2021    Hemoglobin A1c (Prediabetes) 08/20/2025 8/20/2024    Colorectal Cancer Screening 03/29/2027 3/29/2022    Lipid Panel 08/20/2029 8/20/2024        No orders of the defined types were placed in this encounter.      The following information is provided to all patients.  This information is to help you find resources for any of the problems found today that may be affecting your health:                  Living healthy guide: www.[facility].[place].gov      Understanding Diabetes: www.diabetes.org      Eating healthy: www.cdc.gov/healthyweight      CDC home safety checklist: www.cdc.gov/steadi/patient.html      Agency on Aging: www.goea.[place].gov      Alcoholics anonymous (AA): www.aa.org      Physical Activity: www.[place].nih.gov/dc5gtmd      Tobacco use: www.quitwithusla.org

## 2024-10-22 NOTE — PROGRESS NOTES
Aurelio Perkins presented for a  Medicare AWV and comprehensive Health Risk Assessment today. The following components were reviewed and updated:    Medical history  Family History  Social history  Allergies and Current Medications  Health Risk Assessment  Health Maintenance  Care Team         ** See Completed Assessments for Annual Wellness Visit within the encounter summary.**         The following assessments were completed:  Living Situation  CAGE  Depression Screening  Timed Get Up and Go  Whisper Test  Cognitive Function Screening  Nutrition Screening  ADL Screening  PAQ Screening    Has urine leakage ever interrupted your daily activites or sleep? Yes and wears catheter due to prostate removal  Do you think you could use some help to better manage urine leakage?No      Opioid documentation:      Patient does not have a current opioid prescription.        Vitals:    10/22/24 1116   BP: (!) 116/58   Pulse: 67   Resp: 18   SpO2: 97%   Weight: 98.3 kg (216 lb 12.8 oz)   Height: 6' (1.829 m)     Body mass index is 29.4 kg/m².  Physical Exam  Constitutional:       General: He is not in acute distress.     Appearance: He is obese.   HENT:      Head: Normocephalic and atraumatic.      Mouth/Throat:      Mouth: Mucous membranes are moist.   Eyes:      Extraocular Movements: Extraocular movements intact.      Pupils: Pupils are equal, round, and reactive to light.   Cardiovascular:      Rate and Rhythm: Normal rate.   Pulmonary:      Effort: Pulmonary effort is normal. No respiratory distress.   Musculoskeletal:         General: Normal range of motion.      Cervical back: Normal range of motion and neck supple.   Skin:     General: Skin is warm and dry.   Neurological:      General: No focal deficit present.      Mental Status: He is alert and oriented to person, place, and time.   Psychiatric:         Mood and Affect: Mood normal.         Thought Content: Thought content normal.             Diagnoses and health risks  identified today and associated recommendations/orders:    1. Encounter for Medicare annual wellness exam    - Ambulatory Referral/Consult to Enhanced Annual Wellness Visit (eAWV)    2. Essential hypertension  Stable on valsartan  Low-sodium diet  Routine follow-up  Monitor home blood pressure readings    3. Mixed hyperlipidemia  Stable on Repatha injections  Annual lipids  Continue follow-up with Cardiology    4. Prediabetes  Stable on diet changes  A1c every 6 months  Healthy low carb diet with cardiovascular activity    5. Chronic renal impairment, stage 3a  Stable  Routine renal function labs  Follow-up as scheduled    6. Acquired absence of kidney  Stable  Continue routine labs and follow-up    7. Seropositive rheumatoid arthritis  Stable on Simponi  Continue follow-up with Rheumatology    8. Hypothyroidism, unspecified type  Stable on Synthroid  Labs to include TSH  Routine follow-up    9. Vitamin B12 deficiency  Stable on B12 supplements  Labs to include B12  Routine follow-up    10. Vitamin D deficiency  Stable on vitamin-D supplement  Labs to include vitamin-D annually  Routine follow-up    11. History of prostate cancer  Stable  Continue follow-up with Urology    12. Immunocompromised state due to drug therapy  Stable  Continue follow-up with Rheumatology      *      Provided Aurelio with a 5-10 year written screening schedule and personal prevention plan. Recommendations were developed using the USPSTF age appropriate recommendations. Education, counseling, and referrals were provided as needed. After Visit Summary printed and given to patient which includes a list of additional screenings\tests needed.    No follow-ups on file.    Joann Castillo NP      I offered to discuss advanced care planning, including how to pick a person who would make decisions for you if you were unable to make them for yourself, called a health care power of , and what kind of decisions you might make such as use of  life sustaining treatments such as ventilators and tube feeding when faced with a life limiting illness recorded on a living will that they will need to know. (How you want to be cared for as you near the end of your natural life)     X Patient is interested in learning more about how to make advanced directives.  I provided them paperwork and offered to discuss this with them.

## 2024-10-24 ENCOUNTER — LAB VISIT (OUTPATIENT)
Dept: LAB | Facility: HOSPITAL | Age: 74
End: 2024-10-24
Attending: INTERNAL MEDICINE
Payer: MEDICARE

## 2024-10-24 DIAGNOSIS — N18.31 CHRONIC RENAL IMPAIRMENT, STAGE 3A: ICD-10-CM

## 2024-10-24 DIAGNOSIS — E03.9 PRIMARY HYPOTHYROIDISM: ICD-10-CM

## 2024-10-24 DIAGNOSIS — R79.89 LOW TESTOSTERONE IN MALE: ICD-10-CM

## 2024-10-24 DIAGNOSIS — Z12.5 SCREENING FOR PROSTATE CANCER: ICD-10-CM

## 2024-10-24 LAB
ALBUMIN/CREAT UR: NORMAL UG/MG (ref 0–30)
ANION GAP SERPL CALC-SCNC: 10 MMOL/L (ref 8–16)
BASOPHILS # BLD AUTO: 0.03 K/UL (ref 0–0.2)
BASOPHILS NFR BLD: 0.5 % (ref 0–1.9)
BUN SERPL-MCNC: 23 MG/DL (ref 8–23)
CALCIUM SERPL-MCNC: 9.2 MG/DL (ref 8.7–10.5)
CHLORIDE SERPL-SCNC: 106 MMOL/L (ref 95–110)
CO2 SERPL-SCNC: 23 MMOL/L (ref 23–29)
COMPLEXED PSA SERPL-MCNC: <0.01 NG/ML (ref 0–4)
CREAT SERPL-MCNC: 1.4 MG/DL (ref 0.5–1.4)
CREAT UR-MCNC: 46 MG/DL (ref 23–375)
DIFFERENTIAL METHOD BLD: ABNORMAL
EOSINOPHIL # BLD AUTO: 0.2 K/UL (ref 0–0.5)
EOSINOPHIL NFR BLD: 3.7 % (ref 0–8)
ERYTHROCYTE [DISTWIDTH] IN BLOOD BY AUTOMATED COUNT: 12.5 % (ref 11.5–14.5)
EST. GFR  (NO RACE VARIABLE): 53.4 ML/MIN/1.73 M^2
GLUCOSE SERPL-MCNC: 120 MG/DL (ref 70–110)
HCT VFR BLD AUTO: 38.1 % (ref 40–54)
HGB BLD-MCNC: 12.4 G/DL (ref 14–18)
IMM GRANULOCYTES # BLD AUTO: 0.02 K/UL (ref 0–0.04)
IMM GRANULOCYTES NFR BLD AUTO: 0.4 % (ref 0–0.5)
LYMPHOCYTES # BLD AUTO: 1.9 K/UL (ref 1–4.8)
LYMPHOCYTES NFR BLD: 33.5 % (ref 18–48)
MCH RBC QN AUTO: 29.5 PG (ref 27–31)
MCHC RBC AUTO-ENTMCNC: 32.5 G/DL (ref 32–36)
MCV RBC AUTO: 91 FL (ref 82–98)
MICROALBUMIN UR DL<=1MG/L-MCNC: <5 UG/ML
MONOCYTES # BLD AUTO: 0.5 K/UL (ref 0.3–1)
MONOCYTES NFR BLD: 8.8 % (ref 4–15)
NEUTROPHILS # BLD AUTO: 3 K/UL (ref 1.8–7.7)
NEUTROPHILS NFR BLD: 53.1 % (ref 38–73)
NRBC BLD-RTO: 0 /100 WBC
PLATELET # BLD AUTO: 122 K/UL (ref 150–450)
PMV BLD AUTO: 10.8 FL (ref 9.2–12.9)
POTASSIUM SERPL-SCNC: 3.9 MMOL/L (ref 3.5–5.1)
RBC # BLD AUTO: 4.2 M/UL (ref 4.6–6.2)
SODIUM SERPL-SCNC: 139 MMOL/L (ref 136–145)
T4 FREE SERPL-MCNC: 0.97 NG/DL (ref 0.71–1.51)
WBC # BLD AUTO: 5.7 K/UL (ref 3.9–12.7)

## 2024-10-24 PROCEDURE — 36415 COLL VENOUS BLD VENIPUNCTURE: CPT | Mod: PO | Performed by: INTERNAL MEDICINE

## 2024-10-24 PROCEDURE — 84153 ASSAY OF PSA TOTAL: CPT | Performed by: INTERNAL MEDICINE

## 2024-10-24 PROCEDURE — 82043 UR ALBUMIN QUANTITATIVE: CPT | Performed by: FAMILY MEDICINE

## 2024-10-24 PROCEDURE — 84439 ASSAY OF FREE THYROXINE: CPT | Performed by: INTERNAL MEDICINE

## 2024-10-24 PROCEDURE — 82040 ASSAY OF SERUM ALBUMIN: CPT | Performed by: INTERNAL MEDICINE

## 2024-10-24 PROCEDURE — 84403 ASSAY OF TOTAL TESTOSTERONE: CPT | Performed by: INTERNAL MEDICINE

## 2024-10-24 PROCEDURE — 84270 ASSAY OF SEX HORMONE GLOBUL: CPT | Performed by: INTERNAL MEDICINE

## 2024-10-24 PROCEDURE — 80048 BASIC METABOLIC PNL TOTAL CA: CPT | Performed by: INTERNAL MEDICINE

## 2024-10-24 PROCEDURE — 85025 COMPLETE CBC W/AUTO DIFF WBC: CPT | Performed by: INTERNAL MEDICINE

## 2024-10-28 ENCOUNTER — TELEPHONE (OUTPATIENT)
Dept: FAMILY MEDICINE | Facility: CLINIC | Age: 74
End: 2024-10-28
Payer: MEDICARE

## 2024-10-30 ENCOUNTER — TELEPHONE (OUTPATIENT)
Dept: ENDOCRINOLOGY | Facility: CLINIC | Age: 74
End: 2024-10-30
Payer: MEDICARE

## 2024-10-31 ENCOUNTER — TELEPHONE (OUTPATIENT)
Dept: RHEUMATOLOGY | Facility: CLINIC | Age: 74
End: 2024-10-31

## 2024-10-31 ENCOUNTER — HOSPITAL ENCOUNTER (OUTPATIENT)
Dept: RADIOLOGY | Facility: HOSPITAL | Age: 74
Discharge: HOME | End: 2024-10-31
Attending: PHYSICIAN ASSISTANT
Payer: MEDICARE

## 2024-10-31 ENCOUNTER — OFFICE VISIT (OUTPATIENT)
Dept: RHEUMATOLOGY | Facility: CLINIC | Age: 74
End: 2024-10-31
Payer: MEDICARE

## 2024-10-31 VITALS
WEIGHT: 218.25 LBS | BODY MASS INDEX: 29.56 KG/M2 | HEART RATE: 64 BPM | SYSTOLIC BLOOD PRESSURE: 119 MMHG | DIASTOLIC BLOOD PRESSURE: 74 MMHG | HEIGHT: 72 IN

## 2024-10-31 DIAGNOSIS — G89.29 CHRONIC PAIN OF BOTH KNEES: ICD-10-CM

## 2024-10-31 DIAGNOSIS — M05.9 SEROPOSITIVE RHEUMATOID ARTHRITIS: Primary | ICD-10-CM

## 2024-10-31 DIAGNOSIS — M25.561 CHRONIC PAIN OF BOTH KNEES: ICD-10-CM

## 2024-10-31 DIAGNOSIS — D84.821 IMMUNOCOMPROMISED STATE DUE TO DRUG THERAPY: ICD-10-CM

## 2024-10-31 DIAGNOSIS — M05.9 SEROPOSITIVE RHEUMATOID ARTHRITIS: ICD-10-CM

## 2024-10-31 DIAGNOSIS — M25.562 CHRONIC PAIN OF BOTH KNEES: ICD-10-CM

## 2024-10-31 DIAGNOSIS — G47.00 INSOMNIA, UNSPECIFIED TYPE: ICD-10-CM

## 2024-10-31 DIAGNOSIS — Z79.899 IMMUNOCOMPROMISED STATE DUE TO DRUG THERAPY: ICD-10-CM

## 2024-10-31 PROCEDURE — 96372 THER/PROPH/DIAG INJ SC/IM: CPT | Mod: S$GLB,,, | Performed by: PHYSICIAN ASSISTANT

## 2024-10-31 PROCEDURE — 3008F BODY MASS INDEX DOCD: CPT | Mod: CPTII,S$GLB,, | Performed by: PHYSICIAN ASSISTANT

## 2024-10-31 PROCEDURE — 73564 X-RAY EXAM KNEE 4 OR MORE: CPT | Mod: TC,50,FY,PO

## 2024-10-31 PROCEDURE — 3078F DIAST BP <80 MM HG: CPT | Mod: CPTII,S$GLB,, | Performed by: PHYSICIAN ASSISTANT

## 2024-10-31 PROCEDURE — 1125F AMNT PAIN NOTED PAIN PRSNT: CPT | Mod: CPTII,S$GLB,, | Performed by: PHYSICIAN ASSISTANT

## 2024-10-31 PROCEDURE — 3061F NEG MICROALBUMINURIA REV: CPT | Mod: CPTII,S$GLB,, | Performed by: PHYSICIAN ASSISTANT

## 2024-10-31 PROCEDURE — 99999 PR PBB SHADOW E&M-EST. PATIENT-LVL III: CPT | Mod: PBBFAC,,, | Performed by: PHYSICIAN ASSISTANT

## 2024-10-31 PROCEDURE — 3074F SYST BP LT 130 MM HG: CPT | Mod: CPTII,S$GLB,, | Performed by: PHYSICIAN ASSISTANT

## 2024-10-31 PROCEDURE — 3044F HG A1C LEVEL LT 7.0%: CPT | Mod: CPTII,S$GLB,, | Performed by: PHYSICIAN ASSISTANT

## 2024-10-31 PROCEDURE — 3066F NEPHROPATHY DOC TX: CPT | Mod: CPTII,S$GLB,, | Performed by: PHYSICIAN ASSISTANT

## 2024-10-31 PROCEDURE — 1159F MED LIST DOCD IN RCRD: CPT | Mod: CPTII,S$GLB,, | Performed by: PHYSICIAN ASSISTANT

## 2024-10-31 PROCEDURE — 99214 OFFICE O/P EST MOD 30 MIN: CPT | Mod: 25,S$GLB,, | Performed by: PHYSICIAN ASSISTANT

## 2024-10-31 PROCEDURE — 1160F RVW MEDS BY RX/DR IN RCRD: CPT | Mod: CPTII,S$GLB,, | Performed by: PHYSICIAN ASSISTANT

## 2024-10-31 PROCEDURE — 4010F ACE/ARB THERAPY RXD/TAKEN: CPT | Mod: CPTII,S$GLB,, | Performed by: PHYSICIAN ASSISTANT

## 2024-10-31 RX ORDER — DICLOFENAC SODIUM 75 MG/1
75 TABLET, DELAYED RELEASE ORAL DAILY PRN
Qty: 90 TABLET | Refills: 1 | Status: SHIPPED | OUTPATIENT
Start: 2024-10-31

## 2024-10-31 RX ORDER — CYANOCOBALAMIN 1000 UG/ML
1000 INJECTION, SOLUTION INTRAMUSCULAR; SUBCUTANEOUS
Status: COMPLETED | OUTPATIENT
Start: 2024-10-31 | End: 2024-10-31

## 2024-10-31 RX ADMIN — CYANOCOBALAMIN 1000 MCG: 1000 INJECTION, SOLUTION INTRAMUSCULAR; SUBCUTANEOUS at 09:10

## 2024-10-31 ASSESSMENT — ROUTINE ASSESSMENT OF PATIENT INDEX DATA (RAPID3)
MDHAQ FUNCTION SCORE: 0.8
PAIN SCORE: 5
TOTAL RAPID3 SCORE: 3.72
PATIENT GLOBAL ASSESSMENT SCORE: 3.5
PSYCHOLOGICAL DISTRESS SCORE: 0
FATIGUE SCORE: 2.2

## 2024-10-31 NOTE — PROGRESS NOTES
Subjective:       Patient ID: Aurelio Perkins is a 73 y.o. male.    Chief Complaint: Disease Management      HPI    Diagnosis/es  --Seropositive Rheumatoid Arthritis  Positive serologies: +CCP  Negative serologies: RF  Infectious screening labs: Hepatitis B, C, and TB negative (10/23)  Imagin. X-ray lumbar spine: Moderate severe degenerative disc changes at L1-2.  Mild disc space narrowing and endplate spurring at L2-3.\  2. DEXA scan: : There is a 13.4% risk of a major osteoporotic fracture and a 3.8% risk of hip fracture in the next 10 years (FRAX).      Previous treatments:  Enbrel  Xeljanz  Rinvoq    Current treatments:  Simponi  diclofenac    Interval History:  He is doing well on Simponi monthly. He reports increased pain in his knees L>R. He has difficulty with climbing up/down stairs or ladder. No recent images.  He reports significant improvement in his physical stamina since valve replacement. He is taking lunesta nightly for sleep which is working well. He is followed by Endocrinology on hydrocortisone.   We reviewed his recent labs.         Review of Systems   Constitutional:  Negative for chills, fatigue and fever.   Eyes:  Negative for visual disturbance.   Respiratory:  Negative for cough, shortness of breath and wheezing.    Cardiovascular:  Negative for chest pain, palpitations and leg swelling.   Gastrointestinal:  Negative for abdominal pain, constipation, diarrhea, nausea and vomiting.   Musculoskeletal:  Positive for arthralgias and back pain. Negative for myalgias.   Neurological:  Negative for dizziness, syncope and headaches.   Hematological:  Negative for adenopathy.         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    Ptosis of right eyelid 5/29/2019    S/P craniotomy 6/17/2019     Past Surgical History:   Procedure Laterality Date    BRAIN SURGERY  2019    CARDIAC CATH COSURGEON N/A 10/19/2023    Procedure: Cardiac Cath Cosurgeon;  Surgeon: Valente Garcia MD;  Location: [facility] CATH LAB;  Service: Cardiology;  Laterality: N/A;    COLONOSCOPY N/A 03/29/2022    Procedure: COLONOSCOPY;  Surgeon: Cayla Sherman MD;  Location: [facility] ENDO;  Service: Endoscopy;  Laterality: N/A;    CORONARY ANGIOGRAPHY N/A 09/13/2023    Procedure: Angiogram, Coronary;  Surgeon: Santy Obregon MD;  Location: [facility] CATH LAB;  Service: Cardiology;  Laterality: N/A;    CRANIOTOMY Right 05/29/2019    Procedure: CRANIOTOMY  (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)  Co-surgery with Dr Vaibhav Jara - please put in his room  Microscope [facility];  Surgeon: Jimmy Segura DO;  Location: [facility] OR [address] [place] [address];  Service: Neurosurgery;  Laterality: Right;    HEMORRHOID SURGERY      NEPHRECTOMY      PROSTATECTOMY      TONSILLECTOMY      TRANSCATHETER AORTIC VALVE REPLACEMENT (TAVR) N/A 10/19/2023    Procedure: REPLACEMENT, AORTIC VALVE, TRANSCATHETER (TAVR);  Surgeon: Santy Obregon MD;  Location: [facility] CATH LAB;  Service: Cardiology;  Laterality: N/A;    TRANSCATHETER AORTIC VALVE REPLACEMENT (TAVR)  10/19/2023    Procedure: REPLACEMENT, AORTIC VALVE, TRANSCATHETER (TAVR);  Surgeon: Valente Garcia MD;  Location: [facility] CATH LAB;  Service: Cardiology;;          Physical Exam   Constitutional: He is oriented to person, place, and time.   Eyes: Right conjunctiva is not injected. Left conjunctiva is not injected.   Cardiovascular: Normal rate and regular rhythm. Exam reveals no decreased pulses.   Pulmonary/Chest: Effort normal.    Musculoskeletal:      Right wrist: Swelling present.      Left wrist: Normal.      Right knee: Normal.      Left knee: Swelling present. Tenderness present.   Neurological: He is alert and oriented to person, place, and time. Gait normal.   Skin: No rash noted.   Psychiatric: Mood and affect normal.       Right Side Rheumatological Exam     Examination finds the knee, 1st PIP, 2nd PIP, 3rd PIP, 4th PIP and 5th PIP normal.    He has swelling of the wrist, 1st MCP, 2nd MCP, 3rd MCP, 4th MCP and 5th MCP    Left Side Rheumatological Exam     Examination finds the wrist, 1st PIP, 2nd PIP, 3rd PIP, 4th PIP and 5th PIP normal.    The patient is tender to palpation of the knee.    He has swelling of the knee, 1st MCP, 2nd MCP, 3rd MCP, 4th MCP and 5th MCP          Recent labs reviewed:     Component      Latest Ref Rng 9/9/2024 10/24/2024   WBC      3.90 - 12.70 K/uL  5.70    RBC      4.60 - 6.20 M/uL  4.20 (L)    Hemoglobin      14.0 - 18.0 g/dL  12.4 (L)    Hematocrit      40.0 - 54.0 %  38.1 (L)    MCV      82 - 98 fL  91    MCH      27.0 - 31.0 pg  29.5    MCHC      32.0 - 36.0 g/dL  32.5    RDW      11.5 - 14.5 %  12.5    Platelet Count      150 - 450 K/uL  122 (L)    MPV      9.2 - 12.9 fL  10.8    Immature Granulocytes      0.0 - 0.5 %  0.4    Gran # (ANC)      1.8 - 7.7 K/uL  3.0    Immature Grans (Abs)      0.00 - 0.04 K/uL  0.02    Lymph #      1.0 - 4.8 K/uL  1.9    Mono #      0.3 - 1.0 K/uL  0.5    Eos #      0.0 - 0.5 K/uL  0.2    Baso #      0.00 - 0.20 K/uL  0.03    nRBC      0 /100 WBC  0    Gran %      38.0 - 73.0 %  53.1    Lymph %      18.0 - 48.0 %  33.5    Mono %      4.0 - 15.0 %  8.8    Eos %      0.0 - 8.0 %  3.7    Basophil %      0.0 - 1.9 %  0.5    Differential Method  Automated    Sodium      136 - 145 mmol/L  139    Potassium      3.5 - 5.1 mmol/L  3.9    Chloride      95 - 110 mmol/L  106    CO2      23 - 29 mmol/L  23    Glucose      70 - 110 mg/dL  120 (H)    BUN      8 - 23 mg/dL  23     Creatinine      0.5 - 1.4 mg/dL 1.7 (H)  1.4    Calcium      8.7 - 10.5 mg/dL  9.2    Anion Gap      8 - 16 mmol/L  10    eGFR      >60 mL/min/1.73 m^2 42 !  53.4 !    Prostate Specific Antigen      0.00 - 4.00 ng/mL  <0.01    Free T4      0.71 - 1.51 ng/dL  0.97      Assessment:       1. Seropositive rheumatoid arthritis    2. Chronic pain of both knees    3. Immunocompromised state due to drug therapy    4. Insomnia, unspecified type                This patient is a 73 year old male with history of aortic stenosis s/p TAVR, HTN, HLP, aortic atherosclerosis, panhypopituitary, pre diabetes (A1c 5.7%), adrenal insufficiency, vitamin D deficiency, Vitamin b12 deficiency, ED secondary to radical prostatectomy for prostate cancer, chronic fatigue syndrome, insomnia and seropositive rheumatoid arthiritis. He is doing well on simponi monthly and diclofenac 75 mg daily. We are monitoring renal function closely. We will obtain x-rays of knees and consider IACS injection at next visit.     Plan:       Seropositive rheumatoid arthritis  -     diclofenac (VOLTAREN) 75 MG EC tablet; Take 1 tablet (75 mg total) by mouth daily as needed.  Dispense: 90 tablet; Refill: 1  -     X-ray Knee Ortho Bilateral with Flexion; Future; Expected date: 10/31/2024  -     cyanocobalamin injection 1,000 mcg    Chronic pain of both knees  -     X-ray Knee Ortho Bilateral with Flexion; Future; Expected date: 10/31/2024    Immunocompromised state due to drug therapy    Insomnia, unspecified type               1. Cont Simponi monthly  2. Cont. Diclofenac 75 mg daily, avoid additional NSAIDs  3. Cont. lunesta per   4. B12 given today      Follow up:  4 months Dr. Oliveira w/labs prior

## 2024-11-01 LAB — ALBUMIN SERPL-MCNC: 4.2 G/DL (ref 3.6–5.1)

## 2024-11-13 ENCOUNTER — OFFICE VISIT (OUTPATIENT)
Dept: ENDOCRINOLOGY | Facility: CLINIC | Age: 74
End: 2024-11-13
Payer: MEDICARE

## 2024-11-13 VITALS
OXYGEN SATURATION: 98 % | DIASTOLIC BLOOD PRESSURE: 82 MMHG | HEIGHT: 72 IN | SYSTOLIC BLOOD PRESSURE: 118 MMHG | WEIGHT: 219.44 LBS | BODY MASS INDEX: 29.72 KG/M2 | HEART RATE: 54 BPM

## 2024-11-13 DIAGNOSIS — R73.9 HYPERGLYCEMIA: ICD-10-CM

## 2024-11-13 DIAGNOSIS — E03.9 PRIMARY HYPOTHYROIDISM: ICD-10-CM

## 2024-11-13 DIAGNOSIS — E03.9 HYPOTHYROIDISM, UNSPECIFIED TYPE: Chronic | ICD-10-CM

## 2024-11-13 DIAGNOSIS — E27.49 SECONDARY ADRENAL INSUFFICIENCY: Primary | ICD-10-CM

## 2024-11-13 DIAGNOSIS — M89.9 DISORDER OF BONE: ICD-10-CM

## 2024-11-13 DIAGNOSIS — M85.88 OTHER SPECIFIED DISORDERS OF BONE DENSITY AND STRUCTURE, OTHER SITE: ICD-10-CM

## 2024-11-13 DIAGNOSIS — R79.89 LOW TESTOSTERONE IN MALE: ICD-10-CM

## 2024-11-13 DIAGNOSIS — E53.8 B12 DEFICIENCY: ICD-10-CM

## 2024-11-13 DIAGNOSIS — D32.9 MENINGIOMA OF RIGHT SPHENOID WING INVOLVING CAVERNOUS SINUS: Chronic | ICD-10-CM

## 2024-11-13 PROCEDURE — 99999 PR PBB SHADOW E&M-EST. PATIENT-LVL IV: CPT | Mod: PBBFAC,,, | Performed by: INTERNAL MEDICINE

## 2024-11-13 PROCEDURE — 3288F FALL RISK ASSESSMENT DOCD: CPT | Mod: CPTII,S$GLB,, | Performed by: INTERNAL MEDICINE

## 2024-11-13 PROCEDURE — 1160F RVW MEDS BY RX/DR IN RCRD: CPT | Mod: CPTII,S$GLB,, | Performed by: INTERNAL MEDICINE

## 2024-11-13 PROCEDURE — 1126F AMNT PAIN NOTED NONE PRSNT: CPT | Mod: CPTII,S$GLB,, | Performed by: INTERNAL MEDICINE

## 2024-11-13 PROCEDURE — 1101F PT FALLS ASSESS-DOCD LE1/YR: CPT | Mod: CPTII,S$GLB,, | Performed by: INTERNAL MEDICINE

## 2024-11-13 PROCEDURE — 3061F NEG MICROALBUMINURIA REV: CPT | Mod: CPTII,S$GLB,, | Performed by: INTERNAL MEDICINE

## 2024-11-13 PROCEDURE — 3044F HG A1C LEVEL LT 7.0%: CPT | Mod: CPTII,S$GLB,, | Performed by: INTERNAL MEDICINE

## 2024-11-13 PROCEDURE — 1159F MED LIST DOCD IN RCRD: CPT | Mod: CPTII,S$GLB,, | Performed by: INTERNAL MEDICINE

## 2024-11-13 PROCEDURE — 3066F NEPHROPATHY DOC TX: CPT | Mod: CPTII,S$GLB,, | Performed by: INTERNAL MEDICINE

## 2024-11-13 PROCEDURE — 3008F BODY MASS INDEX DOCD: CPT | Mod: CPTII,S$GLB,, | Performed by: INTERNAL MEDICINE

## 2024-11-13 PROCEDURE — 3074F SYST BP LT 130 MM HG: CPT | Mod: CPTII,S$GLB,, | Performed by: INTERNAL MEDICINE

## 2024-11-13 PROCEDURE — 99214 OFFICE O/P EST MOD 30 MIN: CPT | Mod: S$GLB,,, | Performed by: INTERNAL MEDICINE

## 2024-11-13 PROCEDURE — 3079F DIAST BP 80-89 MM HG: CPT | Mod: CPTII,S$GLB,, | Performed by: INTERNAL MEDICINE

## 2024-11-13 PROCEDURE — 4010F ACE/ARB THERAPY RXD/TAKEN: CPT | Mod: CPTII,S$GLB,, | Performed by: INTERNAL MEDICINE

## 2024-11-13 PROCEDURE — G2211 COMPLEX E/M VISIT ADD ON: HCPCS | Mod: S$GLB,,, | Performed by: INTERNAL MEDICINE

## 2024-11-13 RX ORDER — NEEDLES, DISPOSABLE 27GX1/2"
1 NEEDLE, DISPOSABLE MISCELLANEOUS WEEKLY
Qty: 12 EACH | Refills: 4 | Status: SHIPPED | OUTPATIENT
Start: 2024-11-13

## 2024-11-13 RX ORDER — TESTOSTERONE CYPIONATE 200 MG/ML
50 INJECTION, SOLUTION INTRAMUSCULAR WEEKLY
Qty: 1 ML | Refills: 5 | Status: SHIPPED | OUTPATIENT
Start: 2024-11-13 | End: 2025-05-12

## 2024-11-13 RX ORDER — LEVOTHYROXINE SODIUM 112 UG/1
112 TABLET ORAL
Qty: 90 TABLET | Refills: 3 | Status: SHIPPED | OUTPATIENT
Start: 2024-11-13

## 2024-11-13 RX ORDER — HYDROCORTISONE 10 MG/1
TABLET ORAL
Qty: 75 TABLET | Refills: 3 | Status: SHIPPED | OUTPATIENT
Start: 2024-11-13

## 2024-11-13 NOTE — ASSESSMENT & PLAN NOTE
Continue HC 15/10 mg. Discussed lower dose but he felt poorly on this and prefers to continue current dose  Reviewed sick day instructions and written information provided

## 2024-11-13 NOTE — PATIENT INSTRUCTIONS
"Try testosterone 50 mg every week. If no difference, resume 100 mg every 2 weeks    Adrenal insufficiency self-care instructions and sick day rules     Adrenal insufficiency, which involves a deficiency in steroid hormones that are normally produced by the body, can result in symptoms that include generalized and severe fatigue, malaise, dizziness, and low blood pressure. Should you develop any of these symptoms, please call us immediately or go to the ER if severe symptoms develop. You may also take your emergency intramuscular dexamethasone injection if you have this available    Please refer to the following instructions for patients with adrenal insufficiency:  1. Please get an alert bracelet that says "Adrenal insufficiency. Give stress doses of steroids in case of illness."     2. If you become nauseous and are unable to keep hydrocortisone down, you need to go to an emergency room to get this medication via IV.     3. If you are ill with a low-grade fever of  F, please double your dose of hydrocortisone. If you have a fever of >100 F, please triple your hydrocortisone dose for 3 days or until fever resolves.     4. If you are undergoing a planned medical procedure (such as minor surgery, colonoscopy) or a major dental procedure (tooth extraction, root canal etc) you should double your dose the day before and the day of and the day after the procedure.    5. If a major surgery requiring general anesthesia is being planned, please notify your endocrinologist so that we can give instructions to the anesthesia team that will be taking care of you with regards to the amount of hydrocortisone to be given during surgery.    Please call us with any questions and to notify us that you are ill at home or are heading to ER/hospital so that we can help you through the situation and communicate with the physicians taking care of you.    "
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
## 2024-11-18 ENCOUNTER — HOSPITAL ENCOUNTER (OUTPATIENT)
Dept: RADIOLOGY | Facility: HOSPITAL | Age: 74
Discharge: HOME | End: 2024-11-18
Attending: INTERNAL MEDICINE
Payer: MEDICARE

## 2024-11-18 DIAGNOSIS — M89.9 DISORDER OF BONE: ICD-10-CM

## 2024-11-18 DIAGNOSIS — M85.88 OTHER SPECIFIED DISORDERS OF BONE DENSITY AND STRUCTURE, OTHER SITE: ICD-10-CM

## 2024-11-18 DIAGNOSIS — R79.89 LOW TESTOSTERONE IN MALE: ICD-10-CM

## 2024-11-18 PROCEDURE — 77080 DXA BONE DENSITY AXIAL: CPT | Mod: 26,,, | Performed by: RADIOLOGY

## 2024-11-18 PROCEDURE — 77080 DXA BONE DENSITY AXIAL: CPT | Mod: TC,PO

## 2024-11-20 ENCOUNTER — PATIENT MESSAGE (OUTPATIENT)
Dept: ENDOCRINOLOGY | Facility: CLINIC | Age: 74
End: 2024-11-20
Payer: MEDICARE

## 2024-11-20 ENCOUNTER — PATIENT MESSAGE (OUTPATIENT)
Dept: ADMINISTRATIVE | Facility: OTHER | Age: 74
End: 2024-11-20
Payer: MEDICARE

## 2024-11-20 DIAGNOSIS — M81.0 OSTEOPOROSIS, UNSPECIFIED OSTEOPOROSIS TYPE, UNSPECIFIED PATHOLOGICAL FRACTURE PRESENCE: Primary | ICD-10-CM

## 2024-12-11 ENCOUNTER — OFFICE VISIT (OUTPATIENT)
Dept: ENDOCRINOLOGY | Facility: CLINIC | Age: 74
End: 2024-12-11
Payer: MEDICARE

## 2024-12-11 DIAGNOSIS — M81.0 OSTEOPOROSIS, UNSPECIFIED OSTEOPOROSIS TYPE, UNSPECIFIED PATHOLOGICAL FRACTURE PRESENCE: Primary | ICD-10-CM

## 2024-12-11 DIAGNOSIS — E27.49 SECONDARY ADRENAL INSUFFICIENCY: Chronic | ICD-10-CM

## 2024-12-11 DIAGNOSIS — R79.89 LOW TESTOSTERONE IN MALE: ICD-10-CM

## 2024-12-11 PROCEDURE — 1160F RVW MEDS BY RX/DR IN RCRD: CPT | Mod: CPTII,S$GLB,, | Performed by: INTERNAL MEDICINE

## 2024-12-11 PROCEDURE — 99214 OFFICE O/P EST MOD 30 MIN: CPT | Mod: S$GLB,,, | Performed by: INTERNAL MEDICINE

## 2024-12-11 PROCEDURE — 3061F NEG MICROALBUMINURIA REV: CPT | Mod: CPTII,S$GLB,, | Performed by: INTERNAL MEDICINE

## 2024-12-11 PROCEDURE — 3044F HG A1C LEVEL LT 7.0%: CPT | Mod: CPTII,S$GLB,, | Performed by: INTERNAL MEDICINE

## 2024-12-11 PROCEDURE — 4010F ACE/ARB THERAPY RXD/TAKEN: CPT | Mod: CPTII,S$GLB,, | Performed by: INTERNAL MEDICINE

## 2024-12-11 PROCEDURE — 99999 PR PBB SHADOW E&M-EST. PATIENT-LVL II: CPT | Mod: PBBFAC,,, | Performed by: INTERNAL MEDICINE

## 2024-12-11 PROCEDURE — 3066F NEPHROPATHY DOC TX: CPT | Mod: CPTII,S$GLB,, | Performed by: INTERNAL MEDICINE

## 2024-12-11 PROCEDURE — G2211 COMPLEX E/M VISIT ADD ON: HCPCS | Mod: S$GLB,,, | Performed by: INTERNAL MEDICINE

## 2024-12-11 PROCEDURE — 1159F MED LIST DOCD IN RCRD: CPT | Mod: CPTII,S$GLB,, | Performed by: INTERNAL MEDICINE
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
## 2024-12-11 NOTE — ASSESSMENT & PLAN NOTE
On basis of FRAX  Started on Fosamax and with maybe some side effects but will try for a few more doses  Consider actonel if does not tolerate fosamax  No fractures, aware of fall precautions  Monitor DXA in 2 years

## 2025-01-03 DIAGNOSIS — G47.00 INSOMNIA, UNSPECIFIED TYPE: ICD-10-CM

## 2025-01-06 ENCOUNTER — PATIENT MESSAGE (OUTPATIENT)
Dept: RHEUMATOLOGY | Facility: CLINIC | Age: 75
End: 2025-01-06
Payer: MEDICARE

## 2025-01-06 DIAGNOSIS — G47.00 INSOMNIA, UNSPECIFIED TYPE: Primary | ICD-10-CM

## 2025-01-06 RX ORDER — ESZOPICLONE 3 MG/1
3 TABLET, FILM COATED ORAL NIGHTLY
Qty: 30 TABLET | OUTPATIENT
Start: 2025-01-06

## 2025-01-06 RX ORDER — ESZOPICLONE 3 MG/1
3 TABLET, FILM COATED ORAL NIGHTLY
Qty: 30 TABLET | Refills: 5 | Status: SHIPPED | OUTPATIENT
Start: 2025-01-06 | End: 2025-07-05

## 2025-01-06 RX ORDER — ESZOPICLONE 3 MG/1
3 TABLET, FILM COATED ORAL NIGHTLY
Qty: 30 TABLET | Refills: 5 | Status: SHIPPED | OUTPATIENT
Start: 2025-01-06

## 2025-01-07 ENCOUNTER — OFFICE VISIT (OUTPATIENT)
Dept: CARDIOLOGY | Facility: CLINIC | Age: 75
End: 2025-01-07
Payer: MEDICARE

## 2025-01-07 VITALS
WEIGHT: 223 LBS | SYSTOLIC BLOOD PRESSURE: 120 MMHG | DIASTOLIC BLOOD PRESSURE: 72 MMHG | BODY MASS INDEX: 30.2 KG/M2 | HEART RATE: 64 BPM | OXYGEN SATURATION: 98 % | HEIGHT: 72 IN

## 2025-01-07 DIAGNOSIS — Z95.2 S/P TAVR (TRANSCATHETER AORTIC VALVE REPLACEMENT): ICD-10-CM

## 2025-01-07 DIAGNOSIS — I35.0 SEVERE AORTIC STENOSIS: ICD-10-CM

## 2025-01-07 DIAGNOSIS — E78.2 MIXED HYPERLIPIDEMIA: Chronic | ICD-10-CM

## 2025-01-07 DIAGNOSIS — I70.0 ABDOMINAL AORTIC ATHEROSCLEROSIS: Primary | Chronic | ICD-10-CM

## 2025-01-07 DIAGNOSIS — I10 ESSENTIAL HYPERTENSION: Chronic | ICD-10-CM

## 2025-01-07 DIAGNOSIS — Z78.9 STATIN INTOLERANCE: Chronic | ICD-10-CM

## 2025-01-07 PROBLEM — I25.118 CORONARY ARTERY DISEASE OF NATIVE ARTERY OF NATIVE HEART WITH STABLE ANGINA PECTORIS: Chronic | Status: RESOLVED | Noted: 2019-10-05 | Resolved: 2025-01-07

## 2025-01-07 PROCEDURE — 1159F MED LIST DOCD IN RCRD: CPT | Mod: CPTII,S$GLB,, | Performed by: INTERNAL MEDICINE

## 2025-01-07 PROCEDURE — 1160F RVW MEDS BY RX/DR IN RCRD: CPT | Mod: CPTII,S$GLB,, | Performed by: INTERNAL MEDICINE

## 2025-01-07 PROCEDURE — 99999 PR PBB SHADOW E&M-EST. PATIENT-LVL III: CPT | Mod: PBBFAC,,, | Performed by: INTERNAL MEDICINE

## 2025-01-07 PROCEDURE — 3074F SYST BP LT 130 MM HG: CPT | Mod: CPTII,S$GLB,, | Performed by: INTERNAL MEDICINE

## 2025-01-07 PROCEDURE — 3288F FALL RISK ASSESSMENT DOCD: CPT | Mod: CPTII,S$GLB,, | Performed by: INTERNAL MEDICINE

## 2025-01-07 PROCEDURE — 99214 OFFICE O/P EST MOD 30 MIN: CPT | Mod: S$GLB,,, | Performed by: INTERNAL MEDICINE

## 2025-01-07 PROCEDURE — 3008F BODY MASS INDEX DOCD: CPT | Mod: CPTII,S$GLB,, | Performed by: INTERNAL MEDICINE

## 2025-01-07 PROCEDURE — 1101F PT FALLS ASSESS-DOCD LE1/YR: CPT | Mod: CPTII,S$GLB,, | Performed by: INTERNAL MEDICINE

## 2025-01-07 PROCEDURE — 3078F DIAST BP <80 MM HG: CPT | Mod: CPTII,S$GLB,, | Performed by: INTERNAL MEDICINE

## 2025-01-07 NOTE — PROGRESS NOTES
Subjective   Patient ID:  Aurelio Perkins is a 74 y.o. male who presents for follow-up of No chief complaint on file.      [id number] yo WM with hx of TAVR in October of 2023 . Patient states he is doing well . Denies chest pain, SOB, or edema  Denies palpitations, weak spells, and syncope     Review of Systems   Constitutional: Negative for decreased appetite, fever, malaise/fatigue, weight gain and weight loss.   HENT:  Negative for hearing loss and nosebleeds.    Eyes:  Negative for visual disturbance.   Cardiovascular:  Negative for chest pain, claudication, cyanosis, dyspnea on exertion, irregular heartbeat, leg swelling, near-syncope, orthopnea, palpitations, paroxysmal nocturnal dyspnea and syncope.   Respiratory:  Negative for cough, hemoptysis, shortness of breath, sleep disturbances due to breathing, snoring and wheezing.    Endocrine: Negative for cold intolerance, heat intolerance, polydipsia and polyuria.   Hematologic/Lymphatic: Negative for adenopathy and bleeding problem. Does not bruise/bleed easily.   Skin:  Negative for color change, itching, poor wound healing, rash and suspicious lesions.   Musculoskeletal:  Negative for arthritis, back pain, falls, joint pain, joint swelling, muscle cramps, muscle weakness and myalgias.   Gastrointestinal:  Negative for bloating, abdominal pain, change in bowel habit, constipation, flatus, heartburn, hematemesis, hematochezia, hemorrhoids, jaundice, melena, nausea and vomiting.   Genitourinary:  Negative for bladder incontinence, decreased libido, frequency, hematuria, hesitancy and urgency.   Neurological:  Negative for brief paralysis, difficulty with concentration, excessive daytime sleepiness, dizziness, focal weakness, headaches, light-headedness, loss of balance, numbness, vertigo and weakness.   Psychiatric/Behavioral:  Negative for altered mental status, depression and memory loss. The patient does not have insomnia and is not nervous/anxious.     Allergic/Immunologic: Negative for environmental allergies, hives and persistent infections.          Objective     Physical Exam  Constitutional:       General: He is not in acute distress.     Appearance: He is well-developed. He is not diaphoretic.      Comments: /72 (BP Location: Left arm, Patient Position: Sitting)   Pulse 64   Ht 6' (1.829 m)   Wt 101.2 kg (223 lb)   SpO2 98%   BMI 30.24 kg/m²      HENT:      Head: Normocephalic and atraumatic.   Eyes:      General: Lids are normal. No scleral icterus.        Right eye: No discharge.      Conjunctiva/sclera: Conjunctivae normal.      Pupils: Pupils are equal, round, and reactive to light.   Neck:      Thyroid: No thyromegaly.      Vascular: No JVD.      Trachea: No tracheal deviation.   Cardiovascular:      Rate and Rhythm: Normal rate and regular rhythm.      Pulses: Intact distal pulses.           Carotid pulses are 2+ on the right side and 2+ on the left side.       Radial pulses are 2+ on the right side and 2+ on the left side.        Femoral pulses are 2+ on the right side and 2+ on the left side.       Popliteal pulses are 2+ on the right side and 2+ on the left side.        Dorsalis pedis pulses are 2+ on the right side and 2+ on the left side.        Posterior tibial pulses are 2+ on the right side and 2+ on the left side.      Heart sounds: S1 normal and S2 normal. Murmur heard.      Early systolic murmur is present with a grade of 2/6.      No friction rub. No gallop.   Pulmonary:      Effort: Pulmonary effort is normal. No respiratory distress.      Breath sounds: Normal breath sounds. No wheezing or rales.   Chest:      Chest wall: No tenderness.   Abdominal:      General: Bowel sounds are normal. There is no distension.      Palpations: Abdomen is soft. There is no hepatomegaly or mass.      Tenderness: There is no abdominal tenderness. There is no guarding or rebound.   Musculoskeletal:         General: No tenderness. Normal range of  motion.      Cervical back: Normal range of motion and neck supple.   Lymphadenopathy:      Cervical: No cervical adenopathy.   Skin:     General: Skin is warm and dry.      Coloration: Skin is not pale.      Findings: No erythema or rash.   Neurological:      Mental Status: He is alert and oriented to person, place, and time.      Cranial Nerves: No cranial nerve deficit.      Coordination: Coordination normal.      Deep Tendon Reflexes: Reflexes are normal and symmetric.   Psychiatric:         Speech: Speech normal.         Behavior: Behavior normal.         Thought Content: Thought content normal.         Judgment: Judgment normal.            Assessment and Plan     1. Abdominal aortic atherosclerosis  stable continue lipid control   2. Essential hypertension controlled   3. Mixed hyperlipidemia On repatha   4. Severe aortic stenosis improved with TAVR   5. S/P TAVR (transcatheter aortic valve replacement) stable   6. Statin intolerance        Plan:  The current medicines are effective and would continue without changes   Patient advised to modify risk factors such as weight, exercise, diet,  tobacco and alcohol exposure   No orders of the defined types were placed in this encounter.    Follow up in about 6 months (around 7/7/2025).          Advance Care Planning     Date: 01/07/2025

## 2025-01-10 ENCOUNTER — TELEPHONE (OUTPATIENT)
Dept: RHEUMATOLOGY | Facility: CLINIC | Age: 75
End: 2025-01-10

## 2025-01-10 DIAGNOSIS — M05.9 SEROPOSITIVE RHEUMATOID ARTHRITIS: ICD-10-CM

## 2025-01-10 RX ORDER — GOLIMUMAB 50 MG/.5ML
50 INJECTION, SOLUTION SUBCUTANEOUS
Qty: 0.5 ML | Refills: 12 | Status: ACTIVE | OUTPATIENT
Start: 2025-01-10 | End: 2026-01-10

## 2025-01-10 NOTE — TELEPHONE ENCOUNTER
----- Message from Pharmacist Magdalena sent at 1/7/2025 11:00 AM CST -----  Regarding: Simponi  Good morning,    [facility] was informed that Mr. Perkins no longer qualifies for PAP for his Simponi. Could you please send a new order to [facility] so we can begin a benefits investigation for him.    Thank you,    Magdalena Martinez, PharmD  Clinical Pharmacist  [facility]- [address] [address]   Phone: [phone]

## 2025-03-06 ENCOUNTER — OFFICE VISIT (OUTPATIENT)
Dept: RHEUMATOLOGY | Facility: CLINIC | Age: 75
End: 2025-03-06
Payer: MEDICARE

## 2025-03-06 VITALS
SYSTOLIC BLOOD PRESSURE: 127 MMHG | BODY MASS INDEX: 30.31 KG/M2 | DIASTOLIC BLOOD PRESSURE: 79 MMHG | HEART RATE: 65 BPM | WEIGHT: 223.75 LBS | HEIGHT: 72 IN

## 2025-03-06 DIAGNOSIS — E55.9 VITAMIN D DEFICIENCY, UNSPECIFIED: ICD-10-CM

## 2025-03-06 DIAGNOSIS — G89.29 CHRONIC PAIN OF BOTH KNEES: ICD-10-CM

## 2025-03-06 DIAGNOSIS — Z79.899 IMMUNOCOMPROMISED STATE DUE TO DRUG THERAPY: ICD-10-CM

## 2025-03-06 DIAGNOSIS — M25.561 CHRONIC PAIN OF BOTH KNEES: ICD-10-CM

## 2025-03-06 DIAGNOSIS — D84.821 IMMUNOCOMPROMISED STATE DUE TO DRUG THERAPY: ICD-10-CM

## 2025-03-06 DIAGNOSIS — M25.562 CHRONIC PAIN OF BOTH KNEES: ICD-10-CM

## 2025-03-06 DIAGNOSIS — K59.00 CONSTIPATION, UNSPECIFIED CONSTIPATION TYPE: ICD-10-CM

## 2025-03-06 DIAGNOSIS — G47.00 INSOMNIA, UNSPECIFIED TYPE: ICD-10-CM

## 2025-03-06 DIAGNOSIS — N18.30 STAGE 3 CHRONIC KIDNEY DISEASE, UNSPECIFIED WHETHER STAGE 3A OR 3B CKD: ICD-10-CM

## 2025-03-06 DIAGNOSIS — M05.9 SEROPOSITIVE RHEUMATOID ARTHRITIS: Primary | ICD-10-CM

## 2025-03-06 PROCEDURE — 99999 PR PBB SHADOW E&M-EST. PATIENT-LVL III: CPT | Mod: PBBFAC,,, | Performed by: INTERNAL MEDICINE

## 2025-03-06 RX ORDER — DEXAMETHASONE SODIUM PHOSPHATE 4 MG/ML
8 INJECTION, SOLUTION INTRA-ARTICULAR; INTRALESIONAL; INTRAMUSCULAR; INTRAVENOUS; SOFT TISSUE
Status: COMPLETED | OUTPATIENT
Start: 2025-03-06 | End: 2025-03-06

## 2025-03-06 RX ADMIN — DEXAMETHASONE SODIUM PHOSPHATE 8 MG: 4 INJECTION, SOLUTION INTRA-ARTICULAR; INTRALESIONAL; INTRAMUSCULAR; INTRAVENOUS; SOFT TISSUE at 10:03

## 2025-03-06 ASSESSMENT — ROUTINE ASSESSMENT OF PATIENT INDEX DATA (RAPID3)
MDHAQ FUNCTION SCORE: 0.9
FATIGUE SCORE: 1.1
PAIN SCORE: 7.5
PSYCHOLOGICAL DISTRESS SCORE: 0
TOTAL RAPID3 SCORE: 5.33
PATIENT GLOBAL ASSESSMENT SCORE: 5.5

## 2025-03-06 NOTE — PROGRESS NOTES
Subjective:     Patient ID:  Aurelio Perkins    Chief Complaint:  Disease Management     History of Present Illness:  Pt is a 74 y.o. male Diagnosis/es  Patient complains of arthralgias and myalgias for which has been present for a few years. Pain is located in multiple joints, both shoulder(s), both elbow(s), both wrist(s), both MCP(s): 1st, 2nd, 3rd, 4th and 5th, both PIP(s): 1st, 2nd, 3rd, 4th and 5th, both DIP(s): 1st and 2nd, both hip(s), both knee(s) and both MTP(s): 1st, 2nd, 3rd, 4th and 5th, is described as aching, pulsating, shooting and throbbing, and is constant, moderate .  Associated symptoms include: crepitation, decreased range of motion, edema, effusion, tenderness and warmth.  The patient has tried nothing for pain relief.   --Seropositive Rheumatoid Arthritis  Positive serologies: +CCP  Negative serologies: RF  Infectious screening labs: Hepatitis B, C, and TB negative (10/23)  Imagin. X-ray lumbar spine: Moderate severe degenerative disc changes at L1-2.  Mild disc space narrowing and endplate spurring at L2-3.\  2. DEXA scan: : There is a 13.4% risk of a major osteoporotic fracture and a 3.8% risk of hip fracture in the next 10 years (FRAX).        Previous treatments:  Enbrel  Xeljanz  Rinvoq     Current treatments:  Simponi  diclofenac        Interval History:   Hospitalization since last office visit: No    Patient Active Problem List    Diagnosis Date Noted    Osteoporosis 2024    History of prostate cancer 10/22/2024    Stage 3b chronic kidney disease 10/22/2024    Immunocompromised state due to drug therapy 2024    S/P TAVR (transcatheter aortic valve replacement) 2023    Thrombocytopenia, unspecified 2023    COVID-19 2023    Prediabetes 2023    Colon cancer screening 2022    History of colon polyps 2022    Chronic diarrhea 2022    Refused pneumococcal vaccination 2021    Memory change 2021    H/O prostatectomy  06/21/2021    Urinary incontinence 01/28/2021    Anemia of chronic renal failure, stage 3 (moderate) 02/14/2020    Erectile dysfunction after radical prostatectomy 01/28/2020    Acquired absence of kidney 01/28/2020    Chronic renal impairment, stage 3a 01/28/2020    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    Statin intolerance 10/31/2019    Statins contraindicated 10/05/2019    Abdominal aortic atherosclerosis 10/05/2019    Encounter for screening colonoscopy 10/03/2019    Fatigue 10/03/2019    Severe aortic stenosis 07/29/2019    Vitamin B12 deficiency 07/11/2019    Vitamin D deficiency 07/11/2019    Hyperlipidemia 07/09/2019    Encounter for long-term (current) use of medications 07/09/2019    Secondary adrenal insufficiency 06/25/2019    H/O secondary hypogonadism 06/25/2019    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 06/17/2019    S/P craniotomy 06/17/2019    Ptosis of right eyelid 05/29/2019    Essential hypertension 05/29/2019    Pituitary abnormality 03/04/2019    Hyperglycemia     Hypothyroidism 11/09/2017    Chronic fatigue 11/09/2017    Low testosterone in male 11/09/2017    Seropositive rheumatoid arthritis 11/16/2014    Arthritis pain 11/16/2014    Arthropathy 11/16/2014     Past Surgical History:   Procedure Laterality Date    BRAIN SURGERY  2019    CARDIAC CATH COSURGEON N/A 10/19/2023    Procedure: Cardiac Cath Cosurgeon;  Surgeon: Valente Garcia MD;  Location: [facility] CATH LAB;  Service: Cardiology;  Laterality: N/A;    COLONOSCOPY N/A 03/29/2022    Procedure: COLONOSCOPY;  Surgeon: Cayla Sherman MD;  Location: [facility];  Service: Endoscopy;  Laterality: N/A;    CORONARY ANGIOGRAPHY N/A 09/13/2023    Procedure: Angiogram, Coronary;  Surgeon: Santy Obregon MD;  Location: [facility] CATH LAB;  Service: Cardiology;  Laterality: N/A;    CRANIOTOMY Right 05/29/2019    Procedure: CRANIOTOMY  (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)  Co-surgery with   "Vaibhav Jara - please put in his room  Microscope [employer] Sontieshat Vadim;  Surgeon: Jimmy Segura DO;  Location: [facility] OR [address] [place] [address];  Service: Neurosurgery;  Laterality: Right;    HEMORRHOID SURGERY      NEPHRECTOMY      PROSTATECTOMY      TONSILLECTOMY      TRANSCATHETER AORTIC VALVE REPLACEMENT (TAVR) N/A 10/19/2023    Procedure: REPLACEMENT, AORTIC VALVE, TRANSCATHETER (TAVR);  Surgeon: Santy Obregon MD;  Location: [facility] CATH LAB;  Service: Cardiology;  Laterality: N/A;    TRANSCATHETER AORTIC VALVE REPLACEMENT (TAVR)  10/19/2023    Procedure: REPLACEMENT, AORTIC VALVE, TRANSCATHETER (TAVR);  Surgeon: Valente Garcia MD;  Location: [facility] CATH LAB;  Service: Cardiology;;     Social History[1]  Family History   Adopted: Yes   Family history unknown: Yes     Review of patient's allergies indicates:   Allergen Reactions    Antihistamines - alkylamine Other (See Comments)     hallucinations    Benadryl [diphenhydramine hcl] Other (See Comments)     hallucinations    Codeine Itching     Turns red    Lodine [etodolac] Other (See Comments)     fatigue    Methotrexate analogues Other (See Comments)     Sunlight sensitivity    Morphine Itching     Turns red    Statins-hmg-coa reductase inhibitors        Review of Systems   Review of Systems     Current Medications:  Current Outpatient Medications   Medication Instructions    alendronate (FOSAMAX) 70 mg, Oral, Every 7 days, Take on empty stomach with glass water and remain upright for 30 minutes after taking    aspirin (ECOTRIN) 81 mg, Oral, Daily    blunt needle, disposable 18 x 1 1/2 " Ndle 1 Syringe, Misc.(Non-Drug; Combo Route), Weekly    cyanocobalamin 1,000 mcg, Subcutaneous, Every 7 days    diclofenac (VOLTAREN) 75 mg, Oral, Daily PRN    diclofenac sodium (VOLTAREN) 1 % Gel Apply topically.    eszopiclone (LUNESTA) 3 mg, Oral, Nightly    hydrocortisone (CORTEF) 10 MG Tab TAKE 1 AND 1/2 TABLETS (15 mg) BY MOUTH IN THE MORNING AND 1 TABLET (10 " "mg) BY MOUTH IN THE EVENING AT 4PM    needle, disp, 18 G (BD REGULAR BEVEL NEEDLES) 18 gauge x 1" Ndle 1 each, Misc.(Non-Drug; Combo Route), Weekly, Use to draw up testosterone    needle, disp, 30 gauge 30 gauge x 1/2" Ndle 1 each, Misc.(Non-Drug; Combo Route), Weekly    REPATHA SURECLICK 140 mg, Subcutaneous, Every 14 days    safety needles (BD SAFETYGLIDE NEEDLE) 25 gauge x 1" Ndle 1 each, Misc.(Non-Drug; Combo Route), Every 7 days    SIMPONI 50 mg, Subcutaneous, Every 30 days    SYNTHROID 112 mcg, Oral, Before breakfast    syringe with needle 3 mL 25 gauge x 1" Syrg 1 each, Intramuscular, Weekly    syringe, disposable, 1 mL Syrg 1 Syringe, Misc.(Non-Drug; Combo Route), Weekly    testosterone cypionate (DEPOTESTOTERONE CYPIONATE) 50 mg, Intramuscular, Weekly    valsartan (DIOVAN) 80 mg, Oral, Daily         Objective:     Vitals:    03/06/25 0832   BP: 127/79   Pulse: 65   Weight: 101.5 kg (223 lb 12.3 oz)   Height: 6' 0.01" (1.829 m)   PainSc:   8      Body mass index is 30.34 kg/m².     Physical Examinations:  Physical Exam   Constitutional: He is oriented to person, place, and time. No distress.   HENT:   Head: Normocephalic and atraumatic.   Mouth/Throat: Oropharynx is clear and moist.   Eyes: Pupils are equal, round, and reactive to light.   Neck: No thyromegaly present.   Cardiovascular: Normal rate, regular rhythm and normal heart sounds. Exam reveals no gallop and no friction rub.   No murmur heard.  Pulmonary/Chest: Breath sounds normal. He has no wheezes. He has no rales. He exhibits no tenderness.   Abdominal: There is no abdominal tenderness. There is no rebound and no guarding.   Musculoskeletal:         General: Tenderness and deformity present.      Right shoulder: Tenderness present.      Left shoulder: Tenderness present.      Right elbow: Swelling present. Tenderness present.      Left elbow: Tenderness present.      Right wrist: Swelling and tenderness present.      Left wrist: Swelling and " tenderness present.      Cervical back: Neck supple.      Right knee: Swelling and effusion present. Tenderness present.      Left knee: Swelling and effusion present. Tenderness present.   Lymphadenopathy:     He has no cervical adenopathy.   Neurological: He is alert and oriented to person, place, and time.   Skin: No rash noted. No erythema. No pallor.   Psychiatric: Mood and affect normal.   Nursing note and vitals reviewed.      Right Side Rheumatological Exam     The patient is tender to palpation of the shoulder, elbow, wrist, knee, 1st PIP, 1st MCP, 2nd PIP, 2nd MCP, 3rd PIP, 3rd MCP, 4th PIP, 4th MCP and 5th PIP    He has swelling of the elbow, wrist, knee, 1st PIP, 1st MCP, 2nd PIP, 2nd MCP, 3rd PIP, 3rd MCP, 4th PIP, 4th MCP, 5th PIP and 5th MCP    The patient has an enlarged wrist and knee    Shoulder Exam   Tenderness Location: biceps tendon and acromioclavicular joint    Range of Motion   Active abduction:  abnormal   Sensation: normal    Knee Exam   Tenderness Location: medial joint line and LCL  Patellofemoral Crepitus: positive  Effusion: positive  Sensation: normal    Hip Exam   Tenderness Location: posterior, anterior, greater trochanter and lateral  Sensation: normal    Elbow/Wrist Exam   Tenderness Location: lateral epicondyle  Sensation: normal    Foot Exam   Right foot exam exhibits signs of inflamed dorsum  Right foot exam exhibits signs of no podagra, no tophus and no plantar fasciitis    Muscle Strength (0-5 scale):  Neck Flexion:  4  Neck Extension: 3  : 3/5     Left Side Rheumatological Exam     The patient is tender to palpation of the shoulder, elbow, wrist, knee, 1st PIP, 1st MCP, 2nd PIP, 2nd MCP, 3rd PIP, 3rd MCP, 4th PIP, 4th MCP, 5th PIP and 5th MCP.    He has swelling of the wrist, knee, 1st PIP, 1st MCP, 2nd PIP, 2nd MCP, 3rd PIP, 3rd MCP, 4th PIP, 4th MCP, 5th PIP and 5th MCP    The patient has an enlarged knee.    Shoulder Exam   Tenderness Location: acromioclavicular joint  and biceps tendon    Range of Motion   Active abduction:  abnormal   Sensation: normal    Knee Exam   Tenderness Location: lateral joint line and medial joint line    Patellofemoral Crepitus: positive  Effusion: positive  Sensation: normal    Hip Exam   Tenderness Location: posterior, anterior, greater trochanter and lateral  Sensation: normal    Elbow/Wrist Exam   Tenderness Location: lateral epicondyle  Sensation: normal    Foot Exam   Left foot exam exhibits signs of inflamed dorsum  Left foot exam exhibits signs of no podagra, no tophus and no plantar fasciitis    Muscle Strength (0-5 scale):  Neck Flexion:  4  Neck Extension: 3  :  3/5       Back/Neck Exam   General Inspection   Gait: normal       Tenderness Right paramedian tenderness of the Upper C-Spine, Lower C-Spine, Lower L-Spine, SI Joint and Occ.Left paramedian tenderness of the Upper C-Spine, Lower L-Spine, Lower C-Spine, SI Joint and Occ.    Neck Range of Motion   Flexion:  Limited  Extension:  Limited       Disease Assessment Scores:  Patient's Global Assessment of arthritis (0-10): 2  Physician's Global Assessment of arthritis (0-10): 2  Number of Tender Joints (0-28): 2  Number of Swollen Joints (0-28): 1         No data to display                Monitoring Lab Results:  Lab Results   Component Value Date    WBC 5.70 10/24/2024    RBC 4.20 (L) 10/24/2024    HGB 12.4 (L) 10/24/2024    HCT 38.1 (L) 10/24/2024    MCV 91 10/24/2024    MCH 29.5 10/24/2024    MCHC 32.5 10/24/2024    RDW 12.5 10/24/2024     (L) 10/24/2024        Lab Results   Component Value Date     10/24/2024    K 3.9 10/24/2024     10/24/2024    CO2 23 10/24/2024     (H) 10/24/2024    BUN 23 10/24/2024    CREATININE 1.4 10/24/2024    CALCIUM 9.2 10/24/2024    PROT 6.5 06/20/2024    ALBUMIN 4.2 10/24/2024    BILITOT 0.7 06/20/2024    ALKPHOS 52 (L) 06/20/2024    AST 29 06/20/2024    ALT 44 06/20/2024    ANIONGAP 10 10/24/2024    EGFRNORACEVR 53.4 (A)  "10/24/2024       Lab Results   Component Value Date    SEDRATE 9 06/20/2024    CRP 1.0 06/20/2024        Lab Results   Component Value Date    DPSAMCAC48WK 34 07/05/2023    APWFJFDW51 358 09/26/2022        Lab Results   Component Value Date    CHOL 193 08/20/2024    HDL 53 08/20/2024    LDLCALC 89.8 08/20/2024    TRIG 251 (H) 08/20/2024       Lab Results   Component Value Date    RF <13.0 12/21/2021    CCPANTIBODIE 1.1 12/21/2021     No results found for: "ANASCREEN", "ANATITER", "ANAPATTE", "DSDNA", "SMRNPAB", "SSAANTIBODY", "SSBANTIBODY", "HTY52OS", "JO1AB"  No results found for: "HLABB27"    Infectious Disease Screening:  Lab Results   Component Value Date    HEPBSAG Non-reactive 10/18/2023    HEPBCAB Non-reactive 10/18/2023    HEPBSAB <3.00 10/18/2023    HEPBSAB Non-reactive 10/18/2023     Lab Results   Component Value Date    HEPCAB Non-reactive 10/18/2023     Lab Results   Component Value Date    TBGOLDPLUS Negative 10/18/2023     No results found for: "QUANTIFERON", "SVCMT", "QUANTAGVALUE", "QUANTNILVALU", "QUANTMITOGEN", "QFTTBAG", "QINT"     Imaging: DEXA, Xrays, MRIs, CTs, etc   Result Notes  Details    Reading Physician Reading Date Result Priority   Austin Varela MD  [phone] 9/23/2022 Routine     Narrative & Impression  EXAMINATION:  DEXA BONE DENSITY SPINE HIP     CLINICAL HISTORY:  Other specified abnormal findings of blood chemistry     TECHNIQUE:  DXA scanning was performed over the left hip and lumbar spine.  Review of the images confirms satisfactory positioning and technique.     COMPARISON:  None     FINDINGS:  The L1 to L4 vertebral bone mineral density is equal to 1.162 g/cm squared with a T score of -0.1.     The left femoral neck bone mineral density is equal to 0.851 g/cm squared with a T score of -1.3.     There is a 13.4% risk of a major osteoporotic fracture and a 3.8% risk of hip fracture in the next 10 years (FRAX).     Impression:     Osteopenia     Consider FDA approved medical " "therapies in postmenopausal women and men aged 50 years and older, based on the following:     *A hip or vertebral (clinical or morphometric) fracture  *T score less than or equal to -2.5 at the femoral neck or spine after appropriate evaluation to exclude secondary causes.  *Low bone mass -- also known as osteopenia (T score between -1.0 and -2.5 at the femoral neck or spine) and a 10 year probability of hip fracture greater than or equal to 3% or a 10 year probability of major osteoporosis-related fracture greater than or equal to 20% based on the [place]-adapted WHO algorithm.  *Clinicians judgment and/or patient preference may indicate treatment for people with 10 year fracture probabilities is above or below these levels.        Electronically signed by:Austin Varela MD  Date:                                            09/23/2022  Time:                                           11:26        Exam Ended: 09/23/22 11:18 CDT Last Resulted: 09/23/22 11:26 CDT    Scout as an Unsuccessful Attempt     Old & Outside Medical Records:  Reviewed old and all outside medical records available in Care Everywhere     Assessment:     Encounter Diagnoses   Name Primary?    Seropositive rheumatoid arthritis Yes    Comment: restart simponi we may need to do simponi aria     Insomnia, unspecified type     Chronic pain of both knees     Immunocompromised state due to drug therapy     Stage 3 chronic kidney disease, unspecified whether stage 3a or 3b CKD     Vitamin D deficiency, unspecified       Plan:      Encounter Diagnoses   Name Primary?    Seropositive rheumatoid arthritis Yes    Insomnia, unspecified type     Chronic pain of both knees     Immunocompromised state due to drug therapy     Stage 3 chronic kidney disease, unspecified whether stage 3a or 3b CKD     Vitamin D deficiency, unspecified    Aurelio "Sancho" was seen today for disease management.    Diagnoses and all orders for this visit:    Seropositive rheumatoid " arthritis  Comments:  restart simponi we may need to do simponi aria  Orders:  -     dexAMETHasone injection 8 mg  -     Sedimentation rate; Future  -     C-Reactive Protein; Future  -     Comprehensive Metabolic Panel; Future  -     CBC Auto Differential; Future    Insomnia, unspecified type    Chronic pain of both knees  -     dexAMETHasone injection 8 mg  -     Sedimentation rate; Future  -     C-Reactive Protein; Future  -     Comprehensive Metabolic Panel; Future  -     CBC Auto Differential; Future    Immunocompromised state due to drug therapy  -     dexAMETHasone injection 8 mg  -     Sedimentation rate; Future  -     C-Reactive Protein; Future  -     Comprehensive Metabolic Panel; Future  -     CBC Auto Differential; Future    Stage 3 chronic kidney disease, unspecified whether stage 3a or 3b CKD  -     dexAMETHasone injection 8 mg  -     Sedimentation rate; Future  -     C-Reactive Protein; Future  -     Comprehensive Metabolic Panel; Future  -     CBC Auto Differential; Future    Vitamin D deficiency, unspecified       1. Nurse injection  2. Labs ordered  3. F/u 6 months     More than 50% of the  34 minute encounter was spent face to face counseling the patient regarding current status and future plan of care as well as side effects  of the medications. All questions were answered to patient's satisfaction also includes  non-face to face time preparing to see the patient (eg, review of tests), Obtaining and/or reviewing separately obtained history, Documenting clinical information in the electronic or other health record, Independently interpreting results            [1]   Social History  Tobacco Use    Smoking status: Former     Current packs/day: 0.00     Types: Cigarettes     Quit date: 2006     Years since quittin.1     Passive exposure: Never    Smokeless tobacco: Never   Substance Use Topics    Alcohol use: Not Currently    Drug use: No

## 2025-03-08 ENCOUNTER — PATIENT MESSAGE (OUTPATIENT)
Dept: RHEUMATOLOGY | Facility: CLINIC | Age: 75
End: 2025-03-08
Payer: MEDICARE

## 2025-03-13 ENCOUNTER — PATIENT MESSAGE (OUTPATIENT)
Dept: RHEUMATOLOGY | Facility: CLINIC | Age: 75
End: 2025-03-13
Payer: MEDICARE

## 2025-03-23 DIAGNOSIS — E27.49 SECONDARY ADRENAL INSUFFICIENCY: ICD-10-CM

## 2025-03-24 RX ORDER — HYDROCORTISONE 10 MG/1
TABLET ORAL
Qty: 75 TABLET | Refills: 3 | Status: SHIPPED | OUTPATIENT
Start: 2025-03-24

## 2025-04-11 ENCOUNTER — PATIENT MESSAGE (OUTPATIENT)
Dept: RHEUMATOLOGY | Facility: CLINIC | Age: 75
End: 2025-04-11
Payer: MEDICARE

## 2025-05-09 NOTE — TELEPHONE ENCOUNTER
----- Message from Ekaterina Montanez sent at 3/6/2023 11:08 AM CST -----  Regarding: Appt/Orders  Contact: pt [phone]  Pt calling to request orders for labs for visit 7/7/2023. Would also like to have labs done in [place]. Please call      Addended by: ANDRES SCOTT on: 5/9/2025 03:07 PM     Modules accepted: Orders

## 2025-05-13 ENCOUNTER — PATIENT MESSAGE (OUTPATIENT)
Dept: RHEUMATOLOGY | Facility: CLINIC | Age: 75
End: 2025-05-13
Payer: MEDICARE

## 2025-05-16 ENCOUNTER — TELEPHONE (OUTPATIENT)
Dept: RHEUMATOLOGY | Facility: CLINIC | Age: 75
End: 2025-05-16
Payer: MEDICARE

## 2025-05-16 NOTE — TELEPHONE ENCOUNTER
Received an email from [employer]  about needing a rheumatology patient enrollment form pg 1 & a PA denial or approval for simponi in order for patient to receive Simponi via PAP. Called and she sent me the info that is needed    Didn't see PA approval or denial in chart so reached out to [facility]. Assigned pharmD will call insurance and once received will upload PA approval/denial to chart. Completed pg 1 of rheumatology patient enrollment form. Once signed by Dr. Oliveira and approval is received will fax to [employer]

## 2025-05-20 ENCOUNTER — PATIENT MESSAGE (OUTPATIENT)
Dept: RHEUMATOLOGY | Facility: CLINIC | Age: 75
End: 2025-05-20
Payer: MEDICARE

## 2025-05-30 DIAGNOSIS — R79.89 LOW TESTOSTERONE IN MALE: ICD-10-CM

## 2025-05-30 RX ORDER — TESTOSTERONE CYPIONATE 200 MG/ML
50 INJECTION, SOLUTION INTRAMUSCULAR WEEKLY
Qty: 3 ML | Refills: 5 | Status: SHIPPED | OUTPATIENT
Start: 2025-05-30 | End: 2025-11-26

## 2025-06-04 DIAGNOSIS — M05.9 SEROPOSITIVE RHEUMATOID ARTHRITIS: ICD-10-CM

## 2025-06-04 RX ORDER — DICLOFENAC SODIUM 75 MG/1
75 TABLET, DELAYED RELEASE ORAL DAILY PRN
Qty: 90 TABLET | Refills: 1 | Status: SHIPPED | OUTPATIENT
Start: 2025-06-04

## 2025-06-27 ENCOUNTER — PATIENT MESSAGE (OUTPATIENT)
Dept: FAMILY MEDICINE | Facility: CLINIC | Age: 75
End: 2025-06-27
Payer: MEDICARE

## 2025-06-27 DIAGNOSIS — Z01.84 ANTIBODY RESPONSE EXAM: Primary | ICD-10-CM

## 2025-06-27 NOTE — TELEPHONE ENCOUNTER
I received your message which was reviewed along with the the medication list and allergies that we have below.  Please review it for accuracy to make sure that we have the most recent records on your history.     Based on this, the following orders were placed AND/OR medicines were sent in.     Orders Placed This Encounter   Procedures    Bordetella Pertussis Antibody, IgG and IgM with reflex     Standing Status:   Future     Expected Date:   6/27/2025     Expiration Date:   9/25/2026     Send normal result to authorizing provider's In Basket if patient is active on MyChart::   Yes       Medications written and sent at this time include:       Your pharmacy(ies) of choice at this time on record include the list below and any medications would have been sent to the one at the top.    [facility] [place] [facility] - [place] - [address] [address]  [address] [address]  [place] [address]  Phone: [phone] Fax: [phone]    [facility]  [address] [facility] [address] [place] [address]  Phone: [phone] Fax: [phone]    [facility]  [address] [facility] [address]  Phone: [phone] Fax: [phone]    [facility] - [place] - [address] [facility]  [address] [facility] [address]  Phone: [phone] Fax: [phone]    [id number] - [place] [address]  Phone: [phone] Fax: [phone]      Thank you for choosing us as your healthcare provider!  Dr. Cuauhtemoc Gardner    ALLERGY LIST  Review of patient's allergies indicates:   Allergen Reactions    Antihistamines - alkylamine Other (See Comments)     hallucinations    Benadryl [diphenhydramine hcl] Other (See Comments)     hallucinations    Codeine Itching     Turns red    Lodine [etodolac] Other (See Comments)     fatigue    Methotrexate analogues Other (See Comments)     Sunlight sensitivity    Morphine  "Itching     Turns red    Statins-hmg-coa reductase inhibitors        MEDICATION LIST  Medications Ordered Prior to Encounter[1]    HEALTH MAINTENANCE THAT IS OVERDUE OR NEEDS TO BE UPDATED ON OUR CHART IS LISTED BELOW.  IF YOU HAVE HAD IT DONE ELSEWHERE, PLEASE SEND US DATES AND RECORDS IF YOU HAVE THEM TO MAKE YOUR CHART ACCURATE.  IF YOU HAVE NOT HAD THESE DONE AND ARE READY FOR US TO SCHEDULE THEM, PLEASE SEND US A MESSAGE.  Health Maintenance Due   Topic Date Due    RSV Vaccine (Age 60+ and Pregnant patients) (1 - Risk 60-74 years 1-dose series) Never done    COVID-19 Vaccine (7 - 2024-25 season) 09/01/2024       DISCLAIMER: This note was compiled by using a speech recognition dictation system and therefore please be aware that typographical / speech recognition errors can and do occur.  Please contact me if you see any errors specifically.    Cuauhtemoc Gardner MD  We Offer Telehealth & Same Day Appointments!   Book your Telehealth appointment with me through my nurse or   Clinic appointments on AlertMe!  [phone]     Check out my Facebook Page and Follow Me at: CLICK HERE    Check out my website at [employer] by clicking on: CLICK HERE    To Schedule appointments online, go to AlertMe: CLICK HERE     Location: [email or web address]    [address] [place] [address]    FAX: [phone]        [1]   Current Outpatient Medications on File Prior to Visit   Medication Sig Dispense Refill    alendronate (FOSAMAX) 70 MG tablet Take 1 tablet (70 mg total) by mouth every 7 days. Take on empty stomach with glass water and remain upright for 30 minutes after taking 12 tablet 3    aspirin (ECOTRIN) 81 MG EC tablet Take 1 tablet (81 mg total) by mouth once daily. 90 tablet 3    blunt needle, disposable 18 x 1 1/2 " Ndle 1 Syringe by Misc.(Non-Drug; Combo Route) route once a week. 25 each 3    cyanocobalamin 1,000 mcg/mL injection Inject 1 mL (1,000 mcg total) into the skin every 7 " "days. 10 mL 3    diclofenac (VOLTAREN) 75 MG EC tablet Take 1 tablet (75 mg total) by mouth daily as needed. 90 tablet 1    diclofenac sodium (VOLTAREN) 1 % Gel Apply topically.      eszopiclone (LUNESTA) 3 mg Tab Take 1 tablet (3 mg total) by mouth every evening. 30 tablet 5    evolocumab (REPATHA SURECLICK) 140 mg/mL PnIj Inject 1 mL (140 mg total) into the skin every 14 (fourteen) days. 2 mL 12    golimumab (SIMPONI) 50 mg/0.5 mL PnIj Inject 50 mg into the skin every 30 days. 0.5 mL 12    hydrocortisone (CORTEF) 10 MG Tab TAKE 1 AND 1/2 TABLETS BY MOUTH IN THE MORNING AND 1 TABLET BY MOUTH IN THE EVENING AT 4PM 75 tablet 3    linaCLOtide (LINZESS) 72 mcg Cap capsule Take 1 capsule (72 mcg total) by mouth before breakfast. 30 capsule 5    needle, disp, 18 G (BD REGULAR BEVEL NEEDLES) 18 gauge x 1" Ndle 1 each by Misc.(Non-Drug; Combo Route) route once a week. Use to draw up testosterone 12 each 4    needle, disp, 30 gauge 30 gauge x 1/2" Ndle 1 each by Misc.(Non-Drug; Combo Route) route once a week. 25 each 3    safety needles (BD SAFETYGLIDE NEEDLE) 25 gauge x 1" Ndle 1 each by Misc.(Non-Drug; Combo Route) route every 7 days. 25 each 0    SYNTHROID 112 mcg tablet Take 1 tablet (112 mcg total) by mouth before breakfast. 90 tablet 3    syringe with needle 3 mL 25 gauge x 1" Syrg Inject 1 each into the muscle once a week. 12 each 3    syringe, disposable, 1 mL Syrg 1 Syringe by Misc.(Non-Drug; Combo Route) route once a week. 25 each 1    testosterone cypionate (DEPOTESTOTERONE CYPIONATE) 200 mg/mL injection Inject 0.25 mLs (50 mg total) into the muscle once a week. 3 mL 5    valsartan (DIOVAN) 80 MG tablet TAKE 1 TABLET (80 MG TOTAL) BY MOUTH ONCE DAILY. 90 tablet 3     No current facility-administered medications on file prior to visit.     "

## 2025-07-02 DIAGNOSIS — G47.00 INSOMNIA, UNSPECIFIED TYPE: ICD-10-CM

## 2025-07-05 RX ORDER — ESZOPICLONE 3 MG/1
3 TABLET, FILM COATED ORAL NIGHTLY
Qty: 30 TABLET | Refills: 4 | Status: SHIPPED | OUTPATIENT
Start: 2025-07-05

## 2025-07-20 DIAGNOSIS — E27.49 SECONDARY ADRENAL INSUFFICIENCY: ICD-10-CM

## 2025-07-21 RX ORDER — HYDROCORTISONE 10 MG/1
TABLET ORAL
Qty: 75 TABLET | Refills: 4 | Status: SHIPPED | OUTPATIENT
Start: 2025-07-21

## 2025-08-01 DIAGNOSIS — G47.00 INSOMNIA, UNSPECIFIED TYPE: ICD-10-CM

## 2025-08-01 RX ORDER — ESZOPICLONE 3 MG/1
3 TABLET, FILM COATED ORAL NIGHTLY
Qty: 30 TABLET | Refills: 4 | Status: CANCELLED | OUTPATIENT
Start: 2025-08-01

## 2025-08-01 RX ORDER — ESZOPICLONE 3 MG/1
3 TABLET, FILM COATED ORAL NIGHTLY
Qty: 30 TABLET | OUTPATIENT
Start: 2025-08-01

## 2025-08-04 RX ORDER — ESZOPICLONE 3 MG/1
3 TABLET, FILM COATED ORAL NIGHTLY
Qty: 30 TABLET | Refills: 4 | Status: SHIPPED | OUTPATIENT
Start: 2025-08-04

## 2025-08-21 ENCOUNTER — PATIENT MESSAGE (OUTPATIENT)
Dept: FAMILY MEDICINE | Facility: CLINIC | Age: 75
End: 2025-08-21
Payer: MEDICARE

## 2025-08-21 DIAGNOSIS — Z78.9 STATIN INTOLERANCE: Chronic | ICD-10-CM

## 2025-08-21 DIAGNOSIS — E78.2 MIXED HYPERLIPIDEMIA: Chronic | ICD-10-CM

## 2025-08-21 DIAGNOSIS — I25.118 CORONARY ARTERY DISEASE OF NATIVE ARTERY OF NATIVE HEART WITH STABLE ANGINA PECTORIS: ICD-10-CM

## 2025-08-21 RX ORDER — EVOLOCUMAB 140 MG/ML
140 INJECTION, SOLUTION SUBCUTANEOUS
Qty: 6 ML | Refills: 0 | Status: ACTIVE | OUTPATIENT
Start: 2025-08-21

## (undated) DEVICE — CATH MPA2 INFINITI 4FR 100CM

## (undated) DEVICE — SPONGE GAUZE 16PLY 4X4

## (undated) DEVICE — SYS FLEXNAV DELIVERY 15F LARGE

## (undated) DEVICE — HOOK STAY ELAS 5MM 8EA/PK

## (undated) DEVICE — SPIKE SHORT LG BORE 1-WAY 2IN

## (undated) DEVICE — DRESSING SURGICAL 1/2X1/2

## (undated) DEVICE — OMNIPAQUE 350 200ML

## (undated) DEVICE — MARKER SKIN STND TIP BLUE BARR

## (undated) DEVICE — HEMOSTAT SURGICEL 4X8IN

## (undated) DEVICE — TAPE SURG MEDIPORE 6X72IN

## (undated) DEVICE — DRESSING SURGICAL 1X3

## (undated) DEVICE — GUIDEWIRE X SPORT .014IN 190CM

## (undated) DEVICE — WARMER DRAPE STERILE LF

## (undated) DEVICE — ELECTRODE BLADE INSULATED 1 IN

## (undated) DEVICE — SHEATH INTRODUCER 6FR 11CM

## (undated) DEVICE — GUIDEWIRE AMPLATZ .035X260

## (undated) DEVICE — ELECTRODE REM PLYHSV RETURN 9

## (undated) DEVICE — LINE 60IN PRESSURE MON.

## (undated) DEVICE — KIT POWDER ABSORBABLE GELATIN

## (undated) DEVICE — TRAY CATH LAB OMC

## (undated) DEVICE — SHEATH INTRODUCER 8FR 11CM

## (undated) DEVICE — CATH JACKY RADIAL 3.5 110CM

## (undated) DEVICE — OMNIPAQUE CONTRAST 350MG/100ML

## (undated) DEVICE — GAUZE SPONGE 4X4 12PLY

## (undated) DEVICE — DRESSING TELFA STRL 4X3 LF

## (undated) DEVICE — CATH IMA INFINITI 4FRX100CM

## (undated) DEVICE — SUT VICRYL PLUS 3-0 SH 18IN

## (undated) DEVICE — SEALER AQUAMANTYS 2.3 BIPOLAR

## (undated) DEVICE — DRAPE STERI INSTRUMENT 1018

## (undated) DEVICE — CABLE PACING ALLGTR CLIP 12FT

## (undated) DEVICE — SEE MEDLINE ITEM 156905

## (undated) DEVICE — KIT CUSTOM MANIFOLD

## (undated) DEVICE — BLLN CATH TRUE DILATION 18MM

## (undated) DEVICE — SUT 4/0 18IN NUROLON BLK B

## (undated) DEVICE — KIT MICROINTRO 4F .018X40X7CM

## (undated) DEVICE — GUIDEWIRE EMERALD .035IN 260CM

## (undated) DEVICE — HOOK LONE STAR BLUNT 12MM

## (undated) DEVICE — GUIDEWIRE SUPRA CORE 035 190CM

## (undated) DEVICE — TRAY FOLEY 16FR INFECTION CONT

## (undated) DEVICE — TUBE FRAZIER 5MM 2FT SOFT TIP

## (undated) DEVICE — SNAP CAP 18 DOME COVERS

## (undated) DEVICE — DIFFUSER

## (undated) DEVICE — CATH IMPULSE 5F 100CM FR4

## (undated) DEVICE — TRANSDUCER ADULT DISP

## (undated) DEVICE — FLEXSHEATH DRYSEAL 14FR 33CM

## (undated) DEVICE — DEVICE PERCLOSE SUT CLSR 6FR

## (undated) DEVICE — HEMOSTAT VASC BAND REG 24CM

## (undated) DEVICE — DRAPE INCISE IOBAN 2 23X17IN

## (undated) DEVICE — SYS FLEXNAV LOADING LARGE

## (undated) DEVICE — TUBING HPCIL ROT M/F ADPT 10IN

## (undated) DEVICE — SEE MEDLINE ITEM 157131

## (undated) DEVICE — CARTRIDGE OIL

## (undated) DEVICE — Device

## (undated) DEVICE — SPRAY MASTISOL

## (undated) DEVICE — STOPCOCK 3-WAY

## (undated) DEVICE — CATH ALII 4FR INFINITY

## (undated) DEVICE — ROUTER TAPERED 2.3MM

## (undated) DEVICE — GUIDEWIRE EMERALD 150CM PTFE

## (undated) DEVICE — CORD BIPOLAR 12 FOOT

## (undated) DEVICE — BOWL STERILE LARGE 32OZ

## (undated) DEVICE — DRAPE ANGIO BRACH 38X44IN

## (undated) DEVICE — PAD DEFIB CADENCE ADULT R2

## (undated) DEVICE — TIP ASPIRATOR STRAIGHT MICRO D

## (undated) DEVICE — GUIDEWIRE STD .035X180CM ANG

## (undated) DEVICE — CATH DXTERITY PG145 110CM 6FR

## (undated) DEVICE — STAPLER SKIN PROXIMATE WIDE

## (undated) DEVICE — GUIDEWIRE CONFIDA BECKER CURVE

## (undated) DEVICE — CATH JACKY RADIAL 5FR 100CM

## (undated) DEVICE — KIT GLIDESHEATH SLEND 6FR 10CM